# Patient Record
Sex: FEMALE | Race: WHITE | Employment: OTHER | URBAN - METROPOLITAN AREA
[De-identification: names, ages, dates, MRNs, and addresses within clinical notes are randomized per-mention and may not be internally consistent; named-entity substitution may affect disease eponyms.]

---

## 2021-02-22 PROBLEM — D68.9 COAGULOPATHY (HCC): Status: ACTIVE | Noted: 2021-02-22

## 2021-02-22 PROBLEM — D64.9 ABSOLUTE ANEMIA: Status: ACTIVE | Noted: 2021-02-22

## 2021-11-28 PROBLEM — I48.91 ATRIAL FIBRILLATION WITH RVR (HCC): Status: ACTIVE | Noted: 2021-11-28

## 2021-12-03 PROBLEM — E44.0 MODERATE PROTEIN-CALORIE MALNUTRITION (HCC): Chronic | Status: ACTIVE | Noted: 2021-12-03

## 2022-05-03 PROBLEM — I50.23 ACUTE ON CHRONIC SYSTOLIC (CONGESTIVE) HEART FAILURE (HCC): Status: ACTIVE | Noted: 2022-05-03

## 2023-08-18 PROCEDURE — 93295 DEV INTERROG REMOTE 1/2/MLT: CPT | Performed by: INTERNAL MEDICINE

## 2023-08-18 PROCEDURE — G2066 INTER DEVC REMOTE 30D: HCPCS | Performed by: INTERNAL MEDICINE

## 2023-08-18 PROCEDURE — 93296 REM INTERROG EVL PM/IDS: CPT | Performed by: INTERNAL MEDICINE

## 2023-08-18 PROCEDURE — 93297 REM INTERROG DEV EVAL ICPMS: CPT | Performed by: INTERNAL MEDICINE

## 2023-08-30 ENCOUNTER — HOSPITAL ENCOUNTER (EMERGENCY)
Age: 78
Discharge: HOME | End: 2023-08-30
Attending: EMERGENCY MEDICINE
Payer: COMMERCIAL

## 2023-08-30 ENCOUNTER — APPOINTMENT (OUTPATIENT)
Dept: ULTRASOUND IMAGING | Age: 78
End: 2023-08-30
Payer: COMMERCIAL

## 2023-08-30 VITALS
WEIGHT: 96 LBS | DIASTOLIC BLOOD PRESSURE: 63 MMHG | HEART RATE: 91 BPM | OXYGEN SATURATION: 96 % | SYSTOLIC BLOOD PRESSURE: 130 MMHG | TEMPERATURE: 98.2 F | BODY MASS INDEX: 18.14 KG/M2 | RESPIRATION RATE: 18 BRPM

## 2023-08-30 DIAGNOSIS — I80.8 SUPERFICIAL THROMBOPHLEBITIS OF RIGHT UPPER EXTREMITY: Primary | ICD-10-CM

## 2023-08-30 LAB
INR PPP: 4.1
PROTHROMBIN TIME: 44.2 SEC (ref 9.3–12.4)

## 2023-08-30 PROCEDURE — 99284 EMERGENCY DEPT VISIT MOD MDM: CPT

## 2023-08-30 PROCEDURE — 93971 EXTREMITY STUDY: CPT

## 2023-08-30 PROCEDURE — 85610 PROTHROMBIN TIME: CPT

## 2023-08-30 RX ORDER — CEPHALEXIN 250 MG/1
500 CAPSULE ORAL 3 TIMES DAILY
Qty: 30 CAPSULE | Refills: 0 | Status: SHIPPED | OUTPATIENT
Start: 2023-08-30 | End: 2023-09-04

## 2023-08-30 ASSESSMENT — PAIN - FUNCTIONAL ASSESSMENT: PAIN_FUNCTIONAL_ASSESSMENT: 0-10

## 2023-08-30 ASSESSMENT — LIFESTYLE VARIABLES
HOW OFTEN DO YOU HAVE A DRINK CONTAINING ALCOHOL: NEVER
HOW MANY STANDARD DRINKS CONTAINING ALCOHOL DO YOU HAVE ON A TYPICAL DAY: PATIENT DOES NOT DRINK

## 2023-08-30 ASSESSMENT — PAIN SCALES - GENERAL: PAINLEVEL_OUTOF10: 10

## 2023-08-30 NOTE — ED NOTES
Department of Emergency Medicine  FIRST PROVIDER TRIAGE NOTE             Independent MLP           8/30/23  1:03 PM EDT    Date of Encounter: 8/30/23   MRN: [id number]      HPI: Rosalba Lees is a 77 y.o. female who presents to the ED for Arm Pain (Right arm elbow to hand, pt thinks arm is swollen, on coumadin.)         ROS: Negative for cp or sob. PE: Gen Appearance/Constitutional: alert  HEENT: NC/NT. PERRLA,  Airway patent. Initial Plan of Care: All treatment areas with department are currently occupied. Plan to order/Initiate the following while awaiting opening in ED: none.   Initiate Treatment-Testing, Proceed toTreatment Area When Bed Available for ED Attending/MLP to Continue Care    Electronically signed by Carlos Paniagua PA-C   DD: 8/30/23      Carlos Paniagua PA-C  08/30/23 [id number]

## 2023-08-30 NOTE — ED PROVIDER NOTES
plan.       --------------------------------- IMPRESSION AND DISPOSITION ---------------------------------    IMPRESSION  1. Superficial thrombophlebitis of right upper extremity        DISPOSITION  Disposition: Discharge to home  Patient condition is stable        NOTE: This report was transcribed using voice recognition software.  Every effort was made to ensure accuracy; however, inadvertent computerized transcription errors may be present        Jerome Valentine MD  08/30/23 5635

## 2023-09-01 ENCOUNTER — HOSPITAL ENCOUNTER (EMERGENCY)
Age: 78
Discharge: ANOTHER HOSPITAL | End: 2023-09-01
Attending: EMERGENCY MEDICINE
Payer: COMMERCIAL

## 2023-09-01 ENCOUNTER — HOSPITAL ENCOUNTER (INPATIENT)
Age: 78
LOS: 2 days | Discharge: HOME | DRG: 868 | End: 2023-09-03
Attending: INTERNAL MEDICINE | Admitting: INTERNAL MEDICINE
Payer: MEDICARE

## 2023-09-01 ENCOUNTER — APPOINTMENT (OUTPATIENT)
Dept: GENERAL RADIOLOGY | Age: 78
End: 2023-09-01
Payer: COMMERCIAL

## 2023-09-01 VITALS
TEMPERATURE: 97.9 F | RESPIRATION RATE: 18 BRPM | OXYGEN SATURATION: 96 % | SYSTOLIC BLOOD PRESSURE: 110 MMHG | HEART RATE: 80 BPM | DIASTOLIC BLOOD PRESSURE: 60 MMHG

## 2023-09-01 DIAGNOSIS — L03.113 CELLULITIS OF RIGHT UPPER EXTREMITY: Primary | ICD-10-CM

## 2023-09-01 PROBLEM — L03.90 CELLULITIS: Status: ACTIVE | Noted: 2023-09-01

## 2023-09-01 LAB
ANION GAP SERPL CALCULATED.3IONS-SCNC: 11 MMOL/L (ref 7–16)
BASOPHILS # BLD: 0.03 K/UL (ref 0–0.2)
BASOPHILS NFR BLD: 1 % (ref 0–2)
BUN SERPL-MCNC: 18 MG/DL (ref 6–23)
CALCIUM SERPL-MCNC: 9.4 MG/DL (ref 8.6–10.2)
CHLORIDE SERPL-SCNC: 98 MMOL/L (ref 98–107)
CO2 SERPL-SCNC: 29 MMOL/L (ref 22–29)
CREAT SERPL-MCNC: 1.1 MG/DL (ref 0.5–1)
EOSINOPHIL # BLD: 0.21 K/UL (ref 0.05–0.5)
EOSINOPHILS RELATIVE PERCENT: 4 % (ref 0–6)
ERYTHROCYTE [DISTWIDTH] IN BLOOD BY AUTOMATED COUNT: 17.7 % (ref 11.5–15)
GFR SERPL CREATININE-BSD FRML MDRD: 49 ML/MIN/1.73M2
GLUCOSE SERPL-MCNC: 98 MG/DL (ref 74–99)
HCT VFR BLD AUTO: 42.6 % (ref 34–48)
HGB BLD-MCNC: 12.6 G/DL (ref 11.5–15.5)
IMM GRANULOCYTES # BLD AUTO: <0.03 K/UL (ref 0–0.58)
IMM GRANULOCYTES NFR BLD: 0 % (ref 0–5)
INR PPP: 2.5
LYMPHOCYTES NFR BLD: 0.42 K/UL (ref 1.5–4)
LYMPHOCYTES RELATIVE PERCENT: 8 % (ref 20–42)
MCH RBC QN AUTO: 25.6 PG (ref 26–35)
MCHC RBC AUTO-ENTMCNC: 29.6 G/DL (ref 32–34.5)
MCV RBC AUTO: 86.6 FL (ref 80–99.9)
MICROORGANISM SPEC CULT: NORMAL
MICROORGANISM SPEC CULT: NORMAL
MONOCYTES NFR BLD: 0.62 K/UL (ref 0.1–0.95)
MONOCYTES NFR BLD: 11 % (ref 2–12)
NEUTROPHILS NFR BLD: 77 % (ref 43–80)
NEUTS SEG NFR BLD: 4.25 K/UL (ref 1.8–7.3)
PLATELET # BLD AUTO: 160 K/UL (ref 130–450)
PMV BLD AUTO: 12 FL (ref 7–12)
POTASSIUM SERPL-SCNC: 4.8 MMOL/L (ref 3.5–5)
PROTHROMBIN TIME: 27.8 SEC (ref 9.3–12.4)
RBC # BLD AUTO: 4.92 M/UL (ref 3.5–5.5)
SERVICE CMNT-IMP: NORMAL
SERVICE CMNT-IMP: NORMAL
SODIUM SERPL-SCNC: 138 MMOL/L (ref 132–146)
SPECIMEN DESCRIPTION: NORMAL
SPECIMEN DESCRIPTION: NORMAL
WBC OTHER # BLD: 5.6 K/UL (ref 4.5–11.5)

## 2023-09-01 PROCEDURE — 96375 TX/PRO/DX INJ NEW DRUG ADDON: CPT

## 2023-09-01 PROCEDURE — 1200000000 HC SEMI PRIVATE

## 2023-09-01 PROCEDURE — 87040 BLOOD CULTURE FOR BACTERIA: CPT

## 2023-09-01 PROCEDURE — 2580000003 HC RX 258

## 2023-09-01 PROCEDURE — 6360000002 HC RX W HCPCS: Performed by: STUDENT IN AN ORGANIZED HEALTH CARE EDUCATION/TRAINING PROGRAM

## 2023-09-01 PROCEDURE — 96374 THER/PROPH/DIAG INJ IV PUSH: CPT

## 2023-09-01 PROCEDURE — 80048 BASIC METABOLIC PNL TOTAL CA: CPT

## 2023-09-01 PROCEDURE — 85610 PROTHROMBIN TIME: CPT

## 2023-09-01 PROCEDURE — 73130 X-RAY EXAM OF HAND: CPT

## 2023-09-01 PROCEDURE — 85025 COMPLETE CBC W/AUTO DIFF WBC: CPT

## 2023-09-01 PROCEDURE — 99285 EMERGENCY DEPT VISIT HI MDM: CPT

## 2023-09-01 PROCEDURE — 2580000003 HC RX 258: Performed by: STUDENT IN AN ORGANIZED HEALTH CARE EDUCATION/TRAINING PROGRAM

## 2023-09-01 PROCEDURE — 6360000002 HC RX W HCPCS: Performed by: INTERNAL MEDICINE

## 2023-09-01 PROCEDURE — 6370000000 HC RX 637 (ALT 250 FOR IP): Performed by: INTERNAL MEDICINE

## 2023-09-01 PROCEDURE — 2580000003 HC RX 258: Performed by: INTERNAL MEDICINE

## 2023-09-01 PROCEDURE — 6360000002 HC RX W HCPCS

## 2023-09-01 PROCEDURE — 73090 X-RAY EXAM OF FOREARM: CPT

## 2023-09-01 RX ORDER — ACETAMINOPHEN 325 MG/1
650 TABLET ORAL EVERY 6 HOURS PRN
Status: DISCONTINUED | OUTPATIENT
Start: 2023-09-01 | End: 2023-09-03 | Stop reason: HOSPADM

## 2023-09-01 RX ORDER — SENNOSIDES A AND B 8.6 MG/1
1 TABLET, FILM COATED ORAL DAILY PRN
Status: DISCONTINUED | OUTPATIENT
Start: 2023-09-01 | End: 2023-09-03 | Stop reason: HOSPADM

## 2023-09-01 RX ORDER — ACETAMINOPHEN 650 MG/1
650 SUPPOSITORY RECTAL EVERY 6 HOURS PRN
Status: CANCELLED | OUTPATIENT
Start: 2023-09-01

## 2023-09-01 RX ORDER — ACETAMINOPHEN 650 MG/1
650 SUPPOSITORY RECTAL EVERY 6 HOURS PRN
Status: DISCONTINUED | OUTPATIENT
Start: 2023-09-01 | End: 2023-09-03 | Stop reason: HOSPADM

## 2023-09-01 RX ORDER — POTASSIUM CHLORIDE 7.45 MG/ML
10 INJECTION INTRAVENOUS PRN
Status: DISCONTINUED | OUTPATIENT
Start: 2023-09-01 | End: 2023-09-01

## 2023-09-01 RX ORDER — FENTANYL CITRATE 50 UG/ML
25 INJECTION, SOLUTION INTRAMUSCULAR; INTRAVENOUS ONCE
Status: COMPLETED | OUTPATIENT
Start: 2023-09-01 | End: 2023-09-01

## 2023-09-01 RX ORDER — POTASSIUM CHLORIDE 20 MEQ/1
40 TABLET, EXTENDED RELEASE ORAL PRN
Status: DISCONTINUED | OUTPATIENT
Start: 2023-09-01 | End: 2023-09-01

## 2023-09-01 RX ORDER — CHOLECALCIFEROL (VITAMIN D3) 50 MCG
2000 TABLET ORAL DAILY
Status: DISCONTINUED | OUTPATIENT
Start: 2023-09-01 | End: 2023-09-03 | Stop reason: HOSPADM

## 2023-09-01 RX ORDER — DIGOXIN 125 MCG
62.5 TABLET ORAL
Status: DISCONTINUED | OUTPATIENT
Start: 2023-09-01 | End: 2023-09-03 | Stop reason: HOSPADM

## 2023-09-01 RX ORDER — ARFORMOTEROL TARTRATE 15 UG/2ML
15 SOLUTION RESPIRATORY (INHALATION)
Status: DISCONTINUED | OUTPATIENT
Start: 2023-09-01 | End: 2023-09-03 | Stop reason: HOSPADM

## 2023-09-01 RX ORDER — BUDESONIDE AND FORMOTEROL FUMARATE DIHYDRATE 160; 4.5 UG/1; UG/1
2 AEROSOL RESPIRATORY (INHALATION) 2 TIMES DAILY
Status: CANCELLED | OUTPATIENT
Start: 2023-09-01

## 2023-09-01 RX ORDER — CALCITRIOL 0.25 UG/1
0.25 CAPSULE, LIQUID FILLED ORAL DAILY
Status: CANCELLED | OUTPATIENT
Start: 2023-09-01

## 2023-09-01 RX ORDER — VITAMIN C
1 TAB ORAL DAILY
Status: DISCONTINUED | OUTPATIENT
Start: 2023-09-01 | End: 2023-09-03 | Stop reason: HOSPADM

## 2023-09-01 RX ORDER — VITAMIN B COMPLEX
1 CAPSULE ORAL DAILY
Status: CANCELLED | OUTPATIENT
Start: 2023-09-01

## 2023-09-01 RX ORDER — BUDESONIDE 0.5 MG/2ML
0.5 INHALANT ORAL
Status: DISCONTINUED | OUTPATIENT
Start: 2023-09-01 | End: 2023-09-03 | Stop reason: HOSPADM

## 2023-09-01 RX ORDER — MULTIVIT-MIN/IRON/FOLIC ACID/K 18-600-40
2000 CAPSULE ORAL DAILY
Status: CANCELLED | OUTPATIENT
Start: 2023-09-01

## 2023-09-01 RX ORDER — ONDANSETRON 4 MG/1
4 TABLET, ORALLY DISINTEGRATING ORAL EVERY 8 HOURS PRN
Status: DISCONTINUED | OUTPATIENT
Start: 2023-09-01 | End: 2023-09-03 | Stop reason: HOSPADM

## 2023-09-01 RX ORDER — METOPROLOL SUCCINATE 25 MG/1
25 TABLET, EXTENDED RELEASE ORAL 2 TIMES DAILY WITH MEALS
Status: DISCONTINUED | OUTPATIENT
Start: 2023-09-01 | End: 2023-09-02

## 2023-09-01 RX ORDER — LEVOTHYROXINE SODIUM 112 UG/1
112 TABLET ORAL DAILY
Status: CANCELLED | OUTPATIENT
Start: 2023-09-01

## 2023-09-01 RX ORDER — SODIUM CHLORIDE 0.9 % (FLUSH) 0.9 %
10 SYRINGE (ML) INJECTION EVERY 12 HOURS SCHEDULED
Status: DISCONTINUED | OUTPATIENT
Start: 2023-09-01 | End: 2023-09-03 | Stop reason: HOSPADM

## 2023-09-01 RX ORDER — TORSEMIDE 20 MG/1
20 TABLET ORAL DAILY
Status: DISCONTINUED | OUTPATIENT
Start: 2023-09-01 | End: 2023-09-02

## 2023-09-01 RX ORDER — METOPROLOL SUCCINATE 25 MG/1
25 TABLET, EXTENDED RELEASE ORAL 2 TIMES DAILY WITH MEALS
Status: CANCELLED | OUTPATIENT
Start: 2023-09-01

## 2023-09-01 RX ORDER — METOLAZONE 2.5 MG/1
2.5 TABLET ORAL DAILY
Status: DISCONTINUED | OUTPATIENT
Start: 2023-09-01 | End: 2023-09-02

## 2023-09-01 RX ORDER — DIGOXIN 0.06 MG/1
62.5 TABLET ORAL SEE ADMIN INSTRUCTIONS
Status: CANCELLED | OUTPATIENT
Start: 2023-09-01

## 2023-09-01 RX ORDER — SENNOSIDES A AND B 8.6 MG/1
1 TABLET, FILM COATED ORAL DAILY PRN
Status: CANCELLED | OUTPATIENT
Start: 2023-09-01

## 2023-09-01 RX ORDER — ALLOPURINOL 100 MG/1
100 TABLET ORAL DAILY
Status: DISCONTINUED | OUTPATIENT
Start: 2023-09-01 | End: 2023-09-03 | Stop reason: HOSPADM

## 2023-09-01 RX ORDER — BUDESONIDE AND FORMOTEROL FUMARATE DIHYDRATE 160; 4.5 UG/1; UG/1
2 AEROSOL RESPIRATORY (INHALATION) 2 TIMES DAILY
Status: DISCONTINUED | OUTPATIENT
Start: 2023-09-01 | End: 2023-09-01 | Stop reason: CLARIF

## 2023-09-01 RX ORDER — FOLIC ACID 1 MG/1
1 TABLET ORAL DAILY
Status: DISCONTINUED | OUTPATIENT
Start: 2023-09-01 | End: 2023-09-03 | Stop reason: HOSPADM

## 2023-09-01 RX ORDER — MAGNESIUM SULFATE IN WATER 40 MG/ML
2000 INJECTION, SOLUTION INTRAVENOUS PRN
Status: CANCELLED | OUTPATIENT
Start: 2023-09-01

## 2023-09-01 RX ORDER — SODIUM CHLORIDE 9 MG/ML
INJECTION, SOLUTION INTRAVENOUS PRN
Status: DISCONTINUED | OUTPATIENT
Start: 2023-09-01 | End: 2023-09-03 | Stop reason: HOSPADM

## 2023-09-01 RX ORDER — SODIUM CHLORIDE 0.9 % (FLUSH) 0.9 %
10 SYRINGE (ML) INJECTION PRN
Status: DISCONTINUED | OUTPATIENT
Start: 2023-09-01 | End: 2023-09-03 | Stop reason: HOSPADM

## 2023-09-01 RX ORDER — LEVOTHYROXINE SODIUM 112 UG/1
112 TABLET ORAL DAILY
Status: DISCONTINUED | OUTPATIENT
Start: 2023-09-02 | End: 2023-09-03 | Stop reason: HOSPADM

## 2023-09-01 RX ORDER — FERROUS SULFATE 325(65) MG
325 TABLET ORAL 2 TIMES DAILY
Status: CANCELLED | OUTPATIENT
Start: 2023-09-01

## 2023-09-01 RX ORDER — MAGNESIUM SULFATE IN WATER 40 MG/ML
2000 INJECTION, SOLUTION INTRAVENOUS PRN
Status: DISCONTINUED | OUTPATIENT
Start: 2023-09-01 | End: 2023-09-01

## 2023-09-01 RX ORDER — ALLOPURINOL 100 MG/1
100 TABLET ORAL DAILY
Status: CANCELLED | OUTPATIENT
Start: 2023-09-01

## 2023-09-01 RX ORDER — ONDANSETRON 2 MG/ML
4 INJECTION INTRAMUSCULAR; INTRAVENOUS EVERY 6 HOURS PRN
Status: CANCELLED | OUTPATIENT
Start: 2023-09-01

## 2023-09-01 RX ORDER — ONDANSETRON 2 MG/ML
4 INJECTION INTRAMUSCULAR; INTRAVENOUS EVERY 6 HOURS PRN
Status: DISCONTINUED | OUTPATIENT
Start: 2023-09-01 | End: 2023-09-03 | Stop reason: HOSPADM

## 2023-09-01 RX ORDER — FOLIC ACID 1 MG/1
1 TABLET ORAL DAILY
Status: CANCELLED | OUTPATIENT
Start: 2023-09-01

## 2023-09-01 RX ORDER — SODIUM CHLORIDE 0.9 % (FLUSH) 0.9 %
10 SYRINGE (ML) INJECTION PRN
Status: CANCELLED | OUTPATIENT
Start: 2023-09-01

## 2023-09-01 RX ORDER — FERROUS SULFATE 325(65) MG
325 TABLET ORAL 2 TIMES DAILY
Status: DISCONTINUED | OUTPATIENT
Start: 2023-09-01 | End: 2023-09-03 | Stop reason: HOSPADM

## 2023-09-01 RX ORDER — SODIUM CHLORIDE 0.9 % (FLUSH) 0.9 %
10 SYRINGE (ML) INJECTION EVERY 12 HOURS SCHEDULED
Status: CANCELLED | OUTPATIENT
Start: 2023-09-01

## 2023-09-01 RX ORDER — CALCITRIOL 0.25 UG/1
0.25 CAPSULE, LIQUID FILLED ORAL DAILY
Status: DISCONTINUED | OUTPATIENT
Start: 2023-09-01 | End: 2023-09-03 | Stop reason: HOSPADM

## 2023-09-01 RX ORDER — POTASSIUM CHLORIDE 20 MEQ/1
40 TABLET, EXTENDED RELEASE ORAL PRN
Status: CANCELLED | OUTPATIENT
Start: 2023-09-01

## 2023-09-01 RX ORDER — WARFARIN SODIUM 2.5 MG/1
2.5 TABLET ORAL
Status: COMPLETED | OUTPATIENT
Start: 2023-09-01 | End: 2023-09-01

## 2023-09-01 RX ORDER — ACETAMINOPHEN 325 MG/1
650 TABLET ORAL EVERY 6 HOURS PRN
Status: CANCELLED | OUTPATIENT
Start: 2023-09-01

## 2023-09-01 RX ORDER — POTASSIUM CHLORIDE 7.45 MG/ML
10 INJECTION INTRAVENOUS PRN
Status: CANCELLED | OUTPATIENT
Start: 2023-09-01

## 2023-09-01 RX ORDER — METOLAZONE 2.5 MG/1
2.5 TABLET ORAL DAILY
Status: CANCELLED | OUTPATIENT
Start: 2023-09-01

## 2023-09-01 RX ORDER — ONDANSETRON 4 MG/1
4 TABLET, ORALLY DISINTEGRATING ORAL EVERY 8 HOURS PRN
Status: CANCELLED | OUTPATIENT
Start: 2023-09-01

## 2023-09-01 RX ORDER — SODIUM CHLORIDE 9 MG/ML
INJECTION, SOLUTION INTRAVENOUS PRN
Status: CANCELLED | OUTPATIENT
Start: 2023-09-01

## 2023-09-01 RX ORDER — TORSEMIDE 20 MG/1
20 TABLET ORAL DAILY
Status: CANCELLED | OUTPATIENT
Start: 2023-09-01

## 2023-09-01 RX ORDER — POTASSIUM CHLORIDE 750 MG/1
10 TABLET, EXTENDED RELEASE ORAL DAILY
Status: CANCELLED | OUTPATIENT
Start: 2023-09-01

## 2023-09-01 RX ORDER — IPRATROPIUM BROMIDE AND ALBUTEROL SULFATE 2.5; .5 MG/3ML; MG/3ML
1 SOLUTION RESPIRATORY (INHALATION)
Status: DISCONTINUED | OUTPATIENT
Start: 2023-09-01 | End: 2023-09-03 | Stop reason: HOSPADM

## 2023-09-01 RX ORDER — POTASSIUM CHLORIDE 750 MG/1
10 TABLET, EXTENDED RELEASE ORAL DAILY
Status: DISCONTINUED | OUTPATIENT
Start: 2023-09-01 | End: 2023-09-03 | Stop reason: HOSPADM

## 2023-09-01 RX ORDER — LANOLIN ALCOHOL/MO/W.PET/CERES
3 CREAM (GRAM) TOPICAL NIGHTLY PRN
Status: CANCELLED | OUTPATIENT
Start: 2023-09-01

## 2023-09-01 RX ORDER — LANOLIN ALCOHOL/MO/W.PET/CERES
3 CREAM (GRAM) TOPICAL NIGHTLY PRN
Status: DISCONTINUED | OUTPATIENT
Start: 2023-09-01 | End: 2023-09-03 | Stop reason: HOSPADM

## 2023-09-01 RX ADMIN — POTASSIUM CHLORIDE 10 MEQ: 750 TABLET, EXTENDED RELEASE ORAL at 21:34

## 2023-09-01 RX ADMIN — CALCITRIOL 0.25 MCG: 0.25 CAPSULE ORAL at 21:34

## 2023-09-01 RX ADMIN — Medication 1 TABLET: at 21:34

## 2023-09-01 RX ADMIN — SODIUM CHLORIDE, PRESERVATIVE FREE 10 ML: 5 INJECTION INTRAVENOUS at 22:30

## 2023-09-01 RX ADMIN — WATER 1000 MG: 1 INJECTION INTRAMUSCULAR; INTRAVENOUS; SUBCUTANEOUS at 11:48

## 2023-09-01 RX ADMIN — DIGOXIN 62.5 MCG: 0.12 TABLET ORAL at 22:25

## 2023-09-01 RX ADMIN — Medication 2000 UNITS: at 21:36

## 2023-09-01 RX ADMIN — METOPROLOL SUCCINATE 25 MG: 25 TABLET, EXTENDED RELEASE ORAL at 21:35

## 2023-09-01 RX ADMIN — ONDANSETRON 4 MG: 2 INJECTION INTRAMUSCULAR; INTRAVENOUS at 22:35

## 2023-09-01 RX ADMIN — ALLOPURINOL 100 MG: 100 TABLET ORAL at 21:35

## 2023-09-01 RX ADMIN — TORSEMIDE 20 MG: 20 TABLET ORAL at 21:35

## 2023-09-01 RX ADMIN — WATER 2000 MG: 1 INJECTION INTRAMUSCULAR; INTRAVENOUS; SUBCUTANEOUS at 22:26

## 2023-09-01 RX ADMIN — WARFARIN SODIUM 2.5 MG: 2.5 TABLET ORAL at 21:35

## 2023-09-01 RX ADMIN — FERROUS SULFATE TAB 325 MG (65 MG ELEMENTAL FE) 325 MG: 325 (65 FE) TAB at 21:34

## 2023-09-01 RX ADMIN — FENTANYL CITRATE 25 MCG: 50 INJECTION, SOLUTION INTRAMUSCULAR; INTRAVENOUS at 11:47

## 2023-09-01 ASSESSMENT — PAIN DESCRIPTION - DESCRIPTORS
DESCRIPTORS: PATIENT UNABLE TO DESCRIBE
DESCRIPTORS: THROBBING;DISCOMFORT
DESCRIPTORS: ACHING;THROBBING;DISCOMFORT

## 2023-09-01 ASSESSMENT — PAIN SCALES - GENERAL
PAINLEVEL_OUTOF10: 10
PAINLEVEL_OUTOF10: 10
PAINLEVEL_OUTOF10: 8

## 2023-09-01 ASSESSMENT — PAIN DESCRIPTION - LOCATION
LOCATION: HAND
LOCATION: HAND
LOCATION: ARM

## 2023-09-01 ASSESSMENT — PAIN DESCRIPTION - PAIN TYPE: TYPE: ACUTE PAIN

## 2023-09-01 ASSESSMENT — PAIN DESCRIPTION - ORIENTATION
ORIENTATION: RIGHT

## 2023-09-01 ASSESSMENT — PAIN - FUNCTIONAL ASSESSMENT: PAIN_FUNCTIONAL_ASSESSMENT: 0-10

## 2023-09-01 ASSESSMENT — PAIN DESCRIPTION - FREQUENCY: FREQUENCY: CONTINUOUS

## 2023-09-01 ASSESSMENT — LIFESTYLE VARIABLES: HOW OFTEN DO YOU HAVE A DRINK CONTAINING ALCOHOL: NEVER

## 2023-09-01 ASSESSMENT — PAIN DESCRIPTION - ONSET: ONSET: ON-GOING

## 2023-09-01 NOTE — ED PROVIDER NOTES
[address] [address], [facility] ENCOUNTER        Pt Name: James Omalley  MRN: [id number]  [address] [facility] [place] 1945  Date of evaluation: 9/1/2023  Provider: Luz Marina Churchill DO  PCP: Seth Guardado MD  Note Started: 11:29 AM EDT 9/1/23    CHIEF COMPLAINT       Chief Complaint   Patient presents with    Swelling     Swelling to right hand. Seen at the emergency room on 8/30 and prescribed cephalexin. Diagnosed with thrombophlebitis. Not getting better. HISTORY OF PRESENT ILLNESS: 1 or more Elements   History From: Patient    Limitations to history : None    James Omalley is a 77 y.o. female who presents to the emergency department with chief complaint of hand swelling. Patient states that she had routine lab work drawn on 8/29/2023 in her right forearm and states that on 8/30/2023 she woke up and was having erythema around the IV site and some tenderness on the right lateral forearm. She states that she was evaluated at [facility] emergency department at that time and was diagnosed with thrombophlebitis and was prescribed Keflex for infection and inflammation. Patient states that since that time she has not taken the medication as directed but has not had significant improvement of her symptoms. She states that symptoms have actually worsened and she is now having pain and swelling in the right hand as well as the right forearm. Patient does have mild streaking progressing from this area towards her right axillary region as well. Nothing seems to make the symptoms any better and they are worsened with palpation or movement of the hand. Patient denies any fever, chills, nausea, vomiting, chest pain, shortness of breath, abdominal pain, hematuria or dysuria, constipation or diarrhea. Nursing Notes were all reviewed and agreed with or any disagreements were addressed in the HPI.     REVIEW OF SYSTEMS :      Positives and

## 2023-09-02 PROBLEM — I25.10 CAD (CORONARY ARTERY DISEASE): Status: ACTIVE | Noted: 2023-09-02

## 2023-09-02 PROBLEM — L03.90 CELLULITIS: Status: RESOLVED | Noted: 2023-09-01 | Resolved: 2023-09-02

## 2023-09-02 LAB
ALBUMIN SERPL-MCNC: 3.7 G/DL (ref 3.5–5.2)
ALP SERPL-CCNC: 76 U/L (ref 35–104)
ALT SERPL-CCNC: 13 U/L (ref 0–32)
ANION GAP SERPL CALCULATED.3IONS-SCNC: 11 MMOL/L (ref 7–16)
AST SERPL-CCNC: 37 U/L (ref 0–31)
BASOPHILS # BLD: 0.02 K/UL (ref 0–0.2)
BASOPHILS NFR BLD: 0 % (ref 0–2)
BILIRUB SERPL-MCNC: 0.6 MG/DL (ref 0–1.2)
BUN SERPL-MCNC: 18 MG/DL (ref 6–23)
CALCIUM SERPL-MCNC: 9.2 MG/DL (ref 8.6–10.2)
CHLORIDE SERPL-SCNC: 100 MMOL/L (ref 98–107)
CO2 SERPL-SCNC: 28 MMOL/L (ref 22–29)
CREAT SERPL-MCNC: 1.1 MG/DL (ref 0.5–1)
EOSINOPHIL # BLD: 0.4 K/UL (ref 0.05–0.5)
EOSINOPHILS RELATIVE PERCENT: 7 % (ref 0–6)
ERYTHROCYTE [DISTWIDTH] IN BLOOD BY AUTOMATED COUNT: 16.9 % (ref 11.5–15)
GFR SERPL CREATININE-BSD FRML MDRD: 49 ML/MIN/1.73M2
GLUCOSE SERPL-MCNC: 106 MG/DL (ref 74–99)
HCT VFR BLD AUTO: 38.8 % (ref 34–48)
HGB BLD-MCNC: 11.4 G/DL (ref 11.5–15.5)
IMM GRANULOCYTES # BLD AUTO: <0.03 K/UL (ref 0–0.58)
IMM GRANULOCYTES NFR BLD: 0 % (ref 0–5)
INR PPP: 2.7
LYMPHOCYTES NFR BLD: 0.51 K/UL (ref 1.5–4)
LYMPHOCYTES RELATIVE PERCENT: 9 % (ref 20–42)
MCH RBC QN AUTO: 26 PG (ref 26–35)
MCHC RBC AUTO-ENTMCNC: 29.4 G/DL (ref 32–34.5)
MCV RBC AUTO: 88.6 FL (ref 80–99.9)
MONOCYTES NFR BLD: 0.75 K/UL (ref 0.1–0.95)
MONOCYTES NFR BLD: 13 % (ref 2–12)
NEUTROPHILS NFR BLD: 72 % (ref 43–80)
NEUTS SEG NFR BLD: 4.27 K/UL (ref 1.8–7.3)
PLATELET # BLD AUTO: 165 K/UL (ref 130–450)
PMV BLD AUTO: 12.5 FL (ref 7–12)
POTASSIUM SERPL-SCNC: 4.4 MMOL/L (ref 3.5–5)
PROT SERPL-MCNC: 7.1 G/DL (ref 6.4–8.3)
PROTHROMBIN TIME: 30.1 SEC (ref 9.3–12.4)
RBC # BLD AUTO: 4.38 M/UL (ref 3.5–5.5)
RBC # BLD: ABNORMAL 10*6/UL
SODIUM SERPL-SCNC: 139 MMOL/L (ref 132–146)
WBC OTHER # BLD: 6 K/UL (ref 4.5–11.5)

## 2023-09-02 PROCEDURE — 2580000003 HC RX 258: Performed by: STUDENT IN AN ORGANIZED HEALTH CARE EDUCATION/TRAINING PROGRAM

## 2023-09-02 PROCEDURE — 85610 PROTHROMBIN TIME: CPT

## 2023-09-02 PROCEDURE — 36415 COLL VENOUS BLD VENIPUNCTURE: CPT

## 2023-09-02 PROCEDURE — 1200000000 HC SEMI PRIVATE

## 2023-09-02 PROCEDURE — 2580000003 HC RX 258: Performed by: INTERNAL MEDICINE

## 2023-09-02 PROCEDURE — 85025 COMPLETE CBC W/AUTO DIFF WBC: CPT

## 2023-09-02 PROCEDURE — 6370000000 HC RX 637 (ALT 250 FOR IP): Performed by: INTERNAL MEDICINE

## 2023-09-02 PROCEDURE — 80053 COMPREHEN METABOLIC PANEL: CPT

## 2023-09-02 PROCEDURE — 6360000002 HC RX W HCPCS: Performed by: STUDENT IN AN ORGANIZED HEALTH CARE EDUCATION/TRAINING PROGRAM

## 2023-09-02 RX ORDER — TORSEMIDE 20 MG/1
20 TABLET ORAL DAILY
Status: DISCONTINUED | OUTPATIENT
Start: 2023-09-03 | End: 2023-09-02

## 2023-09-02 RX ORDER — METOPROLOL SUCCINATE 25 MG/1
25 TABLET, EXTENDED RELEASE ORAL 2 TIMES DAILY WITH MEALS
Status: DISCONTINUED | OUTPATIENT
Start: 2023-09-02 | End: 2023-09-02

## 2023-09-02 RX ORDER — METOPROLOL SUCCINATE 25 MG/1
25 TABLET, EXTENDED RELEASE ORAL 2 TIMES DAILY
Status: DISCONTINUED | OUTPATIENT
Start: 2023-09-02 | End: 2023-09-03 | Stop reason: HOSPADM

## 2023-09-02 RX ORDER — WARFARIN SODIUM 2 MG/1
2 TABLET ORAL
Status: COMPLETED | OUTPATIENT
Start: 2023-09-02 | End: 2023-09-02

## 2023-09-02 RX ORDER — TORSEMIDE 20 MG/1
20 TABLET ORAL DAILY
Status: DISCONTINUED | OUTPATIENT
Start: 2023-09-03 | End: 2023-09-03 | Stop reason: HOSPADM

## 2023-09-02 RX ADMIN — METOPROLOL SUCCINATE 25 MG: 25 TABLET, EXTENDED RELEASE ORAL at 17:05

## 2023-09-02 RX ADMIN — WARFARIN SODIUM 2 MG: 2 TABLET ORAL at 17:05

## 2023-09-02 RX ADMIN — ALLOPURINOL 100 MG: 100 TABLET ORAL at 11:55

## 2023-09-02 RX ADMIN — WATER 2000 MG: 1 INJECTION INTRAMUSCULAR; INTRAVENOUS; SUBCUTANEOUS at 08:54

## 2023-09-02 RX ADMIN — WATER 2000 MG: 1 INJECTION INTRAMUSCULAR; INTRAVENOUS; SUBCUTANEOUS at 18:39

## 2023-09-02 RX ADMIN — FERROUS SULFATE TAB 325 MG (65 MG ELEMENTAL FE) 325 MG: 325 (65 FE) TAB at 08:53

## 2023-09-02 RX ADMIN — ACETAMINOPHEN 650 MG: 325 TABLET ORAL at 17:06

## 2023-09-02 RX ADMIN — FOLIC ACID 1 MG: 1 TABLET ORAL at 11:55

## 2023-09-02 RX ADMIN — LEVOTHYROXINE SODIUM 112 MCG: 0.11 TABLET ORAL at 06:22

## 2023-09-02 RX ADMIN — CALCITRIOL 0.25 MCG: 0.25 CAPSULE ORAL at 11:55

## 2023-09-02 RX ADMIN — Medication 1 TABLET: at 13:11

## 2023-09-02 RX ADMIN — POTASSIUM CHLORIDE 10 MEQ: 750 TABLET, EXTENDED RELEASE ORAL at 13:11

## 2023-09-02 RX ADMIN — SODIUM CHLORIDE, PRESERVATIVE FREE 10 ML: 5 INJECTION INTRAVENOUS at 08:54

## 2023-09-02 RX ADMIN — SODIUM CHLORIDE, PRESERVATIVE FREE 10 ML: 5 INJECTION INTRAVENOUS at 20:42

## 2023-09-02 RX ADMIN — FERROUS SULFATE TAB 325 MG (65 MG ELEMENTAL FE) 325 MG: 325 (65 FE) TAB at 20:42

## 2023-09-02 RX ADMIN — Medication 2000 UNITS: at 13:11

## 2023-09-02 ASSESSMENT — PAIN SCALES - GENERAL
PAINLEVEL_OUTOF10: 0
PAINLEVEL_OUTOF10: 5

## 2023-09-02 ASSESSMENT — PAIN DESCRIPTION - DESCRIPTORS: DESCRIPTORS: ACHING;DISCOMFORT

## 2023-09-02 ASSESSMENT — PAIN DESCRIPTION - ORIENTATION: ORIENTATION: RIGHT

## 2023-09-02 ASSESSMENT — PAIN DESCRIPTION - LOCATION: LOCATION: ARM

## 2023-09-02 NOTE — CONSULTS
Lab Results   Component Value Date/Time    TSH 0.453 11/28/2021 12:04 PM       Lab Results   Component Value Date/Time    COLORU Yellow 05/03/2022 03:23 PM    PHUR 6.0 05/03/2022 03:23 PM    WBCUA 0-1 05/03/2022 03:23 PM    WBCUA 1-3 04/05/2012 10:15 AM    RBCUA NONE 05/03/2022 03:23 PM    RBCUA NONE 07/18/2013 12:20 PM    BACTERIA RARE 05/03/2022 03:23 PM    CLARITYU Clear 05/03/2022 03:23 PM    SPECGRAV <=1.005 05/03/2022 03:23 PM    LEUKOCYTESUR Negative 05/03/2022 03:23 PM    UROBILINOGEN 0.2 05/03/2022 03:23 PM    BILIRUBINUR Negative 05/03/2022 03:23 PM    BILIRUBINUR NEGATIVE 04/05/2012 10:15 AM    BLOODU Negative 05/03/2022 03:23 PM    GLUCOSEU Negative 05/03/2022 03:23 PM    GLUCOSEU NEGATIVE 04/05/2012 10:15 AM       No results found for: Elaine Ashlee, X4XUEFUB, PHART, THGBART, QKB3URE, PO2ART, UXW7HRS  Radiology:  No orders to display       Microbiology:  Pending  No results for input(s): BC in the last 72 hours. No results for input(s): ORG in the last 72 hours. No results for input(s): Gwenetta Punter in the last 72 hours. No results for input(s): STREPNEUMAGU in the last 72 hours. No results for input(s): LP1UAG in the last 72 hours. No results for input(s): ASO in the last 72 hours. No results for input(s): CULTRESP in the last 72 hours. Assessment:  Right forearm/hand cellulitis/septic thrombophlebitis after IV Stick:     Plan:    Cont IV cefazolin 2gr q 8  Will follow clinical course and adjust antibiotics,  Monitor labs  Will follow with you    Thank you for having us see this patient in consultation. I will be discussing this case with the treating physicians.       Electronically signed by Angi Graf MD on 9/2/2023 at 11:01 AM

## 2023-09-02 NOTE — H&P
Internal Medicine History & Physical     Name: Delta Helms  : 1945  Chief Complaint: hand pain  Primary Care Physician: Teresa Patterson MD  Admission date: 2023  Date of service: 2023   [facility]     History of Present Illness  Dorothy Peguero is a 77y.o. year old female. The patient presents with right arm pain and edema that has been going on for 5-6 days after and IV stick to check her PT/INR on . She was given ABX in the [facility] ([facility] ED and she only took 1 day of them (Keflex). Her sx got worse so she came back to the hospital. These symptoms are moderate in severity. Symptoms are made better by the current ABX. Symptoms are made worse by nothing. Associated symptoms include nothing else. She never had a fever. There are no family or friends at bedside. The history is provided by the patient. She is felt to be a good historian. ED course:   Initial blood work and imaging studies performed. Admission recommended by ED physician. I discussed the case with the ED provider.  Meds in the ED consisted of the following:     Medications   cefTRIAXone (ROCEPHIN) 1,000 mg in sterile water 10 mL IV syringe (1,000 mg IntraVENous Given 23 1148)   fentaNYL (SUBLIMAZE) injection 25 mcg (25 mcg IntraVENous Given 23 1147)        Past Medical History:   Diagnosis Date    Acute exacerbation of CHF (congestive heart failure) ([address]) 2015    alpha one neg PiM >34uM    Acute renal failure ([address]) 10/27/2011    Aneurysm ([address])     thoracic aortic    CAD (coronary artery disease)     CRF (chronic renal failure)     Epistaxis 2014    Gastrointestinal bleeding, lower 2012    H/O anemia of chronic disorder 2012    Hypertension     Thyroid disease        Past Surgical History:   Procedure Laterality Date    AORTIC VALVULOPLASTY      CARDIAC DEFIBRILLATOR PLACEMENT Left 2022    [employer] CRT-D  (Dr. Fortino Owusu)    [facility]

## 2023-09-03 VITALS
OXYGEN SATURATION: 92 % | HEART RATE: 75 BPM | SYSTOLIC BLOOD PRESSURE: 109 MMHG | HEIGHT: 61 IN | WEIGHT: 108 LBS | DIASTOLIC BLOOD PRESSURE: 54 MMHG | RESPIRATION RATE: 16 BRPM | BODY MASS INDEX: 20.39 KG/M2 | TEMPERATURE: 98.2 F

## 2023-09-03 LAB
ALBUMIN SERPL-MCNC: 3.3 G/DL (ref 3.5–5.2)
ALP SERPL-CCNC: 62 U/L (ref 35–104)
ALT SERPL-CCNC: <5 U/L (ref 0–32)
ANION GAP SERPL CALCULATED.3IONS-SCNC: 10 MMOL/L (ref 7–16)
ANION GAP SERPL CALCULATED.3IONS-SCNC: 8 MMOL/L (ref 7–16)
AST SERPL-CCNC: 19 U/L (ref 0–31)
BASOPHILS # BLD: 0.03 K/UL (ref 0–0.2)
BASOPHILS NFR BLD: 1 % (ref 0–2)
BILIRUB SERPL-MCNC: 0.5 MG/DL (ref 0–1.2)
BUN SERPL-MCNC: 22 MG/DL (ref 6–23)
BUN SERPL-MCNC: 24 MG/DL (ref 6–23)
CALCIUM SERPL-MCNC: 8.9 MG/DL (ref 8.6–10.2)
CALCIUM SERPL-MCNC: 9.3 MG/DL (ref 8.6–10.2)
CHLORIDE SERPL-SCNC: 98 MMOL/L (ref 98–107)
CHLORIDE SERPL-SCNC: 98 MMOL/L (ref 98–107)
CO2 SERPL-SCNC: 27 MMOL/L (ref 22–29)
CO2 SERPL-SCNC: 28 MMOL/L (ref 22–29)
CREAT SERPL-MCNC: 1.2 MG/DL (ref 0.5–1)
CREAT SERPL-MCNC: 1.5 MG/DL (ref 0.5–1)
EOSINOPHIL # BLD: 0.5 K/UL (ref 0.05–0.5)
EOSINOPHILS RELATIVE PERCENT: 12 % (ref 0–6)
ERYTHROCYTE [DISTWIDTH] IN BLOOD BY AUTOMATED COUNT: 16.6 % (ref 11.5–15)
GFR SERPL CREATININE-BSD FRML MDRD: 36 ML/MIN/1.73M2
GFR SERPL CREATININE-BSD FRML MDRD: 45 ML/MIN/1.73M2
GLUCOSE SERPL-MCNC: 133 MG/DL (ref 74–99)
GLUCOSE SERPL-MCNC: 96 MG/DL (ref 74–99)
HCT VFR BLD AUTO: 34.3 % (ref 34–48)
HGB BLD-MCNC: 10.1 G/DL (ref 11.5–15.5)
IMM GRANULOCYTES # BLD AUTO: <0.03 K/UL (ref 0–0.58)
IMM GRANULOCYTES NFR BLD: 0 % (ref 0–5)
INR PPP: 3.2
LYMPHOCYTES NFR BLD: 0.5 K/UL (ref 1.5–4)
LYMPHOCYTES RELATIVE PERCENT: 12 % (ref 20–42)
MCH RBC QN AUTO: 25.8 PG (ref 26–35)
MCHC RBC AUTO-ENTMCNC: 29.4 G/DL (ref 32–34.5)
MCV RBC AUTO: 87.5 FL (ref 80–99.9)
MONOCYTES NFR BLD: 0.64 K/UL (ref 0.1–0.95)
MONOCYTES NFR BLD: 15 % (ref 2–12)
NEUTROPHILS NFR BLD: 61 % (ref 43–80)
NEUTS SEG NFR BLD: 2.59 K/UL (ref 1.8–7.3)
PLATELET, FLUORESCENCE: 119 K/UL (ref 130–450)
PMV BLD AUTO: 11.6 FL (ref 7–12)
POTASSIUM SERPL-SCNC: 3.8 MMOL/L (ref 3.5–5)
POTASSIUM SERPL-SCNC: 4.4 MMOL/L (ref 3.5–5)
PROT SERPL-MCNC: 6.1 G/DL (ref 6.4–8.3)
PROTHROMBIN TIME: 36.4 SEC (ref 9.3–12.4)
RBC # BLD AUTO: 3.92 M/UL (ref 3.5–5.5)
RBC # BLD: ABNORMAL 10*6/UL
SODIUM SERPL-SCNC: 134 MMOL/L (ref 132–146)
SODIUM SERPL-SCNC: 135 MMOL/L (ref 132–146)
WBC OTHER # BLD: 4.3 K/UL (ref 4.5–11.5)

## 2023-09-03 PROCEDURE — 6360000002 HC RX W HCPCS: Performed by: STUDENT IN AN ORGANIZED HEALTH CARE EDUCATION/TRAINING PROGRAM

## 2023-09-03 PROCEDURE — 80053 COMPREHEN METABOLIC PANEL: CPT

## 2023-09-03 PROCEDURE — 85025 COMPLETE CBC W/AUTO DIFF WBC: CPT

## 2023-09-03 PROCEDURE — 2580000003 HC RX 258: Performed by: INTERNAL MEDICINE

## 2023-09-03 PROCEDURE — 2580000003 HC RX 258: Performed by: STUDENT IN AN ORGANIZED HEALTH CARE EDUCATION/TRAINING PROGRAM

## 2023-09-03 PROCEDURE — 6370000000 HC RX 637 (ALT 250 FOR IP): Performed by: INTERNAL MEDICINE

## 2023-09-03 PROCEDURE — 85610 PROTHROMBIN TIME: CPT

## 2023-09-03 PROCEDURE — 36415 COLL VENOUS BLD VENIPUNCTURE: CPT

## 2023-09-03 PROCEDURE — 80048 BASIC METABOLIC PNL TOTAL CA: CPT

## 2023-09-03 RX ORDER — SODIUM CHLORIDE 9 MG/ML
INJECTION, SOLUTION INTRAVENOUS CONTINUOUS
Status: ACTIVE | OUTPATIENT
Start: 2023-09-03 | End: 2023-09-03

## 2023-09-03 RX ORDER — CEPHALEXIN 500 MG/1
500 CAPSULE ORAL 3 TIMES DAILY
Qty: 9 CAPSULE | Refills: 0 | Status: SHIPPED | OUTPATIENT
Start: 2023-09-03 | End: 2023-09-06

## 2023-09-03 RX ADMIN — ACETAMINOPHEN 650 MG: 325 TABLET ORAL at 08:21

## 2023-09-03 RX ADMIN — LEVOTHYROXINE SODIUM 112 MCG: 0.11 TABLET ORAL at 06:06

## 2023-09-03 RX ADMIN — ALLOPURINOL 100 MG: 100 TABLET ORAL at 09:53

## 2023-09-03 RX ADMIN — WATER 2000 MG: 1 INJECTION INTRAMUSCULAR; INTRAVENOUS; SUBCUTANEOUS at 14:24

## 2023-09-03 RX ADMIN — SODIUM CHLORIDE: 9 INJECTION, SOLUTION INTRAVENOUS at 09:52

## 2023-09-03 RX ADMIN — WATER 2000 MG: 1 INJECTION INTRAMUSCULAR; INTRAVENOUS; SUBCUTANEOUS at 02:44

## 2023-09-03 RX ADMIN — METOPROLOL SUCCINATE 25 MG: 25 TABLET, EXTENDED RELEASE ORAL at 08:15

## 2023-09-03 RX ADMIN — FOLIC ACID 1 MG: 1 TABLET ORAL at 08:15

## 2023-09-03 RX ADMIN — POTASSIUM CHLORIDE 10 MEQ: 750 TABLET, EXTENDED RELEASE ORAL at 09:53

## 2023-09-03 RX ADMIN — SODIUM CHLORIDE, PRESERVATIVE FREE 10 ML: 5 INJECTION INTRAVENOUS at 08:16

## 2023-09-03 RX ADMIN — Medication 1 TABLET: at 09:53

## 2023-09-03 RX ADMIN — CALCITRIOL 0.25 MCG: 0.25 CAPSULE ORAL at 09:53

## 2023-09-03 RX ADMIN — Medication 2000 UNITS: at 08:16

## 2023-09-03 RX ADMIN — SODIUM CHLORIDE, PRESERVATIVE FREE 10 ML: 5 INJECTION INTRAVENOUS at 02:45

## 2023-09-03 RX ADMIN — FERROUS SULFATE TAB 325 MG (65 MG ELEMENTAL FE) 325 MG: 325 (65 FE) TAB at 08:15

## 2023-09-03 RX ADMIN — METOPROLOL SUCCINATE 25 MG: 25 TABLET, EXTENDED RELEASE ORAL at 16:38

## 2023-09-03 ASSESSMENT — PAIN DESCRIPTION - DESCRIPTORS: DESCRIPTORS: ACHING;DULL

## 2023-09-03 ASSESSMENT — PAIN SCALES - GENERAL: PAINLEVEL_OUTOF10: 5

## 2023-09-03 ASSESSMENT — PAIN DESCRIPTION - LOCATION: LOCATION: ARM;WRIST

## 2023-09-03 ASSESSMENT — PAIN DESCRIPTION - ORIENTATION: ORIENTATION: RIGHT

## 2023-09-05 NOTE — DISCHARGE SUMMARY
Internal Medicine Discharge Summary    NAME: Jessie Arce :  1945  MRN:  [id number] PCP:Teofilo Sy MD    ADMITTED: 2023   DISCHARGED: 9/3/2023  6:08 PM    ADMITTING PHYSICIAN: Brain Lang    PCP: Colby Modi MD    CONSULTANT(S):   IP CONSULT TO PHARMACY  IP CONSULT TO INFECTIOUS DISEASES  IP CONSULT TO IV TEAM  IP CONSULT TO IV TEAM     ADMITTING DIAGNOSIS:   Cellulitis [L03.90]  Cellulitis of right hand [L03.113]     Please see H&P for further details    DISCHARGE DIAGNOSES:   Active Hospital Problems    Diagnosis     Cellulitis of right hand [L03.113]      Priority: High    CAD (coronary artery disease) [I25.10]     Thoracic ascending aortic aneurysm (HCC) [I71.21]     Moderate protein-calorie malnutrition (HCC) [E44.0]     Pulmonary edema [J81.1]     HFrEF (heart failure with reduced ejection fraction) (HCC) [I50.20]     Bilateral carotid artery stenosis [I65.23]     Atrial fibrillation ([address]) [I48.91]     Valvular heart disease [I38]     Hypothyroid [E03.9]     Hypertension [I10]        BRIEF HISTORY OF PRESENT ILLNESS: Jessie Arce is a 77 y.o. female patient of Colby Modi MD who  has a past medical history of Acute exacerbation of CHF (congestive heart failure) ([address]), Acute renal failure ([address]), Aneurysm ([address]), CAD (coronary artery disease), CRF (chronic renal failure), Epistaxis, Gastrointestinal bleeding, lower, H/O anemia of chronic disorder, Hypertension, and Thyroid disease. who originally had no chief complaint listed for this encounter. at presentation on 2023, and was found to have Cellulitis [L03.90]  Cellulitis of right hand [L03.113] after workup. Please see H&P for further details. HOSPITAL COURSE:   The patient presented to the hospital with the chief complaint of No chief complaint on file.   . The patient was admitted to the hospital.     Right hand cellulitis:  Failed OP Keflex  ABX per ID  Xrays noted     Afib:  Coumadin per pharmacy  Follow

## 2023-09-06 LAB
MICROORGANISM SPEC CULT: NORMAL
MICROORGANISM SPEC CULT: NORMAL
SERVICE CMNT-IMP: NORMAL
SERVICE CMNT-IMP: NORMAL
SPECIMEN DESCRIPTION: NORMAL
SPECIMEN DESCRIPTION: NORMAL

## 2023-09-21 ENCOUNTER — HOSPITAL ENCOUNTER (OUTPATIENT)
Age: 78
Discharge: HOME | End: 2023-09-21
Payer: MEDICARE

## 2023-09-21 LAB
INR PPP: 3.4
PROTHROMBIN TIME: 37.2 SEC (ref 9.3–12.4)
URATE SERPL-MCNC: 5.7 MG/DL (ref 2.4–5.7)

## 2023-09-21 PROCEDURE — 85610 PROTHROMBIN TIME: CPT

## 2023-09-21 PROCEDURE — 84550 ASSAY OF BLOOD/URIC ACID: CPT

## 2023-09-21 PROCEDURE — 36415 COLL VENOUS BLD VENIPUNCTURE: CPT

## 2023-10-04 ENCOUNTER — HOSPITAL ENCOUNTER (OUTPATIENT)
Age: 78
Discharge: HOME | End: 2023-10-06
Payer: MEDICARE

## 2023-10-04 ENCOUNTER — HOSPITAL ENCOUNTER (OUTPATIENT)
Dept: GENERAL RADIOLOGY | Age: 78
Discharge: HOME | End: 2023-10-06
Payer: MEDICARE

## 2023-10-04 ENCOUNTER — HOSPITAL ENCOUNTER (OUTPATIENT)
Age: 78
Discharge: HOME | End: 2023-10-04
Payer: MEDICARE

## 2023-10-04 DIAGNOSIS — M25.531 PAIN IN RIGHT WRIST: ICD-10-CM

## 2023-10-04 LAB
CRP SERPL HS-MCNC: 18 MG/L (ref 0–5)
ERYTHROCYTE [SEDIMENTATION RATE] IN BLOOD BY WESTERGREN METHOD: 18 MM/HR (ref 0–20)

## 2023-10-04 PROCEDURE — 85652 RBC SED RATE AUTOMATED: CPT

## 2023-10-04 PROCEDURE — 36415 COLL VENOUS BLD VENIPUNCTURE: CPT

## 2023-10-04 PROCEDURE — 86140 C-REACTIVE PROTEIN: CPT

## 2023-10-04 PROCEDURE — 73110 X-RAY EXAM OF WRIST: CPT

## 2023-10-12 ENCOUNTER — HOSPITAL ENCOUNTER (OUTPATIENT)
Dept: NUCLEAR MEDICINE | Age: 78
Discharge: HOME | End: 2023-10-12
Payer: MEDICARE

## 2023-10-12 DIAGNOSIS — M25.9 DISORDER OF JOINT: ICD-10-CM

## 2023-10-12 PROCEDURE — 78315 BONE IMAGING 3 PHASE: CPT

## 2023-10-12 RX ORDER — TC 99M MEDRONATE 20 MG/10ML
25 INJECTION, POWDER, LYOPHILIZED, FOR SOLUTION INTRAVENOUS
Status: DISCONTINUED | OUTPATIENT
Start: 2023-10-12 | End: 2023-10-13 | Stop reason: HOSPADM

## 2023-11-15 ENCOUNTER — HOSPITAL ENCOUNTER (OUTPATIENT)
Age: 78
Discharge: HOME | End: 2023-11-15
Payer: MEDICARE

## 2023-11-15 LAB
INR PPP: 3.5
PROTHROMBIN TIME: 38.9 SEC (ref 9.3–12.4)

## 2023-11-15 PROCEDURE — 36415 COLL VENOUS BLD VENIPUNCTURE: CPT

## 2023-11-15 PROCEDURE — 85610 PROTHROMBIN TIME: CPT

## 2023-11-20 DIAGNOSIS — I71.21 ANEURYSM OF ASCENDING AORTA WITHOUT RUPTURE (HCC): Primary | ICD-10-CM

## 2023-11-21 ENCOUNTER — HOSPITAL ENCOUNTER (OUTPATIENT)
Age: 78
Discharge: HOME | End: 2023-11-21
Payer: MEDICARE

## 2023-11-21 LAB
INR PPP: 3.3
PROTHROMBIN TIME: 36.8 SEC (ref 9.3–12.4)

## 2023-11-21 PROCEDURE — 85610 PROTHROMBIN TIME: CPT

## 2023-11-21 PROCEDURE — 36415 COLL VENOUS BLD VENIPUNCTURE: CPT

## 2023-12-06 ENCOUNTER — HOSPITAL ENCOUNTER (INPATIENT)
Age: 78
LOS: 2 days | Discharge: HOME | End: 2023-12-08
Attending: INTERNAL MEDICINE | Admitting: INTERNAL MEDICINE
Payer: MEDICARE

## 2023-12-06 ENCOUNTER — HOSPITAL ENCOUNTER (OUTPATIENT)
Age: 78
Discharge: HOME | End: 2023-12-06
Payer: MEDICARE

## 2023-12-06 ENCOUNTER — HOSPITAL ENCOUNTER (EMERGENCY)
Age: 78
Discharge: ANOTHER HOSPITAL | End: 2023-12-06
Attending: EMERGENCY MEDICINE
Payer: MEDICARE

## 2023-12-06 ENCOUNTER — APPOINTMENT (OUTPATIENT)
Dept: GENERAL RADIOLOGY | Age: 78
End: 2023-12-06
Payer: MEDICARE

## 2023-12-06 VITALS
DIASTOLIC BLOOD PRESSURE: 52 MMHG | HEART RATE: 75 BPM | BODY MASS INDEX: 19.83 KG/M2 | OXYGEN SATURATION: 96 % | WEIGHT: 105 LBS | RESPIRATION RATE: 16 BRPM | SYSTOLIC BLOOD PRESSURE: 105 MMHG | TEMPERATURE: 98.6 F | HEIGHT: 61 IN

## 2023-12-06 DIAGNOSIS — I50.9 ACUTE ON CHRONIC CONGESTIVE HEART FAILURE, UNSPECIFIED HEART FAILURE TYPE (HCC): Primary | ICD-10-CM

## 2023-12-06 DIAGNOSIS — I48.11 LONGSTANDING PERSISTENT ATRIAL FIBRILLATION (HCC): ICD-10-CM

## 2023-12-06 DIAGNOSIS — I50.23 ACUTE ON CHRONIC SYSTOLIC CHF (CONGESTIVE HEART FAILURE) (HCC): Primary | ICD-10-CM

## 2023-12-06 LAB
ALBUMIN SERPL-MCNC: 4.2 G/DL (ref 3.5–5.2)
ALP SERPL-CCNC: 71 U/L (ref 35–104)
ALT SERPL-CCNC: 8 U/L (ref 0–32)
ANION GAP SERPL CALCULATED.3IONS-SCNC: 11 MMOL/L (ref 7–16)
AST SERPL-CCNC: 27 U/L (ref 0–31)
BASOPHILS # BLD: 0.04 K/UL (ref 0–0.2)
BASOPHILS NFR BLD: 1 % (ref 0–2)
BILIRUB SERPL-MCNC: 1.5 MG/DL (ref 0–1.2)
BILIRUB UR QL STRIP: NEGATIVE
BNP SERPL-MCNC: ABNORMAL PG/ML (ref 0–450)
BUN SERPL-MCNC: 18 MG/DL (ref 6–23)
CALCIUM SERPL-MCNC: 9 MG/DL (ref 8.6–10.2)
CASTS #/AREA URNS LPF: ABNORMAL /LPF
CHLORIDE SERPL-SCNC: 99 MMOL/L (ref 98–107)
CLARITY UR: CLEAR
CO2 SERPL-SCNC: 28 MMOL/L (ref 22–29)
COLOR UR: YELLOW
CREAT SERPL-MCNC: 1.3 MG/DL (ref 0.5–1)
EOSINOPHIL # BLD: 0.44 K/UL (ref 0.05–0.5)
EOSINOPHILS RELATIVE PERCENT: 9 % (ref 0–6)
EPI CELLS #/AREA URNS HPF: ABNORMAL /HPF
ERYTHROCYTE [DISTWIDTH] IN BLOOD BY AUTOMATED COUNT: 17.7 % (ref 11.5–15)
FLUAV RNA RESP QL NAA+PROBE: NOT DETECTED
FLUBV RNA RESP QL NAA+PROBE: NOT DETECTED
GFR SERPL CREATININE-BSD FRML MDRD: 41 ML/MIN/1.73M2
GLUCOSE SERPL-MCNC: 158 MG/DL (ref 74–99)
GLUCOSE UR STRIP-MCNC: NEGATIVE MG/DL
HCT VFR BLD AUTO: 35 % (ref 34–48)
HGB BLD-MCNC: 10.5 G/DL (ref 11.5–15.5)
HGB UR QL STRIP.AUTO: NEGATIVE
IMM GRANULOCYTES # BLD AUTO: <0.03 K/UL (ref 0–0.58)
IMM GRANULOCYTES NFR BLD: 0 % (ref 0–5)
INR PPP: 8.1
INR PPP: 9
KETONES UR STRIP-MCNC: NEGATIVE MG/DL
LACTATE BLDV-SCNC: 1.4 MMOL/L (ref 0.5–2.2)
LEUKOCYTE ESTERASE UR QL STRIP: NEGATIVE
LIPASE SERPL-CCNC: 19 U/L (ref 13–60)
LYMPHOCYTES NFR BLD: 0.45 K/UL (ref 1.5–4)
LYMPHOCYTES RELATIVE PERCENT: 9 % (ref 20–42)
MCH RBC QN AUTO: 26 PG (ref 26–35)
MCHC RBC AUTO-ENTMCNC: 30 G/DL (ref 32–34.5)
MCV RBC AUTO: 86.6 FL (ref 80–99.9)
MONOCYTES NFR BLD: 0.52 K/UL (ref 0.1–0.95)
MONOCYTES NFR BLD: 10 % (ref 2–12)
MUCOUS THREADS URNS QL MICRO: PRESENT
NEUTROPHILS NFR BLD: 71 % (ref 43–80)
NEUTS SEG NFR BLD: 3.58 K/UL (ref 1.8–7.3)
NITRITE UR QL STRIP: NEGATIVE
PARTIAL THROMBOPLASTIN TIME: 49.5 SEC (ref 24.5–35.1)
PH UR STRIP: 5.5 [PH] (ref 5–9)
PLATELET # BLD AUTO: 201 K/UL (ref 130–450)
PMV BLD AUTO: 11.6 FL (ref 7–12)
POTASSIUM SERPL-SCNC: 4.1 MMOL/L (ref 3.5–5)
PROT SERPL-MCNC: 7.3 G/DL (ref 6.4–8.3)
PROT UR STRIP-MCNC: NEGATIVE MG/DL
PROTHROMBIN TIME: 88.4 SEC (ref 9.3–12.4)
PROTHROMBIN TIME: 98.7 SEC (ref 9.3–12.4)
RBC # BLD AUTO: 4.04 M/UL (ref 3.5–5.5)
RBC #/AREA URNS HPF: ABNORMAL /HPF
SARS-COV-2 RNA RESP QL NAA+PROBE: NOT DETECTED
SODIUM SERPL-SCNC: 138 MMOL/L (ref 132–146)
SOURCE: NORMAL
SP GR UR STRIP: 1.01 (ref 1–1.03)
SPECIMEN DESCRIPTION: NORMAL
TROPONIN I SERPL HS-MCNC: 51 NG/L (ref 0–9)
TROPONIN I SERPL HS-MCNC: 52 NG/L (ref 0–9)
UROBILINOGEN UR STRIP-ACNC: 0.2 EU/DL (ref 0–1)
WBC #/AREA URNS HPF: ABNORMAL /HPF
WBC OTHER # BLD: 5 K/UL (ref 4.5–11.5)

## 2023-12-06 PROCEDURE — 84484 ASSAY OF TROPONIN QUANT: CPT

## 2023-12-06 PROCEDURE — 71045 X-RAY EXAM CHEST 1 VIEW: CPT

## 2023-12-06 PROCEDURE — 80162 ASSAY OF DIGOXIN TOTAL: CPT

## 2023-12-06 PROCEDURE — 81001 URINALYSIS AUTO W/SCOPE: CPT

## 2023-12-06 PROCEDURE — 6370000000 HC RX 637 (ALT 250 FOR IP): Performed by: EMERGENCY MEDICINE

## 2023-12-06 PROCEDURE — 85730 THROMBOPLASTIN TIME PARTIAL: CPT

## 2023-12-06 PROCEDURE — 85025 COMPLETE CBC W/AUTO DIFF WBC: CPT

## 2023-12-06 PROCEDURE — 99285 EMERGENCY DEPT VISIT HI MDM: CPT

## 2023-12-06 PROCEDURE — 36415 COLL VENOUS BLD VENIPUNCTURE: CPT

## 2023-12-06 PROCEDURE — 80053 COMPREHEN METABOLIC PANEL: CPT

## 2023-12-06 PROCEDURE — 87636 SARSCOV2 & INF A&B AMP PRB: CPT

## 2023-12-06 PROCEDURE — 83880 ASSAY OF NATRIURETIC PEPTIDE: CPT

## 2023-12-06 PROCEDURE — 83690 ASSAY OF LIPASE: CPT

## 2023-12-06 PROCEDURE — 2060000000 HC ICU INTERMEDIATE R&B

## 2023-12-06 PROCEDURE — 96374 THER/PROPH/DIAG INJ IV PUSH: CPT

## 2023-12-06 PROCEDURE — 85610 PROTHROMBIN TIME: CPT

## 2023-12-06 PROCEDURE — 93005 ELECTROCARDIOGRAM TRACING: CPT | Performed by: NURSE PRACTITIONER

## 2023-12-06 PROCEDURE — 83605 ASSAY OF LACTIC ACID: CPT

## 2023-12-06 PROCEDURE — 2500000003 HC RX 250 WO HCPCS: Performed by: EMERGENCY MEDICINE

## 2023-12-06 RX ORDER — FERROUS SULFATE 325(65) MG
325 TABLET ORAL 2 TIMES DAILY
Status: CANCELLED | OUTPATIENT
Start: 2023-12-06

## 2023-12-06 RX ORDER — SODIUM CHLORIDE 0.9 % (FLUSH) 0.9 %
10 SYRINGE (ML) INJECTION EVERY 12 HOURS SCHEDULED
Status: DISCONTINUED | OUTPATIENT
Start: 2023-12-06 | End: 2023-12-08 | Stop reason: HOSPADM

## 2023-12-06 RX ORDER — LEVOTHYROXINE SODIUM 112 UG/1
112 TABLET ORAL DAILY
Status: CANCELLED | OUTPATIENT
Start: 2023-12-07

## 2023-12-06 RX ORDER — ALLOPURINOL 100 MG/1
100 TABLET ORAL DAILY
Status: DISCONTINUED | OUTPATIENT
Start: 2023-12-07 | End: 2023-12-08 | Stop reason: HOSPADM

## 2023-12-06 RX ORDER — FERROUS SULFATE 325(65) MG
325 TABLET ORAL 2 TIMES DAILY
Status: DISCONTINUED | OUTPATIENT
Start: 2023-12-06 | End: 2023-12-08 | Stop reason: HOSPADM

## 2023-12-06 RX ORDER — ONDANSETRON 2 MG/ML
4 INJECTION INTRAMUSCULAR; INTRAVENOUS EVERY 6 HOURS PRN
Status: DISCONTINUED | OUTPATIENT
Start: 2023-12-06 | End: 2023-12-08 | Stop reason: HOSPADM

## 2023-12-06 RX ORDER — SODIUM CHLORIDE 0.9 % (FLUSH) 0.9 %
10 SYRINGE (ML) INJECTION EVERY 12 HOURS SCHEDULED
Status: CANCELLED | OUTPATIENT
Start: 2023-12-06

## 2023-12-06 RX ORDER — SODIUM CHLORIDE 0.9 % (FLUSH) 0.9 %
10 SYRINGE (ML) INJECTION PRN
Status: DISCONTINUED | OUTPATIENT
Start: 2023-12-06 | End: 2023-12-08 | Stop reason: HOSPADM

## 2023-12-06 RX ORDER — ACETAMINOPHEN 325 MG/1
650 TABLET ORAL EVERY 6 HOURS PRN
Status: CANCELLED | OUTPATIENT
Start: 2023-12-06

## 2023-12-06 RX ORDER — DIGOXIN 0.06 MG/1
62.5 TABLET ORAL SEE ADMIN INSTRUCTIONS
Status: CANCELLED | OUTPATIENT
Start: 2023-12-06

## 2023-12-06 RX ORDER — ACETAMINOPHEN 650 MG/1
650 SUPPOSITORY RECTAL EVERY 6 HOURS PRN
Status: CANCELLED | OUTPATIENT
Start: 2023-12-06

## 2023-12-06 RX ORDER — METOPROLOL SUCCINATE 25 MG/1
25 TABLET, EXTENDED RELEASE ORAL 2 TIMES DAILY WITH MEALS
Status: DISCONTINUED | OUTPATIENT
Start: 2023-12-07 | End: 2023-12-08 | Stop reason: HOSPADM

## 2023-12-06 RX ORDER — POTASSIUM CHLORIDE 750 MG/1
10 TABLET, EXTENDED RELEASE ORAL DAILY
Status: DISCONTINUED | OUTPATIENT
Start: 2023-12-07 | End: 2023-12-08 | Stop reason: HOSPADM

## 2023-12-06 RX ORDER — FOLIC ACID 1 MG/1
1 TABLET ORAL DAILY
Status: DISCONTINUED | OUTPATIENT
Start: 2023-12-07 | End: 2023-12-08 | Stop reason: HOSPADM

## 2023-12-06 RX ORDER — IPRATROPIUM BROMIDE AND ALBUTEROL SULFATE 2.5; .5 MG/3ML; MG/3ML
1 SOLUTION RESPIRATORY (INHALATION) 4 TIMES DAILY
Status: DISCONTINUED | OUTPATIENT
Start: 2023-12-06 | End: 2023-12-08 | Stop reason: HOSPADM

## 2023-12-06 RX ORDER — ACETAMINOPHEN 325 MG/1
650 TABLET ORAL EVERY 6 HOURS PRN
Status: DISCONTINUED | OUTPATIENT
Start: 2023-12-06 | End: 2023-12-08 | Stop reason: HOSPADM

## 2023-12-06 RX ORDER — ONDANSETRON 4 MG/1
4 TABLET, ORALLY DISINTEGRATING ORAL EVERY 8 HOURS PRN
Status: DISCONTINUED | OUTPATIENT
Start: 2023-12-06 | End: 2023-12-08 | Stop reason: HOSPADM

## 2023-12-06 RX ORDER — TORSEMIDE 20 MG/1
20 TABLET ORAL DAILY
Status: CANCELLED | OUTPATIENT
Start: 2023-12-07

## 2023-12-06 RX ORDER — SODIUM CHLORIDE 9 MG/ML
INJECTION, SOLUTION INTRAVENOUS PRN
Status: CANCELLED | OUTPATIENT
Start: 2023-12-06

## 2023-12-06 RX ORDER — ONDANSETRON 4 MG/1
4 TABLET, ORALLY DISINTEGRATING ORAL EVERY 8 HOURS PRN
Status: CANCELLED | OUTPATIENT
Start: 2023-12-06

## 2023-12-06 RX ORDER — TORSEMIDE 20 MG/1
20 TABLET ORAL DAILY
Status: DISCONTINUED | OUTPATIENT
Start: 2023-12-07 | End: 2023-12-08 | Stop reason: HOSPADM

## 2023-12-06 RX ORDER — DIGOXIN 125 MCG
62.5 TABLET ORAL
Status: DISCONTINUED | OUTPATIENT
Start: 2023-12-08 | End: 2023-12-08 | Stop reason: HOSPADM

## 2023-12-06 RX ORDER — SODIUM CHLORIDE 9 MG/ML
INJECTION, SOLUTION INTRAVENOUS PRN
Status: DISCONTINUED | OUTPATIENT
Start: 2023-12-06 | End: 2023-12-08 | Stop reason: HOSPADM

## 2023-12-06 RX ORDER — LEVOTHYROXINE SODIUM 112 UG/1
112 TABLET ORAL DAILY
Status: DISCONTINUED | OUTPATIENT
Start: 2023-12-07 | End: 2023-12-08 | Stop reason: HOSPADM

## 2023-12-06 RX ORDER — ALLOPURINOL 100 MG/1
100 TABLET ORAL DAILY
Status: CANCELLED | OUTPATIENT
Start: 2023-12-07

## 2023-12-06 RX ORDER — FOLIC ACID 1 MG/1
1 TABLET ORAL DAILY
Status: CANCELLED | OUTPATIENT
Start: 2023-12-07

## 2023-12-06 RX ORDER — SENNOSIDES A AND B 8.6 MG/1
1 TABLET, FILM COATED ORAL DAILY PRN
Status: CANCELLED | OUTPATIENT
Start: 2023-12-06

## 2023-12-06 RX ORDER — ACETAMINOPHEN 650 MG/1
650 SUPPOSITORY RECTAL EVERY 6 HOURS PRN
Status: DISCONTINUED | OUTPATIENT
Start: 2023-12-06 | End: 2023-12-08 | Stop reason: HOSPADM

## 2023-12-06 RX ORDER — POTASSIUM CHLORIDE 750 MG/1
10 TABLET, EXTENDED RELEASE ORAL DAILY
Status: CANCELLED | OUTPATIENT
Start: 2023-12-07

## 2023-12-06 RX ORDER — ONDANSETRON 2 MG/ML
4 INJECTION INTRAMUSCULAR; INTRAVENOUS EVERY 6 HOURS PRN
Status: CANCELLED | OUTPATIENT
Start: 2023-12-06

## 2023-12-06 RX ORDER — BUMETANIDE 0.25 MG/ML
0.5 INJECTION INTRAMUSCULAR; INTRAVENOUS ONCE
Status: COMPLETED | OUTPATIENT
Start: 2023-12-06 | End: 2023-12-06

## 2023-12-06 RX ORDER — SODIUM CHLORIDE 0.9 % (FLUSH) 0.9 %
10 SYRINGE (ML) INJECTION PRN
Status: CANCELLED | OUTPATIENT
Start: 2023-12-06

## 2023-12-06 RX ORDER — PHYTONADIONE 5 MG/1
2.5 TABLET ORAL ONCE
Status: COMPLETED | OUTPATIENT
Start: 2023-12-06 | End: 2023-12-06

## 2023-12-06 RX ORDER — SENNOSIDES A AND B 8.6 MG/1
1 TABLET, FILM COATED ORAL DAILY PRN
Status: DISCONTINUED | OUTPATIENT
Start: 2023-12-06 | End: 2023-12-08 | Stop reason: HOSPADM

## 2023-12-06 RX ORDER — METOPROLOL SUCCINATE 25 MG/1
25 TABLET, EXTENDED RELEASE ORAL 2 TIMES DAILY WITH MEALS
Status: CANCELLED | OUTPATIENT
Start: 2023-12-07

## 2023-12-06 RX ADMIN — BUMETANIDE 0.5 MG: 0.25 INJECTION INTRAMUSCULAR; INTRAVENOUS at 20:25

## 2023-12-06 RX ADMIN — PHYTONADIONE 2.5 MG: 5 TABLET ORAL at 21:13

## 2023-12-06 ASSESSMENT — ENCOUNTER SYMPTOMS
BACK PAIN: 0
ABDOMINAL PAIN: 0
SHORTNESS OF BREATH: 0
COUGH: 0

## 2023-12-06 ASSESSMENT — PAIN - FUNCTIONAL ASSESSMENT: PAIN_FUNCTIONAL_ASSESSMENT: NONE - DENIES PAIN

## 2023-12-07 PROBLEM — I50.23 ACUTE ON CHRONIC SYSTOLIC CHF (CONGESTIVE HEART FAILURE) (HCC): Status: ACTIVE | Noted: 2023-12-06

## 2023-12-07 LAB
ALBUMIN SERPL-MCNC: 3.6 G/DL (ref 3.5–5.2)
ALP SERPL-CCNC: 63 U/L (ref 35–104)
ALT SERPL-CCNC: 7 U/L (ref 0–32)
ANION GAP SERPL CALCULATED.3IONS-SCNC: 14 MMOL/L (ref 7–16)
AST SERPL-CCNC: 26 U/L (ref 0–31)
BASOPHILS # BLD: 0.03 K/UL (ref 0–0.2)
BASOPHILS NFR BLD: 1 % (ref 0–2)
BILIRUB SERPL-MCNC: 1.6 MG/DL (ref 0–1.2)
BUN SERPL-MCNC: 17 MG/DL (ref 6–23)
CALCIUM SERPL-MCNC: 8.7 MG/DL (ref 8.6–10.2)
CHLORIDE SERPL-SCNC: 102 MMOL/L (ref 98–107)
CO2 SERPL-SCNC: 25 MMOL/L (ref 22–29)
CREAT SERPL-MCNC: 1.2 MG/DL (ref 0.5–1)
DIGOXIN SERPL-MCNC: 1 NG/ML (ref 0.8–2)
DIGOXIN SERPL-MCNC: 1.1 NG/ML (ref 0.8–2)
EKG ATRIAL RATE: 468 BPM
EKG Q-T INTERVAL: 426 MS
EKG QRS DURATION: 142 MS
EKG QTC CALCULATION (BAZETT): 475 MS
EKG R AXIS: 146 DEGREES
EKG T AXIS: -29 DEGREES
EKG VENTRICULAR RATE: 75 BPM
EOSINOPHIL # BLD: 0.43 K/UL (ref 0.05–0.5)
EOSINOPHILS RELATIVE PERCENT: 9 % (ref 0–6)
ERYTHROCYTE [DISTWIDTH] IN BLOOD BY AUTOMATED COUNT: 17.8 % (ref 11.5–15)
GFR SERPL CREATININE-BSD FRML MDRD: 46 ML/MIN/1.73M2
GLUCOSE SERPL-MCNC: 86 MG/DL (ref 74–99)
HCT VFR BLD AUTO: 33.8 % (ref 34–48)
HGB BLD-MCNC: 10.3 G/DL (ref 11.5–15.5)
IMM GRANULOCYTES # BLD AUTO: <0.03 K/UL (ref 0–0.58)
IMM GRANULOCYTES NFR BLD: 0 % (ref 0–5)
INR PPP: 5.9
LYMPHOCYTES NFR BLD: 0.5 K/UL (ref 1.5–4)
LYMPHOCYTES RELATIVE PERCENT: 10 % (ref 20–42)
MAGNESIUM SERPL-MCNC: 2 MG/DL (ref 1.6–2.6)
MCH RBC QN AUTO: 26.4 PG (ref 26–35)
MCHC RBC AUTO-ENTMCNC: 30.5 G/DL (ref 32–34.5)
MCV RBC AUTO: 86.7 FL (ref 80–99.9)
MONOCYTES NFR BLD: 0.57 K/UL (ref 0.1–0.95)
MONOCYTES NFR BLD: 11 % (ref 2–12)
NEUTROPHILS NFR BLD: 69 % (ref 43–80)
NEUTS SEG NFR BLD: 3.45 K/UL (ref 1.8–7.3)
PLATELET CONFIRMATION: NORMAL
PLATELET, FLUORESCENCE: 157 K/UL (ref 130–450)
PMV BLD AUTO: 12.1 FL (ref 7–12)
POTASSIUM SERPL-SCNC: 3.2 MMOL/L (ref 3.5–5)
PROT SERPL-MCNC: 6.5 G/DL (ref 6.4–8.3)
PROTHROMBIN TIME: 66 SEC (ref 9.3–12.4)
RBC # BLD AUTO: 3.9 M/UL (ref 3.5–5.5)
RBC # BLD: ABNORMAL 10*6/UL
SODIUM SERPL-SCNC: 141 MMOL/L (ref 132–146)
WBC # BLD: ABNORMAL 10*3/UL
WBC OTHER # BLD: 5 K/UL (ref 4.5–11.5)

## 2023-12-07 PROCEDURE — 2580000003 HC RX 258: Performed by: NURSE PRACTITIONER

## 2023-12-07 PROCEDURE — 2500000003 HC RX 250 WO HCPCS: Performed by: INTERNAL MEDICINE

## 2023-12-07 PROCEDURE — 80162 ASSAY OF DIGOXIN TOTAL: CPT

## 2023-12-07 PROCEDURE — 85610 PROTHROMBIN TIME: CPT

## 2023-12-07 PROCEDURE — 94640 AIRWAY INHALATION TREATMENT: CPT

## 2023-12-07 PROCEDURE — 2060000000 HC ICU INTERMEDIATE R&B

## 2023-12-07 PROCEDURE — 85025 COMPLETE CBC W/AUTO DIFF WBC: CPT

## 2023-12-07 PROCEDURE — 6370000000 HC RX 637 (ALT 250 FOR IP): Performed by: NURSE PRACTITIONER

## 2023-12-07 PROCEDURE — 93010 ELECTROCARDIOGRAM REPORT: CPT | Performed by: INTERNAL MEDICINE

## 2023-12-07 PROCEDURE — 80053 COMPREHEN METABOLIC PANEL: CPT

## 2023-12-07 PROCEDURE — 83735 ASSAY OF MAGNESIUM: CPT

## 2023-12-07 RX ORDER — POTASSIUM CHLORIDE 20 MEQ/1
40 TABLET, EXTENDED RELEASE ORAL PRN
Status: DISCONTINUED | OUTPATIENT
Start: 2023-12-07 | End: 2023-12-07

## 2023-12-07 RX ORDER — POTASSIUM CHLORIDE 7.45 MG/ML
10 INJECTION INTRAVENOUS PRN
Status: DISCONTINUED | OUTPATIENT
Start: 2023-12-07 | End: 2023-12-07

## 2023-12-07 RX ORDER — BUMETANIDE 0.25 MG/ML
1 INJECTION INTRAMUSCULAR; INTRAVENOUS 2 TIMES DAILY
Status: DISCONTINUED | OUTPATIENT
Start: 2023-12-07 | End: 2023-12-08 | Stop reason: HOSPADM

## 2023-12-07 RX ORDER — LANOLIN ALCOHOL/MO/W.PET/CERES
3 CREAM (GRAM) TOPICAL NIGHTLY PRN
Status: DISCONTINUED | OUTPATIENT
Start: 2023-12-07 | End: 2023-12-08 | Stop reason: HOSPADM

## 2023-12-07 RX ADMIN — BUMETANIDE 1 MG: 0.25 INJECTION INTRAMUSCULAR; INTRAVENOUS at 20:20

## 2023-12-07 RX ADMIN — FOLIC ACID 1 MG: 1 TABLET ORAL at 08:14

## 2023-12-07 RX ADMIN — METOPROLOL SUCCINATE 25 MG: 25 TABLET, EXTENDED RELEASE ORAL at 16:02

## 2023-12-07 RX ADMIN — IPRATROPIUM BROMIDE AND ALBUTEROL SULFATE 1 DOSE: 2.5; .5 SOLUTION RESPIRATORY (INHALATION) at 16:13

## 2023-12-07 RX ADMIN — IPRATROPIUM BROMIDE AND ALBUTEROL SULFATE 1 DOSE: 2.5; .5 SOLUTION RESPIRATORY (INHALATION) at 21:22

## 2023-12-07 RX ADMIN — IPRATROPIUM BROMIDE AND ALBUTEROL SULFATE 1 DOSE: 2.5; .5 SOLUTION RESPIRATORY (INHALATION) at 12:17

## 2023-12-07 RX ADMIN — Medication 10 ML: at 08:17

## 2023-12-07 RX ADMIN — POTASSIUM CHLORIDE 10 MEQ: 750 TABLET, EXTENDED RELEASE ORAL at 08:14

## 2023-12-07 RX ADMIN — IPRATROPIUM BROMIDE AND ALBUTEROL SULFATE 1 DOSE: 2.5; .5 SOLUTION RESPIRATORY (INHALATION) at 07:46

## 2023-12-07 RX ADMIN — Medication 10 ML: at 20:23

## 2023-12-07 RX ADMIN — LEVOTHYROXINE SODIUM 112 MCG: 0.11 TABLET ORAL at 05:44

## 2023-12-07 RX ADMIN — BUMETANIDE 1 MG: 0.25 INJECTION INTRAMUSCULAR; INTRAVENOUS at 08:15

## 2023-12-07 RX ADMIN — Medication 10 ML: at 00:44

## 2023-12-07 RX ADMIN — ALLOPURINOL 100 MG: 100 TABLET ORAL at 08:14

## 2023-12-07 NOTE — ED NOTES
Called [facility] and gave report to Michelle Guardado, she is aware of ETA.   Pt and family aware of room and ETA     Will Light RN  12/06/23 6483

## 2023-12-07 NOTE — ED PROVIDER NOTES
This is a 59-year-old female with a past medical history of CHF and atrial fibrillation who presents to the ED for evaluation of shortness of breath. Patient states that increasingly over the past 2 weeks or so she is becoming short of breath. Patient states she has been having to sleep with pillows at night and is also been having leg swelling bilaterally. Patient remarks that she is also becoming increasingly weak and fatigued and cannot walk more than several steps that feeling short of breath. Patient denies any black or bloody stools. Patient has no fevers or chills or chest pain. Patient has no other reported mitigating or exacerbating factors    The history is provided by the patient. Review of Systems   Constitutional:  Positive for fatigue. Negative for fever. HENT:  Negative for congestion. Eyes:  Negative for visual disturbance. Respiratory:  Negative for cough and shortness of breath. Cardiovascular:  Positive for leg swelling. Negative for chest pain. Gastrointestinal:  Negative for abdominal pain. Endocrine: Negative for polyuria. Genitourinary:  Negative for dysuria. Musculoskeletal:  Negative for back pain. Skin:  Negative for rash. Neurological:  Negative for headaches. Hematological:  Bruises/bleeds easily. Psychiatric/Behavioral:  Negative for confusion. Physical Exam  Vitals and nursing note reviewed. Constitutional:       General: She is not in acute distress. Appearance: She is well-developed. HENT:      Head: Normocephalic and atraumatic. Mouth/Throat:      Mouth: Mucous membranes are moist.   Eyes:      Extraocular Movements: Extraocular movements intact. Pupils: Pupils are equal, round, and reactive to light. Neck:      Vascular: No JVD. Cardiovascular:      Rate and Rhythm: Normal rate. Pulmonary:      Effort: Pulmonary effort is normal.   Abdominal:      General: There is no distension.       Palpations: Abdomen is soft.      Tenderness: There is no abdominal tenderness. There is no guarding or rebound. Hernia: No hernia is present. Musculoskeletal:         General: Swelling present. Cervical back: Normal range of motion and neck supple. Skin:     General: Skin is warm and dry. Neurological:      Mental Status: She is alert and oriented to person, place, and time. Cranial Nerves: No cranial nerve deficit. Psychiatric:         Mood and Affect: Mood normal.         Behavior: Behavior normal.          Procedures     MDM  Number of Diagnoses or Management Options  Acute on chronic congestive heart failure, unspecified heart failure type [facility])  Diagnosis management comments: This is a 67-year-old female with a past medical history of CHF and atrial fibrillation who presented to the ED for several weeks of worsening shortness of breath leg swelling and fatigue. Patient appeared fluid overloaded had edema and findings of dyspnea with exertion and sleep. Patient did not require extra oxygen however given that I believe she has decompensated her CHF and she was supratheraputic on her INR, she was admitted to Dr. Rosetta Katz service. She was given Vitamin K and a one time dose of Bumex                           --------------------------------------------- PAST HISTORY ---------------------------------------------  Past Medical History:  has a past medical history of Acute exacerbation of CHF (congestive heart failure) ([address]), Acute renal failure ([address]), Aneurysm ([address]), CAD (coronary artery disease), CRF (chronic renal failure), Epistaxis, Gastrointestinal bleeding, lower, H/O anemia of chronic disorder, Hypertension, and Thyroid disease. Past Surgical History:  has a past surgical history that includes Cardiac surgery; Aortic valvuloplasty; Mitral valvuloplasty; Tricuspid valvuloplasty; Colonoscopy;  Upper gastrointestinal endoscopy; Cardiac defibrillator placement (Left, 05/09/2022); hernia repair (Right,

## 2023-12-07 NOTE — CARE COORDINATION
Internal Medicine On-call Care Coordination Note    I was called by the ED physician because they recommended admission for this patient and we cover their PCP. The history as I understand it after discussion with the ED physician is as follows:    Presents with worsening leg swelling and shortness of breath  Hx CHF  INR 9- given vitamin K    I placed admission orders. Including:    Pharmacy to dose warfarin  Cardiology consult  Cardiac diet    Dr. Cindy Cee, or our coverage will see the patient tomorrow for H&P.     Electronically signed by SOCORRO Westfall CNP on 12/6/2023 at 10:52 PM

## 2023-12-07 NOTE — ACP (ADVANCE CARE PLANNING)
Advance Care Planning   Healthcare Decision Maker:    Primary Decision Maker: Lor Jovel - [facility] - [phone]    Click here to complete Healthcare Decision Makers including selection of the Healthcare Decision Maker Relationship (ie \"Primary\").

## 2023-12-07 NOTE — ED NOTES
[facility] Access provided PAS ETA [id number]     Whitney , [address] [place] [address]  12/06/23 2125

## 2023-12-07 NOTE — CARE COORDINATION
Social Work:    Reviewed chart notes. Mrs. Ko Yoder admitted from home due to leg swelling/SOV. She is presently on bumex BID, coumadin is hold until INR is therapeutic. Cardiology & CHF nurse were consulted. Social service met with Mrs. Ko Yoder and her , advised them about social service role and discussed discharge planning. Mrs. Ko Yoder is a patient of Dr. Chivo Arroyo and uses [facility] in [place]. She resides independently in her 2- story home and has a cane & nebulizer. Mrs. Ko Yoder has never used [facility] or SNF. She has no present discharge concerns and does not feel she will need home care at this time.     Electronically signed by LISSETH Mancera on 12/7/2023 at 2:03 PM

## 2023-12-07 NOTE — ED NOTES
[facility] lab reports INR critical 9.0, this reported to Dr Judith Lopez and RN Xiang Crew.      Sury Burrows RN  12/06/23 2040

## 2023-12-07 NOTE — DISCHARGE INSTRUCTIONS
HEART FAILURE  / CONGESTIVE HEART FAILURE  DISCHARGE INSTRUCTIONS:  GUIDELINES TO FOLLOW AT HOME    Self- Managed Care:     MEDICATIONS:  Take your medication as directed. If you are experiencing any side effects, inform your doctor, Do not stop taking any of your medications without letting your doctor know. Check with your doctor before taking any over-the-counter medications / herbal / or dietary supplements. They may interfere with your other medications. Do not take ibuprofen (Advil or Motrin) and naproxen (Aleve) without talking to your doctor first. They could make your heart failure worse. WEIGHT MONITORING:   Weigh yourself everyday (with the same scale) around the same time of the day and write it down. (you can chart them on a calendar or keep track of them on paper. Notify your doctor of a weight gain of 3 pounds or more in 1 day   OR a total of 5 pounds or more in 1 week    Take your weight record to your doctor visits  Also, the same goes if you loose more than 3# in one day, let your heart doctor know. DIET:   Cardiac heart healthy diet- Low saturated / low trans fat, no added salt, caffeine restricted, Low sodium diet-   No more than 2,000mg (2 grams) of salt / sodium per day (which equals to a little less than  a teaspoon of salt)  If your doctor wants you on a fluid restriction. ..it is usually recommended a fluid limit of 2,000cc -  Fluid restriction- 2,000 ml (milliliters) = 64 ounces = you can have 8 glasses of fluid per day (each glass 8 ounces)    Follow a low salt diet - avoid using salt at the table, avoid / limit use of canned soups, processed / packaged foods, salted snacks, olives and pickles. Do not use a salt substitute without checking with your doctor, they may contain a high amount of potassioum. (Mrs. Veronica Flores is safe to use).     Limit the use of alcohol       CALL YOUR DOCTOR THE FIRST DAY YOU NOTICE ANY OF THESE   SYMPTOMS:  You have a doctor whether you need another dose. My Goal for Self-management of Heart Failure Includes 5 steps :    1. Notice a change in symptoms ( weight gain, short of breath, leg swelling, decreased activity level, bloating. ...)    2. Evaluate the change: (use the Heart Failure Zones )     3. Decide to take action: decide what your options are, such as: (call your doctor for an extra visit, take a prescribed medication, such as your water pill if your doctor has given you directions to do so, [facility])    4. Come up with a strategy:  (now you call the doctor for advice / appointment. This is where you take action!!! Do not wait, catch the symptom early and treat it before it worsens. 5. Evaluate the response: The next day, check your Heart Failure Zones: are you in the GREEN ZONE (safe zone)? Worsening symptoms of YELLOW ZONE? Or have you moved to the RED ZONE and need to call 911 or go to the Emergency Room for evaluation? Call your doctor's office to update them on your symptoms of heart failure.

## 2023-12-07 NOTE — ED NOTES
[facility] with access line called advise room assignment at [place] bed 308D with report line of [id number], intermediate bed & was accepted by  Dr Gerda Heimlich. JOSE Napoles notified.       Oscar Saleh  12/06/23 2120

## 2023-12-07 NOTE — PROGRESS NOTES
Pharmacy Consultation Note  (Warfarin Dosing and Monitoring)    Initial consult date: 12/6/2023  Consulting Provider: SOCORRO Cheng-MELISSA    Olivai Craven is a 77 y.o. female for whom pharmacy has been asked to manage warfarin therapy. Weight:   Wt Readings from Last 1 Encounters:   12/07/23 48.1 kg (106 lb)       TSH:    Lab Results   Component Value Date/Time    TSH 0.453 11/28/2021 12:04 PM       Hepatic Function Panel:                          Lab Results   Component Value Date/Time    ALKPHOS 63 12/07/2023 04:41 AM    ALT 7 12/07/2023 04:41 AM    AST 26 12/07/2023 04:41 AM    PROT 6.5 12/07/2023 04:41 AM    BILITOT 1.6 12/07/2023 04:41 AM    BILIDIR <0.2 09/25/2016 10:07 AM    IBILI 0.7 09/25/2016 10:07 AM    LABALBU 3.6 12/07/2023 04:41 AM    LABALBU 4.4 04/05/2012 10:15 AM       Current warfarin drug-drug interactions include: No significant interactions    Recent Labs     12/06/23  1800 12/07/23  0441   HGB 10.5* 10.3*     --      Date Warfarin Dose INR Heparin or LMWH Comment   12/7 HOLD 5.9 --                                  Assessment:  Patient is a 77 y.o. female on warfarin for Atrial Fibrillation. Patient's home warfarin dosing regimen is documented as warfarin 2.5 mg on Sunday and warfarin 2 mg Monday through Saturday. Goal INR 2 - 3  INR 5.9 today; received vitamin K 2.5 mg PO on 12/6    Plan:   Will hold warfarin tonight  Daily PT/INR until the INR is stable within the therapeutic range  Pharmacist will follow and monitor/adjust dosing as necessary    Angel Patiño PharmD, BCCCP  12/7/2023  10:48 AM

## 2023-12-07 NOTE — PROGRESS NOTES
Dr Rebecca Ortega notified of consult via his office.  Brayan ramírez any difficulty speaking. She is able to speak in full and clear sentences. Patient is A&O x4. Previous GI history:   Patient has an extensive GI history as noted below.       OBJECTIVE:     PAST MEDICAL/SURGICAL HISTORY  Past Medical History:   Diagnosis Date    Anxiety     Arthritis     Back pain     Bronchitis     Caffeine use     8 coffee / day    Carpal tunnel syndrome     Cataracts, bilateral     Constipation     Depression     Diarrhea     GERD (gastroesophageal reflux disease)     H/O gastric ulcer     Hyperlipidemia     Hypertension     Mumps     MVP (mitral valve prolapse)     Dr. Erickson Merrill in May 2019    Recurrent UTI     Dr. Walker Julián    Sinusitis     Tracheostomy in place [facility])     Ulcerative esophagitis     Wellness examination     Dr. Vega Gtz seen in Jan 2020     Past Surgical History:   Procedure Laterality Date    APPENDECTOMY      CARPAL TUNNEL RELEASE      CHOLECYSTECTOMY, LAPAROSCOPIC N/A 05/22/2020    XI LAPAROSCOPIC ROBOTIC CHOLECYSTECTOMY, PYLOROPLASTY performed by Larisa Farrell MD at [facility] ERCP  05/21/2020    with stent insertion, balloon dilation, sphinctereotomy    ERCP  05/21/2020    ** CASE IN OR WITY GI STAFF** ERCP STENT INSERTION performed by Matt Alston MD at [facility]    ERCP  05/21/2020    ** CASE IN OR WITH GI STAFF**ERCP SPHINCTER/PAPILLOTOMY performed by Matt Alston MD at [facility]    ERCP  05/21/2020    ** CASE IN OR WITH GI STAFF**ERCP DILATION BALLOON performed by Matt Alston MD at [facility]    ERCP N/A 2/8/2021    ERCP STENT REMOVAL performed by Jared Reid MD at [facility]    ERCP  2/8/2021    ERCP STONE REMOVAL performed by Jared Reid MD at [address]    GASTRIC FUNDOPLICATION N/A 78/65/0803    XI LAPAROSCOPIC ROBOTIC HIATAL HERNIA REPAIR, CHERYL FUNDOPLICATION performed by Larisa Farrell MD at [address] N/A 03/27/2020    EGD PEG TUBE PLACEMENT performed by Tricia Sood MD at [address] [place] [address] 434 N/A 2/8/2021    **CASE IN O.R. W/ GI STAFF - EGD WITH REMOVAL PEG TUBE performed by Alexys Preciado MD at [facility]    HAND SURGERY      [facility] CATH POWER PICC TRIPLE  03/04/2020         HYSTERECTOMY      Abdominal    JOINT REPLACEMENT Right     right hip    OVARY REMOVAL      RHINOPLASTY      TONSILLECTOMY      TOTAL HIP ARTHROPLASTY      TOTAL NEPHRECTOMY      atrophic from infections, age 13    TRACHEOSTOMY N/A 03/27/2020    **ADD ON **WANTS 10:00AM **TRACHEOTOMY performed by Tricia Sood MD at [facility] 04/02/2020    TRACHEOTOMY EXCHANGE performed by Tricia Sood MD at [facility] N/A 03/17/2020    BEDSIDE EGD ESOPHAGOGASTRODUODENOSCOPY (ICU) performed by Tricia Sood MD at [address] [place] [address] N/A 05/18/2020    EGD BIOPSY performed by Danni Chong MD at [address] [place] [address]  05/18/2020    EGD DILATION BALLOON performed by Danni Chong MD at [address] [place] [address] N/A 07/06/2020    ** CASE IN O.R. WITH GI STAFF** EGD ESOPHAGOGASTRODUODENOSCOPY performed by Yudith Reyes MD at [address] [place] [address] N/A 11/09/2020    EGD DIAGNOSTIC ONLY performed by Sis Elizalde MD at [address] [place] [address]  02/08/2021    - EGD WITH REMOVAL PEG TUBE,ERCP STENT REMOVAL,ERCP STONE REMOVAL       ALLERGIES:  Allergies   Allergen Reactions    Prednisone Anxiety    Compazine [Prochlorperazine Maleate]     Penicillin V Potassium     Penicillins Other (See Comments)     Shock- received meropenem and ceftriaxone wo issues 2020       HOME MEDICATIONS:  Prior to Admission medications    Medication Sig Start Date End Date Taking?  Authorizing Provider   bisacodyl (DULCOLAX) 10 MG suppository Place 10 mg rectally daily as needed for Constipation    Historical Provider, MD   Sodium Phosphates (FLEET) 7-19 GM/118ML Place 1 enema rectally As needed after no BM for 5 days    Historical Provider, MD   clotrimazole-betamethasone (LOTRISONE) 1-0.05 % cream Apply topically 2 times daily Apply topically 2 times daily. Historical Provider, MD   magnesium hydroxide (MILK OF MAGNESIA) 400 MG/5ML suspension Take by mouth daily as needed for Constipation    Historical Provider, MD   polyethylene glycol (GLYCOLAX) 17 g packet Take 17 g by mouth every other day    Historical Provider, MD   metoclopramide (REGLAN) 5 MG tablet Take 5 mg by mouth 3 times daily (before meals) For nausea    Historical Provider, MD   budesonide-formoterol (SYMBICORT) 160-4.5 MCG/ACT AERO Inhale 2 puffs into the lungs 2 times daily    Historical Provider, MD   gabapentin (NEURONTIN) 300 MG capsule Take 1 capsule by mouth 3 times daily for 30 days. 2/10/21 3/12/21  Howard Dangelo MD   QUEtiapine (SEROQUEL) 25 MG tablet Take 1 tablet by mouth 2 times daily 12/16/20   MARGOTH Cheng NP   ferrous sulfate (FE TABS 325) 325 (65 Fe) MG EC tablet Take 1 tablet by mouth daily (with breakfast) 12/16/20   MARGOTH Cheng NP   furosemide (LASIX) 20 MG tablet Take 1 tablet by mouth daily 12/16/20   MARGOTH Cheng NP   gabapentin (NEURONTIN) 300 MG capsule Take 1 capsule by mouth 3 times daily for 30 days. 12/16/20 9/10/21  MARGOTH Cheng NP   clonazePAM (KLONOPIN) 0.5 MG tablet Take 0.5 tablets by mouth 2 times daily for 60 days.  Taking 1/2 tablet BID 7/7/20 9/10/21  Umberto Marshall MD   pantoprazole (PROTONIX) 40 MG tablet Take 1 tablet by mouth 2 times daily 7/7/20   Umberto Marshall MD   amitriptyline (ELAVIL) 25 MG tablet Take 1 tablet by mouth nightly 7/14/20   Umberto Marshall MD   busPIRone (BUSPAR) 10 MG tablet Take 2 tablets by mouth 3 times daily 7/14/20   Umberto Marshall MD   metoprolol succinate (TOPROL XL) 25 MG extended release tablet Take 1 tablet by mouth daily 5/30/20   Elías Cardozo MD   docusate (COLACE) 50 MG/5ML liquid 10 mLs by Per G Tube route 2 times daily 4/6/20   Curly Marc MD   sucralfate (CARAFATE) 1 GM tablet Take 1 tablet by mouth 4 times daily 4/6/20   Curly Marc MD   traZODone (DESYREL) 100 MG tablet Take 1 tablet by mouth nightly 4/6/20   Curly Marc MD RA VITAMIN D-3 50 MCG (2000 UT) CAPS take 1 capsule by mouth once daily 2/14/20   Historical Provider, MD   Oyster Shell 500 MG TABS Take 500 mg by mouth 2 times daily     Historical Provider, MD   calcium carbonate (TUMS) 500 MG chewable tablet Take 1 tablet by mouth 3 times daily as needed for Heartburn    Historical Provider, MD   simvastatin (ZOCOR) 40 MG tablet Take 40 mg by mouth nightly. Historical Provider, MD       CURRENT MEDICATIONS:  Scheduled Meds:   meropenem  1,000 mg IntraVENous Q12H    sodium chloride flush  5-40 mL IntraVENous 2 times per day    enoxaparin  30 mg SubCUTAneous Daily    busPIRone  20 mg Oral TID    amitriptyline  25 mg Oral Nightly    budesonide-formoterol  2 puff Inhalation BID    metoclopramide  5 mg Oral TID AC    polyethylene glycol  17 g Oral Every Other Day    QUEtiapine  25 mg Oral BID    atorvastatin  40 mg Oral Daily    sucralfate  1 g Oral 4x Daily    traZODone  100 mg Oral Nightly    tamsulosin  0.4 mg Oral Daily     Continuous Infusions:   sodium chloride      sodium chloride 150 mL/hr at 11/24/21 0819     PRN Meds:sodium chloride flush, sodium chloride, acetaminophen **OR** acetaminophen, LORazepam, calcium carbonate, magnesium hydroxide, morphine    SOCIAL HISTORY:     Tobacco:   reports that she has quit smoking. Her smoking use included cigarettes. She has a 50.00 pack-year smoking history. She has never used smokeless tobacco.  Alcohol:   reports no history of alcohol use. Illicit drugs:  reports no history of drug use.     FAMILY HISTORY: Family History   Problem Relation Age of Onset   [facility] Mother         uterine    Heart Attack Mother     Heart Attack Father     Other Maternal Grandfather         foot gangrene    Other Paternal Grandmother         bleeding ulcers    Other Paternal Grandfather         lung cancer       REVIEW OF SYSTEMS:    Constitutional: No fever, no chills, no lethargy, no weakness. HEENT:  No headache, otalgia, itchy eyes, nasal discharge or sore throat. Cardiac:  No chest pain, dyspnea, orthopnea or PND. Chest:   No cough, phlegm or wheezing. Abdomen:  No abdominal pain, nausea or vomiting. Neuro:  No focal weakness, abnormal movements or seizure like activity. Skin:   No rashes, no itching. :   No hematuria, no pyuria, no dysuria, no flank pain. Extremities:  No swelling or joint pains. ROS was otherwise negative except as mentioned in the [address] PHYSICAL EXAM:    /65   Pulse 71   Temp 98.7 °F (37.1 °C)   Resp 18   Ht 5' 2\" (1.575 m)   Wt 170 lb (77.1 kg)   SpO2 93%   BMI 31.09 kg/m²     GENERAL:  Tearful and anxious   HEAD:  Normocephalic, Atraumatic  EENT:  EOMI, Sclera not icteric, Oropharynx moist   NECK: Supple, Trachea midline  LUNGS: CTA Bilaterally  HEART: RRR, No murmur  ABDOMEN:  Soft, lower abdominal pain that is sharp in nature with guarding and rebound tenderness, Nondistended, BS WNL  EXT:  No clubbing. No cyanosis. No edema. SKIN:  No rashes. No jaundice. No stigmata of liver disease.     MUSC/SKEL:  Adequate muscle bulk for patient's age, No significant synovitis, No deformities  NEURO:  A&O x Three, CN II- XII grossly intact      LABS AND IMAGING:     CBC  Recent Labs     11/23/21 2043 11/24/21  0631   WBC 8.9 7.9   HGB 13.4 12.3   HCT 42.2 38.1   MCV 87.6 88.6   MCH 27.8 28.6   MCHC 31.8 32.3    155       IMMATURE PLTs  Lab Results   Component Value Date    PLTFLUORE 131 12/14/2020    PLTFLUORE 130 02/27/2020       BMP  Recent Labs     11/23/21 2043 obstruction. Suggestion of mild wall thickening and mucosal hyperenhancement at the rectum. Pelvis: Assessment is degraded by streak artifact from right hip arthroplasty hardware. The urinary bladder contains excreting contrast but is otherwise grossly unremarkable. Hysterectomy. Neither ovary is definitively identified. Peritoneum/Retroperitoneum: No free fluid or free air. Mildly prominent lymph nodes in the maria luisa hepatis are unchanged from prior. Aortoiliac atherosclerosis and minimal ectasia but no aneurysm. Bones/Soft Tissues: Osseous demineralization and degenerative changes with levoconvex rotatory scoliosis. Partially imaged right total hip arthroplasty. No acute abnormality in the superficial soft tissues. Moderate hiatal hernia with a fluid-filled patulous distal esophagus which is similar to prior comparison exams. Distended stomach with a normal caliber duodenum. Query possible gastroparesis or functional outlet obstruction. Possible findings of proctitis. Correlate clinically. Hepatomegaly with steatosis, unchanged. Findings in the dependent lung bases which are suspicious for sequelae of aspiration pneumonia in the appropriate clinical setting. XR CHEST PORTABLE    Result Date: 11/23/2021  EXAMINATION: ONE XRAY VIEW OF THE CHEST 11/23/2021 11:33 pm COMPARISON: 09/10/2021 HISTORY: ORDERING SYSTEM PROVIDED HISTORY: eval for pmn FINDINGS: The cardiomediastinal silhouette is normal in size and configuration. Coarse interstitial lung markings along with right basilar airspace opacities. No sizable pleural effusion or pneumothorax. No acute osseous findings. Upper abdomen is grossly unremarkable. Chronic lung changes with right basilar airspace opacity concerning for underlying pneumonia in the appropriate clinical setting. Correlate with aspiration risk.      CTA HEAD NECK W CONTRAST    Result Date: 11/23/2021  EXAMINATION: CTA OF THE HEAD AND NECK WITH CONTRAST 11/23/2021 8:25 pm: TECHNIQUE: CTA of the head and neck was performed with the administration of intravenous contrast. Multiplanar reformatted images are provided for review. MIP images are provided for review. Stenosis of the internal carotid arteries measured using NASCET criteria. Dose modulation, iterative reconstruction, and/or weight based adjustment of the mA/kV was utilized to reduce the radiation dose to as low as reasonably achievable. COMPARISON: None. HISTORY: ORDERING SYSTEM PROVIDED HISTORY: eval for stroke TECHNOLOGIST PROVIDED HISTORY: eval for stroke Decision Support Exception - unselect if not a suspected or confirmed emergency medical condition->Emergency Medical Condition (MA) FINDINGS: CTA NECK: AORTIC ARCH/ARCH VESSELS: No dissection or arterial injury. No significant stenosis of the brachiocephalic or subclavian arteries. CAROTID ARTERIES: Atherosclerotic disease results in 25% stenosis of the proximal right cervical internal carotid artery. VERTEBRAL ARTERIES: No dissection, arterial injury, or significant stenosis. SOFT TISSUES: Emphysematous changes of the lungs are present. There is a dilated, air and fluid-filled esophagus. BONES: Degenerative changes of the spine are noted. CTA HEAD: ANTERIOR CIRCULATION: No significant stenosis of the intracranial internal carotid, anterior cerebral, or middle cerebral arteries. No aneurysm. POSTERIOR CIRCULATION: No significant stenosis of the vertebral, basilar, or posterior cerebral arteries. No aneurysm. OTHER: No dural venous sinus thrombosis on this non-dedicated study. BRAIN: See separately dictated noncontrast head CT report. No large vessel occlusion visualized within the head or neck. Incidentally noted pulmonary emphysema and dilated, air and fluid-filled esophagus. IMPRESSION:     1. Possible gastric outlet obstruction given history of gastric ulcers. CT abdomen shows mild distension of the stomach with gas and fluid.  There is also mild distension of proximal transverse colon with no focal obstruction. Considering ongoing abdominal pain and recent history of nausea and dark red vomiting, we will proceed with EGD today. 2. Nausea and vomiting   3. Diarrhea   4. Constipation   5. GERD   6. Ulcerative esophagitis   7. Anxiety/depression   8. HLD  9. HTN  10. MVP    Old records, labs and imaging reviewed. PLAN   - Plan for EGD today   - Keep NPO   - Complete plan of care to follow EGD     This plan was formulated in collaboration with Dr. Josiah Holman. Thank you for allowing us to participate in the care of your patient. Electronically signed by: Ramon Mcfarlane MD on 11/24/2021 at 11:23 AM     Please note that this note was generated using a voice recognition dictation software. Although every effort was made to ensure the accuracy of this automated transcription, some errors in transcription may have occurred.

## 2023-12-08 ENCOUNTER — APPOINTMENT (OUTPATIENT)
Age: 78
End: 2023-12-08
Attending: INTERNAL MEDICINE
Payer: MEDICARE

## 2023-12-08 VITALS
SYSTOLIC BLOOD PRESSURE: 102 MMHG | HEART RATE: 106 BPM | BODY MASS INDEX: 19.45 KG/M2 | HEIGHT: 61 IN | RESPIRATION RATE: 18 BRPM | OXYGEN SATURATION: 92 % | TEMPERATURE: 99.2 F | WEIGHT: 103 LBS | DIASTOLIC BLOOD PRESSURE: 50 MMHG

## 2023-12-08 LAB
ALBUMIN SERPL-MCNC: 3.6 G/DL (ref 3.5–5.2)
ALP SERPL-CCNC: 61 U/L (ref 35–104)
ALT SERPL-CCNC: 7 U/L (ref 0–32)
ANION GAP SERPL CALCULATED.3IONS-SCNC: 11 MMOL/L (ref 7–16)
AST SERPL-CCNC: 20 U/L (ref 0–31)
BASOPHILS # BLD: 0.03 K/UL (ref 0–0.2)
BASOPHILS NFR BLD: 1 % (ref 0–2)
BILIRUB SERPL-MCNC: 2 MG/DL (ref 0–1.2)
BUN SERPL-MCNC: 17 MG/DL (ref 6–23)
CALCIUM SERPL-MCNC: 8.7 MG/DL (ref 8.6–10.2)
CHLORIDE SERPL-SCNC: 98 MMOL/L (ref 98–107)
CO2 SERPL-SCNC: 27 MMOL/L (ref 22–29)
CREAT SERPL-MCNC: 1.2 MG/DL (ref 0.5–1)
EOSINOPHIL # BLD: 0.49 K/UL (ref 0.05–0.5)
EOSINOPHILS RELATIVE PERCENT: 9 % (ref 0–6)
ERYTHROCYTE [DISTWIDTH] IN BLOOD BY AUTOMATED COUNT: 17.4 % (ref 11.5–15)
GFR SERPL CREATININE-BSD FRML MDRD: 48 ML/MIN/1.73M2
GLUCOSE SERPL-MCNC: 88 MG/DL (ref 74–99)
HCT VFR BLD AUTO: 30.5 % (ref 34–48)
HGB BLD-MCNC: 9.1 G/DL (ref 11.5–15.5)
IMM GRANULOCYTES # BLD AUTO: <0.03 K/UL (ref 0–0.58)
IMM GRANULOCYTES NFR BLD: 0 % (ref 0–5)
INR PPP: 2.4
LYMPHOCYTES NFR BLD: 0.62 K/UL (ref 1.5–4)
LYMPHOCYTES RELATIVE PERCENT: 12 % (ref 20–42)
MAGNESIUM SERPL-MCNC: 2 MG/DL (ref 1.6–2.6)
MCH RBC QN AUTO: 26 PG (ref 26–35)
MCHC RBC AUTO-ENTMCNC: 29.8 G/DL (ref 32–34.5)
MCV RBC AUTO: 87.1 FL (ref 80–99.9)
MONOCYTES NFR BLD: 0.62 K/UL (ref 0.1–0.95)
MONOCYTES NFR BLD: 12 % (ref 2–12)
NEUTROPHILS NFR BLD: 66 % (ref 43–80)
NEUTS SEG NFR BLD: 3.41 K/UL (ref 1.8–7.3)
PLATELET # BLD AUTO: 161 K/UL (ref 130–450)
PMV BLD AUTO: 11.7 FL (ref 7–12)
POTASSIUM SERPL-SCNC: 3.2 MMOL/L (ref 3.5–5)
PROT SERPL-MCNC: 6.4 G/DL (ref 6.4–8.3)
PROTHROMBIN TIME: 26.3 SEC (ref 9.3–12.4)
RBC # BLD AUTO: 3.5 M/UL (ref 3.5–5.5)
SODIUM SERPL-SCNC: 136 MMOL/L (ref 132–146)
WBC OTHER # BLD: 5.2 K/UL (ref 4.5–11.5)

## 2023-12-08 PROCEDURE — 2580000003 HC RX 258: Performed by: NURSE PRACTITIONER

## 2023-12-08 PROCEDURE — 2500000003 HC RX 250 WO HCPCS: Performed by: INTERNAL MEDICINE

## 2023-12-08 PROCEDURE — 6370000000 HC RX 637 (ALT 250 FOR IP): Performed by: INTERNAL MEDICINE

## 2023-12-08 PROCEDURE — 6370000000 HC RX 637 (ALT 250 FOR IP): Performed by: NURSE PRACTITIONER

## 2023-12-08 PROCEDURE — 6360000002 HC RX W HCPCS: Performed by: INTERNAL MEDICINE

## 2023-12-08 PROCEDURE — 83735 ASSAY OF MAGNESIUM: CPT

## 2023-12-08 PROCEDURE — 80053 COMPREHEN METABOLIC PANEL: CPT

## 2023-12-08 PROCEDURE — 93306 TTE W/DOPPLER COMPLETE: CPT

## 2023-12-08 PROCEDURE — 85610 PROTHROMBIN TIME: CPT

## 2023-12-08 PROCEDURE — 85025 COMPLETE CBC W/AUTO DIFF WBC: CPT

## 2023-12-08 PROCEDURE — 94640 AIRWAY INHALATION TREATMENT: CPT

## 2023-12-08 RX ORDER — WARFARIN SODIUM 2 MG/1
2 TABLET ORAL DAILY
Status: DISCONTINUED | OUTPATIENT
Start: 2023-12-08 | End: 2023-12-08 | Stop reason: HOSPADM

## 2023-12-08 RX ORDER — POTASSIUM CHLORIDE 20 MEQ/1
40 TABLET, EXTENDED RELEASE ORAL ONCE
Status: COMPLETED | OUTPATIENT
Start: 2023-12-08 | End: 2023-12-08

## 2023-12-08 RX ORDER — ENOXAPARIN SODIUM 100 MG/ML
1 INJECTION SUBCUTANEOUS 2 TIMES DAILY
Status: DISCONTINUED | OUTPATIENT
Start: 2023-12-08 | End: 2023-12-08 | Stop reason: HOSPADM

## 2023-12-08 RX ADMIN — IPRATROPIUM BROMIDE AND ALBUTEROL SULFATE 1 DOSE: 2.5; .5 SOLUTION RESPIRATORY (INHALATION) at 08:56

## 2023-12-08 RX ADMIN — LEVOTHYROXINE SODIUM 112 MCG: 0.11 TABLET ORAL at 05:37

## 2023-12-08 RX ADMIN — WARFARIN SODIUM 2 MG: 2 TABLET ORAL at 17:13

## 2023-12-08 RX ADMIN — IPRATROPIUM BROMIDE AND ALBUTEROL SULFATE 1 DOSE: 2.5; .5 SOLUTION RESPIRATORY (INHALATION) at 15:42

## 2023-12-08 RX ADMIN — ALLOPURINOL 100 MG: 100 TABLET ORAL at 08:06

## 2023-12-08 RX ADMIN — DIGOXIN 62.5 MCG: 0.12 TABLET ORAL at 08:07

## 2023-12-08 RX ADMIN — METOPROLOL SUCCINATE 25 MG: 25 TABLET, EXTENDED RELEASE ORAL at 17:13

## 2023-12-08 RX ADMIN — POTASSIUM CHLORIDE 10 MEQ: 750 TABLET, EXTENDED RELEASE ORAL at 08:06

## 2023-12-08 RX ADMIN — ENOXAPARIN SODIUM 50 MG: 100 INJECTION SUBCUTANEOUS at 09:52

## 2023-12-08 RX ADMIN — Medication 10 ML: at 08:08

## 2023-12-08 RX ADMIN — FOLIC ACID 1 MG: 1 TABLET ORAL at 08:06

## 2023-12-08 RX ADMIN — IPRATROPIUM BROMIDE AND ALBUTEROL SULFATE 1 DOSE: 2.5; .5 SOLUTION RESPIRATORY (INHALATION) at 12:15

## 2023-12-08 RX ADMIN — BUMETANIDE 1 MG: 0.25 INJECTION INTRAMUSCULAR; INTRAVENOUS at 08:08

## 2023-12-08 RX ADMIN — POTASSIUM CHLORIDE 40 MEQ: 1500 TABLET, EXTENDED RELEASE ORAL at 11:47

## 2023-12-08 NOTE — DISCHARGE SUMMARY
Recent Labs     12/07/23  0441 12/08/23  0336   MG 2.0 2.0       No results for input(s): \"CKTOTAL\", \"CKMB\", \"TROPONINI\" in the last 72 hours. Recent Labs     12/06/23  1800   LACTA 1.4       IMAGING:  XR CHEST PORTABLE    Result Date: 12/6/2023  EXAMINATION: ONE XRAY VIEW OF THE CHEST 12/6/2023 7:48 pm COMPARISON: None. HISTORY: ORDERING SYSTEM PROVIDED HISTORY: SOB TECHNOLOGIST PROVIDED HISTORY: Reason for exam:->SOB FINDINGS: The lungs are without acute focal process. There is no effusion or pneumothorax. Cardiomegaly. Sternotomy wires and left-sided transvenous pacer device. The osseous structures are without acute process. No acute process. Cardiomegaly. DISPOSITION:  The patient's condition is fair.    The patient is being discharged to home    DISCHARGE MEDICATIONS:      Medication List        CONTINUE taking these medications      albuterol-ipratropium  MCG/ACT Aers inhaler  Commonly known as: COMBIVENT RESPIMAT  Inhale 1 puff into the lungs 4 times daily     allopurinol 100 MG tablet  Commonly known as: ZYLOPRIM     b complex vitamins capsule     calcitRIOL 0.25 MCG capsule  Commonly known as: ROCALTROL     Digoxin 62.5 MCG Tabs  Take 62.5 mcg by mouth See Admin Instructions Take the medication: Monday, Wednesday and Friday     ferrous sulfate 325 (65 Fe) MG tablet  Commonly known as: IRON 667     folic acid 1 MG tablet  Commonly known as: FOLVITE     levothyroxine 112 MCG tablet  Commonly known as: SYNTHROID     metoprolol succinate 25 MG extended release tablet  Commonly known as: TOPROL XL  Take 1 tablet by mouth 2 times daily (with meals)     potassium chloride 10 MEQ extended release tablet  Commonly known as: KLOR-CON M  Take 1 tablet by mouth daily     torsemide 20 MG tablet  Commonly known as: DEMADEX  Take 1 tablet by mouth daily     vitamin B-12 500 MCG tablet  Commonly known as: CYANOCOBALAMIN     vitamin D 50 MCG (2000 UT) Caps capsule     warfarin 2.5 MG

## 2023-12-08 NOTE — PROGRESS NOTES
Internal Medicine Progress Note    Patient's name: Sumit Zamora  : 1945  Chief complaints (on day of admission): No chief complaint on file. Admission date: 2023  Date of service: 2023   Room: [facility]  Primary care physician: Samantha Barrett MD  Reason for visit: Follow-up for CHF    Subjective  Lelia Abdi was seen and examined at the bedside. She was sitting comfortably in bed, in no acute distress. She states that she is feeling better and asking when she will be discharged. She is on room air. She denies fever, chills, cough and shortness of breath. She was asking if she was going to get an echocardiogram today. She has no other questions or concerns at this time. Review of Systems  There are no new complaints of chest pain, shortness of breath, abdominal pain, nausea, vomiting, diarrhea, constipation.     Hospital Medications  Current Facility-Administered Medications   Medication Dose Route Frequency Provider Last Rate Last Admin    bumetanide (BUMEX) injection 1 mg  1 mg IntraVENous BID Nicolás Lam DO   1 mg at 23    melatonin tablet 3 mg  3 mg Oral Nightly PRN Nicolás Lam DO        warfarin placeholder: dosing by pharmacy   Other RX Placeholder SOCORRO Orta CNP        ipratropium 0.5 mg-albuterol 2.5 mg (DUONEB) nebulizer solution 1 Dose  1 Dose Inhalation 4x Daily SOCORRO Orta CNP   1 Dose at 23    allopurinol (ZYLOPRIM) tablet 100 mg  100 mg Oral Daily SOCORRO Orta CNP   100 mg at 23 1024    digoxin (LANOXIN) tablet 62.5 mcg  62.5 mcg Oral Q MWF Isaura Guerin APRN - CNP        [Held by provider] ferrous sulfate (IRON 325) tablet 325 mg  325 mg Oral BID Isaura Guerin APRN - CNP        folic acid (FOLVITE) tablet 1 mg  1 mg Oral Daily SOCORRO Orta CNP   1 mg at 23    levothyroxine (SYNTHROID) tablet 112 mcg  112 mcg Oral Daily SOCORRO Orta

## 2023-12-08 NOTE — PROGRESS NOTES
Discharge instructions, medications and labs reviewed with patient and her  and all questions answered. IV site and telemetry monitor removed.

## 2023-12-08 NOTE — CONSULTS
CARDIOLOGY CONSULTATION    Patient Name:  Chula Fishman    :  1945    Reason for Consultation:   History of aortic-mitral valve replacement; hypoprothrombinemia    History of Present Illness:   Chula Fishman returns to [address], following history of . Markedly elevated prothrombin time of 9 units. Upon further questioning she apparently has been on antibiotics which she discontinued on her own but denied any ingestion of nonsteroidals etc.  He also has chronic shortness of breath with any form of exertion secondary to her longstanding history of underlying valvular heart disease as well as right heart failure. She also has an ICD device and follows with Dr. Trevor Benavides. .  She remains in chronic atrial fibrillation. .  She is now admitted for reduction of INR with medical intervention and will reassess left ventricular right ventricular and valvular function as it is now been >1-year since her last echocardiogram.  Past Medical History:   has a past medical history of A-fib ([address]), Acute exacerbation of CHF (congestive heart failure) ([address]), Acute renal failure ([address]), Aneurysm ([address]), CAD (coronary artery disease), CRF (chronic renal failure), Epistaxis, Gastrointestinal bleeding, lower, H/O anemia of chronic disorder, Hypertension, and Thyroid disease. Surgical History:   has a past surgical history that includes Cardiac surgery; Aortic valvuloplasty; Mitral valvuloplasty; Tricuspid valvuloplasty; Colonoscopy; Upper gastrointestinal endoscopy; Cardiac defibrillator placement (Left, 2022); hernia repair (Right, 2023); and Hernia mesh removal.  Aortic and mitral valve replacement  [facility], tricuspid valvuloplasty-[facility] Rukhsana-Lebron annuloplasty ring-Kaleb salguero MD    Social History:   reports that she quit smoking about 23 years ago. Her smoking use included cigarettes. She has a 12.50 pack-year smoking history.  She has never used smokeless  and Dany Vaughn MD for allowing me to consult in the care of this patient.     Janusz Laurent DO DO, [address] [place] [address], Erica Albrecht

## 2023-12-08 NOTE — PROGRESS NOTES
Pharmacy Consultation Note  (Warfarin Dosing and Monitoring)    Initial consult date: 12/6/2023  Consulting Provider: AYDEN Gallo    Kati Donis is a 77 y.o. female for whom pharmacy has been asked to manage warfarin therapy. Weight:   Wt Readings from Last 1 Encounters:   12/08/23 46.7 kg (103 lb)       TSH:    Lab Results   Component Value Date/Time    TSH 0.453 11/28/2021 12:04 PM       Hepatic Function Panel:                          Lab Results   Component Value Date/Time    ALKPHOS 61 12/08/2023 03:36 AM    ALT 7 12/08/2023 03:36 AM    AST 20 12/08/2023 03:36 AM    PROT 6.4 12/08/2023 03:36 AM    BILITOT 2.0 12/08/2023 03:36 AM    BILIDIR <0.2 09/25/2016 10:07 AM    IBILI 0.7 09/25/2016 10:07 AM    LABALBU 3.6 12/08/2023 03:36 AM    LABALBU 4.4 04/05/2012 10:15 AM       Current warfarin drug-drug interactions include: No significant interactions    Recent Labs     12/06/23  1800 12/07/23  0441 12/08/23  0336   HGB 10.5* 10.3* 9.1*     --  161       Date Warfarin Dose INR Heparin or LMWH Comment   12/7 HOLD 5.9 --    12/8 2 mg 2.4 Enoxaparin                           Assessment:  Patient is a 77 y.o. female on warfarin for Atrial Fibrillation. Patient's home warfarin dosing regimen is documented as warfarin 2.5 mg on Sunday and warfarin 2 mg Monday through Saturday. Goal INR 2 - 3  INR 2.4 today; received vitamin K 2.5 mg PO on 12/6  On enoxaparin for bridge anticoagulation    Plan:   Will give warfarin 2 mg tonight  Daily PT/INR until the INR is stable within the therapeutic range  Pharmacist will follow and monitor/adjust dosing as necessary    Karey Sue PharmD, BCCCP  12/8/2023  1:01 PM

## 2023-12-09 LAB
ECHO AO ASC DIAM: 3.3 CM
ECHO AO ASCENDING AORTA INDEX: 2.32 CM/M2
ECHO AV AREA PEAK VELOCITY: 1.4 CM2
ECHO AV AREA VTI: 1.6 CM2
ECHO AV AREA/BSA PEAK VELOCITY: 1 CM2/M2
ECHO AV AREA/BSA VTI: 1.1 CM2/M2
ECHO AV MEAN GRADIENT: 23 MMHG
ECHO AV MEAN VELOCITY: 2.3 M/S
ECHO AV PEAK GRADIENT: 40 MMHG
ECHO AV PEAK VELOCITY: 3.2 M/S
ECHO AV VELOCITY RATIO: 0.47
ECHO AV VTI: 53.5 CM
ECHO BSA: 1.43 M2
ECHO EST RA PRESSURE: 15 MMHG
ECHO LA DIAMETER INDEX: 3.8 CM/M2
ECHO LA DIAMETER: 5.4 CM
ECHO LV EDV A4C: 94 ML
ECHO LV EDV INDEX A4C: 66 ML/M2
ECHO LV EF PHYSICIAN: 33 %
ECHO LV EJECTION FRACTION A4C: 34 %
ECHO LV ESV A4C: 62 ML
ECHO LV ESV INDEX A4C: 44 ML/M2
ECHO LV FRACTIONAL SHORTENING: 16 % (ref 28–44)
ECHO LV INTERNAL DIMENSION DIASTOLE INDEX: 4.01 CM/M2
ECHO LV INTERNAL DIMENSION DIASTOLIC: 5.7 CM (ref 3.9–5.3)
ECHO LV INTERNAL DIMENSION SYSTOLIC INDEX: 3.38 CM/M2
ECHO LV INTERNAL DIMENSION SYSTOLIC: 4.8 CM
ECHO LV IVSD: 0.9 CM (ref 0.6–0.9)
ECHO LV IVSS: 0.8 CM
ECHO LV MASS 2D: 241.3 G (ref 67–162)
ECHO LV MASS INDEX 2D: 170 G/M2 (ref 43–95)
ECHO LV POSTERIOR WALL DIASTOLIC: 1.2 CM (ref 0.6–0.9)
ECHO LV POSTERIOR WALL SYSTOLIC: 1.4 CM
ECHO LV RELATIVE WALL THICKNESS RATIO: 0.42
ECHO LVOT AREA: 3.1 CM2
ECHO LVOT AV VTI INDEX: 0.55
ECHO LVOT DIAM: 2 CM
ECHO LVOT MEAN GRADIENT: 5 MMHG
ECHO LVOT PEAK GRADIENT: 9 MMHG
ECHO LVOT PEAK VELOCITY: 1.5 M/S
ECHO LVOT STROKE VOLUME INDEX: 64.8 ML/M2
ECHO LVOT SV: 92 ML
ECHO LVOT VTI: 29.3 CM
ECHO MV AREA PHT: 3.9 CM2
ECHO MV AREA VTI: 3.1 CM2
ECHO MV LVOT VTI INDEX: 1.01
ECHO MV MAX VELOCITY: 2.1 M/S
ECHO MV MEAN GRADIENT: 5 MMHG
ECHO MV MEAN VELOCITY: 0.9 M/S
ECHO MV PEAK GRADIENT: 17 MMHG
ECHO MV PRESSURE HALF TIME (PHT): 55.8 MS
ECHO MV VTI: 29.7 CM
ECHO PULMONARY ARTERY END DIASTOLIC PRESSURE: 14 MMHG
ECHO PV MAX VELOCITY: 1.4 M/S
ECHO PV MEAN GRADIENT: 3 MMHG
ECHO PV MEAN VELOCITY: 0.7 M/S
ECHO PV PEAK GRADIENT: 8 MMHG
ECHO PV REGURGITANT MAX VELOCITY: 1.9 M/S
ECHO PV VTI: 20.2 CM
ECHO RIGHT VENTRICULAR SYSTOLIC PRESSURE (RVSP): 72 MMHG
ECHO TV REGURGITANT MAX VELOCITY: 3.78 M/S
ECHO TV REGURGITANT PEAK GRADIENT: 57 MMHG

## 2023-12-11 ENCOUNTER — HOSPITAL ENCOUNTER (OUTPATIENT)
Age: 78
Discharge: HOME | End: 2023-12-11
Payer: MEDICARE

## 2023-12-11 DIAGNOSIS — I48.11 LONGSTANDING PERSISTENT ATRIAL FIBRILLATION (HCC): ICD-10-CM

## 2023-12-11 LAB
INR PPP: 2
PROTHROMBIN TIME: 22.4 SEC (ref 9.3–12.4)

## 2023-12-11 PROCEDURE — 36415 COLL VENOUS BLD VENIPUNCTURE: CPT

## 2023-12-11 PROCEDURE — 85610 PROTHROMBIN TIME: CPT

## 2023-12-15 PROBLEM — I50.9 ACUTE ON CHRONIC CONGESTIVE HEART FAILURE, UNSPECIFIED HEART FAILURE TYPE (HCC): Status: ACTIVE | Noted: 2023-12-15

## 2023-12-20 PROBLEM — Z95.2 HISTORY OF MITRAL VALVE REPLACEMENT WITH MECHANICAL VALVE: Status: ACTIVE | Noted: 2023-12-20

## 2023-12-20 PROBLEM — Z95.2 HISTORY OF MECHANICAL AORTIC VALVE REPLACEMENT: Status: ACTIVE | Noted: 2023-12-20

## 2023-12-20 PROBLEM — N18.30 CHRONIC RENAL INSUFFICIENCY, STAGE III (MODERATE) (HCC): Status: ACTIVE | Noted: 2023-12-20

## 2023-12-20 PROBLEM — R79.1 SUPRATHERAPEUTIC INR: Status: ACTIVE | Noted: 2023-12-20

## 2023-12-22 ENCOUNTER — HOSPITAL ENCOUNTER (OUTPATIENT)
Dept: CT IMAGING | Age: 78
Discharge: HOME | End: 2023-12-24
Payer: MEDICARE

## 2023-12-22 DIAGNOSIS — I71.21 ANEURYSM OF ASCENDING AORTA WITHOUT RUPTURE (HCC): ICD-10-CM

## 2023-12-22 PROCEDURE — 71250 CT THORAX DX C-: CPT

## 2023-12-26 NOTE — PROGRESS NOTES
CLINICAL PHARMACY NOTE: MEDS TO BEDS    Total # of Prescriptions Filled: 4   The following medications were delivered to the patient:  Warfarin 1  Bumex 1  Aldactone 25  Jardiance 10    Additional Documentation:

## 2023-12-27 ENCOUNTER — HOSPITAL ENCOUNTER (OUTPATIENT)
Age: 78
Discharge: HOME | End: 2023-12-27
Payer: MEDICARE

## 2023-12-27 LAB
FERRITIN SERPL-MCNC: 74 NG/ML
INR PPP: 4.3
IRON SATN MFR SERPL: 10 % (ref 15–50)
IRON SERPL-MCNC: 36 UG/DL (ref 37–145)
PROTHROMBIN TIME: 47.5 SEC (ref 9.3–12.4)
TIBC SERPL-MCNC: 349 UG/DL (ref 250–450)

## 2023-12-27 PROCEDURE — 85610 PROTHROMBIN TIME: CPT

## 2023-12-27 PROCEDURE — 36415 COLL VENOUS BLD VENIPUNCTURE: CPT

## 2024-01-02 ENCOUNTER — SCHEDULED TELEPHONE ENCOUNTER (OUTPATIENT)
Dept: CARDIOTHORACIC SURGERY | Age: 79
End: 2024-01-02
Payer: MEDICARE

## 2024-01-02 DIAGNOSIS — I71.21 ANEURYSM OF ASCENDING AORTA WITHOUT RUPTURE (HCC): Primary | ICD-10-CM

## 2024-01-02 PROCEDURE — 99441 PR PHYS/QHP TELEPHONE EVALUATION 5-10 MIN: CPT | Performed by: THORACIC SURGERY (CARDIOTHORACIC VASCULAR SURGERY)

## 2024-01-02 NOTE — PROGRESS NOTES
CC: Thoracic ascending aneurysm      HPI:  Josefina Garcia is a 78 y.o. female whom is evaluated via telephone due to the covid 19 pandemic for initial surveillance of an ascending aortic aneurysm. She is s/p sternotomy for AVR/MVR in 1992 and redo sternotomy in 2011 for ?tricuspid repair. Recent CT of the chest done reveals an incidental finding of a 12 mm Focal outpouching anterior ascending thoracic aorta may be a small saccular aneurysm. She denies any SOB, CP, back pain, or any major complaints. She takes antihypertensive medication, and is a former smoker (quit 2000).      Review of Systems:   Constitutional: Negative for fever and chills.   Respiratory: Negative for cough, chest tightness and shortness of breath.    Cardiovascular: Negative for chest pain, palpitations and leg swelling.   Musculoskeletal: Negative for back pain.   Neurological: Negative for dizziness, syncope and light-headedness.       Objective:   Physical Exam   Constitutional: oriented to person, place, and time. No distress.     Remaining PE deferred due to telephone encounter.    Assessment:      Stable, small saccular aneurysm of the ascending aorta likely related to previous cardiac surgery many years ago has not changed in follow up                Plan:     F/u prn   Importance of BP control reinforced        Educated on the importance of strict blood pressure control and tobacco free life-style to prevent progression            Josefina Garcia is a 78 y.o. female evaluated via telephone on 1/2/2024.      Consent:  She and/or health care decision maker is aware that that she may receive a bill for this telephone service, depending on her insurance coverage, and has provided verbal consent to proceed: Yes      Documentation:  I communicated with the patient and/or health care decision maker about above.   Details of this discussion including any medical advice provided: as above      I affirm this is a Patient Initiated

## 2024-01-03 ENCOUNTER — HOSPITAL ENCOUNTER (OUTPATIENT)
Age: 79
Discharge: HOME | End: 2024-01-03
Payer: MEDICARE

## 2024-01-03 LAB
INR PPP: 4.6
PROTHROMBIN TIME: 50.6 SEC (ref 9.3–12.4)

## 2024-01-03 PROCEDURE — 36415 COLL VENOUS BLD VENIPUNCTURE: CPT

## 2024-01-03 PROCEDURE — 85610 PROTHROMBIN TIME: CPT

## 2024-01-05 ENCOUNTER — HOSPITAL ENCOUNTER (OUTPATIENT)
Age: 79
Discharge: HOME | End: 2024-01-05
Payer: MEDICARE

## 2024-01-05 LAB
INR PPP: 3.2
PROTHROMBIN TIME: 35.6 SEC (ref 9.3–12.4)

## 2024-01-05 PROCEDURE — 85610 PROTHROMBIN TIME: CPT

## 2024-01-05 PROCEDURE — 36415 COLL VENOUS BLD VENIPUNCTURE: CPT

## 2024-01-10 LAB
ALBUMIN SERPL-MCNC: 4 G/DL (ref 3.5–5.2)
ALP SERPL-CCNC: 77 U/L (ref 35–104)
ALT SERPL-CCNC: 9 U/L (ref 0–32)
ANION GAP SERPL CALCULATED.3IONS-SCNC: 13 MMOL/L (ref 7–16)
AST SERPL-CCNC: 30 U/L (ref 0–31)
BILIRUB SERPL-MCNC: 1.5 MG/DL (ref 0–1.2)
BUN SERPL-MCNC: 17 MG/DL (ref 6–23)
CALCIUM SERPL-MCNC: 9.9 MG/DL (ref 8.6–10.2)
CHLORIDE SERPL-SCNC: 97 MMOL/L (ref 98–107)
CO2 SERPL-SCNC: 28 MMOL/L (ref 22–29)
CREAT SERPL-MCNC: 1.2 MG/DL (ref 0.5–1)
FERRITIN SERPL-MCNC: 55 NG/ML
GFR SERPL CREATININE-BSD FRML MDRD: 45 ML/MIN/1.73M2
GLUCOSE SERPL-MCNC: 89 MG/DL (ref 74–99)
IRON SATN MFR SERPL: 8 % (ref 15–50)
IRON SERPL-MCNC: 28 UG/DL (ref 37–145)
POTASSIUM SERPL-SCNC: 4 MMOL/L (ref 3.5–5)
PROT SERPL-MCNC: 7.3 G/DL (ref 6.4–8.3)
SODIUM SERPL-SCNC: 138 MMOL/L (ref 132–146)
TIBC SERPL-MCNC: 348 UG/DL (ref 250–450)

## 2024-01-11 ENCOUNTER — HOSPITAL ENCOUNTER (OUTPATIENT)
Age: 79
Discharge: HOME | End: 2024-01-11
Payer: MEDICARE

## 2024-01-11 LAB
INR PPP: 1.9
PROTHROMBIN TIME: 20.8 SEC (ref 9.3–12.4)

## 2024-01-11 PROCEDURE — 36415 COLL VENOUS BLD VENIPUNCTURE: CPT

## 2024-01-11 PROCEDURE — 85610 PROTHROMBIN TIME: CPT

## 2024-01-23 ENCOUNTER — HOSPITAL ENCOUNTER (OUTPATIENT)
Age: 79
Discharge: HOME | End: 2024-01-23
Payer: MEDICARE

## 2024-01-23 LAB
INR PPP: 2.2
PROTHROMBIN TIME: 23.9 SEC (ref 9.3–12.4)

## 2024-01-23 PROCEDURE — 85610 PROTHROMBIN TIME: CPT

## 2024-01-23 PROCEDURE — 36415 COLL VENOUS BLD VENIPUNCTURE: CPT

## 2024-02-06 ENCOUNTER — HOSPITAL ENCOUNTER (OUTPATIENT)
Age: 79
Discharge: HOME | End: 2024-02-06
Payer: MEDICARE

## 2024-02-06 LAB
INR PPP: 1.9
PROTHROMBIN TIME: 21.2 SEC (ref 9.3–12.4)

## 2024-02-06 PROCEDURE — 36415 COLL VENOUS BLD VENIPUNCTURE: CPT

## 2024-02-06 PROCEDURE — 85610 PROTHROMBIN TIME: CPT

## 2024-03-20 ENCOUNTER — HOSPITAL ENCOUNTER (OUTPATIENT)
Age: 79
Discharge: HOME | End: 2024-03-20
Payer: MEDICARE

## 2024-03-20 LAB
25(OH)D3 SERPL-MCNC: 83.2 NG/ML (ref 30–100)
ALBUMIN SERPL-MCNC: 4.1 G/DL (ref 3.5–5.2)
ALP SERPL-CCNC: 85 U/L (ref 35–104)
ALT SERPL-CCNC: 8 U/L (ref 0–32)
ANION GAP SERPL CALCULATED.3IONS-SCNC: 12 MMOL/L (ref 7–16)
AST SERPL-CCNC: 22 U/L (ref 0–31)
BASOPHILS # BLD: 0.03 K/UL (ref 0–0.2)
BASOPHILS NFR BLD: 1 % (ref 0–2)
BILIRUB SERPL-MCNC: 1.2 MG/DL (ref 0–1.2)
BILIRUB UR QL STRIP: NEGATIVE
BUN SERPL-MCNC: 22 MG/DL (ref 6–23)
CALCIUM SERPL-MCNC: 9.6 MG/DL (ref 8.6–10.2)
CHLORIDE SERPL-SCNC: 99 MMOL/L (ref 98–107)
CHOLEST SERPL-MCNC: 120 MG/DL
CLARITY UR: CLEAR
CO2 SERPL-SCNC: 28 MMOL/L (ref 22–29)
COLOR UR: YELLOW
CREAT SERPL-MCNC: 1.3 MG/DL (ref 0.5–1)
CREAT UR-MCNC: 45.8 MG/DL (ref 29–226)
CRP SERPL HS-MCNC: 2.1 MG/L (ref 0–3)
EOSINOPHIL # BLD: 0.4 K/UL (ref 0.05–0.5)
EOSINOPHILS RELATIVE PERCENT: 8 % (ref 0–6)
EPI CELLS #/AREA URNS HPF: ABNORMAL /HPF
ERYTHROCYTE [DISTWIDTH] IN BLOOD BY AUTOMATED COUNT: 19.4 % (ref 11.5–15)
GFR SERPL CREATININE-BSD FRML MDRD: 41 ML/MIN/1.73M2
GLUCOSE SERPL-MCNC: 86 MG/DL (ref 74–99)
GLUCOSE UR STRIP-MCNC: 500 MG/DL
HBA1C MFR BLD: 5.6 % (ref 4–5.6)
HCT VFR BLD AUTO: 30.8 % (ref 34–48)
HDLC SERPL-MCNC: 45 MG/DL
HGB BLD-MCNC: 9.5 G/DL (ref 11.5–15.5)
HGB UR QL STRIP.AUTO: ABNORMAL
IMM GRANULOCYTES # BLD AUTO: <0.03 K/UL (ref 0–0.58)
IMM GRANULOCYTES NFR BLD: 0 % (ref 0–5)
KETONES UR STRIP-MCNC: NEGATIVE MG/DL
LDLC SERPL CALC-MCNC: 63 MG/DL
LEUKOCYTE ESTERASE UR QL STRIP: NEGATIVE
LYMPHOCYTES NFR BLD: 0.48 K/UL (ref 1.5–4)
LYMPHOCYTES RELATIVE PERCENT: 10 % (ref 20–42)
MAGNESIUM SERPL-MCNC: 2.2 MG/DL (ref 1.6–2.6)
MCH RBC QN AUTO: 25.4 PG (ref 26–35)
MCHC RBC AUTO-ENTMCNC: 30.8 G/DL (ref 32–34.5)
MCV RBC AUTO: 82.4 FL (ref 80–99.9)
MICROALBUMIN UR-MCNC: <12 MG/L (ref 0–19)
MICROALBUMIN/CREAT UR-RTO: NORMAL MCG/MG CREAT (ref 0–30)
MONOCYTES NFR BLD: 0.41 K/UL (ref 0.1–0.95)
MONOCYTES NFR BLD: 8 % (ref 2–12)
NEUTROPHILS NFR BLD: 73 % (ref 43–80)
NEUTS SEG NFR BLD: 3.54 K/UL (ref 1.8–7.3)
NITRITE UR QL STRIP: NEGATIVE
PH UR STRIP: 6 [PH] (ref 5–9)
PHOSPHATE SERPL-MCNC: 3.4 MG/DL (ref 2.5–4.5)
PLATELET # BLD AUTO: 185 K/UL (ref 130–450)
PMV BLD AUTO: 11.1 FL (ref 7–12)
POTASSIUM SERPL-SCNC: 4 MMOL/L (ref 3.5–5)
PROT SERPL-MCNC: 7.6 G/DL (ref 6.4–8.3)
PROT UR STRIP-MCNC: NEGATIVE MG/DL
PTH-INTACT SERPL-MCNC: 37.3 PG/ML (ref 15–65)
RBC # BLD AUTO: 3.74 M/UL (ref 3.5–5.5)
RBC #/AREA URNS HPF: ABNORMAL /HPF
SODIUM SERPL-SCNC: 139 MMOL/L (ref 132–146)
SP GR UR STRIP: <1.005 (ref 1–1.03)
TRIGL SERPL-MCNC: 58 MG/DL
URATE SERPL-MCNC: 5.4 MG/DL (ref 2.4–5.7)
UROBILINOGEN UR STRIP-ACNC: 0.2 EU/DL (ref 0–1)
VIT B12 SERPL-MCNC: 798 PG/ML (ref 211–946)
VLDLC SERPL CALC-MCNC: 12 MG/DL
WBC #/AREA URNS HPF: ABNORMAL /HPF
WBC OTHER # BLD: 4.9 K/UL (ref 4.5–11.5)

## 2024-03-20 PROCEDURE — 85025 COMPLETE CBC W/AUTO DIFF WBC: CPT

## 2024-03-20 PROCEDURE — 83036 HEMOGLOBIN GLYCOSYLATED A1C: CPT

## 2024-03-20 PROCEDURE — 82570 ASSAY OF URINE CREATININE: CPT

## 2024-03-20 PROCEDURE — 82607 VITAMIN B-12: CPT

## 2024-03-20 PROCEDURE — 86141 C-REACTIVE PROTEIN HS: CPT

## 2024-03-20 PROCEDURE — 81001 URINALYSIS AUTO W/SCOPE: CPT

## 2024-03-20 PROCEDURE — 82306 VITAMIN D 25 HYDROXY: CPT

## 2024-03-20 PROCEDURE — 36415 COLL VENOUS BLD VENIPUNCTURE: CPT

## 2024-03-20 PROCEDURE — 84550 ASSAY OF BLOOD/URIC ACID: CPT

## 2024-03-20 PROCEDURE — 80061 LIPID PANEL: CPT

## 2024-03-20 PROCEDURE — 84100 ASSAY OF PHOSPHORUS: CPT

## 2024-03-20 PROCEDURE — 80053 COMPREHEN METABOLIC PANEL: CPT

## 2024-03-20 PROCEDURE — 83970 ASSAY OF PARATHORMONE: CPT

## 2024-03-20 PROCEDURE — 82043 UR ALBUMIN QUANTITATIVE: CPT

## 2024-03-20 PROCEDURE — 83735 ASSAY OF MAGNESIUM: CPT

## 2024-04-03 ENCOUNTER — NURSE ONLY (OUTPATIENT)
Dept: NON INVASIVE DIAGNOSTICS | Age: 79
End: 2024-04-03
Payer: MEDICARE

## 2024-04-03 DIAGNOSIS — I42.0 NONISCHEMIC DILATED CARDIOMYOPATHY (HCC): Primary | ICD-10-CM

## 2024-04-03 DIAGNOSIS — I48.20 CHRONIC ATRIAL FIBRILLATION (HCC): ICD-10-CM

## 2024-04-03 DIAGNOSIS — Z95.810 PRESENCE OF AUTOMATIC CARDIOVERTER/DEFIBRILLATOR (AICD): ICD-10-CM

## 2024-04-03 PROCEDURE — 93284 PRGRMG EVAL IMPLANTABLE DFB: CPT | Performed by: INTERNAL MEDICINE

## 2024-04-15 ENCOUNTER — HOSPITAL ENCOUNTER (OUTPATIENT)
Age: 79
Discharge: HOME | End: 2024-04-15
Payer: MEDICARE

## 2024-04-15 LAB
ANION GAP SERPL CALCULATED.3IONS-SCNC: 10 MMOL/L (ref 7–16)
BUN SERPL-MCNC: 20 MG/DL (ref 6–23)
CALCIUM SERPL-MCNC: 9.4 MG/DL (ref 8.6–10.2)
CHLORIDE SERPL-SCNC: 97 MMOL/L (ref 98–107)
CO2 SERPL-SCNC: 34 MMOL/L (ref 22–29)
CREAT SERPL-MCNC: 1.4 MG/DL (ref 0.5–1)
GFR SERPL CREATININE-BSD FRML MDRD: 39 ML/MIN/1.73M2
GLUCOSE SERPL-MCNC: 77 MG/DL (ref 74–99)
POTASSIUM SERPL-SCNC: 4.4 MMOL/L (ref 3.5–5)
SODIUM SERPL-SCNC: 141 MMOL/L (ref 132–146)

## 2024-04-15 PROCEDURE — 36415 COLL VENOUS BLD VENIPUNCTURE: CPT

## 2024-04-15 PROCEDURE — 80048 BASIC METABOLIC PNL TOTAL CA: CPT

## 2024-04-19 ENCOUNTER — PREP FOR PROCEDURE (OUTPATIENT)
Dept: GASTROENTEROLOGY | Age: 79
End: 2024-04-19

## 2024-04-19 RX ORDER — POTASSIUM CHLORIDE 1.5 G/1.58G
20 POWDER, FOR SOLUTION ORAL DAILY
COMMUNITY

## 2024-04-19 RX ORDER — SODIUM CHLORIDE 9 MG/ML
INJECTION, SOLUTION INTRAVENOUS CONTINUOUS
Status: CANCELLED | OUTPATIENT
Start: 2024-04-19

## 2024-04-19 RX ORDER — SODIUM CHLORIDE 0.9 % (FLUSH) 0.9 %
5-40 SYRINGE (ML) INJECTION PRN
Status: CANCELLED | OUTPATIENT
Start: 2024-04-19

## 2024-04-19 RX ORDER — SODIUM CHLORIDE 0.9 % (FLUSH) 0.9 %
5-40 SYRINGE (ML) INJECTION EVERY 12 HOURS SCHEDULED
Status: CANCELLED | OUTPATIENT
Start: 2024-04-19

## 2024-04-19 RX ORDER — SODIUM CHLORIDE 9 MG/ML
INJECTION, SOLUTION INTRAVENOUS PRN
Status: CANCELLED | OUTPATIENT
Start: 2024-04-19

## 2024-04-19 NOTE — PROGRESS NOTES
[facility] PRE-ADMISSION TESTING INSTRUCTIONS    The Preadmission Testing patient is instructed accordingly using the following criteria (check applicable):    ARRIVAL INSTRUCTIONS:  [x] Parking the day of Surgery is located in the Main Entrance lot.  Upon entering the door, make an immediate right to the surgery reception desk    [x] Bring photo ID and insurance card    [] Bring in a copy of Living will or Durable Power of  papers.    [x] Please be sure to arrange for a responsible adult to provide transportation to and from the hospital    [x] Please arrange for a responsible adult to be with you for the 24 hour period post procedure due to having anesthesia    [x] If you awake am of surgery not feeling well or have temperature >100 please call [phone]    GENERAL INSTRUCTIONS:    [x] No solid foods after midnight, may have up to 8oz of water until 4 hours prior to surgery. No gum, no candy, no mints. NPO time:       [x] You may brush your teeth, do not swallow any toothpaste    [x] Take medications as instructed     [x] Stop herbal supplements and vitamins 5 days prior to procedure    [x] Follow preop dosing of blood thinners per physician instructions    [] Take 1/2 dose of evening insulin, but no insulin after midnight    [] No oral diabetic medications after midnight    [] If diabetic and have low blood sugar or feel symptomatic, take 1-2oz apple juice only    [] Bring inhalers day of surgery    [x] Bring urine specimen day of surgery    [x] Shower or bath with soap, lather and rinse well, AM of Surgery, no lotion, powders or creams to surgical site    [x] Follow bowel prep as instructed per surgeon    [x] No tobacco products within 24 hours of surgery     [x] No alcohol or illegal drug use, marijuana included, within 24 hours of surgery.    [x] Jewelry, body piercing's, eyeglasses, contact lenses and dentures are not permitted into surgery (bring cases)      [x] Please

## 2024-04-19 NOTE — H&P
Patient: Josefina Garcia  MR#: [id number]  : 1945  Acct:[id number]  Age/Sex: 79 / F  ADM Date: 24  Loc: SPS.514            Provider Location:   cc: Dr. Teofilo Dutton~        Intake  Vital Signs      24  08:35  Height   5 ft 1 in  Weight   98 lb 2 oz  BMI   18.5  BP   111/60  Mean Arterial Pressure   77  Blood Pressure Location   Left Upper Arm  Position   Sitting  Pulse Rate   92  Pulse Source   Monitor    Intake  Visit Reasons: Positive Cologuard  Current Pain: No  Fall Within Last 3 Months: No  Work/School/Activity Note Needed: No  Allergies    Iodinated Contrast Media [Iodinated Contrast Media - Oral and] Allergy (Verified 24 08:37)  Anaphylaxis      Safety Concerns: Feels Safe At This Time    [facility]  Medical History (Updated 24 @ 16:33 by Wolf Jenkins MD)    Hypertension  Cirrhosis  Rheumatic heart disease  Status post aortic and mitral mechanical valve, tricuspid repair, severe pulmonary hypertension, combined systolic and diastolic heart failure, atrial fibrillation and chronic anticoagulant therapy, biventricular pacer ICD May 2022  Presence of combination internal cardiac defibrillator (ICD) and pacemaker (~2022)  Done at [facility]      Surgical History (Updated 24 @ 16:24 by Wolf Jenkins MD)    History of femoral hernia repair  Incarcerated right femoral hernia repair 2023  H/O tricuspid valve repair  Mitral valve replaced  Aortic valve replaced  History of colonoscopy  2018,  and 2009: All normal      Social History (Updated 24 @ 16:25 by Wolf Jenkins MD)  Marital Status Details:     Number of Children:  3   Have You Traveled Out of the [place] in the Last Month:  No   Smoking Status:  Former Smoker   Smoking Start Date:  1961   Smoking Cessation Date:  93   Alcohol Intake:  Never         HPI  History of Present Illness:   79 years of age.  Known to this office.  Last seen 2023.  He

## 2024-04-19 NOTE — H&P (VIEW-ONLY)
Patient: Josefina Garcia  MR#: [id number]  : 1945  Acct:[id number]  Age/Sex: 79 / F  ADM Date: 24  Loc: SPS.514            Provider Location:   cc: Dr. Teofilo Dutton~        Intake  Vital Signs      24  08:35  Height   5 ft 1 in  Weight   98 lb 2 oz  BMI   18.5  BP   111/60  Mean Arterial Pressure   77  Blood Pressure Location   Left Upper Arm  Position   Sitting  Pulse Rate   92  Pulse Source   Monitor    Intake  Visit Reasons: Positive Cologuard  Current Pain: No  Fall Within Last 3 Months: No  Work/School/Activity Note Needed: No  Allergies    Iodinated Contrast Media [Iodinated Contrast Media - Oral and] Allergy (Verified 24 08:37)  Anaphylaxis      Safety Concerns: Feels Safe At This Time    [facility]  Medical History (Updated 24 @ 16:33 by Wolf Jenkins MD)    Hypertension  Cirrhosis  Rheumatic heart disease  Status post aortic and mitral mechanical valve, tricuspid repair, severe pulmonary hypertension, combined systolic and diastolic heart failure, atrial fibrillation and chronic anticoagulant therapy, biventricular pacer ICD May 2022  Presence of combination internal cardiac defibrillator (ICD) and pacemaker (~2022)  Done at [facility]      Surgical History (Updated 24 @ 16:24 by Wolf Jenkins MD)    History of femoral hernia repair  Incarcerated right femoral hernia repair 2023  H/O tricuspid valve repair  Mitral valve replaced  Aortic valve replaced  History of colonoscopy  2018,  and 2009: All normal      Social History (Updated 24 @ 16:25 by Wolf Jenkins MD)  Marital Status Details:     Number of Children:  3   Have You Traveled Out of the [place] in the Last Month:  No   Smoking Status:  Former Smoker   Smoking Start Date:  1961   Smoking Cessation Date:  93   Alcohol Intake:  Never         HPI  History of Present Illness:   79 years of age.  Known to this office.  Last seen 2023.  He  heart tones.  A benign abdomen without ascites and no edema.  A rectal exam was not done.  H&P for Procedures  H&P for Procedure: No    **Medication Reconciliation  Allergies    Iodinated Contrast Media [Iodinated Contrast Media - Oral and] Allergy (Verified 02/07/24 08:37)  Anaphylaxis        Assessment and Plan  Assessment and Plan  (1) Positive colorectal cancer screening using Cologuard test:         Code(s):  R19.5 - Other fecal abnormalities        Status: None        Plan:   She is anemic but normochromic normocytic with a slightly prolonged RDW.  She is chronically anticoagulated.  She has been anemic before and is anemic again.  Mildly so.  No localizing symptoms.  She was Hemoccult negative in the hospital.  Now she has a positive Cologuard.  3 previous colonoscopies the last as recently as 2018 was negative and there was no plan then for future screening.  Now her face with a positive Cologuard in the absence of symptoms.  My suspicion is that this is a significant findings is exceedingly small.  She has severe underlying heart disease.  Chronically in failure.  Neck veins are nondistended.  Severe pulmonary hypertension.  She needs cardiac clearance and has an appointment in the not-too-distant future with her primary cardiologist.  I have asked her to address that with him.  Again my suspicions of underlying colonic neoplasia are vanishingly small.  The question is is it worth the risk of sedation and a bowel prep at this situation.  I guess we could consider CT colonoscopy.  Would still require imaging and a trip to [place].    Gastrointestinal ROS: negative     Plan: She will discuss this with Dr. Fajardo and I will be more than happy to approach it with him as well.  No decisions until she is reevaluated by her cardiologist with possible colonoscopy in mind.  (2) Anemia:         Code(s):  D64.9 - Anemia, unspecified        Status: None        Qualifiers:          Anemia type: unspecified type

## 2024-04-22 ENCOUNTER — ANESTHESIA EVENT (OUTPATIENT)
Dept: ENDOSCOPY | Age: 79
End: 2024-04-22
Payer: MEDICARE

## 2024-04-22 ASSESSMENT — ENCOUNTER SYMPTOMS: SHORTNESS OF BREATH: 1

## 2024-04-22 NOTE — PROGRESS NOTES
Pt with Hx CAD,a fib follows yearly with Dr Fajardo last seen 2/29/24. Cardiac clearance received from Dr Fajardo office, attached to chart. Last echo 12/2023 EF 30-35%. Pt denies any recent cardiac complaints/symptoms.

## 2024-04-23 ENCOUNTER — ANESTHESIA (OUTPATIENT)
Dept: ENDOSCOPY | Age: 79
End: 2024-04-23
Payer: MEDICARE

## 2024-04-23 ENCOUNTER — HOSPITAL ENCOUNTER (OUTPATIENT)
Age: 79
Setting detail: OUTPATIENT SURGERY
Discharge: HOME | End: 2024-04-23
Attending: INTERNAL MEDICINE | Admitting: INTERNAL MEDICINE
Payer: MEDICARE

## 2024-04-23 VITALS
DIASTOLIC BLOOD PRESSURE: 55 MMHG | HEART RATE: 85 BPM | WEIGHT: 100 LBS | RESPIRATION RATE: 20 BRPM | BODY MASS INDEX: 18.88 KG/M2 | SYSTOLIC BLOOD PRESSURE: 90 MMHG | TEMPERATURE: 97.8 F | HEIGHT: 61 IN | OXYGEN SATURATION: 97 %

## 2024-04-23 DIAGNOSIS — R19.5 POSITIVE COLORECTAL CANCER SCREENING USING COLOGUARD TEST: ICD-10-CM

## 2024-04-23 LAB
INR PPP: 2.1
PARTIAL THROMBOPLASTIN TIME: 34.4 SEC (ref 24.5–35.1)
PROTHROMBIN TIME: 23 SEC (ref 9.3–12.4)

## 2024-04-23 PROCEDURE — 6360000002 HC RX W HCPCS: Performed by: REGISTERED NURSE

## 2024-04-23 PROCEDURE — 3609017100 HC EGD: Performed by: INTERNAL MEDICINE

## 2024-04-23 PROCEDURE — 3700000001 HC ADD 15 MINUTES (ANESTHESIA): Performed by: INTERNAL MEDICINE

## 2024-04-23 PROCEDURE — 2580000003 HC RX 258: Performed by: INTERNAL MEDICINE

## 2024-04-23 PROCEDURE — 3609009900 HC COLONOSCOPY W/CONTROL BLEEDING ANY METHOD: Performed by: INTERNAL MEDICINE

## 2024-04-23 PROCEDURE — C1889 IMPLANT/INSERT DEVICE, NOC: HCPCS | Performed by: INTERNAL MEDICINE

## 2024-04-23 PROCEDURE — 85610 PROTHROMBIN TIME: CPT

## 2024-04-23 PROCEDURE — 85730 THROMBOPLASTIN TIME PARTIAL: CPT

## 2024-04-23 PROCEDURE — 3700000000 HC ANESTHESIA ATTENDED CARE: Performed by: INTERNAL MEDICINE

## 2024-04-23 PROCEDURE — 88305 TISSUE EXAM BY PATHOLOGIST: CPT

## 2024-04-23 PROCEDURE — 2709999900 HC NON-CHARGEABLE SUPPLY: Performed by: INTERNAL MEDICINE

## 2024-04-23 PROCEDURE — 7100000010 HC PHASE II RECOVERY - FIRST 15 MIN: Performed by: INTERNAL MEDICINE

## 2024-04-23 PROCEDURE — 7100000011 HC PHASE II RECOVERY - ADDTL 15 MIN: Performed by: INTERNAL MEDICINE

## 2024-04-23 DEVICE — WORKING LENGTH 235CM, WORKING CHANNEL 2.8MM
Type: IMPLANTABLE DEVICE | Site: OTHER | Status: FUNCTIONAL
Brand: RESOLUTION 360 CLIP

## 2024-04-23 RX ORDER — SODIUM CHLORIDE 9 MG/ML
INJECTION, SOLUTION INTRAVENOUS CONTINUOUS
Status: DISCONTINUED | OUTPATIENT
Start: 2024-04-23 | End: 2024-04-23 | Stop reason: HOSPADM

## 2024-04-23 RX ORDER — SODIUM CHLORIDE 0.9 % (FLUSH) 0.9 %
5-40 SYRINGE (ML) INJECTION PRN
Status: DISCONTINUED | OUTPATIENT
Start: 2024-04-23 | End: 2024-04-23 | Stop reason: HOSPADM

## 2024-04-23 RX ORDER — SODIUM CHLORIDE 9 MG/ML
INJECTION, SOLUTION INTRAVENOUS PRN
Status: CANCELLED | OUTPATIENT
Start: 2024-04-23

## 2024-04-23 RX ORDER — SODIUM CHLORIDE 9 MG/ML
INJECTION, SOLUTION INTRAVENOUS PRN
Status: DISCONTINUED | OUTPATIENT
Start: 2024-04-23 | End: 2024-04-23 | Stop reason: HOSPADM

## 2024-04-23 RX ORDER — SODIUM CHLORIDE 0.9 % (FLUSH) 0.9 %
5-40 SYRINGE (ML) INJECTION EVERY 12 HOURS SCHEDULED
Status: DISCONTINUED | OUTPATIENT
Start: 2024-04-23 | End: 2024-04-23 | Stop reason: HOSPADM

## 2024-04-23 RX ORDER — PROPOFOL 10 MG/ML
INJECTION, EMULSION INTRAVENOUS PRN
Status: DISCONTINUED | OUTPATIENT
Start: 2024-04-23 | End: 2024-04-23 | Stop reason: SDUPTHER

## 2024-04-23 RX ORDER — SODIUM CHLORIDE 0.9 % (FLUSH) 0.9 %
5-40 SYRINGE (ML) INJECTION PRN
Status: CANCELLED | OUTPATIENT
Start: 2024-04-23

## 2024-04-23 RX ORDER — NALOXONE HYDROCHLORIDE 0.4 MG/ML
INJECTION, SOLUTION INTRAMUSCULAR; INTRAVENOUS; SUBCUTANEOUS PRN
Status: CANCELLED | OUTPATIENT
Start: 2024-04-23

## 2024-04-23 RX ORDER — ONDANSETRON 2 MG/ML
4 INJECTION INTRAMUSCULAR; INTRAVENOUS
Status: CANCELLED | OUTPATIENT
Start: 2024-04-23 | End: 2024-04-24

## 2024-04-23 RX ORDER — FENTANYL CITRATE 50 UG/ML
25 INJECTION, SOLUTION INTRAMUSCULAR; INTRAVENOUS EVERY 5 MIN PRN
Status: CANCELLED | OUTPATIENT
Start: 2024-04-23

## 2024-04-23 RX ORDER — SODIUM CHLORIDE 0.9 % (FLUSH) 0.9 %
5-40 SYRINGE (ML) INJECTION EVERY 12 HOURS SCHEDULED
Status: CANCELLED | OUTPATIENT
Start: 2024-04-23

## 2024-04-23 RX ADMIN — PHENYLEPHRINE HYDROCHLORIDE 100 MCG: 10 INJECTION INTRAVENOUS at 12:59

## 2024-04-23 RX ADMIN — PROPOFOL 220 MG: 10 INJECTION, EMULSION INTRAVENOUS at 12:49

## 2024-04-23 RX ADMIN — PHENYLEPHRINE HYDROCHLORIDE 100 MCG: 10 INJECTION INTRAVENOUS at 12:56

## 2024-04-23 RX ADMIN — PHENYLEPHRINE HYDROCHLORIDE 100 MCG: 10 INJECTION INTRAVENOUS at 13:12

## 2024-04-23 RX ADMIN — PHENYLEPHRINE HYDROCHLORIDE 100 MCG: 10 INJECTION INTRAVENOUS at 12:54

## 2024-04-23 RX ADMIN — PHENYLEPHRINE HYDROCHLORIDE 100 MCG: 10 INJECTION INTRAVENOUS at 13:01

## 2024-04-23 RX ADMIN — PHENYLEPHRINE HYDROCHLORIDE 100 MCG: 10 INJECTION INTRAVENOUS at 13:08

## 2024-04-23 RX ADMIN — SODIUM CHLORIDE: 9 INJECTION, SOLUTION INTRAVENOUS at 12:41

## 2024-04-23 RX ADMIN — PHENYLEPHRINE HYDROCHLORIDE 100 MCG: 10 INJECTION INTRAVENOUS at 13:04

## 2024-04-23 ASSESSMENT — PAIN - FUNCTIONAL ASSESSMENT
PAIN_FUNCTIONAL_ASSESSMENT: 0-10
PAIN_FUNCTIONAL_ASSESSMENT: 0-10

## 2024-04-23 NOTE — BRIEF OP NOTE
Brief Postoperative Note      Patient: Josefina Garcia  YOB: 1945  MRN: [id number]    Date of Procedure: 4/23/2024    Pre-Op Diagnosis Codes:     * Positive colorectal cancer screening using Cologuard test [R19.5]    Post-Op Diagnosis: 4 mm polyp at hepatic flexure                                    Mild gastritis       Procedure(s):  COLONOSCOPY CONTROL HEMORRHAGE  COLONOSCOPY POLYPECTOMY COLD SNARE BIOPSY  ESOPHAGOGASTRODUODENOSCOPY    Surgeon(s):  Wolf Jenkins MD    Assistant:  * No surgical staff found *    Anesthesia: * No anesthesia type entered *    Estimated Blood Loss (mL): Minimal    Complications: None    Specimens:   ID Type Source Tests Collected by Time Destination   A : HEPATIC FLEXURE POLYPECTOMY Tissue Colon SURGICAL PATHOLOGY Wolf Jenkins MD 4/23/2024 1300        Implants:  Implant Name Type Inv. Item Serial No.  Lot No. LRB No. Used Action   CLIP INT [id number] WRK CHN DIA2.8MM OPN 11MM BRAID CATH ROT BX/10 - [id number]  CLIP INT [id number] WRK CHN DIA2.8MM OPN 11MM BRAID CATH ROT BX/10  [employer]- [id number] N/A 1 Implanted         Drains: * No LDAs found *    Findings:  Infection Present At Time Of Surgery (PATOS) (choose all levels that have infection present):  No infection present  Other Findings see above    Electronically signed by Wolf Jenkins MD on 4/23/2024 at 1:14 PM

## 2024-04-23 NOTE — ANESTHESIA PRE PROCEDURE
Department of Anesthesiology  Preprocedure Note       Name:  Josefina Garcia   Age:  79 y.o.  :  1945                                          MRN:  [id number]         Date:  2024      Surgeon: CHARLES Garcia    Procedure: RIGHT HERNIA FEMORAL REPAIR, POSSIBLE MESH (Right)      Medications prior to admission:   Prior to Admission medications    Medication Sig Start Date End Date Taking? Authorizing Provider   potassium chloride (KLOR-CON) 20 MEQ packet Take 20 mEq by mouth daily   Yes Jr Johnson MD   empagliflozin (JARDIANCE) 10 MG tablet Take 1 tablet by mouth daily  Patient taking differently: Take 1 tablet by mouth daily Used for heart failure 23   Rick Fajardo DO   warfarin (COUMADIN) 2 MG tablet 2 mg daily except 1 mg  and  only  Patient taking differently: 2 mg daily except 1.5mg Thursday and  only   Last dose 24   Rick Fajardo DO   metoprolol succinate (TOPROL XL) 25 MG extended release tablet Take 1 tablet by mouth daily 23   Rick Fajardo DO   bumetanide (BUMEX) 1 MG tablet Take 1 tablet by mouth daily 23   Rick Fajardo DO   spironolactone (ALDACTONE) 25 MG tablet Take 0.5 tablets by mouth daily 23   Rick Fajardo DO   levothyroxine (SYNTHROID) 112 MCG tablet Take 1 tablet by mouth Daily    Jr Johnson MD   digoxin 62.5 MCG TABS Take 62.5 mcg by mouth See Admin Instructions Take the medication: Monday, Wednesday and 21  Lyla Mccann MD   folic acid (FOLVITE) 1 MG tablet Take 1 tablet by mouth daily    Jr Johnson MD   vitamin B-12 (CYANOCOBALAMIN) 500 MCG tablet Take 1 tablet by mouth daily    Jr Johnson MD   b complex vitamins capsule Take 1 capsule by mouth daily    Jr Johnson MD   albuterol-ipratropium (COMBIVENT RESPIMAT)  MCG/ACT AERS inhaler Inhale 1 puff into the lungs 4 times daily 8/3/15   Jordan Aguero MD

## 2024-04-23 NOTE — INTERVAL H&P NOTE
Update History & Physical    The patient's History and Physical of April 19, 2024 was reviewed with the patient and I examined the patient. There was no change. The surgical site was confirmed by the patient and me.     Plan: The risks, benefits, expected outcome, and alternative to the recommended procedure have been discussed with the patient. Patient understands and wants to proceed with the procedure.     Electronically signed by Wolf Jenkins MD on 4/23/2024 at 12:19 PM

## 2024-04-23 NOTE — DISCHARGE INSTRUCTIONS
Follow all instructions carefully:      Restrictions: Do not drive or operate machinery for eighteen hours after sedation.    Diet:  regular diet       Medications: [unfilled]      Follow up Care:   Follow-up : not required, resume warfarin today    If problems with abdominal pain, nausea, vomiting, bleeding or any other concerns call Dr. Jenkins at [phone] or Answering Service [phone], If unable to reach me call [facility].    Wolf Jenkins MD M.D.

## 2024-04-23 NOTE — ANESTHESIA POSTPROCEDURE EVALUATION
Department of Anesthesiology  Postprocedure Note    Patient: Josefina Garcia  MRN: [id number]  YOB: 1945  Date of evaluation: 4/23/2024    Procedure Summary       Date: 04/23/24 Room / Location: [place] [address] / [facility]    Anesthesia Start: 1241 Anesthesia Stop: 1314    Procedures:       COLONOSCOPY CONTROL HEMORRHAGE WITH CLIPPING      COLONOSCOPY POLYPECTOMY COLD SNARE BIOPSY      ESOPHAGOGASTRODUODENOSCOPY Diagnosis:       Positive colorectal cancer screening using Cologuard test      (Positive colorectal cancer screening using Cologuard test [R19.5])    Surgeons: Wolf Jenkins MD Responsible Provider: Jimy Heredia MD    Anesthesia Type: MAC ASA Status: 4            Anesthesia Type: No value filed.    Idalia Phase I: Idalia Score: 10    Idalia Phase II:      Anesthesia Post Evaluation    Patient location during evaluation: PACU  Patient participation: complete - patient participated  Level of consciousness: awake and alert  Pain score: 2  Airway patency: patent  Nausea & Vomiting: no nausea and no vomiting  Cardiovascular status: blood pressure returned to baseline  Respiratory status: acceptable  Hydration status: euvolemic  Pain management: adequate    No notable events documented.

## 2024-04-24 NOTE — PROCEDURES
[place] [address]                             PROCEDURE NOTE      PATIENT NAME: RULA GUZMAN          : 1945  MED REC NO: [id number]                        ROOM: [facility] ROOM  ACCOUNT NO: [id number]                       ADMIT DATE: 2024  PROVIDER: Wolf Jenkins MD      DATE OF PROCEDURE:  2024    SURGEON:  Wolf Jenkins MD    PROCEDURE:  Colonoscopy post polypectomy/esophagogastroduodenoscopy.    PREOPERATIVE DIAGNOSES:  Anemia, Hemoccult-positive stool, positive Cologuard.    POSTOPERATIVE DIAGNOSES:    1. Well-prepped colon.  2. Single isolated 4 mm polyp at the hepatic flexure, cold snare removal, Resolution clip applied because of anticoagulation.  3. Normal esophagus.  4. Mild gastritis without mucosal breaks or angiodysplasia.  5. Normal proximal duodenum.    INDICATIONS:  She is 79 years of age.  She has a complicated history of valvular heart disease with 2 mechanical valves and chronic anticoagulant therapy.  She also has a history of underlying cirrhosis, likely related to her valvular heart disease.  That is stable.  She presents with a history of some fluctuation in hemoglobins, iron studies, which are not diagnostic, but seemed consistent with iron deficiency and a positive Cologuard.  Her last colonoscopy of which there have been 3 over time was in 2018 and devoid of any neoplastic polyps.  She does not have a history of gross GI bleeding.  Cardiac clearance was obtained from a cardiologist.    Preop is monitored anesthesia care.    DESCRIPTION OF PROCEDURE:  She was placed left lateral and sedated.  Digital exam was normal.  The Olympus PCF-H190L video colonoscope was introduced into the rectum which was normal and colon was well prepared.  The instrument was passed proximally.  Intubation to the level of the appendiceal orifice was achieved without difficulty.  On entry,

## 2024-04-25 LAB — SURGICAL PATHOLOGY REPORT: NORMAL

## 2024-08-18 PROCEDURE — 93297 REM INTERROG DEV EVAL ICPMS: CPT | Performed by: INTERNAL MEDICINE

## 2024-08-18 PROCEDURE — 93296 REM INTERROG EVL PM/IDS: CPT | Performed by: INTERNAL MEDICINE

## 2024-08-18 PROCEDURE — 93295 DEV INTERROG REMOTE 1/2/MLT: CPT | Performed by: INTERNAL MEDICINE

## 2024-10-16 ENCOUNTER — HOSPITAL ENCOUNTER (OUTPATIENT)
Age: 79
Discharge: HOME | End: 2024-10-16
Payer: MEDICARE

## 2024-10-16 LAB
ALBUMIN SERPL-MCNC: 4.4 G/DL (ref 3.5–5.2)
ALP SERPL-CCNC: 108 U/L (ref 35–104)
ALT SERPL-CCNC: <5 U/L (ref 0–32)
ANION GAP SERPL CALCULATED.3IONS-SCNC: 12 MMOL/L (ref 7–16)
AST SERPL-CCNC: 29 U/L (ref 0–31)
BASOPHILS # BLD: 0.05 K/UL (ref 0–0.2)
BASOPHILS NFR BLD: 2 % (ref 0–2)
BILIRUB SERPL-MCNC: 1.5 MG/DL (ref 0–1.2)
BILIRUB UR QL STRIP: NEGATIVE
BUN SERPL-MCNC: 16 MG/DL (ref 6–23)
CALCIUM SERPL-MCNC: 9.5 MG/DL (ref 8.6–10.2)
CHLORIDE SERPL-SCNC: 98 MMOL/L (ref 98–107)
CLARITY UR: CLEAR
CO2 SERPL-SCNC: 29 MMOL/L (ref 22–29)
COLOR UR: YELLOW
CREAT SERPL-MCNC: 1.4 MG/DL (ref 0.5–1)
EOSINOPHIL # BLD: 0.38 K/UL (ref 0.05–0.5)
EOSINOPHILS RELATIVE PERCENT: 12 % (ref 0–6)
EPI CELLS #/AREA URNS HPF: ABNORMAL /HPF
ERYTHROCYTE [DISTWIDTH] IN BLOOD BY AUTOMATED COUNT: 16.1 % (ref 11.5–15)
FERRITIN SERPL-MCNC: 94 NG/ML
GFR, ESTIMATED: 38 ML/MIN/1.73M2
GLUCOSE SERPL-MCNC: 86 MG/DL (ref 74–99)
GLUCOSE UR STRIP-MCNC: 500 MG/DL
HCT VFR BLD AUTO: 39.6 % (ref 34–48)
HGB BLD-MCNC: 11.7 G/DL (ref 11.5–15.5)
HGB UR QL STRIP.AUTO: ABNORMAL
IMM GRANULOCYTES # BLD AUTO: <0.03 K/UL (ref 0–0.58)
IMM GRANULOCYTES NFR BLD: 0 % (ref 0–5)
IRON SATN MFR SERPL: 10 % (ref 15–50)
IRON SERPL-MCNC: 33 UG/DL (ref 37–145)
KETONES UR STRIP-MCNC: NEGATIVE MG/DL
LEUKOCYTE ESTERASE UR QL STRIP: NEGATIVE
LYMPHOCYTES NFR BLD: 0.42 K/UL (ref 1.5–4)
LYMPHOCYTES RELATIVE PERCENT: 13 % (ref 20–42)
MAGNESIUM SERPL-MCNC: 2.3 MG/DL (ref 1.6–2.6)
MCH RBC QN AUTO: 26.4 PG (ref 26–35)
MCHC RBC AUTO-ENTMCNC: 29.5 G/DL (ref 32–34.5)
MCV RBC AUTO: 89.4 FL (ref 80–99.9)
MONOCYTES NFR BLD: 0.43 K/UL (ref 0.1–0.95)
MONOCYTES NFR BLD: 14 % (ref 2–12)
NEUTROPHILS NFR BLD: 59 % (ref 43–80)
NEUTS SEG NFR BLD: 1.86 K/UL (ref 1.8–7.3)
NITRITE UR QL STRIP: NEGATIVE
PH UR STRIP: 6 [PH] (ref 5–9)
PHOSPHATE SERPL-MCNC: 2.4 MG/DL (ref 2.5–4.5)
PLATELET # BLD AUTO: 175 K/UL (ref 130–450)
PMV BLD AUTO: 11.4 FL (ref 7–12)
POTASSIUM SERPL-SCNC: 4.2 MMOL/L (ref 3.5–5)
PROT SERPL-MCNC: 7.6 G/DL (ref 6.4–8.3)
PROT UR STRIP-MCNC: NEGATIVE MG/DL
PTH-INTACT SERPL-MCNC: 59.2 PG/ML (ref 15–65)
RBC # BLD AUTO: 4.43 M/UL (ref 3.5–5.5)
RBC #/AREA URNS HPF: ABNORMAL /HPF
SODIUM SERPL-SCNC: 139 MMOL/L (ref 132–146)
SP GR UR STRIP: 1.01 (ref 1–1.03)
TIBC SERPL-MCNC: 315 UG/DL (ref 250–450)
UROBILINOGEN UR STRIP-ACNC: 0.2 EU/DL (ref 0–1)
WBC #/AREA URNS HPF: ABNORMAL /HPF
WBC OTHER # BLD: 3.2 K/UL (ref 4.5–11.5)

## 2024-10-16 PROCEDURE — 85025 COMPLETE CBC W/AUTO DIFF WBC: CPT

## 2024-10-16 PROCEDURE — 83540 ASSAY OF IRON: CPT

## 2024-10-16 PROCEDURE — 81001 URINALYSIS AUTO W/SCOPE: CPT

## 2024-10-16 PROCEDURE — 83550 IRON BINDING TEST: CPT

## 2024-10-16 PROCEDURE — 87077 CULTURE AEROBIC IDENTIFY: CPT

## 2024-10-16 PROCEDURE — 36415 COLL VENOUS BLD VENIPUNCTURE: CPT

## 2024-10-16 PROCEDURE — 84100 ASSAY OF PHOSPHORUS: CPT

## 2024-10-16 PROCEDURE — 87086 URINE CULTURE/COLONY COUNT: CPT

## 2024-10-16 PROCEDURE — 82728 ASSAY OF FERRITIN: CPT

## 2024-10-16 PROCEDURE — 83970 ASSAY OF PARATHORMONE: CPT

## 2024-10-16 PROCEDURE — 83735 ASSAY OF MAGNESIUM: CPT

## 2024-10-16 PROCEDURE — 80053 COMPREHEN METABOLIC PANEL: CPT

## 2024-10-18 LAB
MICROORGANISM SPEC CULT: ABNORMAL
SPECIMEN DESCRIPTION: ABNORMAL

## 2024-11-18 ENCOUNTER — NURSE ONLY (OUTPATIENT)
Dept: NON INVASIVE DIAGNOSTICS | Age: 79
End: 2024-11-18

## 2024-11-18 ENCOUNTER — OFFICE VISIT (OUTPATIENT)
Dept: NON INVASIVE DIAGNOSTICS | Age: 79
End: 2024-11-18

## 2024-11-18 VITALS
HEART RATE: 81 BPM | DIASTOLIC BLOOD PRESSURE: 52 MMHG | WEIGHT: 99.6 LBS | HEIGHT: 61 IN | SYSTOLIC BLOOD PRESSURE: 98 MMHG | RESPIRATION RATE: 18 BRPM | BODY MASS INDEX: 18.81 KG/M2

## 2024-11-18 DIAGNOSIS — I42.0 NONISCHEMIC DILATED CARDIOMYOPATHY (HCC): Primary | ICD-10-CM

## 2024-11-18 DIAGNOSIS — I42.0 NONISCHEMIC DILATED CARDIOMYOPATHY (HCC): ICD-10-CM

## 2024-11-18 DIAGNOSIS — Z95.810 PRESENCE OF AUTOMATIC CARDIOVERTER/DEFIBRILLATOR (AICD): ICD-10-CM

## 2024-11-18 DIAGNOSIS — I48.20 CHRONIC ATRIAL FIBRILLATION (HCC): Primary | ICD-10-CM

## 2024-11-18 RX ORDER — POTASSIUM CHLORIDE 750 MG/1
10 CAPSULE, EXTENDED RELEASE ORAL DAILY
COMMUNITY
Start: 2024-10-29

## 2024-11-18 RX ORDER — DIGOXIN 125 MCG
125 TABLET ORAL DAILY
COMMUNITY
Start: 2024-08-29

## 2024-11-18 RX ORDER — LEVOTHYROXINE SODIUM 125 UG/1
125 TABLET ORAL DAILY
COMMUNITY
Start: 2024-11-09

## 2024-11-18 NOTE — PROGRESS NOTES
Cardiac Electrophysiology   Outpatient Progress Note    Josefina Garcia  1945  Date of Service: 11/18/2024  Referring Provider/PCP: Teofilo Dutton Jr., MD    Chief Complaint   Patient presents with    Follow-up         Patient Active Problem List    Diagnosis Date Noted    Acute on chronic right-sided congestive heart failure ([facility]) 05/03/2022     Priority: Medium    Supratherapeutic INR 12/20/2023    History of mitral valve replacement with mechanical valve 12/20/2023    History of mechanical aortic valve replacement 12/20/2023    Chronic renal insufficiency, stage III (moderate) ([facility]) 12/20/2023    Acute on chronic congestive heart failure, unspecified heart failure type ([facility]) 12/15/2023    Acute on chronic systolic CHF (congestive heart failure) ([facility]) 12/06/2023    CAD (coronary artery disease) 09/02/2023    Cellulitis of right hand 09/01/2023    Thoracic ascending aortic aneurysm ([facility]) 02/01/2022    Moderate protein-calorie malnutrition ([facility]) 12/03/2021    Atrial fibrillation with RVR ([facility]) 11/28/2021    HFrEF (heart failure with reduced ejection fraction) ([facility]) 11/28/2021    Pulmonary edema 11/28/2021    Bilateral carotid artery stenosis 05/11/2021    Abrasion 02/22/2021    Coagulopathy ([facility]) 02/22/2021    Absolute anemia 02/22/2021    Red blood cell antibody positive 06/25/2019     Overview Note:     \"The patient demonstrates red blood cell antibody (Antibody ID: Anti-Chelsie) in pre-transfusion testing.   Antigen-negative red blood cells are required for transfusion.  Slight/minimal delay in finding compatible red blood cell units for transfusion.        Herpes zoster 09/25/2016    Acute on chronic systolic and diastolic heart failure, NYHA class 3 ([facility]) 09/24/2016    CHF exacerbation ([facility]) 07/23/2015    Atrial fibrillation ([facility]) 07/23/2015    Hypertension 10/27/2011    Hypothyroid 10/27/2011    Valvular heart disease 10/27/2011     PMH    1.  Hypertension   2.  Valvular heart disease--related to

## 2024-11-27 ENCOUNTER — HOSPITAL ENCOUNTER (OUTPATIENT)
Dept: ULTRASOUND IMAGING | Age: 79
Discharge: HOME | End: 2024-11-29
Attending: SURGERY
Payer: MEDICARE

## 2024-11-27 DIAGNOSIS — K40.20 BILATERAL DIRECT INGUINAL HERNIA: ICD-10-CM

## 2024-11-27 PROCEDURE — 76856 US EXAM PELVIC COMPLETE: CPT

## 2024-12-06 LAB
ALBUMIN SERPL-MCNC: 4.3 G/DL (ref 3.5–5.2)
ALP SERPL-CCNC: 83 U/L (ref 35–104)
ALT SERPL-CCNC: 10 U/L (ref 0–32)
ANION GAP SERPL CALCULATED.3IONS-SCNC: 9 MMOL/L (ref 7–16)
AST SERPL-CCNC: 23 U/L (ref 0–31)
BASOPHILS # BLD: 0.04 K/UL (ref 0–0.2)
BASOPHILS NFR BLD: 1 % (ref 0–2)
BILIRUB SERPL-MCNC: 0.9 MG/DL (ref 0–1.2)
BUN SERPL-MCNC: 20 MG/DL (ref 6–23)
CALCIUM SERPL-MCNC: 9.6 MG/DL (ref 8.6–10.2)
CHLORIDE SERPL-SCNC: 99 MMOL/L (ref 98–107)
CO2 SERPL-SCNC: 31 MMOL/L (ref 22–29)
CREAT SERPL-MCNC: 1.3 MG/DL (ref 0.5–1)
EOSINOPHIL # BLD: 0.56 K/UL (ref 0.05–0.5)
EOSINOPHILS RELATIVE PERCENT: 13 % (ref 0–6)
ERYTHROCYTE [DISTWIDTH] IN BLOOD BY AUTOMATED COUNT: 16.5 % (ref 11.5–15)
GFR, ESTIMATED: 43 ML/MIN/1.73M2
GLUCOSE SERPL-MCNC: 92 MG/DL (ref 74–99)
HCT VFR BLD AUTO: 37.3 % (ref 34–48)
HGB BLD-MCNC: 10.8 G/DL (ref 11.5–15.5)
LYMPHOCYTES NFR BLD: 0.49 K/UL (ref 1.5–4)
LYMPHOCYTES RELATIVE PERCENT: 11 % (ref 20–42)
MCH RBC QN AUTO: 26.5 PG (ref 26–35)
MCHC RBC AUTO-ENTMCNC: 29 G/DL (ref 32–34.5)
MCV RBC AUTO: 91.6 FL (ref 80–99.9)
MONOCYTES NFR BLD: 0.37 K/UL (ref 0.1–0.95)
MONOCYTES NFR BLD: 9 % (ref 2–12)
NEUTROPHILS NFR BLD: 66 % (ref 43–80)
NEUTS SEG NFR BLD: 2.84 K/UL (ref 1.8–7.3)
PLATELET # BLD AUTO: 173 K/UL (ref 130–450)
PMV BLD AUTO: 13 FL (ref 7–12)
POTASSIUM SERPL-SCNC: 5 MMOL/L (ref 3.5–5)
PROT SERPL-MCNC: 8 G/DL (ref 6.4–8.3)
RBC # BLD AUTO: 4.07 M/UL (ref 3.5–5.5)
RBC # BLD: ABNORMAL 10*6/UL
SODIUM SERPL-SCNC: 139 MMOL/L (ref 132–146)
TSH SERPL DL<=0.05 MIU/L-ACNC: 3.69 UIU/ML (ref 0.27–4.2)
WBC # BLD: ABNORMAL 10*3/UL
WBC OTHER # BLD: 4.3 K/UL (ref 4.5–11.5)

## 2024-12-18 ENCOUNTER — TELEPHONE (OUTPATIENT)
Dept: CARDIOLOGY CLINIC | Age: 79
End: 2024-12-18

## 2024-12-18 ENCOUNTER — HOSPITAL ENCOUNTER (OUTPATIENT)
Age: 79
Discharge: HOME | End: 2024-12-18
Payer: MEDICARE

## 2024-12-18 LAB
ALBUMIN SERPL-MCNC: 4.2 G/DL (ref 3.5–5.2)
ALP SERPL-CCNC: 86 U/L (ref 35–104)
ALT SERPL-CCNC: 6 U/L (ref 0–32)
ANION GAP SERPL CALCULATED.3IONS-SCNC: 11 MMOL/L (ref 7–16)
AST SERPL-CCNC: 19 U/L (ref 0–31)
BILIRUB SERPL-MCNC: 1.5 MG/DL (ref 0–1.2)
BUN SERPL-MCNC: 27 MG/DL (ref 6–23)
CALCIUM SERPL-MCNC: 9.5 MG/DL (ref 8.6–10.2)
CHLORIDE SERPL-SCNC: 99 MMOL/L (ref 98–107)
CO2 SERPL-SCNC: 30 MMOL/L (ref 22–29)
CREAT SERPL-MCNC: 1.7 MG/DL (ref 0.5–1)
GFR, ESTIMATED: 30 ML/MIN/1.73M2
GLUCOSE SERPL-MCNC: 86 MG/DL (ref 74–99)
POTASSIUM SERPL-SCNC: 4.2 MMOL/L (ref 3.5–5)
PROT SERPL-MCNC: 7.8 G/DL (ref 6.4–8.3)
SODIUM SERPL-SCNC: 140 MMOL/L (ref 132–146)

## 2024-12-18 PROCEDURE — 36415 COLL VENOUS BLD VENIPUNCTURE: CPT

## 2024-12-18 PROCEDURE — 80053 COMPREHEN METABOLIC PANEL: CPT

## 2024-12-18 NOTE — TELEPHONE ENCOUNTER
Patients  and her called in and stated that she still has swelling and is now having shortness of breath while doing things. Patient's  is wondering if it's from the changes made to her device at the last OV. Please advise.

## 2024-12-26 ENCOUNTER — HOSPITAL ENCOUNTER (EMERGENCY)
Age: 79
Discharge: ANOTHER HOSPITAL | End: 2024-12-27
Attending: EMERGENCY MEDICINE
Payer: MEDICARE

## 2024-12-26 ENCOUNTER — APPOINTMENT (OUTPATIENT)
Dept: GENERAL RADIOLOGY | Age: 79
End: 2024-12-26
Payer: MEDICARE

## 2024-12-26 DIAGNOSIS — Z79.01 CHRONIC ANTICOAGULATION: ICD-10-CM

## 2024-12-26 DIAGNOSIS — R60.0 PERIPHERAL EDEMA: ICD-10-CM

## 2024-12-26 DIAGNOSIS — R79.1 ELEVATED INR: ICD-10-CM

## 2024-12-26 DIAGNOSIS — I50.9 ACUTE ON CHRONIC CONGESTIVE HEART FAILURE, UNSPECIFIED HEART FAILURE TYPE (HCC): Primary | ICD-10-CM

## 2024-12-26 DIAGNOSIS — R06.02 SHORTNESS OF BREATH ON EXERTION: ICD-10-CM

## 2024-12-26 LAB
ALBUMIN SERPL-MCNC: 4.2 G/DL (ref 3.5–5.2)
ALP SERPL-CCNC: 77 U/L (ref 35–104)
ALT SERPL-CCNC: 6 U/L (ref 0–32)
ANION GAP SERPL CALCULATED.3IONS-SCNC: 11 MMOL/L (ref 7–16)
AST SERPL-CCNC: 21 U/L (ref 0–31)
BASOPHILS # BLD: 0.05 K/UL (ref 0–0.2)
BASOPHILS NFR BLD: 1 % (ref 0–2)
BILIRUB SERPL-MCNC: 1.6 MG/DL (ref 0–1.2)
BNP SERPL-MCNC: ABNORMAL PG/ML (ref 0–450)
BUN SERPL-MCNC: 21 MG/DL (ref 6–23)
CALCIUM SERPL-MCNC: 9.9 MG/DL (ref 8.6–10.2)
CHLORIDE SERPL-SCNC: 97 MMOL/L (ref 98–107)
CO2 SERPL-SCNC: 28 MMOL/L (ref 22–29)
CREAT SERPL-MCNC: 1.7 MG/DL (ref 0.5–1)
EOSINOPHIL # BLD: 0.11 K/UL (ref 0.05–0.5)
EOSINOPHILS RELATIVE PERCENT: 2 % (ref 0–6)
ERYTHROCYTE [DISTWIDTH] IN BLOOD BY AUTOMATED COUNT: 17.3 % (ref 11.5–15)
GFR, ESTIMATED: 31 ML/MIN/1.73M2
GLUCOSE SERPL-MCNC: 103 MG/DL (ref 74–99)
HCT VFR BLD AUTO: 37.7 % (ref 34–48)
HGB BLD-MCNC: 11.6 G/DL (ref 11.5–15.5)
IMM GRANULOCYTES # BLD AUTO: <0.03 K/UL (ref 0–0.58)
IMM GRANULOCYTES NFR BLD: 0 % (ref 0–5)
INR PPP: 4.1
LYMPHOCYTES NFR BLD: 0.48 K/UL (ref 1.5–4)
LYMPHOCYTES RELATIVE PERCENT: 9 % (ref 20–42)
MCH RBC QN AUTO: 25.9 PG (ref 26–35)
MCHC RBC AUTO-ENTMCNC: 30.8 G/DL (ref 32–34.5)
MCV RBC AUTO: 84.2 FL (ref 80–99.9)
MONOCYTES NFR BLD: 0.65 K/UL (ref 0.1–0.95)
MONOCYTES NFR BLD: 12 % (ref 2–12)
NEUTROPHILS NFR BLD: 76 % (ref 43–80)
NEUTS SEG NFR BLD: 4.18 K/UL (ref 1.8–7.3)
PLATELET # BLD AUTO: 206 K/UL (ref 130–450)
PMV BLD AUTO: 11.2 FL (ref 7–12)
POTASSIUM SERPL-SCNC: 4.7 MMOL/L (ref 3.5–5)
PROT SERPL-MCNC: 7.5 G/DL (ref 6.4–8.3)
PROTHROMBIN TIME: 45.8 SEC (ref 9.3–12.4)
RBC # BLD AUTO: 4.48 M/UL (ref 3.5–5.5)
SODIUM SERPL-SCNC: 136 MMOL/L (ref 132–146)
TROPONIN I SERPL HS-MCNC: 51 NG/L (ref 0–9)
TROPONIN I SERPL HS-MCNC: 53 NG/L (ref 0–9)
WBC OTHER # BLD: 5.5 K/UL (ref 4.5–11.5)

## 2024-12-26 PROCEDURE — 85025 COMPLETE CBC W/AUTO DIFF WBC: CPT

## 2024-12-26 PROCEDURE — 83880 ASSAY OF NATRIURETIC PEPTIDE: CPT

## 2024-12-26 PROCEDURE — 96374 THER/PROPH/DIAG INJ IV PUSH: CPT

## 2024-12-26 PROCEDURE — 80162 ASSAY OF DIGOXIN TOTAL: CPT

## 2024-12-26 PROCEDURE — 71046 X-RAY EXAM CHEST 2 VIEWS: CPT

## 2024-12-26 PROCEDURE — 93005 ELECTROCARDIOGRAM TRACING: CPT | Performed by: PHYSICIAN ASSISTANT

## 2024-12-26 PROCEDURE — 6360000002 HC RX W HCPCS: Performed by: EMERGENCY MEDICINE

## 2024-12-26 PROCEDURE — 85610 PROTHROMBIN TIME: CPT

## 2024-12-26 PROCEDURE — 84484 ASSAY OF TROPONIN QUANT: CPT

## 2024-12-26 PROCEDURE — 80053 COMPREHEN METABOLIC PANEL: CPT

## 2024-12-26 PROCEDURE — 99285 EMERGENCY DEPT VISIT HI MDM: CPT

## 2024-12-26 RX ORDER — POTASSIUM CHLORIDE 750 MG/1
10 CAPSULE, EXTENDED RELEASE ORAL DAILY
Status: CANCELLED | OUTPATIENT
Start: 2024-12-27

## 2024-12-26 RX ORDER — ONDANSETRON 4 MG/1
4 TABLET, ORALLY DISINTEGRATING ORAL EVERY 8 HOURS PRN
Status: CANCELLED | OUTPATIENT
Start: 2024-12-26

## 2024-12-26 RX ORDER — SODIUM CHLORIDE 0.9 % (FLUSH) 0.9 %
10 SYRINGE (ML) INJECTION PRN
Status: CANCELLED | OUTPATIENT
Start: 2024-12-26

## 2024-12-26 RX ORDER — BUMETANIDE 0.25 MG/ML
1 INJECTION, SOLUTION INTRAMUSCULAR; INTRAVENOUS ONCE
Status: DISCONTINUED | OUTPATIENT
Start: 2024-12-26 | End: 2024-12-27 | Stop reason: HOSPADM

## 2024-12-26 RX ORDER — ONDANSETRON 2 MG/ML
4 INJECTION INTRAMUSCULAR; INTRAVENOUS EVERY 6 HOURS PRN
Status: CANCELLED | OUTPATIENT
Start: 2024-12-26

## 2024-12-26 RX ORDER — SODIUM CHLORIDE 0.9 % (FLUSH) 0.9 %
10 SYRINGE (ML) INJECTION EVERY 12 HOURS SCHEDULED
Status: CANCELLED | OUTPATIENT
Start: 2024-12-26

## 2024-12-26 RX ORDER — FOLIC ACID 1 MG/1
1 TABLET ORAL DAILY
Status: CANCELLED | OUTPATIENT
Start: 2024-12-27

## 2024-12-26 RX ORDER — ACETAMINOPHEN 650 MG/1
650 SUPPOSITORY RECTAL EVERY 6 HOURS PRN
Status: CANCELLED | OUTPATIENT
Start: 2024-12-26

## 2024-12-26 RX ORDER — SENNOSIDES A AND B 8.6 MG/1
1 TABLET, FILM COATED ORAL DAILY PRN
Status: CANCELLED | OUTPATIENT
Start: 2024-12-26

## 2024-12-26 RX ORDER — DIGOXIN 125 MCG
125 TABLET ORAL DAILY
Status: CANCELLED | OUTPATIENT
Start: 2024-12-27

## 2024-12-26 RX ORDER — SODIUM CHLORIDE 9 MG/ML
INJECTION, SOLUTION INTRAVENOUS PRN
Status: CANCELLED | OUTPATIENT
Start: 2024-12-26

## 2024-12-26 RX ORDER — FERROUS SULFATE 325(65) MG
325 TABLET ORAL 2 TIMES DAILY
Status: CANCELLED | OUTPATIENT
Start: 2024-12-26

## 2024-12-26 RX ORDER — ACETAMINOPHEN 325 MG/1
650 TABLET ORAL EVERY 6 HOURS PRN
Status: CANCELLED | OUTPATIENT
Start: 2024-12-26

## 2024-12-26 RX ORDER — LEVOTHYROXINE SODIUM 125 UG/1
125 TABLET ORAL DAILY
Status: CANCELLED | OUTPATIENT
Start: 2024-12-27

## 2024-12-26 RX ORDER — SPIRONOLACTONE 25 MG/1
12.5 TABLET ORAL DAILY
Status: CANCELLED | OUTPATIENT
Start: 2024-12-27

## 2024-12-26 RX ORDER — ALLOPURINOL 100 MG/1
100 TABLET ORAL DAILY
Status: CANCELLED | OUTPATIENT
Start: 2024-12-27

## 2024-12-26 RX ORDER — IPRATROPIUM BROMIDE AND ALBUTEROL SULFATE 2.5; .5 MG/3ML; MG/3ML
1 SOLUTION RESPIRATORY (INHALATION)
Status: CANCELLED | OUTPATIENT
Start: 2024-12-26

## 2024-12-26 RX ORDER — BUMETANIDE 1 MG/1
1 TABLET ORAL DAILY
Status: CANCELLED | OUTPATIENT
Start: 2024-12-27

## 2024-12-26 RX ORDER — METOPROLOL SUCCINATE 25 MG/1
25 TABLET, EXTENDED RELEASE ORAL DAILY
Status: CANCELLED | OUTPATIENT
Start: 2024-12-27

## 2024-12-26 RX ORDER — BUMETANIDE 0.25 MG/ML
1 INJECTION, SOLUTION INTRAMUSCULAR; INTRAVENOUS ONCE
Status: COMPLETED | OUTPATIENT
Start: 2024-12-26 | End: 2024-12-26

## 2024-12-26 RX ADMIN — BUMETANIDE 1 MG: 0.25 INJECTION INTRAMUSCULAR; INTRAVENOUS at 20:58

## 2024-12-26 ASSESSMENT — PAIN - FUNCTIONAL ASSESSMENT
PAIN_FUNCTIONAL_ASSESSMENT: NONE - DENIES PAIN
PAIN_FUNCTIONAL_ASSESSMENT: 0-10
PAIN_FUNCTIONAL_ASSESSMENT: NONE - DENIES PAIN

## 2024-12-26 ASSESSMENT — PAIN SCALES - GENERAL
PAINLEVEL_OUTOF10: 0
PAINLEVEL_OUTOF10: 0

## 2024-12-26 NOTE — ED NOTES
Department of Emergency Medicine  FIRST PROVIDER TRIAGE NOTE             Independent MLP           12/26/24  3:26 PM EST    Date of Encounter: 12/26/24   MRN: [id number]      HPI: Josefina Garcia is a 79 y.o. female who presents to the ED for Leg Swelling (Having swelling in abdomen and bilateral legs x 3days)   Swelling in her abdomen and legs for weeks but worse the past 2 days. No CP. Does report exertional dyspnea. Has had this in the past. Reports she typically weighs 98lbs, today scale reads 104.6lbs. did not take her diuretic today \"because it's not working anyways.\" Reports her pacemaker was adjusted about a month ago and symptoms worsened since then.     ROS: Negative for vomiting or diarrhea.    PE: Gen Appearance/Constitutional: alert  Musculoskeletal: moves all extremities x 4    Initial Plan of Care: All treatment areas with department are currently occupied.      Plan to order/Initiate the following while awaiting opening in ED: Triage evaluation  .     Provider-Patient relationship only established for Provider In Triage (PIT).  Full assessment, HPI and examination not performed, therefore, it is not yet possible to state whether or not an emergency medical condition exists     Initial Plan of Care: Initiate Treatment-Testing, Proceed toTreatment Area When Bed Available for ED Attending/MLP to Continue Care  Secondary to high volume, low staffing, and/or boarding- patient to await bed availability.     This ends my PIT-Patient relationship.  Care of patient relinquished after triage         Electronically signed by THOM Tomlinson   DD: 12/26/24       Iva Zavaleta PA  12/26/24 1528       Iva Zavaleta PA  12/26/24 1529       Iva Zavaleta PA  12/26/24 1531

## 2024-12-27 ENCOUNTER — HOSPITAL ENCOUNTER (INPATIENT)
Age: 79
LOS: 5 days | Discharge: HOME | DRG: 291 | End: 2025-01-01
Attending: INTERNAL MEDICINE | Admitting: INTERNAL MEDICINE
Payer: MEDICARE

## 2024-12-27 VITALS
RESPIRATION RATE: 16 BRPM | OXYGEN SATURATION: 99 % | BODY MASS INDEX: 19.76 KG/M2 | TEMPERATURE: 97.8 F | WEIGHT: 104.6 LBS | HEART RATE: 87 BPM | SYSTOLIC BLOOD PRESSURE: 113 MMHG | DIASTOLIC BLOOD PRESSURE: 57 MMHG

## 2024-12-27 DIAGNOSIS — I50.20 SYSTOLIC CONGESTIVE HEART FAILURE, UNSPECIFIED HF CHRONICITY (HCC): Primary | ICD-10-CM

## 2024-12-27 PROBLEM — I50.23 ACUTE ON CHRONIC CLINICAL SYSTOLIC HEART FAILURE (HCC): Status: ACTIVE | Noted: 2024-12-27

## 2024-12-27 LAB
ALBUMIN SERPL-MCNC: 3.9 G/DL (ref 3.5–5.2)
ALP SERPL-CCNC: 75 U/L (ref 35–104)
ALT SERPL-CCNC: 9 U/L (ref 0–32)
ANION GAP SERPL CALCULATED.3IONS-SCNC: 13 MMOL/L (ref 7–16)
AST SERPL-CCNC: 24 U/L (ref 0–31)
BASOPHILS # BLD: 0.06 K/UL (ref 0–0.2)
BASOPHILS NFR BLD: 1 % (ref 0–2)
BILIRUB SERPL-MCNC: 1.7 MG/DL (ref 0–1.2)
BUN SERPL-MCNC: 21 MG/DL (ref 6–23)
CALCIUM SERPL-MCNC: 9.1 MG/DL (ref 8.6–10.2)
CHLORIDE SERPL-SCNC: 100 MMOL/L (ref 98–107)
CO2 SERPL-SCNC: 26 MMOL/L (ref 22–29)
CREAT SERPL-MCNC: 1.5 MG/DL (ref 0.5–1)
DIGOXIN SERPL-MCNC: 0.7 NG/ML (ref 0.8–2)
EOSINOPHIL # BLD: 0.24 K/UL (ref 0.05–0.5)
EOSINOPHILS RELATIVE PERCENT: 5 % (ref 0–6)
ERYTHROCYTE [DISTWIDTH] IN BLOOD BY AUTOMATED COUNT: 17.2 % (ref 11.5–15)
GFR, ESTIMATED: 36 ML/MIN/1.73M2
GLUCOSE SERPL-MCNC: 78 MG/DL (ref 74–99)
HCT VFR BLD AUTO: 38.6 % (ref 34–48)
HGB BLD-MCNC: 11.3 G/DL (ref 11.5–15.5)
IMM GRANULOCYTES # BLD AUTO: <0.03 K/UL (ref 0–0.58)
IMM GRANULOCYTES NFR BLD: 0 % (ref 0–5)
INR PPP: 4.4
LYMPHOCYTES NFR BLD: 0.43 K/UL (ref 1.5–4)
LYMPHOCYTES RELATIVE PERCENT: 8 % (ref 20–42)
MCH RBC QN AUTO: 25.8 PG (ref 26–35)
MCHC RBC AUTO-ENTMCNC: 29.3 G/DL (ref 32–34.5)
MCV RBC AUTO: 88.1 FL (ref 80–99.9)
MONOCYTES NFR BLD: 0.63 K/UL (ref 0.1–0.95)
MONOCYTES NFR BLD: 12 % (ref 2–12)
NEUTROPHILS NFR BLD: 74 % (ref 43–80)
NEUTS SEG NFR BLD: 3.9 K/UL (ref 1.8–7.3)
PLATELET # BLD AUTO: 198 K/UL (ref 130–450)
PMV BLD AUTO: 11.4 FL (ref 7–12)
POTASSIUM SERPL-SCNC: 4.2 MMOL/L (ref 3.5–5)
PROT SERPL-MCNC: 7.4 G/DL (ref 6.4–8.3)
PROTHROMBIN TIME: 48.6 SEC (ref 9.3–12.4)
RBC # BLD AUTO: 4.38 M/UL (ref 3.5–5.5)
RBC # BLD: ABNORMAL 10*6/UL
SODIUM SERPL-SCNC: 139 MMOL/L (ref 132–146)
WBC OTHER # BLD: 5.3 K/UL (ref 4.5–11.5)

## 2024-12-27 PROCEDURE — 6360000002 HC RX W HCPCS: Performed by: INTERNAL MEDICINE

## 2024-12-27 PROCEDURE — 6370000000 HC RX 637 (ALT 250 FOR IP): Performed by: NURSE PRACTITIONER

## 2024-12-27 PROCEDURE — 85610 PROTHROMBIN TIME: CPT

## 2024-12-27 PROCEDURE — 94640 AIRWAY INHALATION TREATMENT: CPT

## 2024-12-27 PROCEDURE — 2700000000 HC OXYGEN THERAPY PER DAY

## 2024-12-27 PROCEDURE — 85025 COMPLETE CBC W/AUTO DIFF WBC: CPT

## 2024-12-27 PROCEDURE — 2060000000 HC ICU INTERMEDIATE R&B

## 2024-12-27 PROCEDURE — 97165 OT EVAL LOW COMPLEX 30 MIN: CPT

## 2024-12-27 PROCEDURE — 80053 COMPREHEN METABOLIC PANEL: CPT

## 2024-12-27 PROCEDURE — 6370000000 HC RX 637 (ALT 250 FOR IP): Performed by: INTERNAL MEDICINE

## 2024-12-27 PROCEDURE — 97161 PT EVAL LOW COMPLEX 20 MIN: CPT

## 2024-12-27 PROCEDURE — 2500000003 HC RX 250 WO HCPCS: Performed by: NURSE PRACTITIONER

## 2024-12-27 RX ORDER — METOPROLOL SUCCINATE 25 MG/1
25 TABLET, EXTENDED RELEASE ORAL DAILY
Status: DISCONTINUED | OUTPATIENT
Start: 2024-12-27 | End: 2025-01-01 | Stop reason: HOSPADM

## 2024-12-27 RX ORDER — ACETAMINOPHEN 325 MG/1
650 TABLET ORAL EVERY 6 HOURS PRN
Status: DISCONTINUED | OUTPATIENT
Start: 2024-12-27 | End: 2025-01-01 | Stop reason: HOSPADM

## 2024-12-27 RX ORDER — DIGOXIN 125 MCG
62.5 TABLET ORAL DAILY
Status: DISCONTINUED | OUTPATIENT
Start: 2024-12-28 | End: 2024-12-30

## 2024-12-27 RX ORDER — BUMETANIDE 1 MG/1
1 TABLET ORAL DAILY
Status: DISCONTINUED | OUTPATIENT
Start: 2024-12-27 | End: 2024-12-30

## 2024-12-27 RX ORDER — ACETAMINOPHEN 650 MG/1
650 SUPPOSITORY RECTAL EVERY 6 HOURS PRN
Status: DISCONTINUED | OUTPATIENT
Start: 2024-12-27 | End: 2025-01-01 | Stop reason: HOSPADM

## 2024-12-27 RX ORDER — FERROUS SULFATE 325(65) MG
325 TABLET ORAL 2 TIMES DAILY WITH MEALS
Status: DISCONTINUED | OUTPATIENT
Start: 2024-12-27 | End: 2025-01-01 | Stop reason: HOSPADM

## 2024-12-27 RX ORDER — ONDANSETRON 4 MG/1
4 TABLET, ORALLY DISINTEGRATING ORAL EVERY 8 HOURS PRN
Status: DISCONTINUED | OUTPATIENT
Start: 2024-12-27 | End: 2024-12-27

## 2024-12-27 RX ORDER — SENNOSIDES A AND B 8.6 MG/1
1 TABLET, FILM COATED ORAL DAILY PRN
Status: DISCONTINUED | OUTPATIENT
Start: 2024-12-27 | End: 2025-01-01 | Stop reason: HOSPADM

## 2024-12-27 RX ORDER — FOLIC ACID 1 MG/1
1 TABLET ORAL DAILY
Status: DISCONTINUED | OUTPATIENT
Start: 2024-12-27 | End: 2025-01-01 | Stop reason: HOSPADM

## 2024-12-27 RX ORDER — SODIUM CHLORIDE 0.9 % (FLUSH) 0.9 %
10 SYRINGE (ML) INJECTION PRN
Status: DISCONTINUED | OUTPATIENT
Start: 2024-12-27 | End: 2025-01-01 | Stop reason: HOSPADM

## 2024-12-27 RX ORDER — PROCHLORPERAZINE EDISYLATE 5 MG/ML
10 INJECTION INTRAMUSCULAR; INTRAVENOUS EVERY 6 HOURS PRN
Status: DISCONTINUED | OUTPATIENT
Start: 2024-12-27 | End: 2025-01-01 | Stop reason: HOSPADM

## 2024-12-27 RX ORDER — ALLOPURINOL 100 MG/1
100 TABLET ORAL DAILY
Status: DISCONTINUED | OUTPATIENT
Start: 2024-12-27 | End: 2025-01-01 | Stop reason: HOSPADM

## 2024-12-27 RX ORDER — ONDANSETRON 2 MG/ML
4 INJECTION INTRAMUSCULAR; INTRAVENOUS EVERY 6 HOURS PRN
Status: DISCONTINUED | OUTPATIENT
Start: 2024-12-27 | End: 2024-12-27

## 2024-12-27 RX ORDER — DIGOXIN 125 MCG
125 TABLET ORAL DAILY
Status: DISCONTINUED | OUTPATIENT
Start: 2024-12-27 | End: 2024-12-27

## 2024-12-27 RX ORDER — IPRATROPIUM BROMIDE AND ALBUTEROL SULFATE 2.5; .5 MG/3ML; MG/3ML
1 SOLUTION RESPIRATORY (INHALATION)
Status: DISCONTINUED | OUTPATIENT
Start: 2024-12-27 | End: 2025-01-01 | Stop reason: HOSPADM

## 2024-12-27 RX ORDER — SODIUM CHLORIDE 9 MG/ML
INJECTION, SOLUTION INTRAVENOUS PRN
Status: DISCONTINUED | OUTPATIENT
Start: 2024-12-27 | End: 2025-01-01 | Stop reason: HOSPADM

## 2024-12-27 RX ORDER — ACETAZOLAMIDE 500 MG/5ML
250 INJECTION, POWDER, LYOPHILIZED, FOR SOLUTION INTRAVENOUS ONCE
Status: COMPLETED | OUTPATIENT
Start: 2024-12-27 | End: 2024-12-27

## 2024-12-27 RX ORDER — POTASSIUM CHLORIDE 750 MG/1
10 TABLET, EXTENDED RELEASE ORAL DAILY
Status: DISCONTINUED | OUTPATIENT
Start: 2024-12-27 | End: 2025-01-01 | Stop reason: HOSPADM

## 2024-12-27 RX ORDER — SPIRONOLACTONE 25 MG/1
12.5 TABLET ORAL DAILY
Status: DISCONTINUED | OUTPATIENT
Start: 2024-12-27 | End: 2025-01-01

## 2024-12-27 RX ORDER — SODIUM CHLORIDE 0.9 % (FLUSH) 0.9 %
10 SYRINGE (ML) INJECTION EVERY 12 HOURS SCHEDULED
Status: DISCONTINUED | OUTPATIENT
Start: 2024-12-27 | End: 2025-01-01 | Stop reason: HOSPADM

## 2024-12-27 RX ORDER — PROCHLORPERAZINE MALEATE 10 MG
10 TABLET ORAL EVERY 8 HOURS PRN
Status: DISCONTINUED | OUTPATIENT
Start: 2024-12-27 | End: 2025-01-01 | Stop reason: HOSPADM

## 2024-12-27 RX ADMIN — FERROUS SULFATE TAB 325 MG (65 MG ELEMENTAL FE) 325 MG: 325 (65 FE) TAB at 15:42

## 2024-12-27 RX ADMIN — IPRATROPIUM BROMIDE AND ALBUTEROL SULFATE 1 DOSE: .5; 2.5 SOLUTION RESPIRATORY (INHALATION) at 12:36

## 2024-12-27 RX ADMIN — IPRATROPIUM BROMIDE AND ALBUTEROL SULFATE 1 DOSE: .5; 2.5 SOLUTION RESPIRATORY (INHALATION) at 08:22

## 2024-12-27 RX ADMIN — SODIUM CHLORIDE, PRESERVATIVE FREE 10 ML: 5 INJECTION INTRAVENOUS at 11:34

## 2024-12-27 RX ADMIN — DIGOXIN 125 MCG: 0.12 TABLET ORAL at 08:52

## 2024-12-27 RX ADMIN — ALLOPURINOL 100 MG: 100 TABLET ORAL at 08:53

## 2024-12-27 RX ADMIN — FERROUS SULFATE TAB 325 MG (65 MG ELEMENTAL FE) 325 MG: 325 (65 FE) TAB at 08:52

## 2024-12-27 RX ADMIN — SACUBITRIL AND VALSARTAN 0.5 TABLET: 24; 26 TABLET, FILM COATED ORAL at 19:46

## 2024-12-27 RX ADMIN — POTASSIUM CHLORIDE 10 MEQ: 750 TABLET, EXTENDED RELEASE ORAL at 08:53

## 2024-12-27 RX ADMIN — FOLIC ACID 1 MG: 1 TABLET ORAL at 08:52

## 2024-12-27 RX ADMIN — IPRATROPIUM BROMIDE AND ALBUTEROL SULFATE 1 DOSE: .5; 2.5 SOLUTION RESPIRATORY (INHALATION) at 21:08

## 2024-12-27 RX ADMIN — Medication 3 MG: at 19:46

## 2024-12-27 RX ADMIN — IPRATROPIUM BROMIDE AND ALBUTEROL SULFATE 1 DOSE: .5; 2.5 SOLUTION RESPIRATORY (INHALATION) at 15:48

## 2024-12-27 RX ADMIN — LEVOTHYROXINE SODIUM 125 MCG: 25 TABLET ORAL at 05:18

## 2024-12-27 RX ADMIN — ACETAZOLAMIDE SODIUM 250 MG: 500 INJECTION, POWDER, LYOPHILIZED, FOR SOLUTION INTRAVENOUS at 15:42

## 2024-12-27 ASSESSMENT — PAIN SCALES - GENERAL: PAINLEVEL_OUTOF10: 0

## 2024-12-27 ASSESSMENT — PAIN - FUNCTIONAL ASSESSMENT: PAIN_FUNCTIONAL_ASSESSMENT: 0-10

## 2024-12-27 NOTE — H&P
Internal Medicine History & Physical     Name: Josefina Garcia  : 1945  Chief Complaint: No chief complaint on file.  Primary Care Physician: Teofilo Dutton Jr., MD  Admission date: 2024  Date of service: 2024     History of Present Illness  Josefina is a 79 y.o. year old female.  Patient states she has had several days of increasing shortness of breath with any activity.  States that her legs are more swollen than usual.  States that she is unable to walk a flight of steps without getting very short of breath.  She denies fevers or chills.  She is taking medications as prescribed at home.  She denies fevers, chills, headache, dysuria, hematuria, chest pain, palpitations.  States she has no orthopnea, PND.  Admits to dyspnea with exertion.  Patient states nothing has been making her feel better at home.  She states she is taking medications as prescribed.    Patient follows with Dr. Fajardo is her cardiologist.  States that she does not feel as swollen as when she came in after receiving a diuretic.  Nothing else was helping her at home.  Nothing was making her feel better.  She continued to worsen and therefore came in for reevaluation in the hospital.      ED course:   Initial blood work and imaging studies performed. Admission recommended by ED physician. Case was discussed with ED provider. Meds in ED consisted of the following:  Medications   ipratropium 0.5 mg-albuterol 2.5 mg (DUONEB) nebulizer solution 1 Dose (1 Dose Inhalation Given 24 1236)   allopurinol (ZYLOPRIM) tablet 100 mg (100 mg Oral Given 24 0853)   bumetanide (BUMEX) tablet 1 mg (0 mg Oral Held 24 0855)   digoxin (LANOXIN) tablet 125 mcg (125 mcg Oral Given 24 0852)   empagliflozin (JARDIANCE) tablet 10 mg (0 mg Oral Held 24 0855)   ferrous sulfate (IRON 325) tablet 325 mg (325 mg Oral Given 24 0852)   folic acid (FOLVITE) tablet 1 mg (1 mg Oral Given 24 0852)   levothyroxine

## 2024-12-27 NOTE — ED PROVIDER NOTES
[facility] EMERGENCY DEPARTMENT  EMERGENCY DEPARTMENT ENCOUNTER        Pt Name: Josefina Garcia  MRN: [id number]  Birthdate 1945  Date of evaluation: 12/26/2024  Provider: Arturo Mixon DO  PCP: Teofilo Dutton Jr., MD  Note Started: 8:53 PM EST 12/26/24    CHIEF COMPLAINT       Chief Complaint   Patient presents with    Leg Swelling     Having swelling in abdomen and bilateral legs x 3days       HISTORY OF PRESENT ILLNESS: 1 or more Elements   History From: Patient, EMR     Limitations to history : None    Josefina Garcia is a 79 y.o. female who presents with leg swelling shortness of breath.  History of congestive heart failure, primary cardiologist Dr. Fajardo, states worsening leg swelling for the last week or so particularly bad in the last few days, dyspnea on exertion cannot walk more than few steps for she has stopped catch her breath.  She is on warfarin therapy secondary to mechanical valves.  Denies fevers chills chest pain or productive cough she states good compliance with her diuretics.    Nursing Notes were all reviewed and agreed with or any disagreements were addressed in the HPI.      REVIEW OF EXTERNAL NOTE :       Electrophysiology note from 11/18/2024, was seen in follow-up noted to have history of mechanical valve.  History of A-fib with pacemaker placed    Nephrology note from 10/25/2024 was seen by Dr. Burdick    REVIEW OF SYSTEMS :           Positives and Pertinent negatives as per HPI.     SURGICAL HISTORY     Past Surgical History:   Procedure Laterality Date    AORTIC VALVULOPLASTY      1992    CARDIAC DEFIBRILLATOR PLACEMENT Left 05/09/2022    [employer] CRT-D  (Dr. Vega)    CARDIAC SURGERY      COLONOSCOPY      COLONOSCOPY  4/23/2024    COLONOSCOPY CONTROL HEMORRHAGE WITH CLIPPING performed by Wolf Jenkins MD at [facility]    HERNIA MESH REMOVAL      HERNIA REPAIR Right 01/05/2023    RIGHT FEMORAL HERNIA  REPAIR WITH MESH

## 2024-12-27 NOTE — CONSULTS
CARDIOLOGY CONSULTATION    Patient Name:  Josefina Garcia    :  1945    Reason for Consultation:   History of aortic-mitral valve replacement; hypoprothrombinemia; recurrent peripheral edema and elevated INR.    History of Present Illness:   Josefina Garcia returns to [facility], following history of increasing edema in lower extremities.  He also has chronic shortness of breath with any form of exertion secondary to her longstanding history of underlying complex valvular heart disease as well as right heart failure.  She also has an ICD device and follows with Dr. Antonia Vega..  She remains in chronic atrial fibrillation..  She was just discharged following an episode in which her INR was exceedingly high after having received antibiotics.  Additionally her proBNP is exceedingly elevated at 30,000 pg/mL and importantly her INR is once again increased with a change in her dosage of warfarin.  Past Medical History:   has a past medical history of A-fib (HCC), Acute exacerbation of CHF (congestive heart failure) (HCC), Acute renal failure (HCC), Aneurysm (HCC), Aneurysm of ascending aorta without rupture (HCC), CAD (coronary artery disease), CRF (chronic renal failure), Epistaxis, Gastrointestinal bleeding, lower, H/O anemia of chronic disorder, Hypertension, and Thyroid disease.    Surgical History:   has a past surgical history that includes Cardiac surgery; Aortic valvuloplasty; Mitral valvuloplasty; Tricuspid valvuloplasty; Colonoscopy; Upper gastrointestinal endoscopy; Cardiac defibrillator placement (Left, 2022); hernia repair (Right, 2023); Hernia mesh removal; Colonoscopy (2024); and Upper gastrointestinal endoscopy (2024).  Aortic and mitral valve replacement  [facility], tricuspid valvuloplasty-[facility] #[facility] Rukhsana-Lebron annuloplasty ubaldo-Kaleb salguero MD    Social History:   reports that she quit smoking about 24 years ago. Her smoking use

## 2024-12-28 ENCOUNTER — APPOINTMENT (OUTPATIENT)
Age: 79
DRG: 291 | End: 2024-12-28
Attending: INTERNAL MEDICINE
Payer: MEDICARE

## 2024-12-28 LAB
ALBUMIN SERPL-MCNC: 3.4 G/DL (ref 3.5–5.2)
ALP SERPL-CCNC: 67 U/L (ref 35–104)
ALT SERPL-CCNC: 7 U/L (ref 0–32)
ANION GAP SERPL CALCULATED.3IONS-SCNC: 10 MMOL/L (ref 7–16)
AST SERPL-CCNC: 16 U/L (ref 0–31)
BASOPHILS # BLD: 0.05 K/UL (ref 0–0.2)
BASOPHILS NFR BLD: 1 % (ref 0–2)
BILIRUB SERPL-MCNC: 1.2 MG/DL (ref 0–1.2)
BUN SERPL-MCNC: 24 MG/DL (ref 6–23)
CALCIUM SERPL-MCNC: 8.9 MG/DL (ref 8.6–10.2)
CHLORIDE SERPL-SCNC: 101 MMOL/L (ref 98–107)
CO2 SERPL-SCNC: 28 MMOL/L (ref 22–29)
CREAT SERPL-MCNC: 1.7 MG/DL (ref 0.5–1)
ECHO AO ASC DIAM: 3.2 CM
ECHO AO ASCENDING AORTA INDEX: 2.24 CM/M2
ECHO AO SINUS VALSALVA DIAM: 2.9 CM
ECHO AO SINUS VALSALVA INDEX: 2.03 CM/M2
ECHO AV ACCELERATION TIME: 114.19 MS
ECHO AV AREA PEAK VELOCITY: 2.4 CM2
ECHO AV AREA VTI: 2.1 CM2
ECHO AV AREA/BSA PEAK VELOCITY: 1.7 CM2/M2
ECHO AV AREA/BSA VTI: 1.5 CM2/M2
ECHO AV MEAN GRADIENT: 27 MMHG
ECHO AV MEAN VELOCITY: 2.5 M/S
ECHO AV PEAK GRADIENT: 39 MMHG
ECHO AV PEAK VELOCITY: 3.1 M/S
ECHO AV VELOCITY RATIO: 0.74
ECHO AV VTI: 60.3 CM
ECHO BSA: 1.42 M2
ECHO EST RA PRESSURE: 15 MMHG
ECHO LA DIAMETER INDEX: 3.36 CM/M2
ECHO LA DIAMETER: 4.8 CM
ECHO LA VOL A-L A2C: 109 ML (ref 22–52)
ECHO LA VOL A-L A4C: 94 ML (ref 22–52)
ECHO LA VOL MOD A2C: 97 ML (ref 22–52)
ECHO LA VOL MOD A4C: 89 ML (ref 22–52)
ECHO LA VOLUME AREA LENGTH: 107 ML
ECHO LA VOLUME INDEX A-L A2C: 76 ML/M2 (ref 16–34)
ECHO LA VOLUME INDEX A-L A4C: 66 ML/M2 (ref 16–34)
ECHO LA VOLUME INDEX AREA LENGTH: 75 ML/M2 (ref 16–34)
ECHO LA VOLUME INDEX MOD A2C: 68 ML/M2 (ref 16–34)
ECHO LA VOLUME INDEX MOD A4C: 62 ML/M2 (ref 16–34)
ECHO LV EDV A2C: 69 ML
ECHO LV EDV A4C: 89 ML
ECHO LV EDV BP: 85 ML (ref 56–104)
ECHO LV EDV INDEX A4C: 62 ML/M2
ECHO LV EDV INDEX BP: 59 ML/M2
ECHO LV EDV NDEX A2C: 48 ML/M2
ECHO LV EF PHYSICIAN: 28 %
ECHO LV EJECTION FRACTION A2C: 31 %
ECHO LV EJECTION FRACTION A4C: 30 %
ECHO LV EJECTION FRACTION BIPLANE: 29 % (ref 55–100)
ECHO LV ESV A2C: 48 ML
ECHO LV ESV A4C: 62 ML
ECHO LV ESV BP: 60 ML (ref 19–49)
ECHO LV ESV INDEX A2C: 34 ML/M2
ECHO LV ESV INDEX A4C: 43 ML/M2
ECHO LV ESV INDEX BP: 42 ML/M2
ECHO LV FRACTIONAL SHORTENING: 11 % (ref 28–44)
ECHO LV INTERNAL DIMENSION DIASTOLE INDEX: 3.15 CM/M2
ECHO LV INTERNAL DIMENSION DIASTOLIC: 4.5 CM (ref 3.9–5.3)
ECHO LV INTERNAL DIMENSION SYSTOLIC INDEX: 2.8 CM/M2
ECHO LV INTERNAL DIMENSION SYSTOLIC: 4 CM
ECHO LV IVSD: 1.1 CM (ref 0.6–0.9)
ECHO LV IVSS: 1.2 CM
ECHO LV MASS 2D: 175 G (ref 67–162)
ECHO LV MASS INDEX 2D: 122.4 G/M2 (ref 43–95)
ECHO LV POSTERIOR WALL DIASTOLIC: 1.1 CM (ref 0.6–0.9)
ECHO LV POSTERIOR WALL SYSTOLIC: 1.2 CM
ECHO LV RELATIVE WALL THICKNESS RATIO: 0.49
ECHO LVOT AREA: 3.1 CM2
ECHO LVOT AV VTI INDEX: 0.66
ECHO LVOT DIAM: 2 CM
ECHO LVOT MEAN GRADIENT: 11 MMHG
ECHO LVOT PEAK GRADIENT: 21 MMHG
ECHO LVOT PEAK VELOCITY: 2.3 M/S
ECHO LVOT STROKE VOLUME INDEX: 87.6 ML/M2
ECHO LVOT SV: 125.3 ML
ECHO LVOT VTI: 39.9 CM
ECHO MV AREA PHT: 4.5 CM2
ECHO MV AREA VTI: 4.8 CM2
ECHO MV E DECELERATION TIME (DT): 138.5 MS
ECHO MV LVOT VTI INDEX: 0.65
ECHO MV MAX VELOCITY: 1.9 M/S
ECHO MV MEAN GRADIENT: 4 MMHG
ECHO MV MEAN VELOCITY: 0.8 M/S
ECHO MV PEAK GRADIENT: 14 MMHG
ECHO MV PRESSURE HALF TIME (PHT): 48.6 MS
ECHO MV VTI: 26 CM
ECHO PULMONARY ARTERY END DIASTOLIC PRESSURE: 6 MMHG
ECHO PV MAX VELOCITY: 1 M/S
ECHO PV MEAN GRADIENT: 2 MMHG
ECHO PV MEAN VELOCITY: 0.6 M/S
ECHO PV PEAK GRADIENT: 4 MMHG
ECHO PV REGURGITANT MAX VELOCITY: 1.2 M/S
ECHO PV VTI: 17.1 CM
ECHO RIGHT VENTRICULAR SYSTOLIC PRESSURE (RVSP): 64 MMHG
ECHO RV INTERNAL DIMENSION: 2.9 CM
ECHO RV TAPSE: 1.3 CM (ref 1.7–?)
ECHO TV REGURGITANT MAX VELOCITY: 3.49 M/S
ECHO TV REGURGITANT PEAK GRADIENT: 49 MMHG
EKG ATRIAL RATE: 86 BPM
EKG Q-T INTERVAL: 466 MS
EKG QRS DURATION: 168 MS
EKG QTC CALCULATION (BAZETT): 527 MS
EKG R AXIS: 151 DEGREES
EKG T AXIS: -29 DEGREES
EKG VENTRICULAR RATE: 77 BPM
EOSINOPHIL # BLD: 0.3 K/UL (ref 0.05–0.5)
EOSINOPHILS RELATIVE PERCENT: 6 % (ref 0–6)
ERYTHROCYTE [DISTWIDTH] IN BLOOD BY AUTOMATED COUNT: 17.5 % (ref 11.5–15)
GFR, ESTIMATED: 31 ML/MIN/1.73M2
GLUCOSE SERPL-MCNC: 104 MG/DL (ref 74–99)
HCT VFR BLD AUTO: 33 % (ref 34–48)
HGB BLD-MCNC: 10.2 G/DL (ref 11.5–15.5)
IMM GRANULOCYTES # BLD AUTO: <0.03 K/UL (ref 0–0.58)
IMM GRANULOCYTES NFR BLD: 0 % (ref 0–5)
INR PPP: 5.2
INR PPP: 6.5
LYMPHOCYTES NFR BLD: 0.59 K/UL (ref 1.5–4)
LYMPHOCYTES RELATIVE PERCENT: 12 % (ref 20–42)
MCH RBC QN AUTO: 26.4 PG (ref 26–35)
MCHC RBC AUTO-ENTMCNC: 30.9 G/DL (ref 32–34.5)
MCV RBC AUTO: 85.5 FL (ref 80–99.9)
MONOCYTES NFR BLD: 0.59 K/UL (ref 0.1–0.95)
MONOCYTES NFR BLD: 12 % (ref 2–12)
NEUTROPHILS NFR BLD: 69 % (ref 43–80)
NEUTS SEG NFR BLD: 3.45 K/UL (ref 1.8–7.3)
PARTIAL THROMBOPLASTIN TIME: 41.3 SEC (ref 24.5–35.1)
PLATELET # BLD AUTO: 176 K/UL (ref 130–450)
PMV BLD AUTO: 11.1 FL (ref 7–12)
POTASSIUM SERPL-SCNC: 3.6 MMOL/L (ref 3.5–5)
PROT SERPL-MCNC: 6.5 G/DL (ref 6.4–8.3)
PROTHROMBIN TIME: 55.2 SEC (ref 9.3–12.4)
PROTHROMBIN TIME: 71.1 SEC (ref 9.3–12.4)
RBC # BLD AUTO: 3.86 M/UL (ref 3.5–5.5)
RBC # BLD: ABNORMAL 10*6/UL
SODIUM SERPL-SCNC: 139 MMOL/L (ref 132–146)
WBC OTHER # BLD: 5 K/UL (ref 4.5–11.5)

## 2024-12-28 PROCEDURE — 6370000000 HC RX 637 (ALT 250 FOR IP): Performed by: INTERNAL MEDICINE

## 2024-12-28 PROCEDURE — 85730 THROMBOPLASTIN TIME PARTIAL: CPT

## 2024-12-28 PROCEDURE — 85610 PROTHROMBIN TIME: CPT

## 2024-12-28 PROCEDURE — 2500000003 HC RX 250 WO HCPCS: Performed by: NURSE PRACTITIONER

## 2024-12-28 PROCEDURE — 94640 AIRWAY INHALATION TREATMENT: CPT

## 2024-12-28 PROCEDURE — 6370000000 HC RX 637 (ALT 250 FOR IP): Performed by: NURSE PRACTITIONER

## 2024-12-28 PROCEDURE — 93010 ELECTROCARDIOGRAM REPORT: CPT | Performed by: INTERNAL MEDICINE

## 2024-12-28 PROCEDURE — 93306 TTE W/DOPPLER COMPLETE: CPT

## 2024-12-28 PROCEDURE — 2580000003 HC RX 258: Performed by: INTERNAL MEDICINE

## 2024-12-28 PROCEDURE — 85025 COMPLETE CBC W/AUTO DIFF WBC: CPT

## 2024-12-28 PROCEDURE — 2060000000 HC ICU INTERMEDIATE R&B

## 2024-12-28 PROCEDURE — 80053 COMPREHEN METABOLIC PANEL: CPT

## 2024-12-28 RX ORDER — 0.9 % SODIUM CHLORIDE 0.9 %
500 INTRAVENOUS SOLUTION INTRAVENOUS ONCE
Status: COMPLETED | OUTPATIENT
Start: 2024-12-28 | End: 2024-12-28

## 2024-12-28 RX ADMIN — METOPROLOL SUCCINATE 25 MG: 25 TABLET, FILM COATED, EXTENDED RELEASE ORAL at 08:09

## 2024-12-28 RX ADMIN — LEVOTHYROXINE SODIUM 125 MCG: 25 TABLET ORAL at 06:25

## 2024-12-28 RX ADMIN — SODIUM CHLORIDE, PRESERVATIVE FREE 10 ML: 5 INJECTION INTRAVENOUS at 08:12

## 2024-12-28 RX ADMIN — BUMETANIDE 1 MG: 1 TABLET ORAL at 08:10

## 2024-12-28 RX ADMIN — IPRATROPIUM BROMIDE AND ALBUTEROL SULFATE 1 DOSE: .5; 2.5 SOLUTION RESPIRATORY (INHALATION) at 13:01

## 2024-12-28 RX ADMIN — DIGOXIN 62.5 MCG: 0.12 TABLET ORAL at 08:09

## 2024-12-28 RX ADMIN — EMPAGLIFLOZIN 10 MG: 10 TABLET, FILM COATED ORAL at 08:10

## 2024-12-28 RX ADMIN — SODIUM CHLORIDE 500 ML: 9 INJECTION, SOLUTION INTRAVENOUS at 17:25

## 2024-12-28 RX ADMIN — FERROUS SULFATE TAB 325 MG (65 MG ELEMENTAL FE) 325 MG: 325 (65 FE) TAB at 17:35

## 2024-12-28 RX ADMIN — ALLOPURINOL 100 MG: 100 TABLET ORAL at 08:11

## 2024-12-28 RX ADMIN — IPRATROPIUM BROMIDE AND ALBUTEROL SULFATE 1 DOSE: .5; 2.5 SOLUTION RESPIRATORY (INHALATION) at 09:35

## 2024-12-28 RX ADMIN — IPRATROPIUM BROMIDE AND ALBUTEROL SULFATE 1 DOSE: .5; 2.5 SOLUTION RESPIRATORY (INHALATION) at 16:18

## 2024-12-28 RX ADMIN — IPRATROPIUM BROMIDE AND ALBUTEROL SULFATE 1 DOSE: .5; 2.5 SOLUTION RESPIRATORY (INHALATION) at 20:20

## 2024-12-28 RX ADMIN — POTASSIUM CHLORIDE 10 MEQ: 750 TABLET, EXTENDED RELEASE ORAL at 08:09

## 2024-12-28 RX ADMIN — SPIRONOLACTONE 12.5 MG: 25 TABLET ORAL at 08:10

## 2024-12-28 RX ADMIN — FOLIC ACID 1 MG: 1 TABLET ORAL at 08:10

## 2024-12-28 RX ADMIN — FERROUS SULFATE TAB 325 MG (65 MG ELEMENTAL FE) 325 MG: 325 (65 FE) TAB at 08:11

## 2024-12-28 ASSESSMENT — PAIN SCALES - GENERAL
PAINLEVEL_OUTOF10: 0
PAINLEVEL_OUTOF10: 0

## 2024-12-29 LAB
ALBUMIN SERPL-MCNC: 3.7 G/DL (ref 3.5–5.2)
ALP SERPL-CCNC: 70 U/L (ref 35–104)
ALT SERPL-CCNC: 7 U/L (ref 0–32)
ANION GAP SERPL CALCULATED.3IONS-SCNC: 10 MMOL/L (ref 7–16)
AST SERPL-CCNC: 20 U/L (ref 0–31)
BASOPHILS # BLD: 0.04 K/UL (ref 0–0.2)
BASOPHILS NFR BLD: 1 % (ref 0–2)
BILIRUB SERPL-MCNC: 1.6 MG/DL (ref 0–1.2)
BUN SERPL-MCNC: 24 MG/DL (ref 6–23)
CALCIUM SERPL-MCNC: 9 MG/DL (ref 8.6–10.2)
CHLORIDE SERPL-SCNC: 96 MMOL/L (ref 98–107)
CO2 SERPL-SCNC: 25 MMOL/L (ref 22–29)
CREAT SERPL-MCNC: 1.6 MG/DL (ref 0.5–1)
EOSINOPHIL # BLD: 0.44 K/UL (ref 0.05–0.5)
EOSINOPHILS RELATIVE PERCENT: 9 % (ref 0–6)
ERYTHROCYTE [DISTWIDTH] IN BLOOD BY AUTOMATED COUNT: 17.4 % (ref 11.5–15)
GFR, ESTIMATED: 33 ML/MIN/1.73M2
GLUCOSE SERPL-MCNC: 87 MG/DL (ref 74–99)
HCT VFR BLD AUTO: 35.3 % (ref 34–48)
HGB BLD-MCNC: 10.5 G/DL (ref 11.5–15.5)
IMM GRANULOCYTES # BLD AUTO: <0.03 K/UL (ref 0–0.58)
IMM GRANULOCYTES NFR BLD: 0 % (ref 0–5)
INR PPP: 4.3
LYMPHOCYTES NFR BLD: 0.45 K/UL (ref 1.5–4)
LYMPHOCYTES RELATIVE PERCENT: 9 % (ref 20–42)
MCH RBC QN AUTO: 25.7 PG (ref 26–35)
MCHC RBC AUTO-ENTMCNC: 29.7 G/DL (ref 32–34.5)
MCV RBC AUTO: 86.5 FL (ref 80–99.9)
MONOCYTES NFR BLD: 0.53 K/UL (ref 0.1–0.95)
MONOCYTES NFR BLD: 10 % (ref 2–12)
NEUTROPHILS NFR BLD: 72 % (ref 43–80)
NEUTS SEG NFR BLD: 3.69 K/UL (ref 1.8–7.3)
PLATELET # BLD AUTO: 177 K/UL (ref 130–450)
PMV BLD AUTO: 11.3 FL (ref 7–12)
POTASSIUM SERPL-SCNC: 4.3 MMOL/L (ref 3.5–5)
PROT SERPL-MCNC: 7 G/DL (ref 6.4–8.3)
PROTHROMBIN TIME: 45.4 SEC (ref 9.3–12.4)
RBC # BLD AUTO: 4.08 M/UL (ref 3.5–5.5)
RBC # BLD: ABNORMAL 10*6/UL
SODIUM SERPL-SCNC: 131 MMOL/L (ref 132–146)
WBC OTHER # BLD: 5.2 K/UL (ref 4.5–11.5)

## 2024-12-29 PROCEDURE — 2500000003 HC RX 250 WO HCPCS: Performed by: NURSE PRACTITIONER

## 2024-12-29 PROCEDURE — 94640 AIRWAY INHALATION TREATMENT: CPT

## 2024-12-29 PROCEDURE — 6370000000 HC RX 637 (ALT 250 FOR IP): Performed by: NURSE PRACTITIONER

## 2024-12-29 PROCEDURE — 85610 PROTHROMBIN TIME: CPT

## 2024-12-29 PROCEDURE — 80053 COMPREHEN METABOLIC PANEL: CPT

## 2024-12-29 PROCEDURE — 6370000000 HC RX 637 (ALT 250 FOR IP): Performed by: INTERNAL MEDICINE

## 2024-12-29 PROCEDURE — 2060000000 HC ICU INTERMEDIATE R&B

## 2024-12-29 PROCEDURE — 85025 COMPLETE CBC W/AUTO DIFF WBC: CPT

## 2024-12-29 RX ADMIN — DIGOXIN 62.5 MCG: 0.12 TABLET ORAL at 08:06

## 2024-12-29 RX ADMIN — FERROUS SULFATE TAB 325 MG (65 MG ELEMENTAL FE) 325 MG: 325 (65 FE) TAB at 08:06

## 2024-12-29 RX ADMIN — METOPROLOL SUCCINATE 25 MG: 25 TABLET, FILM COATED, EXTENDED RELEASE ORAL at 08:07

## 2024-12-29 RX ADMIN — LEVOTHYROXINE SODIUM 125 MCG: 25 TABLET ORAL at 06:55

## 2024-12-29 RX ADMIN — Medication 3 MG: at 19:58

## 2024-12-29 RX ADMIN — IPRATROPIUM BROMIDE AND ALBUTEROL SULFATE 1 DOSE: .5; 2.5 SOLUTION RESPIRATORY (INHALATION) at 09:29

## 2024-12-29 RX ADMIN — FOLIC ACID 1 MG: 1 TABLET ORAL at 08:06

## 2024-12-29 RX ADMIN — EMPAGLIFLOZIN 10 MG: 10 TABLET, FILM COATED ORAL at 08:06

## 2024-12-29 RX ADMIN — ALLOPURINOL 100 MG: 100 TABLET ORAL at 08:06

## 2024-12-29 RX ADMIN — FERROUS SULFATE TAB 325 MG (65 MG ELEMENTAL FE) 325 MG: 325 (65 FE) TAB at 16:28

## 2024-12-29 RX ADMIN — IPRATROPIUM BROMIDE AND ALBUTEROL SULFATE 1 DOSE: .5; 2.5 SOLUTION RESPIRATORY (INHALATION) at 20:42

## 2024-12-29 RX ADMIN — POTASSIUM CHLORIDE 10 MEQ: 750 TABLET, EXTENDED RELEASE ORAL at 08:06

## 2024-12-29 RX ADMIN — SODIUM CHLORIDE, PRESERVATIVE FREE 10 ML: 5 INJECTION INTRAVENOUS at 08:08

## 2024-12-30 ENCOUNTER — APPOINTMENT (OUTPATIENT)
Dept: GENERAL RADIOLOGY | Age: 79
DRG: 291 | End: 2024-12-30
Attending: INTERNAL MEDICINE
Payer: MEDICARE

## 2024-12-30 LAB
ALBUMIN SERPL-MCNC: 3.8 G/DL (ref 3.5–5.2)
ALP SERPL-CCNC: 74 U/L (ref 35–104)
ALT SERPL-CCNC: 9 U/L (ref 0–32)
ANION GAP SERPL CALCULATED.3IONS-SCNC: 13 MMOL/L (ref 7–16)
AST SERPL-CCNC: 23 U/L (ref 0–31)
ATYPICAL LYMPHOCYTE ABSOLUTE COUNT: 0.12 K/UL (ref 0–0.46)
ATYPICAL LYMPHOCYTES: 3 % (ref 0–4)
BASOPHILS # BLD: 0 K/UL (ref 0–0.2)
BASOPHILS NFR BLD: 0 % (ref 0–2)
BILIRUB SERPL-MCNC: 1.9 MG/DL (ref 0–1.2)
BNP SERPL-MCNC: ABNORMAL PG/ML (ref 0–450)
BUN SERPL-MCNC: 22 MG/DL (ref 6–23)
CALCIUM SERPL-MCNC: 9.5 MG/DL (ref 8.6–10.2)
CHLORIDE SERPL-SCNC: 98 MMOL/L (ref 98–107)
CO2 SERPL-SCNC: 22 MMOL/L (ref 22–29)
CREAT SERPL-MCNC: 1.5 MG/DL (ref 0.5–1)
EOSINOPHIL # BLD: 0.4 K/UL (ref 0.05–0.5)
EOSINOPHILS RELATIVE PERCENT: 9 % (ref 0–6)
ERYTHROCYTE [DISTWIDTH] IN BLOOD BY AUTOMATED COUNT: 17.6 % (ref 11.5–15)
GFR, ESTIMATED: 36 ML/MIN/1.73M2
GLUCOSE SERPL-MCNC: 102 MG/DL (ref 74–99)
HCT VFR BLD AUTO: 36.8 % (ref 34–48)
HGB BLD-MCNC: 10.9 G/DL (ref 11.5–15.5)
INR PPP: 2.9
LYMPHOCYTES NFR BLD: 0.16 K/UL (ref 1.5–4)
LYMPHOCYTES RELATIVE PERCENT: 4 % (ref 20–42)
MCH RBC QN AUTO: 25.5 PG (ref 26–35)
MCHC RBC AUTO-ENTMCNC: 29.6 G/DL (ref 32–34.5)
MCV RBC AUTO: 86.2 FL (ref 80–99.9)
MONOCYTES NFR BLD: 0.24 K/UL (ref 0.1–0.95)
MONOCYTES NFR BLD: 5 % (ref 2–12)
NEUTROPHILS NFR BLD: 80 % (ref 43–80)
NEUTS SEG NFR BLD: 3.68 K/UL (ref 1.8–7.3)
NUCLEATED RED BLOOD CELLS: 1 PER 100 WBC
PLATELET # BLD AUTO: 181 K/UL (ref 130–450)
PMV BLD AUTO: 11.5 FL (ref 7–12)
POTASSIUM SERPL-SCNC: 4.1 MMOL/L (ref 3.5–5)
PROT SERPL-MCNC: 7.3 G/DL (ref 6.4–8.3)
PROTHROMBIN TIME: 30.3 SEC (ref 9.3–12.4)
RBC # BLD AUTO: 4.27 M/UL (ref 3.5–5.5)
RBC # BLD: ABNORMAL 10*6/UL
SODIUM SERPL-SCNC: 133 MMOL/L (ref 132–146)
WBC OTHER # BLD: 4.6 K/UL (ref 4.5–11.5)

## 2024-12-30 PROCEDURE — 6370000000 HC RX 637 (ALT 250 FOR IP): Performed by: NURSE PRACTITIONER

## 2024-12-30 PROCEDURE — 85025 COMPLETE CBC W/AUTO DIFF WBC: CPT

## 2024-12-30 PROCEDURE — 83880 ASSAY OF NATRIURETIC PEPTIDE: CPT

## 2024-12-30 PROCEDURE — 2500000003 HC RX 250 WO HCPCS: Performed by: NURSE PRACTITIONER

## 2024-12-30 PROCEDURE — 80053 COMPREHEN METABOLIC PANEL: CPT

## 2024-12-30 PROCEDURE — 2060000000 HC ICU INTERMEDIATE R&B

## 2024-12-30 PROCEDURE — 85610 PROTHROMBIN TIME: CPT

## 2024-12-30 PROCEDURE — 6370000000 HC RX 637 (ALT 250 FOR IP): Performed by: HOSPITALIST

## 2024-12-30 PROCEDURE — 94640 AIRWAY INHALATION TREATMENT: CPT

## 2024-12-30 PROCEDURE — 6370000000 HC RX 637 (ALT 250 FOR IP): Performed by: INTERNAL MEDICINE

## 2024-12-30 PROCEDURE — 71045 X-RAY EXAM CHEST 1 VIEW: CPT

## 2024-12-30 PROCEDURE — 36415 COLL VENOUS BLD VENIPUNCTURE: CPT

## 2024-12-30 PROCEDURE — 82306 VITAMIN D 25 HYDROXY: CPT

## 2024-12-30 RX ORDER — WARFARIN SODIUM 2 MG/1
2 TABLET ORAL
Status: COMPLETED | OUTPATIENT
Start: 2024-12-30 | End: 2024-12-30

## 2024-12-30 RX ORDER — DIGOXIN 125 MCG
62.5 TABLET ORAL
Status: DISCONTINUED | OUTPATIENT
Start: 2025-01-01 | End: 2025-01-01 | Stop reason: HOSPADM

## 2024-12-30 RX ORDER — BUMETANIDE 1 MG/1
2 TABLET ORAL DAILY
Status: DISCONTINUED | OUTPATIENT
Start: 2024-12-31 | End: 2025-01-01 | Stop reason: HOSPADM

## 2024-12-30 RX ADMIN — IPRATROPIUM BROMIDE AND ALBUTEROL SULFATE 1 DOSE: .5; 2.5 SOLUTION RESPIRATORY (INHALATION) at 20:00

## 2024-12-30 RX ADMIN — IPRATROPIUM BROMIDE AND ALBUTEROL SULFATE 1 DOSE: .5; 2.5 SOLUTION RESPIRATORY (INHALATION) at 08:50

## 2024-12-30 RX ADMIN — SODIUM CHLORIDE, PRESERVATIVE FREE 10 ML: 5 INJECTION INTRAVENOUS at 17:53

## 2024-12-30 RX ADMIN — IPRATROPIUM BROMIDE AND ALBUTEROL SULFATE 1 DOSE: .5; 2.5 SOLUTION RESPIRATORY (INHALATION) at 15:57

## 2024-12-30 RX ADMIN — ALLOPURINOL 100 MG: 100 TABLET ORAL at 08:33

## 2024-12-30 RX ADMIN — FERROUS SULFATE TAB 325 MG (65 MG ELEMENTAL FE) 325 MG: 325 (65 FE) TAB at 17:53

## 2024-12-30 RX ADMIN — Medication 3 MG: at 19:54

## 2024-12-30 RX ADMIN — METOPROLOL SUCCINATE 25 MG: 25 TABLET, FILM COATED, EXTENDED RELEASE ORAL at 10:06

## 2024-12-30 RX ADMIN — FERROUS SULFATE TAB 325 MG (65 MG ELEMENTAL FE) 325 MG: 325 (65 FE) TAB at 08:33

## 2024-12-30 RX ADMIN — IPRATROPIUM BROMIDE AND ALBUTEROL SULFATE 1 DOSE: .5; 2.5 SOLUTION RESPIRATORY (INHALATION) at 12:43

## 2024-12-30 RX ADMIN — DIGOXIN 62.5 MCG: 0.12 TABLET ORAL at 08:40

## 2024-12-30 RX ADMIN — SACUBITRIL AND VALSARTAN 0.5 TABLET: 24; 26 TABLET, FILM COATED ORAL at 17:53

## 2024-12-30 RX ADMIN — LEVOTHYROXINE SODIUM 125 MCG: 25 TABLET ORAL at 05:38

## 2024-12-30 RX ADMIN — BUMETANIDE 1 MG: 1 TABLET ORAL at 08:33

## 2024-12-30 RX ADMIN — POTASSIUM CHLORIDE 10 MEQ: 750 TABLET, EXTENDED RELEASE ORAL at 08:33

## 2024-12-30 RX ADMIN — FOLIC ACID 1 MG: 1 TABLET ORAL at 08:33

## 2024-12-30 RX ADMIN — SODIUM CHLORIDE, PRESERVATIVE FREE 10 ML: 5 INJECTION INTRAVENOUS at 19:54

## 2024-12-30 RX ADMIN — WARFARIN SODIUM 2 MG: 2 TABLET ORAL at 17:53

## 2024-12-30 RX ADMIN — EMPAGLIFLOZIN 10 MG: 10 TABLET, FILM COATED ORAL at 10:06

## 2024-12-30 NOTE — PLAN OF CARE
Patient's chart updated to reflect:      .    - HF care plan, HF education points and HF discharge instructions.  -Orders: 2 gram sodium diet, daily weights, I/O.  -PCP and cardiology follow up appointments to be scheduled within 7 days of hospital discharge.  -CHF education session will be provided to the patient prior to hospital discharge.    Angelica Singer RN   Heart Failure Navigator

## 2024-12-30 NOTE — PLAN OF CARE
Problem: Chronic Conditions and Co-morbidities  Goal: Patient's chronic conditions and co-morbidity symptoms are monitored and maintained or improved  12/30/2024 1108 by Lefty Campbell RN  Outcome: Progressing  12/30/2024 1108 by Lefty Campbell RN  Outcome: Progressing  12/29/2024 2131 by Cindy Gonsales RN  Outcome: Progressing     Problem: Discharge Planning  Goal: Discharge to home or other facility with appropriate resources  12/30/2024 1108 by Lefty Campbell RN  Outcome: Progressing  12/30/2024 1108 by Lefty Campbell RN  Outcome: Progressing  12/29/2024 2131 by Cindy Gonsales RN  Outcome: Progressing     Problem: Safety - Adult  Goal: Free from fall injury  12/30/2024 1108 by Lefty Campbell RN  Outcome: Progressing  12/30/2024 1108 by Lefty Campbell RN  Outcome: Progressing  12/29/2024 2131 by Cindy Gonsales RN  Outcome: Progressing  Flowsheets (Taken 12/29/2024 0800 by Neeta Vazquez, RN)  Free From Fall Injury: Instruct family/caregiver on patient safety

## 2024-12-30 NOTE — PLAN OF CARE
Problem: Chronic Conditions and Co-morbidities  Goal: Patient's chronic conditions and co-morbidity symptoms are monitored and maintained or improved  12/30/2024 1108 by Lefty Campbell RN  Outcome: Progressing  12/29/2024 2131 by Cindy Gonsales RN  Outcome: Progressing     Problem: Discharge Planning  Goal: Discharge to home or other facility with appropriate resources  12/30/2024 1108 by Lefty Campbell RN  Outcome: Progressing  12/29/2024 2131 by Cindy Gonsales RN  Outcome: Progressing     Problem: Safety - Adult  Goal: Free from fall injury  12/30/2024 1108 by Lefty Campbell RN  Outcome: Progressing  12/29/2024 2131 by Cindy Gonsales RN  Outcome: Progressing  Flowsheets (Taken 12/29/2024 0800 by Neeta Vazquez, RN)  Free From Fall Injury: Instruct family/caregiver on patient safety

## 2024-12-30 NOTE — CARE COORDINATION
Social work / Discharge planning:         Patient is independent from home with .    AM PAC 22/24.     On room air.   CHF nurse following.    No discharge needs identified at this time.   Electronically signed by LISSETH Schwartz on 12/30/2024 at 10:27 AM

## 2024-12-30 NOTE — ACP (ADVANCE CARE PLANNING)
Advance Care Planning   Healthcare Decision Maker:    Primary Decision Maker: GarciaGagandeep - [facility] - [phone]    Click here to complete Healthcare Decision Makers including selection of the Healthcare Decision Maker Relationship (ie \"Primary\").

## 2024-12-31 LAB
25(OH)D3 SERPL-MCNC: 72.2 NG/ML (ref 30–100)
ALBUMIN SERPL-MCNC: 3.5 G/DL (ref 3.5–5.2)
ALP SERPL-CCNC: 67 U/L (ref 35–104)
ALT SERPL-CCNC: 8 U/L (ref 0–32)
ANION GAP SERPL CALCULATED.3IONS-SCNC: 12 MMOL/L (ref 7–16)
AST SERPL-CCNC: 19 U/L (ref 0–31)
BASOPHILS # BLD: 0.04 K/UL (ref 0–0.2)
BASOPHILS NFR BLD: 1 % (ref 0–2)
BILIRUB SERPL-MCNC: 1.4 MG/DL (ref 0–1.2)
BUN SERPL-MCNC: 24 MG/DL (ref 6–23)
CALCIUM SERPL-MCNC: 9 MG/DL (ref 8.6–10.2)
CHLORIDE SERPL-SCNC: 100 MMOL/L (ref 98–107)
CO2 SERPL-SCNC: 25 MMOL/L (ref 22–29)
CREAT SERPL-MCNC: 1.4 MG/DL (ref 0.5–1)
EOSINOPHIL # BLD: 0.37 K/UL (ref 0.05–0.5)
EOSINOPHILS RELATIVE PERCENT: 10 % (ref 0–6)
ERYTHROCYTE [DISTWIDTH] IN BLOOD BY AUTOMATED COUNT: 17.4 % (ref 11.5–15)
GFR, ESTIMATED: 37 ML/MIN/1.73M2
GLUCOSE SERPL-MCNC: 82 MG/DL (ref 74–99)
HCT VFR BLD AUTO: 34.4 % (ref 34–48)
HGB BLD-MCNC: 10.2 G/DL (ref 11.5–15.5)
IMM GRANULOCYTES # BLD AUTO: <0.03 K/UL (ref 0–0.58)
IMM GRANULOCYTES NFR BLD: 0 % (ref 0–5)
INR PPP: 2.5
LYMPHOCYTES NFR BLD: 0.4 K/UL (ref 1.5–4)
LYMPHOCYTES RELATIVE PERCENT: 11 % (ref 20–42)
MCH RBC QN AUTO: 25.6 PG (ref 26–35)
MCHC RBC AUTO-ENTMCNC: 29.7 G/DL (ref 32–34.5)
MCV RBC AUTO: 86.2 FL (ref 80–99.9)
MONOCYTES NFR BLD: 0.48 K/UL (ref 0.1–0.95)
MONOCYTES NFR BLD: 13 % (ref 2–12)
NEUTROPHILS NFR BLD: 64 % (ref 43–80)
NEUTS SEG NFR BLD: 2.31 K/UL (ref 1.8–7.3)
PLATELET # BLD AUTO: 155 K/UL (ref 130–450)
PMV BLD AUTO: 10.2 FL (ref 7–12)
POTASSIUM SERPL-SCNC: 3.6 MMOL/L (ref 3.5–5)
PROT SERPL-MCNC: 6.5 G/DL (ref 6.4–8.3)
PROTHROMBIN TIME: 27.4 SEC (ref 9.3–12.4)
RBC # BLD AUTO: 3.99 M/UL (ref 3.5–5.5)
RBC # BLD: ABNORMAL 10*6/UL
SODIUM SERPL-SCNC: 137 MMOL/L (ref 132–146)
TSH SERPL DL<=0.05 MIU/L-ACNC: 0.31 UIU/ML (ref 0.27–4.2)
WBC OTHER # BLD: 3.6 K/UL (ref 4.5–11.5)

## 2024-12-31 PROCEDURE — 94640 AIRWAY INHALATION TREATMENT: CPT

## 2024-12-31 PROCEDURE — 85610 PROTHROMBIN TIME: CPT

## 2024-12-31 PROCEDURE — 2500000003 HC RX 250 WO HCPCS: Performed by: NURSE PRACTITIONER

## 2024-12-31 PROCEDURE — 84443 ASSAY THYROID STIM HORMONE: CPT

## 2024-12-31 PROCEDURE — 80053 COMPREHEN METABOLIC PANEL: CPT

## 2024-12-31 PROCEDURE — 36415 COLL VENOUS BLD VENIPUNCTURE: CPT

## 2024-12-31 PROCEDURE — 6370000000 HC RX 637 (ALT 250 FOR IP): Performed by: HOSPITALIST

## 2024-12-31 PROCEDURE — 6370000000 HC RX 637 (ALT 250 FOR IP): Performed by: INTERNAL MEDICINE

## 2024-12-31 PROCEDURE — 85025 COMPLETE CBC W/AUTO DIFF WBC: CPT

## 2024-12-31 PROCEDURE — 6370000000 HC RX 637 (ALT 250 FOR IP): Performed by: NURSE PRACTITIONER

## 2024-12-31 PROCEDURE — 2060000000 HC ICU INTERMEDIATE R&B

## 2024-12-31 RX ORDER — PROCHLORPERAZINE MALEATE 10 MG
10 TABLET ORAL EVERY 8 HOURS PRN
Qty: 120 TABLET | Refills: 3 | Status: SHIPPED | OUTPATIENT
Start: 2024-12-31

## 2024-12-31 RX ORDER — DIGOXIN 0.06 MG/1
62.5 TABLET ORAL
Qty: 30 TABLET | Refills: 3 | Status: SHIPPED | OUTPATIENT
Start: 2025-01-01

## 2024-12-31 RX ORDER — BUMETANIDE 2 MG/1
2 TABLET ORAL DAILY
Qty: 30 TABLET | Refills: 3 | Status: SHIPPED | OUTPATIENT
Start: 2025-01-01

## 2024-12-31 RX ORDER — WARFARIN SODIUM 1 MG/1
1 TABLET ORAL
Status: COMPLETED | OUTPATIENT
Start: 2024-12-31 | End: 2024-12-31

## 2024-12-31 RX ORDER — METOLAZONE 2.5 MG/1
2.5 TABLET ORAL
Status: DISCONTINUED | OUTPATIENT
Start: 2025-01-01 | End: 2025-01-01 | Stop reason: HOSPADM

## 2024-12-31 RX ORDER — WARFARIN SODIUM 1 MG/1
1 TABLET ORAL DAILY
Qty: 30 TABLET | Refills: 3 | Status: SHIPPED | OUTPATIENT
Start: 2024-12-31

## 2024-12-31 RX ADMIN — IPRATROPIUM BROMIDE AND ALBUTEROL SULFATE 1 DOSE: .5; 2.5 SOLUTION RESPIRATORY (INHALATION) at 08:46

## 2024-12-31 RX ADMIN — WARFARIN SODIUM 1 MG: 1 TABLET ORAL at 18:24

## 2024-12-31 RX ADMIN — FERROUS SULFATE TAB 325 MG (65 MG ELEMENTAL FE) 325 MG: 325 (65 FE) TAB at 08:33

## 2024-12-31 RX ADMIN — ALLOPURINOL 100 MG: 100 TABLET ORAL at 08:33

## 2024-12-31 RX ADMIN — METOPROLOL SUCCINATE 25 MG: 25 TABLET, FILM COATED, EXTENDED RELEASE ORAL at 08:33

## 2024-12-31 RX ADMIN — SODIUM CHLORIDE, PRESERVATIVE FREE 10 ML: 5 INJECTION INTRAVENOUS at 08:34

## 2024-12-31 RX ADMIN — EMPAGLIFLOZIN 10 MG: 10 TABLET, FILM COATED ORAL at 08:33

## 2024-12-31 RX ADMIN — POTASSIUM CHLORIDE 10 MEQ: 750 TABLET, EXTENDED RELEASE ORAL at 08:33

## 2024-12-31 RX ADMIN — IPRATROPIUM BROMIDE AND ALBUTEROL SULFATE 1 DOSE: .5; 2.5 SOLUTION RESPIRATORY (INHALATION) at 20:02

## 2024-12-31 RX ADMIN — Medication 3 MG: at 19:33

## 2024-12-31 RX ADMIN — FOLIC ACID 1 MG: 1 TABLET ORAL at 08:33

## 2024-12-31 RX ADMIN — LEVOTHYROXINE SODIUM 125 MCG: 25 TABLET ORAL at 05:27

## 2024-12-31 RX ADMIN — SACUBITRIL AND VALSARTAN 0.5 TABLET: 24; 26 TABLET, FILM COATED ORAL at 19:32

## 2024-12-31 RX ADMIN — BUMETANIDE 2 MG: 1 TABLET ORAL at 08:33

## 2024-12-31 RX ADMIN — SODIUM CHLORIDE, PRESERVATIVE FREE 10 ML: 5 INJECTION INTRAVENOUS at 19:33

## 2024-12-31 RX ADMIN — FERROUS SULFATE TAB 325 MG (65 MG ELEMENTAL FE) 325 MG: 325 (65 FE) TAB at 16:55

## 2024-12-31 RX ADMIN — IPRATROPIUM BROMIDE AND ALBUTEROL SULFATE 1 DOSE: .5; 2.5 SOLUTION RESPIRATORY (INHALATION) at 12:04

## 2024-12-31 NOTE — CONSULTS
[facility]   Inpatient CHF Nurse Navigator Consult      Cardiologist: Dr Tana Garcia is a 79 y.o. (1945) female with a history of HFrEF, most recent EF:  Lab Results   Component Value Date    LVEF 32 05/10/2022       Patient was awake and alert, laying in bed during the consultation and is agreeable to heart failure education. She was engaged and asked appropriate questions throughout the education session. She is feeling better today. She weighs herself daily and follow a low salt diet most of the time.    Barriers identified during consult contributing to HF Hospitalization:  [] Limited medication adherence   [] Poor health literacy, education regarding HF medications provided   [] Pill box provided to patient  [] Difficulty affording medications  [] Difficulty obtaining/ managing medications  [] Prescription assistance information given     [] Not weighing themselves daily  [x] Weight log provided for easy monitoring  [] Scale provided     [] Not following low sodium diet  [] Food insecurity   [x] 2 gram sodium diet education provided   [] Low sodium recipes provided  [] Sodium free seasoning provided   [] Low sodium meal delivery options given to patient  [] Dietician consulted     [] Lack of transportation to appointments     [] Depression, given chronic illness  [] Primary team notified     [] Goals of care need addressed  [] Palliative care consulted     [] CHF CHW consulted, to assist with     Chart Reviewed:  Diet: ADULT DIET; Regular; Low Fat/Low Chol/High Fiber/DAVIDSON   Daily Weights: Patient Vitals for the past 96 hrs (Last 3 readings):   Weight   12/31/24 0527 47.6 kg (105 lb)     I/O:   Intake/Output Summary (Last 24 hours) at 12/31/2024 1235  Last data filed at 12/31/2024 0527  Gross per 24 hour   Intake 300 ml   Output 350 ml   Net -50 ml       [] Nursing staff/manager notified of inaccurate tompkins weights or I/O        Discharge Plan:  Above

## 2024-12-31 NOTE — PLAN OF CARE
Problem: Chronic Conditions and Co-morbidities  Goal: Patient's chronic conditions and co-morbidity symptoms are monitored and maintained or improved  12/31/2024 1004 by Lefty Campbell RN  Outcome: Progressing  12/30/2024 2321 by Nimisha Montes, RN  Outcome: Progressing     Problem: Discharge Planning  Goal: Discharge to home or other facility with appropriate resources  12/31/2024 1004 by Lefty Campbell, RN  Outcome: Progressing  12/30/2024 2321 by Nimisha Montes, RN  Outcome: Progressing     Problem: Safety - Adult  Goal: Free from fall injury  12/31/2024 1004 by Lefty Campbell, RN  Outcome: Progressing  12/30/2024 2321 by Nimisha Montes, RN  Outcome: Progressing

## 2024-12-31 NOTE — PLAN OF CARE
Problem: Chronic Conditions and Co-morbidities  Goal: Patient's chronic conditions and co-morbidity symptoms are monitored and maintained or improved  12/30/2024 2321 by Nimisha Montes RN  Outcome: Progressing  12/30/2024 1108 by Lefty Campbell RN  Outcome: Progressing  12/30/2024 1108 by Lefty Campbell RN  Outcome: Progressing     Problem: Discharge Planning  Goal: Discharge to home or other facility with appropriate resources  12/30/2024 2321 by Nimisha Montes RN  Outcome: Progressing  12/30/2024 1108 by Lefty Campbell RN  Outcome: Progressing  12/30/2024 1108 by Lefty Campbell, RN  Outcome: Progressing     Problem: Safety - Adult  Goal: Free from fall injury  12/30/2024 2321 by Nimisha Montes RN  Outcome: Progressing  12/30/2024 1108 by Lefty Campbell, RN  Outcome: Progressing  12/30/2024 1108 by Lefty Campbell, RN  Outcome: Progressing

## 2024-12-31 NOTE — DISCHARGE SUMMARY
Will continue Monday, Wednesday, Friday.  Discussed with cardiology, they agree with plan for discharge.  Follow-up as an up as an outpatient.  Continue Tubigrip's at home as ordered.    Acute on chronic systolic heart failure  Gentle diuresis per cardiology given hypotensive tendencies  Monitor blood pressure and heart rate  Metoprolol dosing decreased, continue Jardiance, give low-dose Entresto at night  Echo noted  Permanent A-fib secondary to valvular heart disease  Digoxin dose decreased to 62.5  Continue to monitor heart rate  Supratherapeutic INR  Patient chronically on Coumadin for A-fib  Pharmacy consulted, will keep INR between 2-3  Coumadin held 12/27, 12/28, 12/29  Hypertension  Blood pressure on the low side, continue to monitor  May need to adjust medications further  Valvular heart disease  Cardiology making recommendation for outpatient follow-up in [place]  Information given  Hypothyroidism  Continue levothyroxine  CKD stage III  Baseline creatinine is 1.3-1.5  Currently stable  Continue to monitor        BRIEF PHYSICAL EXAMINATION AND LABORATORIES ON DAY OF DISCHARGE:  VITALS:  BP (!) 99/44   Pulse 74   Temp 98.4 °F (36.9 °C) (Oral)   Resp 16   Ht 1.549 m (5' 1\")   Wt 47.6 kg (105 lb)   SpO2 96%   BMI 19.84 kg/m²   General: AAO to person/place/time/purpose, NAD, no labored breathing,   Eyes: conjunctivae/corneas clear, sclera non icteric  Skin: Legs with tubigrip below the knee with a darkened color, no weeping  Lungs: CTAB in upper lobes, fine crackles noted bilaterally lower lobes, no retractions/use of accessory muscles, no vocal fremitus, no rhonchi, no rales.  Room air  Heart: Irregular rate, irregular rhythm, + murmur  Abdomen: soft, NT, bowel sounds normal  Extremities: atraumatic, 1+  pitting edema jaime lower extremities with chronic discoloration noted. DP/ PT pulses 2+ and equal  Neurologic: cranial nerves 2-12 grossly intact, no slurred speech    LABS::  Recent Labs

## 2025-01-01 ENCOUNTER — APPOINTMENT (OUTPATIENT)
Dept: GENERAL RADIOLOGY | Age: 80
End: 2025-01-01
Payer: MEDICARE

## 2025-01-01 ENCOUNTER — HOSPITAL ENCOUNTER (INPATIENT)
Age: 80
LOS: 5 days | Discharge: HOME | DRG: 308 | End: 2025-06-16
Attending: INTERNAL MEDICINE | Admitting: INTERNAL MEDICINE
Payer: MEDICARE

## 2025-01-01 ENCOUNTER — APPOINTMENT (OUTPATIENT)
Dept: ULTRASOUND IMAGING | Age: 80
DRG: 308 | End: 2025-01-01
Attending: INTERNAL MEDICINE
Payer: MEDICARE

## 2025-01-01 ENCOUNTER — APPOINTMENT (OUTPATIENT)
Dept: GENERAL RADIOLOGY | Age: 80
DRG: 865 | End: 2025-01-01
Attending: INTERNAL MEDICINE
Payer: MEDICARE

## 2025-01-01 ENCOUNTER — HOSPITAL ENCOUNTER (EMERGENCY)
Age: 80
Discharge: ANOTHER HOSPITAL | End: 2025-06-22
Attending: STUDENT IN AN ORGANIZED HEALTH CARE EDUCATION/TRAINING PROGRAM
Payer: MEDICARE

## 2025-01-01 ENCOUNTER — HOSPITAL ENCOUNTER (INPATIENT)
Age: 80
LOS: 1 days | DRG: 865 | End: 2025-06-23
Attending: INTERNAL MEDICINE | Admitting: INTERNAL MEDICINE
Payer: MEDICARE

## 2025-01-01 ENCOUNTER — APPOINTMENT (OUTPATIENT)
Dept: GENERAL RADIOLOGY | Age: 80
DRG: 308 | End: 2025-01-01
Attending: INTERNAL MEDICINE
Payer: MEDICARE

## 2025-01-01 VITALS
OXYGEN SATURATION: 98 % | TEMPERATURE: 98.4 F | RESPIRATION RATE: 16 BRPM | BODY MASS INDEX: 19.83 KG/M2 | WEIGHT: 105 LBS | SYSTOLIC BLOOD PRESSURE: 92 MMHG | HEIGHT: 61 IN | DIASTOLIC BLOOD PRESSURE: 52 MMHG | HEART RATE: 87 BPM

## 2025-01-01 VITALS
TEMPERATURE: 97.5 F | BODY MASS INDEX: 18.5 KG/M2 | OXYGEN SATURATION: 90 % | HEART RATE: 105 BPM | WEIGHT: 98 LBS | RESPIRATION RATE: 18 BRPM | SYSTOLIC BLOOD PRESSURE: 97 MMHG | HEIGHT: 61 IN | DIASTOLIC BLOOD PRESSURE: 60 MMHG

## 2025-01-01 VITALS
RESPIRATION RATE: 22 BRPM | OXYGEN SATURATION: 95 % | DIASTOLIC BLOOD PRESSURE: 56 MMHG | HEART RATE: 101 BPM | TEMPERATURE: 98 F | SYSTOLIC BLOOD PRESSURE: 107 MMHG

## 2025-01-01 VITALS
DIASTOLIC BLOOD PRESSURE: 50 MMHG | OXYGEN SATURATION: 98 % | WEIGHT: 89.73 LBS | SYSTOLIC BLOOD PRESSURE: 100 MMHG | BODY MASS INDEX: 16.94 KG/M2 | TEMPERATURE: 100.2 F | RESPIRATION RATE: 25 BRPM | HEIGHT: 61 IN

## 2025-01-01 DIAGNOSIS — I50.9 ACUTE ON CHRONIC CONGESTIVE HEART FAILURE, UNSPECIFIED HEART FAILURE TYPE (HCC): Primary | ICD-10-CM

## 2025-01-01 DIAGNOSIS — J18.9 PNEUMONIA OF BOTH LOWER LOBES DUE TO INFECTIOUS ORGANISM: ICD-10-CM

## 2025-01-01 DIAGNOSIS — J90 BILATERAL PLEURAL EFFUSION: ICD-10-CM

## 2025-01-01 LAB
ALBUMIN SERPL-MCNC: 3.4 G/DL (ref 3.5–5.2)
ALBUMIN SERPL-MCNC: 3.6 G/DL (ref 3.5–5.2)
ALBUMIN SERPL-MCNC: 3.6 G/DL (ref 3.5–5.2)
ALBUMIN SERPL-MCNC: 3.7 G/DL (ref 3.5–5.2)
ALBUMIN SERPL-MCNC: 3.7 G/DL (ref 3.5–5.2)
ALBUMIN SERPL-MCNC: 3.9 G/DL (ref 3.5–5.2)
ALBUMIN SERPL-MCNC: 4.5 G/DL (ref 3.5–5.2)
ALP SERPL-CCNC: 64 U/L (ref 35–104)
ALP SERPL-CCNC: 67 U/L (ref 35–104)
ALP SERPL-CCNC: 67 U/L (ref 35–104)
ALP SERPL-CCNC: 70 U/L (ref 35–104)
ALP SERPL-CCNC: 70 U/L (ref 35–104)
ALP SERPL-CCNC: 75 U/L (ref 35–104)
ALP SERPL-CCNC: 76 U/L (ref 35–104)
ALP SERPL-CCNC: 78 U/L (ref 35–104)
ALP SERPL-CCNC: 92 U/L (ref 35–104)
ALT SERPL-CCNC: 10 U/L (ref 0–32)
ALT SERPL-CCNC: 11 U/L (ref 0–32)
ALT SERPL-CCNC: 17 U/L (ref 0–32)
ALT SERPL-CCNC: 20 U/L (ref 0–32)
ALT SERPL-CCNC: 20 U/L (ref 0–35)
ANION GAP SERPL CALCULATED.3IONS-SCNC: 13 MMOL/L (ref 7–16)
ANION GAP SERPL CALCULATED.3IONS-SCNC: 14 MMOL/L (ref 7–16)
ANION GAP SERPL CALCULATED.3IONS-SCNC: 14 MMOL/L (ref 7–16)
ANION GAP SERPL CALCULATED.3IONS-SCNC: 15 MMOL/L (ref 7–16)
ANION GAP SERPL CALCULATED.3IONS-SCNC: 16 MMOL/L (ref 7–16)
ANION GAP SERPL CALCULATED.3IONS-SCNC: 17 MMOL/L (ref 7–16)
ANION GAP SERPL CALCULATED.3IONS-SCNC: 17 MMOL/L (ref 7–16)
AST SERPL-CCNC: 19 U/L (ref 0–31)
AST SERPL-CCNC: 25 U/L (ref 0–31)
AST SERPL-CCNC: 28 U/L (ref 0–31)
AST SERPL-CCNC: 30 U/L (ref 0–31)
AST SERPL-CCNC: 31 U/L (ref 0–35)
AST SERPL-CCNC: 34 U/L (ref 0–31)
AST SERPL-CCNC: 46 U/L (ref 0–31)
AST SERPL-CCNC: 47 U/L (ref 0–31)
AST SERPL-CCNC: 53 U/L (ref 0–31)
B PARAP IS1001 DNA NPH QL NAA+NON-PROBE: NOT DETECTED
B PARAP IS1001 DNA NPH QL NAA+NON-PROBE: NOT DETECTED
B PERT DNA SPEC QL NAA+PROBE: NOT DETECTED
B PERT DNA SPEC QL NAA+PROBE: NOT DETECTED
B.E.: 11.3 MMOL/L (ref -3–3)
BASOPHILS # BLD: 0 K/UL (ref 0–0.2)
BASOPHILS # BLD: 0.01 K/UL (ref 0–0.2)
BASOPHILS # BLD: 0.01 K/UL (ref 0–0.2)
BASOPHILS # BLD: 0.02 K/UL (ref 0–0.2)
BASOPHILS # BLD: 0.04 K/UL (ref 0–0.2)
BASOPHILS NFR BLD: 0 % (ref 0–2)
BASOPHILS NFR BLD: 1 % (ref 0–2)
BILIRUB SERPL-MCNC: 0.7 MG/DL (ref 0–1.2)
BILIRUB SERPL-MCNC: 0.8 MG/DL (ref 0–1.2)
BILIRUB SERPL-MCNC: 0.8 MG/DL (ref 0–1.2)
BILIRUB SERPL-MCNC: 1 MG/DL (ref 0–1.2)
BILIRUB SERPL-MCNC: 1.1 MG/DL (ref 0–1.2)
BILIRUB SERPL-MCNC: 1.1 MG/DL (ref 0–1.2)
BILIRUB SERPL-MCNC: 1.3 MG/DL (ref 0–1.2)
BILIRUB SERPL-MCNC: 1.7 MG/DL (ref 0–1.2)
BILIRUB SERPL-MCNC: 2.7 MG/DL (ref 0–1.2)
BILIRUB UR QL STRIP: NEGATIVE
BNP SERPL-MCNC: ABNORMAL PG/ML (ref 0–450)
BUN SERPL-MCNC: 16 MG/DL (ref 6–23)
BUN SERPL-MCNC: 35 MG/DL (ref 6–23)
BUN SERPL-MCNC: 39 MG/DL (ref 6–23)
BUN SERPL-MCNC: 41 MG/DL (ref 6–23)
BUN SERPL-MCNC: 58 MG/DL (ref 6–23)
BUN SERPL-MCNC: 59 MG/DL (ref 6–23)
BUN SERPL-MCNC: 62 MG/DL (ref 6–23)
BUN SERPL-MCNC: 69 MG/DL (ref 6–23)
BUN SERPL-MCNC: 71 MG/DL (ref 8–23)
BUN SERPL-MCNC: 73 MG/DL (ref 6–23)
BUN SERPL-MCNC: 75 MG/DL (ref 6–23)
BUN SERPL-MCNC: 78 MG/DL (ref 6–23)
C PNEUM DNA NPH QL NAA+NON-PROBE: NOT DETECTED
C PNEUM DNA NPH QL NAA+NON-PROBE: NOT DETECTED
CA-I BLD-SCNC: 1.15 MMOL/L (ref 1.15–1.33)
CALCIUM SERPL-MCNC: 10.3 MG/DL (ref 8.8–10.2)
CALCIUM SERPL-MCNC: 8.4 MG/DL (ref 8.6–10.2)
CALCIUM SERPL-MCNC: 8.4 MG/DL (ref 8.6–10.2)
CALCIUM SERPL-MCNC: 8.5 MG/DL (ref 8.6–10.2)
CALCIUM SERPL-MCNC: 8.6 MG/DL (ref 8.6–10.2)
CALCIUM SERPL-MCNC: 8.7 MG/DL (ref 8.6–10.2)
CALCIUM SERPL-MCNC: 8.7 MG/DL (ref 8.6–10.2)
CALCIUM SERPL-MCNC: 8.8 MG/DL (ref 8.6–10.2)
CALCIUM SERPL-MCNC: 9.1 MG/DL (ref 8.6–10.2)
CALCIUM SERPL-MCNC: 9.3 MG/DL (ref 8.6–10.2)
CALCIUM SERPL-MCNC: 9.6 MG/DL (ref 8.6–10.2)
CALCIUM SERPL-MCNC: 9.9 MG/DL (ref 8.6–10.2)
CHLORIDE SERPL-SCNC: 101 MMOL/L (ref 98–107)
CHLORIDE SERPL-SCNC: 84 MMOL/L (ref 98–107)
CHLORIDE SERPL-SCNC: 85 MMOL/L (ref 98–107)
CHLORIDE SERPL-SCNC: 86 MMOL/L (ref 98–107)
CHLORIDE SERPL-SCNC: 86 MMOL/L (ref 98–107)
CHLORIDE SERPL-SCNC: 88 MMOL/L (ref 98–107)
CHLORIDE SERPL-SCNC: 89 MMOL/L (ref 98–107)
CHLORIDE SERPL-SCNC: 89 MMOL/L (ref 98–107)
CHLORIDE SERPL-SCNC: 90 MMOL/L (ref 98–107)
CHLORIDE SERPL-SCNC: 91 MMOL/L (ref 98–107)
CLARITY UR: CLEAR
CO2 SERPL-SCNC: 24 MMOL/L (ref 22–29)
CO2 SERPL-SCNC: 28 MMOL/L (ref 22–29)
CO2 SERPL-SCNC: 29 MMOL/L (ref 22–29)
CO2 SERPL-SCNC: 30 MMOL/L (ref 22–29)
CO2 SERPL-SCNC: 31 MMOL/L (ref 22–29)
CO2 SERPL-SCNC: 35 MMOL/L (ref 22–29)
CO2 SERPL-SCNC: 38 MMOL/L (ref 22–29)
CO2 SERPL-SCNC: 38 MMOL/L (ref 22–29)
CO2 SERPL-SCNC: 39 MMOL/L (ref 22–29)
COHB: 0.7 % (ref 0–1.5)
COLOR UR: YELLOW
COMMENT: ABNORMAL
CORTIS SERPL-MCNC: 25.3 UG/DL (ref 2.7–18.4)
CREAT SERPL-MCNC: 1.4 MG/DL (ref 0.5–1)
CREAT SERPL-MCNC: 1.5 MG/DL (ref 0.5–1)
CREAT SERPL-MCNC: 1.5 MG/DL (ref 0.5–1)
CREAT SERPL-MCNC: 1.6 MG/DL (ref 0.5–1)
CREAT SERPL-MCNC: 1.6 MG/DL (ref 0.5–1)
CREAT SERPL-MCNC: 1.7 MG/DL (ref 0.5–1)
CREAT SERPL-MCNC: 1.7 MG/DL (ref 0.5–1)
CREAT SERPL-MCNC: 1.8 MG/DL (ref 0.5–1)
CREAT SERPL-MCNC: 1.9 MG/DL (ref 0.5–1)
CREAT SERPL-MCNC: 2.1 MG/DL (ref 0.5–1)
CREAT UR-MCNC: 22 MG/DL (ref 29–226)
CRITICAL ACTION: NORMAL
CRITICAL NOTIFICATION DATE/TIME: NORMAL
CRITICAL NOTIFICATION: NORMAL
CRITICAL VALUE READ BACK: YES
CRITICAL: ABNORMAL
CRP SERPL HS-MCNC: 220 MG/L (ref 0–5)
DATE ANALYZED: ABNORMAL
DATE OF COLLECTION: ABNORMAL
EKG ATRIAL RATE: 88 BPM
EKG Q-T INTERVAL: 458 MS
EKG QRS DURATION: 188 MS
EKG QTC CALCULATION (BAZETT): 587 MS
EKG R AXIS: 210 DEGREES
EKG T AXIS: 9 DEGREES
EKG VENTRICULAR RATE: 99 BPM
EOSINOPHIL # BLD: 0 K/UL (ref 0.05–0.5)
EOSINOPHIL # BLD: 0.01 K/UL (ref 0.05–0.5)
EOSINOPHIL # BLD: 0.03 K/UL (ref 0.05–0.5)
EOSINOPHIL # BLD: 0.08 K/UL (ref 0.05–0.5)
EOSINOPHIL # BLD: 0.47 K/UL (ref 0.05–0.5)
EOSINOPHILS RELATIVE PERCENT: 0 % (ref 0–6)
EOSINOPHILS RELATIVE PERCENT: 1 % (ref 0–6)
EOSINOPHILS RELATIVE PERCENT: 13 % (ref 0–6)
ERYTHROCYTE [DISTWIDTH] IN BLOOD BY AUTOMATED COUNT: 15.9 % (ref 11.5–15)
ERYTHROCYTE [DISTWIDTH] IN BLOOD BY AUTOMATED COUNT: 16 % (ref 11.5–15)
ERYTHROCYTE [DISTWIDTH] IN BLOOD BY AUTOMATED COUNT: 16.1 % (ref 11.5–15)
ERYTHROCYTE [DISTWIDTH] IN BLOOD BY AUTOMATED COUNT: 16.9 % (ref 11.5–15)
ERYTHROCYTE [DISTWIDTH] IN BLOOD BY AUTOMATED COUNT: 17.3 % (ref 11.5–15)
ERYTHROCYTE [DISTWIDTH] IN BLOOD BY AUTOMATED COUNT: 17.3 % (ref 11.5–15)
ERYTHROCYTE [DISTWIDTH] IN BLOOD BY AUTOMATED COUNT: 17.5 % (ref 11.5–15)
ERYTHROCYTE [SEDIMENTATION RATE] IN BLOOD BY WESTERGREN METHOD: 10 MM/HR (ref 0–20)
FLUAV RNA NPH QL NAA+NON-PROBE: NOT DETECTED
FLUAV RNA NPH QL NAA+NON-PROBE: NOT DETECTED
FLUAV RNA RESP QL NAA+PROBE: NOT DETECTED
FLUBV RNA NPH QL NAA+NON-PROBE: NOT DETECTED
FLUBV RNA NPH QL NAA+NON-PROBE: NOT DETECTED
FLUBV RNA RESP QL NAA+PROBE: NOT DETECTED
GFR, ESTIMATED: 24 ML/MIN/1.73M2
GFR, ESTIMATED: 26 ML/MIN/1.73M2
GFR, ESTIMATED: 28 ML/MIN/1.73M2
GFR, ESTIMATED: 28 ML/MIN/1.73M2
GFR, ESTIMATED: 29 ML/MIN/1.73M2
GFR, ESTIMATED: 30 ML/MIN/1.73M2
GFR, ESTIMATED: 30 ML/MIN/1.73M2
GFR, ESTIMATED: 31 ML/MIN/1.73M2
GFR, ESTIMATED: 32 ML/MIN/1.73M2
GFR, ESTIMATED: 35 ML/MIN/1.73M2
GFR, ESTIMATED: 36 ML/MIN/1.73M2
GFR, ESTIMATED: 37 ML/MIN/1.73M2
GLUCOSE BLD-MCNC: 57 MG/DL (ref 74–99)
GLUCOSE BLD-MCNC: 74 MG/DL (ref 74–99)
GLUCOSE SERPL-MCNC: 103 MG/DL (ref 74–99)
GLUCOSE SERPL-MCNC: 106 MG/DL (ref 74–99)
GLUCOSE SERPL-MCNC: 110 MG/DL (ref 74–99)
GLUCOSE SERPL-MCNC: 116 MG/DL (ref 74–99)
GLUCOSE SERPL-MCNC: 125 MG/DL (ref 74–99)
GLUCOSE SERPL-MCNC: 138 MG/DL (ref 74–99)
GLUCOSE SERPL-MCNC: 139 MG/DL (ref 74–99)
GLUCOSE SERPL-MCNC: 140 MG/DL (ref 74–99)
GLUCOSE SERPL-MCNC: 144 MG/DL (ref 74–99)
GLUCOSE SERPL-MCNC: 60 MG/DL (ref 74–99)
GLUCOSE SERPL-MCNC: 75 MG/DL (ref 74–99)
GLUCOSE SERPL-MCNC: 89 MG/DL (ref 74–99)
GLUCOSE UR STRIP-MCNC: NEGATIVE MG/DL
HADV DNA NPH QL NAA+NON-PROBE: NOT DETECTED
HADV DNA NPH QL NAA+NON-PROBE: NOT DETECTED
HCO3: 35.9 MMOL/L (ref 22–26)
HCOV 229E RNA NPH QL NAA+NON-PROBE: NOT DETECTED
HCOV 229E RNA NPH QL NAA+NON-PROBE: NOT DETECTED
HCOV HKU1 RNA NPH QL NAA+NON-PROBE: NOT DETECTED
HCOV HKU1 RNA NPH QL NAA+NON-PROBE: NOT DETECTED
HCOV NL63 RNA NPH QL NAA+NON-PROBE: NOT DETECTED
HCOV NL63 RNA NPH QL NAA+NON-PROBE: NOT DETECTED
HCOV OC43 RNA NPH QL NAA+NON-PROBE: NOT DETECTED
HCOV OC43 RNA NPH QL NAA+NON-PROBE: NOT DETECTED
HCT VFR BLD AUTO: 28.2 % (ref 34–48)
HCT VFR BLD AUTO: 29 % (ref 34–48)
HCT VFR BLD AUTO: 29.9 % (ref 34–48)
HCT VFR BLD AUTO: 30.9 % (ref 34–48)
HCT VFR BLD AUTO: 32.7 % (ref 34–48)
HCT VFR BLD AUTO: 33.7 % (ref 34–48)
HCT VFR BLD AUTO: 34 % (ref 34–48)
HCT VFR BLD AUTO: 34.5 % (ref 34–48)
HCT VFR BLD AUTO: 35.4 % (ref 34–48)
HGB BLD-MCNC: 10.2 G/DL (ref 11.5–15.5)
HGB BLD-MCNC: 10.4 G/DL (ref 11.5–15.5)
HGB BLD-MCNC: 10.5 G/DL (ref 11.5–15.5)
HGB BLD-MCNC: 10.8 G/DL (ref 11.5–15.5)
HGB BLD-MCNC: 8.7 G/DL (ref 11.5–15.5)
HGB BLD-MCNC: 8.9 G/DL (ref 11.5–15.5)
HGB BLD-MCNC: 9.1 G/DL (ref 11.5–15.5)
HGB BLD-MCNC: 9.5 G/DL (ref 11.5–15.5)
HGB BLD-MCNC: 9.8 G/DL (ref 11.5–15.5)
HGB UR QL STRIP.AUTO: NEGATIVE
HHB: 11.8 % (ref 0–5)
HMPV RNA NPH QL NAA+NON-PROBE: NOT DETECTED
HMPV RNA NPH QL NAA+NON-PROBE: NOT DETECTED
HPIV1 RNA NPH QL NAA+NON-PROBE: NOT DETECTED
HPIV1 RNA NPH QL NAA+NON-PROBE: NOT DETECTED
HPIV2 RNA NPH QL NAA+NON-PROBE: NOT DETECTED
HPIV2 RNA NPH QL NAA+NON-PROBE: NOT DETECTED
HPIV3 RNA NPH QL NAA+NON-PROBE: DETECTED
HPIV3 RNA NPH QL NAA+NON-PROBE: DETECTED
HPIV4 RNA NPH QL NAA+NON-PROBE: NOT DETECTED
HPIV4 RNA NPH QL NAA+NON-PROBE: NOT DETECTED
IMM GRANULOCYTES # BLD AUTO: 0.05 K/UL (ref 0–0.58)
IMM GRANULOCYTES # BLD AUTO: 0.11 K/UL (ref 0–0.58)
IMM GRANULOCYTES # BLD AUTO: <0.03 K/UL (ref 0–0.58)
IMM GRANULOCYTES NFR BLD: 0 % (ref 0–5)
IMM GRANULOCYTES NFR BLD: 1 % (ref 0–5)
IMM GRANULOCYTES NFR BLD: 1 % (ref 0–5)
INR PPP: 2.4
INR PPP: 2.7
INR PPP: 3.4
INR PPP: 3.5
INR PPP: 3.7
INR PPP: 3.8
INR PPP: 3.9
INR PPP: 4.4
INR PPP: 4.9
INR PPP: 6
KETONES UR STRIP-MCNC: NEGATIVE MG/DL
L PNEUMO1 AG UR QL IA.RAPID: NEGATIVE
LAB: ABNORMAL
LACTATE BLDV-SCNC: 2.7 MMOL/L (ref 0.5–2.2)
LACTATE BLDV-SCNC: 4.5 MMOL/L (ref 0.5–2.2)
LEUKOCYTE ESTERASE UR QL STRIP: NEGATIVE
LYMPHOCYTES NFR BLD: 0 K/UL (ref 1.5–4)
LYMPHOCYTES NFR BLD: 0 K/UL (ref 1.5–4)
LYMPHOCYTES NFR BLD: 0.02 K/UL (ref 1.5–4)
LYMPHOCYTES NFR BLD: 0.04 K/UL (ref 1.5–4)
LYMPHOCYTES NFR BLD: 0.15 K/UL (ref 1.5–4)
LYMPHOCYTES NFR BLD: 0.26 K/UL (ref 1.5–4)
LYMPHOCYTES NFR BLD: 0.26 K/UL (ref 1.5–4)
LYMPHOCYTES NFR BLD: 0.3 K/UL (ref 1.5–4)
LYMPHOCYTES NFR BLD: 0.48 K/UL (ref 1.5–4)
LYMPHOCYTES RELATIVE PERCENT: 0 % (ref 20–42)
LYMPHOCYTES RELATIVE PERCENT: 0 % (ref 20–42)
LYMPHOCYTES RELATIVE PERCENT: 1 % (ref 20–42)
LYMPHOCYTES RELATIVE PERCENT: 13 % (ref 20–42)
LYMPHOCYTES RELATIVE PERCENT: 3 % (ref 20–42)
LYMPHOCYTES RELATIVE PERCENT: 6 % (ref 20–42)
LYMPHOCYTES RELATIVE PERCENT: 7 % (ref 20–42)
Lab: 1437
M PNEUMO DNA NPH QL NAA+NON-PROBE: NOT DETECTED
M PNEUMO DNA NPH QL NAA+NON-PROBE: NOT DETECTED
MAGNESIUM SERPL-MCNC: 1.5 MG/DL (ref 1.6–2.6)
MAGNESIUM SERPL-MCNC: 1.7 MG/DL (ref 1.6–2.6)
MAGNESIUM SERPL-MCNC: 1.9 MG/DL (ref 1.6–2.4)
MAGNESIUM SERPL-MCNC: 1.9 MG/DL (ref 1.6–2.6)
MAGNESIUM SERPL-MCNC: 2 MG/DL (ref 1.6–2.6)
MAGNESIUM SERPL-MCNC: 2.2 MG/DL (ref 1.6–2.6)
MCH RBC QN AUTO: 25.7 PG (ref 26–35)
MCH RBC QN AUTO: 26.8 PG (ref 26–35)
MCH RBC QN AUTO: 26.9 PG (ref 26–35)
MCH RBC QN AUTO: 27 PG (ref 26–35)
MCH RBC QN AUTO: 27.1 PG (ref 26–35)
MCH RBC QN AUTO: 27.2 PG (ref 26–35)
MCH RBC QN AUTO: 27.3 PG (ref 26–35)
MCH RBC QN AUTO: 27.4 PG (ref 26–35)
MCH RBC QN AUTO: 27.4 PG (ref 26–35)
MCHC RBC AUTO-ENTMCNC: 29.7 G/DL (ref 32–34.5)
MCHC RBC AUTO-ENTMCNC: 30 G/DL (ref 32–34.5)
MCHC RBC AUTO-ENTMCNC: 30.4 G/DL (ref 32–34.5)
MCHC RBC AUTO-ENTMCNC: 30.7 G/DL (ref 32–34.5)
MCHC RBC AUTO-ENTMCNC: 30.9 G/DL (ref 32–34.5)
MCHC RBC AUTO-ENTMCNC: 31.3 G/DL (ref 32–34.5)
MCHC RBC AUTO-ENTMCNC: 31.6 G/DL (ref 32–34.5)
MCV RBC AUTO: 86.5 FL (ref 80–99.9)
MCV RBC AUTO: 86.8 FL (ref 80–99.9)
MCV RBC AUTO: 86.8 FL (ref 80–99.9)
MCV RBC AUTO: 88.7 FL (ref 80–99.9)
MCV RBC AUTO: 88.8 FL (ref 80–99.9)
MCV RBC AUTO: 89.2 FL (ref 80–99.9)
MCV RBC AUTO: 89.5 FL (ref 80–99.9)
MCV RBC AUTO: 89.8 FL (ref 80–99.9)
MCV RBC AUTO: 90.1 FL (ref 80–99.9)
METAMYELOCYTES ABSOLUTE COUNT: 0.04 K/UL (ref 0–0.12)
METAMYELOCYTES ABSOLUTE COUNT: 0.08 K/UL (ref 0–0.12)
METAMYELOCYTES ABSOLUTE COUNT: 1.02 K/UL (ref 0–0.12)
METAMYELOCYTES: 1 % (ref 0–1)
METAMYELOCYTES: 1 % (ref 0–1)
METAMYELOCYTES: 6 % (ref 0–1)
METHB: 0.3 % (ref 0–1.5)
MODE: ABNORMAL
MONOCYTES NFR BLD: 0 % (ref 2–12)
MONOCYTES NFR BLD: 0 % (ref 2–12)
MONOCYTES NFR BLD: 0 K/UL (ref 0.1–0.95)
MONOCYTES NFR BLD: 0 K/UL (ref 0.1–0.95)
MONOCYTES NFR BLD: 0.05 K/UL (ref 0.1–0.95)
MONOCYTES NFR BLD: 0.28 K/UL (ref 0.1–0.95)
MONOCYTES NFR BLD: 0.32 K/UL (ref 0.1–0.95)
MONOCYTES NFR BLD: 0.34 K/UL (ref 0.1–0.95)
MONOCYTES NFR BLD: 0.4 K/UL (ref 0.1–0.95)
MONOCYTES NFR BLD: 0.56 K/UL (ref 0.1–0.95)
MONOCYTES NFR BLD: 0.85 K/UL (ref 0.1–0.95)
MONOCYTES NFR BLD: 16 % (ref 2–12)
MONOCYTES NFR BLD: 2 % (ref 2–12)
MONOCYTES NFR BLD: 2 % (ref 2–12)
MONOCYTES NFR BLD: 4 % (ref 2–12)
MONOCYTES NFR BLD: 5 % (ref 2–12)
MONOCYTES NFR BLD: 7 % (ref 2–12)
MONOCYTES NFR BLD: 7 % (ref 2–12)
MYELOCYTES ABSOLUTE COUNT: 0.17 K/UL
MYELOCYTES: 1 %
NEUTROPHILS NFR BLD: 57 % (ref 43–80)
NEUTROPHILS NFR BLD: 86 % (ref 43–80)
NEUTROPHILS NFR BLD: 87 % (ref 43–80)
NEUTROPHILS NFR BLD: 91 % (ref 43–80)
NEUTROPHILS NFR BLD: 92 % (ref 43–80)
NEUTROPHILS NFR BLD: 95 % (ref 43–80)
NEUTROPHILS NFR BLD: 97 % (ref 43–80)
NEUTROPHILS NFR BLD: 98 % (ref 43–80)
NEUTROPHILS NFR BLD: 98 % (ref 43–80)
NEUTS SEG NFR BLD: 15.47 K/UL (ref 1.8–7.3)
NEUTS SEG NFR BLD: 19.55 K/UL (ref 1.8–7.3)
NEUTS SEG NFR BLD: 2.04 K/UL (ref 1.8–7.3)
NEUTS SEG NFR BLD: 2.73 K/UL (ref 1.8–7.3)
NEUTS SEG NFR BLD: 3.58 K/UL (ref 1.8–7.3)
NEUTS SEG NFR BLD: 3.76 K/UL (ref 1.8–7.3)
NEUTS SEG NFR BLD: 4.23 K/UL (ref 1.8–7.3)
NEUTS SEG NFR BLD: 8.25 K/UL (ref 1.8–7.3)
NEUTS SEG NFR BLD: 9.14 K/UL (ref 1.8–7.3)
NITRITE UR QL STRIP: NEGATIVE
O2 CONTENT: 12.8 ML/DL
O2 DELIVERY DEVICE: ABNORMAL
O2 SATURATION: 88.1 % (ref 92–98.5)
O2HB: 87.2 % (ref 94–97)
OPERATOR ID: 8634
PATIENT TEMP: 37 C
PCO2: 48 MMHG (ref 35–45)
PH BLOOD GAS: 7.49 (ref 7.35–7.45)
PH UR STRIP: 6 [PH] (ref 5–8)
PHOSPHATE SERPL-MCNC: 3.3 MG/DL (ref 2.5–4.5)
PLATELET # BLD AUTO: 130 K/UL (ref 130–450)
PLATELET # BLD AUTO: 156 K/UL (ref 130–450)
PLATELET # BLD AUTO: 157 K/UL (ref 130–450)
PLATELET # BLD AUTO: 159 K/UL (ref 130–450)
PLATELET # BLD AUTO: 176 K/UL (ref 130–450)
PLATELET # BLD AUTO: 179 K/UL (ref 130–450)
PLATELET # BLD AUTO: 191 K/UL (ref 130–450)
PLATELET # BLD AUTO: 202 K/UL (ref 130–450)
PLATELET, FLUORESCENCE: 114 K/UL (ref 130–450)
PMV BLD AUTO: 10.8 FL (ref 7–12)
PMV BLD AUTO: 11.8 FL (ref 7–12)
PMV BLD AUTO: 11.9 FL (ref 7–12)
PMV BLD AUTO: 12 FL (ref 7–12)
PMV BLD AUTO: 12 FL (ref 7–12)
PMV BLD AUTO: 12.1 FL (ref 7–12)
PMV BLD AUTO: 12.1 FL (ref 7–12)
PMV BLD AUTO: 12.5 FL (ref 7–12)
PMV BLD AUTO: 12.9 FL (ref 7–12)
PO2: 51.1 MMHG (ref 75–100)
POC HCO3: 41.7 MMOL/L (ref 22–26)
POC O2 SATURATION: 98.8 % (ref 92–98.5)
POC PCO2: 43.3 MM HG (ref 35–45)
POC PH: 7.59 (ref 7.35–7.45)
POC PO2: 105.7 MM HG (ref 60–80)
POSITIVE BASE EXCESS, ART: 18.1 MMOL/L (ref 0–3)
POTASSIUM SERPL-SCNC: 2.3 MMOL/L (ref 3.5–5.1)
POTASSIUM SERPL-SCNC: 2.7 MMOL/L (ref 3.5–5)
POTASSIUM SERPL-SCNC: 2.7 MMOL/L (ref 3.5–5)
POTASSIUM SERPL-SCNC: 2.8 MMOL/L (ref 3.5–5.1)
POTASSIUM SERPL-SCNC: 2.9 MMOL/L (ref 3.5–5)
POTASSIUM SERPL-SCNC: 3.1 MMOL/L (ref 3.5–5)
POTASSIUM SERPL-SCNC: 3.2 MMOL/L (ref 3.5–5)
POTASSIUM SERPL-SCNC: 3.4 MMOL/L (ref 3.5–5)
POTASSIUM SERPL-SCNC: 3.9 MMOL/L (ref 3.5–5)
POTASSIUM SERPL-SCNC: 4 MMOL/L (ref 3.5–5)
POTASSIUM SERPL-SCNC: 4.2 MMOL/L (ref 3.5–5)
PROCALCITONIN SERPL-MCNC: 0.32 NG/ML (ref 0–0.08)
PROCALCITONIN SERPL-MCNC: 0.68 NG/ML (ref 0–0.08)
PROCALCITONIN SERPL-MCNC: 76.8 NG/ML (ref 0–0.08)
PROT SERPL-MCNC: 6.5 G/DL (ref 6.4–8.3)
PROT SERPL-MCNC: 6.6 G/DL (ref 6.4–8.3)
PROT SERPL-MCNC: 6.9 G/DL (ref 6.4–8.3)
PROT SERPL-MCNC: 7 G/DL (ref 6.4–8.3)
PROT SERPL-MCNC: 7.1 G/DL (ref 6.4–8.3)
PROT SERPL-MCNC: 7.2 G/DL (ref 6.4–8.3)
PROT SERPL-MCNC: 7.2 G/DL (ref 6.4–8.3)
PROT SERPL-MCNC: 7.7 G/DL (ref 6.4–8.3)
PROT SERPL-MCNC: 7.8 G/DL (ref 6.4–8.3)
PROT UR STRIP-MCNC: NEGATIVE MG/DL
PROTHROMBIN TIME: 26.2 SEC (ref 9.3–12.4)
PROTHROMBIN TIME: 29.3 SEC (ref 9.3–12.4)
PROTHROMBIN TIME: 36.2 SEC (ref 9.3–12.4)
PROTHROMBIN TIME: 38.7 SEC (ref 9.3–12.4)
PROTHROMBIN TIME: 39.8 SEC (ref 9.3–12.4)
PROTHROMBIN TIME: 41.6 SEC (ref 9.3–12.4)
PROTHROMBIN TIME: 43.6 SEC (ref 9.3–12.4)
PROTHROMBIN TIME: 47 SEC (ref 9.3–12.4)
PROTHROMBIN TIME: 51.9 SEC (ref 9.3–12.4)
PROTHROMBIN TIME: 63.7 SEC (ref 9.3–12.4)
PTH-INTACT SERPL-MCNC: 144 PG/ML (ref 15–65)
RBC # BLD AUTO: 3.22 M/UL (ref 3.5–5.5)
RBC # BLD AUTO: 3.25 M/UL (ref 3.5–5.5)
RBC # BLD AUTO: 3.34 M/UL (ref 3.5–5.5)
RBC # BLD AUTO: 3.48 M/UL (ref 3.5–5.5)
RBC # BLD AUTO: 3.64 M/UL (ref 3.5–5.5)
RBC # BLD AUTO: 3.8 M/UL (ref 3.5–5.5)
RBC # BLD AUTO: 3.81 M/UL (ref 3.5–5.5)
RBC # BLD AUTO: 3.99 M/UL (ref 3.5–5.5)
RBC # BLD AUTO: 4.08 M/UL (ref 3.5–5.5)
RBC # BLD: ABNORMAL 10*6/UL
RSV RNA NPH QL NAA+NON-PROBE: NOT DETECTED
RSV RNA NPH QL NAA+NON-PROBE: NOT DETECTED
RV+EV RNA NPH QL NAA+NON-PROBE: NOT DETECTED
RV+EV RNA NPH QL NAA+NON-PROBE: NOT DETECTED
S PNEUM AG SPEC QL: NEGATIVE
SAMPLE SITE: ABNORMAL
SARS-COV-2 RNA NPH QL NAA+NON-PROBE: NOT DETECTED
SARS-COV-2 RNA NPH QL NAA+NON-PROBE: NOT DETECTED
SARS-COV-2 RNA RESP QL NAA+PROBE: NOT DETECTED
SODIUM SERPL-SCNC: 133 MMOL/L (ref 132–146)
SODIUM SERPL-SCNC: 133 MMOL/L (ref 132–146)
SODIUM SERPL-SCNC: 134 MMOL/L (ref 132–146)
SODIUM SERPL-SCNC: 135 MMOL/L (ref 132–146)
SODIUM SERPL-SCNC: 135 MMOL/L (ref 132–146)
SODIUM SERPL-SCNC: 136 MMOL/L (ref 132–146)
SODIUM SERPL-SCNC: 137 MMOL/L (ref 132–146)
SODIUM SERPL-SCNC: 137 MMOL/L (ref 132–146)
SODIUM SERPL-SCNC: 138 MMOL/L (ref 132–146)
SODIUM SERPL-SCNC: 139 MMOL/L (ref 136–145)
SODIUM UR-SCNC: 48 MMOL/L
SOURCE, BLOOD GAS: ABNORMAL
SOURCE: NORMAL
SP GR UR STRIP: <1.005 (ref 1–1.03)
SPECIMEN DESCRIPTION: ABNORMAL
SPECIMEN DESCRIPTION: ABNORMAL
SPECIMEN DESCRIPTION: NORMAL
SPECIMEN SOURCE: NORMAL
THB: 10.4 G/DL (ref 11.5–16.5)
TIME ANALYZED: 1440
TROPONIN I SERPL HS-MCNC: 89 NG/L (ref 0–14)
TROPONIN I SERPL HS-MCNC: 89 NG/L (ref 0–14)
UROBILINOGEN UR STRIP-ACNC: 0.2 EU/DL (ref 0–1)
UUN UR-MCNC: 195 MG/DL (ref 800–1666)
WBC # BLD: ABNORMAL 10*3/UL
WBC OTHER # BLD: 17 K/UL (ref 4.5–11.5)
WBC OTHER # BLD: 2.8 K/UL (ref 4.5–11.5)
WBC OTHER # BLD: 20.7 K/UL (ref 4.5–11.5)
WBC OTHER # BLD: 3.6 K/UL (ref 4.5–11.5)
WBC OTHER # BLD: 4.1 K/UL (ref 4.5–11.5)
WBC OTHER # BLD: 4.3 K/UL (ref 4.5–11.5)
WBC OTHER # BLD: 4.4 K/UL (ref 4.5–11.5)
WBC OTHER # BLD: 9 K/UL (ref 4.5–11.5)
WBC OTHER # BLD: 9.3 K/UL (ref 4.5–11.5)

## 2025-01-01 PROCEDURE — 2500000003 HC RX 250 WO HCPCS: Performed by: NURSE PRACTITIONER

## 2025-01-01 PROCEDURE — 94640 AIRWAY INHALATION TREATMENT: CPT

## 2025-01-01 PROCEDURE — 2500000003 HC RX 250 WO HCPCS: Performed by: HOSPITALIST

## 2025-01-01 PROCEDURE — 6370000000 HC RX 637 (ALT 250 FOR IP): Performed by: INTERNAL MEDICINE

## 2025-01-01 PROCEDURE — 83970 ASSAY OF PARATHORMONE: CPT

## 2025-01-01 PROCEDURE — 96361 HYDRATE IV INFUSION ADD-ON: CPT

## 2025-01-01 PROCEDURE — 36415 COLL VENOUS BLD VENIPUNCTURE: CPT

## 2025-01-01 PROCEDURE — 2060000000 HC ICU INTERMEDIATE R&B

## 2025-01-01 PROCEDURE — 84145 PROCALCITONIN (PCT): CPT

## 2025-01-01 PROCEDURE — 83735 ASSAY OF MAGNESIUM: CPT

## 2025-01-01 PROCEDURE — 85610 PROTHROMBIN TIME: CPT

## 2025-01-01 PROCEDURE — 99291 CRITICAL CARE FIRST HOUR: CPT | Performed by: STUDENT IN AN ORGANIZED HEALTH CARE EDUCATION/TRAINING PROGRAM

## 2025-01-01 PROCEDURE — 82805 BLOOD GASES W/O2 SATURATION: CPT

## 2025-01-01 PROCEDURE — 85025 COMPLETE CBC W/AUTO DIFF WBC: CPT

## 2025-01-01 PROCEDURE — 6360000002 HC RX W HCPCS: Performed by: INTERNAL MEDICINE

## 2025-01-01 PROCEDURE — 81003 URINALYSIS AUTO W/O SCOPE: CPT

## 2025-01-01 PROCEDURE — 6370000000 HC RX 637 (ALT 250 FOR IP): Performed by: HOSPITALIST

## 2025-01-01 PROCEDURE — 84484 ASSAY OF TROPONIN QUANT: CPT

## 2025-01-01 PROCEDURE — 6360000002 HC RX W HCPCS: Performed by: HOSPITALIST

## 2025-01-01 PROCEDURE — 6360000002 HC RX W HCPCS: Performed by: STUDENT IN AN ORGANIZED HEALTH CARE EDUCATION/TRAINING PROGRAM

## 2025-01-01 PROCEDURE — 84132 ASSAY OF SERUM POTASSIUM: CPT

## 2025-01-01 PROCEDURE — 87150 DNA/RNA AMPLIFIED PROBE: CPT

## 2025-01-01 PROCEDURE — 80048 BASIC METABOLIC PNL TOTAL CA: CPT

## 2025-01-01 PROCEDURE — 86140 C-REACTIVE PROTEIN: CPT

## 2025-01-01 PROCEDURE — 6360000002 HC RX W HCPCS

## 2025-01-01 PROCEDURE — 84100 ASSAY OF PHOSPHORUS: CPT

## 2025-01-01 PROCEDURE — 94660 CPAP INITIATION&MGMT: CPT

## 2025-01-01 PROCEDURE — 80053 COMPREHEN METABOLIC PANEL: CPT

## 2025-01-01 PROCEDURE — 36620 INSERTION CATHETER ARTERY: CPT

## 2025-01-01 PROCEDURE — 99222 1ST HOSP IP/OBS MODERATE 55: CPT

## 2025-01-01 PROCEDURE — 2700000000 HC OXYGEN THERAPY PER DAY

## 2025-01-01 PROCEDURE — 99285 EMERGENCY DEPT VISIT HI MDM: CPT

## 2025-01-01 PROCEDURE — 87081 CULTURE SCREEN ONLY: CPT

## 2025-01-01 PROCEDURE — 6370000000 HC RX 637 (ALT 250 FOR IP): Performed by: NURSE PRACTITIONER

## 2025-01-01 PROCEDURE — 06HY33Z INSERTION OF INFUSION DEVICE INTO LOWER VEIN, PERCUTANEOUS APPROACH: ICD-10-PCS | Performed by: INTERNAL MEDICINE

## 2025-01-01 PROCEDURE — 83880 ASSAY OF NATRIURETIC PEPTIDE: CPT

## 2025-01-01 PROCEDURE — 6370000000 HC RX 637 (ALT 250 FOR IP): Performed by: STUDENT IN AN ORGANIZED HEALTH CARE EDUCATION/TRAINING PROGRAM

## 2025-01-01 PROCEDURE — 84300 ASSAY OF URINE SODIUM: CPT

## 2025-01-01 PROCEDURE — 2580000003 HC RX 258: Performed by: STUDENT IN AN ORGANIZED HEALTH CARE EDUCATION/TRAINING PROGRAM

## 2025-01-01 PROCEDURE — 71045 X-RAY EXAM CHEST 1 VIEW: CPT

## 2025-01-01 PROCEDURE — 82330 ASSAY OF CALCIUM: CPT

## 2025-01-01 PROCEDURE — 36556 INSERT NON-TUNNEL CV CATH: CPT

## 2025-01-01 PROCEDURE — 87077 CULTURE AEROBIC IDENTIFY: CPT

## 2025-01-01 PROCEDURE — 2580000003 HC RX 258

## 2025-01-01 PROCEDURE — 85652 RBC SED RATE AUTOMATED: CPT

## 2025-01-01 PROCEDURE — 93010 ELECTROCARDIOGRAM REPORT: CPT | Performed by: INTERNAL MEDICINE

## 2025-01-01 PROCEDURE — 5A09357 ASSISTANCE WITH RESPIRATORY VENTILATION, LESS THAN 24 CONSECUTIVE HOURS, CONTINUOUS POSITIVE AIRWAY PRESSURE: ICD-10-PCS

## 2025-01-01 PROCEDURE — C1751 CATH, INF, PER/CENT/MIDLINE: HCPCS

## 2025-01-01 PROCEDURE — 2500000003 HC RX 250 WO HCPCS: Performed by: INTERNAL MEDICINE

## 2025-01-01 PROCEDURE — 82803 BLOOD GASES ANY COMBINATION: CPT

## 2025-01-01 PROCEDURE — 3E033XZ INTRODUCTION OF VASOPRESSOR INTO PERIPHERAL VEIN, PERCUTANEOUS APPROACH: ICD-10-PCS

## 2025-01-01 PROCEDURE — 3E033XZ INTRODUCTION OF VASOPRESSOR INTO PERIPHERAL VEIN, PERCUTANEOUS APPROACH: ICD-10-PCS | Performed by: INTERNAL MEDICINE

## 2025-01-01 PROCEDURE — 82533 TOTAL CORTISOL: CPT

## 2025-01-01 PROCEDURE — 5A09357 ASSISTANCE WITH RESPIRATORY VENTILATION, LESS THAN 24 CONSECUTIVE HOURS, CONTINUOUS POSITIVE AIRWAY PRESSURE: ICD-10-PCS | Performed by: INTERNAL MEDICINE

## 2025-01-01 PROCEDURE — 87449 NOS EACH ORGANISM AG IA: CPT

## 2025-01-01 PROCEDURE — 82570 ASSAY OF URINE CREATININE: CPT

## 2025-01-01 PROCEDURE — 6360000002 HC RX W HCPCS: Performed by: PHYSICIAN ASSISTANT

## 2025-01-01 PROCEDURE — 0202U NFCT DS 22 TRGT SARS-COV-2: CPT

## 2025-01-01 PROCEDURE — 04HY32Z INSERTION OF MONITORING DEVICE INTO LOWER ARTERY, PERCUTANEOUS APPROACH: ICD-10-PCS | Performed by: INTERNAL MEDICINE

## 2025-01-01 PROCEDURE — 87040 BLOOD CULTURE FOR BACTERIA: CPT

## 2025-01-01 PROCEDURE — 93005 ELECTROCARDIOGRAM TRACING: CPT | Performed by: STUDENT IN AN ORGANIZED HEALTH CARE EDUCATION/TRAINING PROGRAM

## 2025-01-01 PROCEDURE — 76770 US EXAM ABDO BACK WALL COMP: CPT

## 2025-01-01 PROCEDURE — 82962 GLUCOSE BLOOD TEST: CPT

## 2025-01-01 PROCEDURE — 2580000003 HC RX 258: Performed by: INTERNAL MEDICINE

## 2025-01-01 PROCEDURE — 6360000002 HC RX W HCPCS: Performed by: NURSE PRACTITIONER

## 2025-01-01 PROCEDURE — 87899 AGENT NOS ASSAY W/OPTIC: CPT

## 2025-01-01 PROCEDURE — 96375 TX/PRO/DX INJ NEW DRUG ADDON: CPT

## 2025-01-01 PROCEDURE — 93005 ELECTROCARDIOGRAM TRACING: CPT | Performed by: INTERNAL MEDICINE

## 2025-01-01 PROCEDURE — 84540 ASSAY OF URINE/UREA-N: CPT

## 2025-01-01 PROCEDURE — 96365 THER/PROPH/DIAG IV INF INIT: CPT

## 2025-01-01 PROCEDURE — 87636 SARSCOV2 & INF A&B AMP PRB: CPT

## 2025-01-01 PROCEDURE — 83605 ASSAY OF LACTIC ACID: CPT

## 2025-01-01 RX ORDER — FERROUS SULFATE 325(65) MG
325 TABLET ORAL 2 TIMES DAILY
Status: DISCONTINUED | OUTPATIENT
Start: 2025-01-01 | End: 2025-01-01 | Stop reason: HOSPADM

## 2025-01-01 RX ORDER — PREDNISONE 10 MG/1
TABLET ORAL
Qty: 30 TABLET | Refills: 0 | Status: SHIPPED | OUTPATIENT
Start: 2025-01-01 | End: 2025-06-24

## 2025-01-01 RX ORDER — METOLAZONE 2.5 MG/1
2.5 TABLET ORAL
Qty: 30 TABLET | Refills: 3 | Status: SHIPPED | OUTPATIENT
Start: 2025-01-03

## 2025-01-01 RX ORDER — PREDNISONE 20 MG/1
20 TABLET ORAL DAILY
Status: DISCONTINUED | OUTPATIENT
Start: 2025-01-01 | End: 2025-01-01

## 2025-01-01 RX ORDER — AMIODARONE HYDROCHLORIDE 200 MG/1
200 TABLET ORAL DAILY
Status: DISCONTINUED | OUTPATIENT
Start: 2025-01-01 | End: 2025-01-01

## 2025-01-01 RX ORDER — WARFARIN SODIUM 2 MG/1
2 TABLET ORAL
Status: COMPLETED | OUTPATIENT
Start: 2025-01-01 | End: 2025-01-01

## 2025-01-01 RX ORDER — SODIUM CHLORIDE, SODIUM LACTATE, POTASSIUM CHLORIDE, AND CALCIUM CHLORIDE .6; .31; .03; .02 G/100ML; G/100ML; G/100ML; G/100ML
500 INJECTION, SOLUTION INTRAVENOUS ONCE
Status: COMPLETED | OUTPATIENT
Start: 2025-01-01 | End: 2025-01-01

## 2025-01-01 RX ORDER — GLUCAGON 1 MG/ML
1 KIT INJECTION PRN
Status: DISCONTINUED | OUTPATIENT
Start: 2025-01-01 | End: 2025-01-01 | Stop reason: HOSPADM

## 2025-01-01 RX ORDER — SODIUM CHLORIDE 9 MG/ML
INJECTION, SOLUTION INTRAVENOUS PRN
Status: DISCONTINUED | OUTPATIENT
Start: 2025-01-01 | End: 2025-01-01 | Stop reason: HOSPADM

## 2025-01-01 RX ORDER — AMIODARONE HYDROCHLORIDE 200 MG/1
100 TABLET ORAL DAILY
Status: DISCONTINUED | OUTPATIENT
Start: 2025-06-24 | End: 2025-01-01 | Stop reason: HOSPADM

## 2025-01-01 RX ORDER — IPRATROPIUM BROMIDE AND ALBUTEROL SULFATE 2.5; .5 MG/3ML; MG/3ML
1 SOLUTION RESPIRATORY (INHALATION) ONCE
Status: COMPLETED | OUTPATIENT
Start: 2025-01-01 | End: 2025-01-01

## 2025-01-01 RX ORDER — LORAZEPAM 2 MG/ML
1 INJECTION INTRAMUSCULAR EVERY 6 HOURS PRN
Status: DISCONTINUED | OUTPATIENT
Start: 2025-01-01 | End: 2025-01-01 | Stop reason: HOSPADM

## 2025-01-01 RX ORDER — DIGOXIN 0.06 MG/1
62.5 TABLET ORAL
Qty: 30 TABLET | Refills: 3 | Status: SHIPPED | OUTPATIENT
Start: 2025-01-01 | End: 2025-06-24

## 2025-01-01 RX ORDER — POTASSIUM CHLORIDE 1500 MG/1
20 TABLET, EXTENDED RELEASE ORAL ONCE
Status: COMPLETED | OUTPATIENT
Start: 2025-01-01 | End: 2025-01-01

## 2025-01-01 RX ORDER — MIDODRINE HYDROCHLORIDE 5 MG/1
5 TABLET ORAL
Status: DISCONTINUED | OUTPATIENT
Start: 2025-01-01 | End: 2025-01-01

## 2025-01-01 RX ORDER — DIGOXIN 125 MCG
62.5 TABLET ORAL
Status: DISCONTINUED | OUTPATIENT
Start: 2025-01-01 | End: 2025-01-01

## 2025-01-01 RX ORDER — METHYLPREDNISOLONE SODIUM SUCCINATE 40 MG/ML
40 INJECTION INTRAMUSCULAR; INTRAVENOUS EVERY 12 HOURS
Status: DISCONTINUED | OUTPATIENT
Start: 2025-01-01 | End: 2025-01-01 | Stop reason: HOSPADM

## 2025-01-01 RX ORDER — DIGOXIN 0.25 MG/ML
62.5 INJECTION INTRAMUSCULAR; INTRAVENOUS ONCE
Status: COMPLETED | OUTPATIENT
Start: 2025-01-01 | End: 2025-01-01

## 2025-01-01 RX ORDER — GUAIFENESIN/DEXTROMETHORPHAN 100-10MG/5
5 SYRUP ORAL EVERY 4 HOURS PRN
Status: DISCONTINUED | OUTPATIENT
Start: 2025-01-01 | End: 2025-01-01 | Stop reason: HOSPADM

## 2025-01-01 RX ORDER — ONDANSETRON 2 MG/ML
4 INJECTION INTRAMUSCULAR; INTRAVENOUS EVERY 6 HOURS PRN
Status: DISCONTINUED | OUTPATIENT
Start: 2025-01-01 | End: 2025-01-01

## 2025-01-01 RX ORDER — DEXTROSE MONOHYDRATE 100 MG/ML
INJECTION, SOLUTION INTRAVENOUS CONTINUOUS PRN
Status: CANCELLED | OUTPATIENT
Start: 2025-01-01

## 2025-01-01 RX ORDER — ALBUTEROL SULFATE 0.83 MG/ML
2.5 SOLUTION RESPIRATORY (INHALATION)
Status: DISCONTINUED | OUTPATIENT
Start: 2025-01-01 | End: 2025-01-01 | Stop reason: SDUPTHER

## 2025-01-01 RX ORDER — FOLIC ACID 1 MG/1
1 TABLET ORAL DAILY
Status: DISCONTINUED | OUTPATIENT
Start: 2025-01-01 | End: 2025-01-01 | Stop reason: HOSPADM

## 2025-01-01 RX ORDER — POTASSIUM CHLORIDE 1500 MG/1
40 TABLET, EXTENDED RELEASE ORAL ONCE
Status: COMPLETED | OUTPATIENT
Start: 2025-01-01 | End: 2025-01-01

## 2025-01-01 RX ORDER — METHYLPREDNISOLONE SODIUM SUCCINATE 40 MG/ML
40 INJECTION INTRAMUSCULAR; INTRAVENOUS EVERY 8 HOURS
Status: DISCONTINUED | OUTPATIENT
Start: 2025-01-01 | End: 2025-01-01

## 2025-01-01 RX ORDER — SENNOSIDES 8.6 MG/1
1 TABLET ORAL DAILY PRN
Status: DISCONTINUED | OUTPATIENT
Start: 2025-01-01 | End: 2025-01-01 | Stop reason: HOSPADM

## 2025-01-01 RX ORDER — MORPHINE SULFATE 4 MG/ML
4 INJECTION, SOLUTION INTRAMUSCULAR; INTRAVENOUS
Refills: 0 | Status: DISCONTINUED | OUTPATIENT
Start: 2025-01-01 | End: 2025-01-01 | Stop reason: HOSPADM

## 2025-01-01 RX ORDER — ALLOPURINOL 100 MG/1
100 TABLET ORAL DAILY
Status: DISCONTINUED | OUTPATIENT
Start: 2025-01-01 | End: 2025-01-01 | Stop reason: HOSPADM

## 2025-01-01 RX ORDER — POTASSIUM CHLORIDE 7.45 MG/ML
10 INJECTION INTRAVENOUS
Status: COMPLETED | OUTPATIENT
Start: 2025-01-01 | End: 2025-01-01

## 2025-01-01 RX ORDER — POTASSIUM CHLORIDE 29.8 MG/ML
INJECTION INTRAVENOUS
Status: COMPLETED
Start: 2025-01-01 | End: 2025-01-01

## 2025-01-01 RX ORDER — GUAIFENESIN 200 MG/10ML
200 LIQUID ORAL EVERY 4 HOURS PRN
Status: DISCONTINUED | OUTPATIENT
Start: 2025-01-01 | End: 2025-01-01 | Stop reason: HOSPADM

## 2025-01-01 RX ORDER — DIGOXIN 125 MCG
62.5 TABLET ORAL
Status: DISCONTINUED | OUTPATIENT
Start: 2025-01-01 | End: 2025-01-01 | Stop reason: HOSPADM

## 2025-01-01 RX ORDER — IPRATROPIUM BROMIDE AND ALBUTEROL SULFATE 2.5; .5 MG/3ML; MG/3ML
1 SOLUTION RESPIRATORY (INHALATION) EVERY 6 HOURS
Status: DISCONTINUED | OUTPATIENT
Start: 2025-01-01 | End: 2025-01-01

## 2025-01-01 RX ORDER — AMIODARONE HYDROCHLORIDE 200 MG/1
200 TABLET ORAL DAILY
Status: DISCONTINUED | OUTPATIENT
Start: 2025-01-01 | End: 2025-01-01 | Stop reason: HOSPADM

## 2025-01-01 RX ORDER — ACETAMINOPHEN 325 MG/1
650 TABLET ORAL EVERY 6 HOURS PRN
Status: DISCONTINUED | OUTPATIENT
Start: 2025-01-01 | End: 2025-01-01 | Stop reason: HOSPADM

## 2025-01-01 RX ORDER — DEXTROSE MONOHYDRATE 100 MG/ML
INJECTION, SOLUTION INTRAVENOUS CONTINUOUS PRN
Status: DISCONTINUED | OUTPATIENT
Start: 2025-01-01 | End: 2025-01-01 | Stop reason: HOSPADM

## 2025-01-01 RX ORDER — PROCHLORPERAZINE EDISYLATE 5 MG/ML
10 INJECTION INTRAMUSCULAR; INTRAVENOUS EVERY 6 HOURS PRN
Status: DISCONTINUED | OUTPATIENT
Start: 2025-01-01 | End: 2025-01-01 | Stop reason: HOSPADM

## 2025-01-01 RX ORDER — ACETAMINOPHEN 650 MG/1
650 SUPPOSITORY RECTAL EVERY 6 HOURS PRN
Status: DISCONTINUED | OUTPATIENT
Start: 2025-01-01 | End: 2025-01-01 | Stop reason: HOSPADM

## 2025-01-01 RX ORDER — MAGNESIUM SULFATE IN WATER 40 MG/ML
2000 INJECTION, SOLUTION INTRAVENOUS PRN
Status: DISCONTINUED | OUTPATIENT
Start: 2025-01-01 | End: 2025-01-01 | Stop reason: HOSPADM

## 2025-01-01 RX ORDER — POTASSIUM CHLORIDE 29.8 MG/ML
20 INJECTION INTRAVENOUS PRN
Status: DISCONTINUED | OUTPATIENT
Start: 2025-01-01 | End: 2025-01-01 | Stop reason: HOSPADM

## 2025-01-01 RX ORDER — MIDODRINE HYDROCHLORIDE 5 MG/1
10 TABLET ORAL
Status: DISCONTINUED | OUTPATIENT
Start: 2025-01-01 | End: 2025-01-01 | Stop reason: HOSPADM

## 2025-01-01 RX ORDER — SODIUM CHLORIDE 0.9 % (FLUSH) 0.9 %
10 SYRINGE (ML) INJECTION PRN
Status: DISCONTINUED | OUTPATIENT
Start: 2025-01-01 | End: 2025-01-01 | Stop reason: HOSPADM

## 2025-01-01 RX ORDER — LEVOTHYROXINE SODIUM 100 UG/1
100 TABLET ORAL DAILY
Status: DISCONTINUED | OUTPATIENT
Start: 2025-01-01 | End: 2025-01-01 | Stop reason: HOSPADM

## 2025-01-01 RX ORDER — WARFARIN SODIUM 2 MG/1
2 TABLET ORAL
Status: DISCONTINUED | OUTPATIENT
Start: 2025-01-01 | End: 2025-01-01 | Stop reason: HOSPADM

## 2025-01-01 RX ORDER — PROCHLORPERAZINE MALEATE 10 MG
10 TABLET ORAL EVERY 8 HOURS PRN
Status: DISCONTINUED | OUTPATIENT
Start: 2025-01-01 | End: 2025-01-01 | Stop reason: HOSPADM

## 2025-01-01 RX ORDER — METOPROLOL SUCCINATE 25 MG/1
25 TABLET, EXTENDED RELEASE ORAL DAILY
Status: DISCONTINUED | OUTPATIENT
Start: 2025-01-01 | End: 2025-01-01 | Stop reason: HOSPADM

## 2025-01-01 RX ORDER — LEVOTHYROXINE SODIUM 100 UG/1
100 TABLET ORAL DAILY
Qty: 30 TABLET | Refills: 3 | Status: SHIPPED | OUTPATIENT
Start: 2025-01-01 | End: 2025-06-24

## 2025-01-01 RX ORDER — MORPHINE SULFATE 2 MG/ML
2 INJECTION, SOLUTION INTRAMUSCULAR; INTRAVENOUS
Refills: 0 | Status: DISCONTINUED | OUTPATIENT
Start: 2025-01-01 | End: 2025-01-01 | Stop reason: HOSPADM

## 2025-01-01 RX ORDER — ONDANSETRON 4 MG/1
4 TABLET, ORALLY DISINTEGRATING ORAL EVERY 8 HOURS PRN
Status: DISCONTINUED | OUTPATIENT
Start: 2025-01-01 | End: 2025-01-01

## 2025-01-01 RX ORDER — GLYCOPYRROLATE 0.2 MG/ML
0.2 INJECTION INTRAMUSCULAR; INTRAVENOUS EVERY 4 HOURS PRN
Status: DISCONTINUED | OUTPATIENT
Start: 2025-01-01 | End: 2025-01-01 | Stop reason: HOSPADM

## 2025-01-01 RX ORDER — AMIODARONE HYDROCHLORIDE 200 MG/1
200 TABLET ORAL DAILY
Qty: 30 TABLET | Refills: 1 | Status: SHIPPED | OUTPATIENT
Start: 2025-01-01 | End: 2025-06-24

## 2025-01-01 RX ORDER — IPRATROPIUM BROMIDE AND ALBUTEROL SULFATE 2.5; .5 MG/3ML; MG/3ML
1 SOLUTION RESPIRATORY (INHALATION) EVERY 4 HOURS PRN
Status: DISCONTINUED | OUTPATIENT
Start: 2025-01-01 | End: 2025-01-01

## 2025-01-01 RX ORDER — BUMETANIDE 0.25 MG/ML
1 INJECTION, SOLUTION INTRAMUSCULAR; INTRAVENOUS ONCE
Status: COMPLETED | OUTPATIENT
Start: 2025-01-01 | End: 2025-01-01

## 2025-01-01 RX ORDER — POTASSIUM CHLORIDE 7.45 MG/ML
10 INJECTION INTRAVENOUS PRN
Status: DISCONTINUED | OUTPATIENT
Start: 2025-01-01 | End: 2025-01-01

## 2025-01-01 RX ORDER — MIDODRINE HYDROCHLORIDE 5 MG/1
5 TABLET ORAL
Status: DISCONTINUED | OUTPATIENT
Start: 2025-01-01 | End: 2025-01-01 | Stop reason: HOSPADM

## 2025-01-01 RX ORDER — WARFARIN SODIUM 1 MG/1
1 TABLET ORAL
Status: COMPLETED | OUTPATIENT
Start: 2025-01-01 | End: 2025-01-01

## 2025-01-01 RX ORDER — DILTIAZEM HYDROCHLORIDE 5 MG/ML
5 INJECTION INTRAVENOUS ONCE
Status: DISCONTINUED | OUTPATIENT
Start: 2025-01-01 | End: 2025-01-01

## 2025-01-01 RX ORDER — POTASSIUM CHLORIDE 7.45 MG/ML
10 INJECTION INTRAVENOUS
Status: DISCONTINUED | OUTPATIENT
Start: 2025-01-01 | End: 2025-01-01 | Stop reason: HOSPADM

## 2025-01-01 RX ORDER — GLUCAGON 1 MG/ML
1 KIT INJECTION PRN
Status: CANCELLED | OUTPATIENT
Start: 2025-01-01

## 2025-01-01 RX ORDER — SODIUM CHLORIDE 0.9 % (FLUSH) 0.9 %
10 SYRINGE (ML) INJECTION EVERY 12 HOURS SCHEDULED
Status: DISCONTINUED | OUTPATIENT
Start: 2025-01-01 | End: 2025-01-01 | Stop reason: HOSPADM

## 2025-01-01 RX ORDER — MAGNESIUM SULFATE IN WATER 40 MG/ML
2000 INJECTION, SOLUTION INTRAVENOUS PRN
Status: DISCONTINUED | OUTPATIENT
Start: 2025-01-01 | End: 2025-01-01

## 2025-01-01 RX ORDER — BUMETANIDE 1 MG/1
2 TABLET ORAL DAILY
Status: DISCONTINUED | OUTPATIENT
Start: 2025-01-01 | End: 2025-01-01 | Stop reason: HOSPADM

## 2025-01-01 RX ORDER — ENOXAPARIN SODIUM 100 MG/ML
30 INJECTION SUBCUTANEOUS DAILY
Status: DISCONTINUED | OUTPATIENT
Start: 2025-01-01 | End: 2025-01-01

## 2025-01-01 RX ORDER — IPRATROPIUM BROMIDE AND ALBUTEROL SULFATE 2.5; .5 MG/3ML; MG/3ML
1 SOLUTION RESPIRATORY (INHALATION)
Status: DISCONTINUED | OUTPATIENT
Start: 2025-01-01 | End: 2025-01-01 | Stop reason: HOSPADM

## 2025-01-01 RX ORDER — METOLAZONE 2.5 MG/1
2.5 TABLET ORAL
Status: DISCONTINUED | OUTPATIENT
Start: 2025-01-01 | End: 2025-01-01 | Stop reason: HOSPADM

## 2025-01-01 RX ORDER — POTASSIUM CHLORIDE 29.8 MG/ML
20 INJECTION INTRAVENOUS PRN
Status: DISCONTINUED | OUTPATIENT
Start: 2025-01-01 | End: 2025-01-01

## 2025-01-01 RX ORDER — POTASSIUM CHLORIDE 7.45 MG/ML
10 INJECTION INTRAVENOUS
Status: DISCONTINUED | OUTPATIENT
Start: 2025-01-01 | End: 2025-01-01

## 2025-01-01 RX ORDER — POTASSIUM CHLORIDE 29.8 MG/ML
20 INJECTION INTRAVENOUS
Status: ACTIVE | OUTPATIENT
Start: 2025-01-01 | End: 2025-01-01

## 2025-01-01 RX ORDER — LANOLIN ALCOHOL/MO/W.PET/CERES
400 CREAM (GRAM) TOPICAL DAILY
Status: DISCONTINUED | OUTPATIENT
Start: 2025-01-01 | End: 2025-01-01 | Stop reason: HOSPADM

## 2025-01-01 RX ADMIN — MIDODRINE HYDROCHLORIDE 5 MG: 5 TABLET ORAL at 16:24

## 2025-01-01 RX ADMIN — LEVOTHYROXINE SODIUM 100 MCG: 100 TABLET ORAL at 07:13

## 2025-01-01 RX ADMIN — ACETAMINOPHEN 650 MG: 325 TABLET ORAL at 04:31

## 2025-01-01 RX ADMIN — MIDODRINE HYDROCHLORIDE 5 MG: 5 TABLET ORAL at 17:23

## 2025-01-01 RX ADMIN — POTASSIUM CHLORIDE 10 MEQ: 10 INJECTION, SOLUTION INTRAVENOUS at 12:00

## 2025-01-01 RX ADMIN — FOLIC ACID 1 MG: 1 TABLET ORAL at 08:38

## 2025-01-01 RX ADMIN — SODIUM CHLORIDE, PRESERVATIVE FREE 10 ML: 5 INJECTION INTRAVENOUS at 08:29

## 2025-01-01 RX ADMIN — FERROUS SULFATE TAB 325 MG (65 MG ELEMENTAL FE) 325 MG: 325 (65 FE) TAB at 08:31

## 2025-01-01 RX ADMIN — EMPAGLIFLOZIN 10 MG: 10 TABLET, FILM COATED ORAL at 08:47

## 2025-01-01 RX ADMIN — GUAIFENESIN 200 MG: 200 SOLUTION ORAL at 18:15

## 2025-01-01 RX ADMIN — BUMETANIDE 1 MG: 0.25 INJECTION INTRAMUSCULAR; INTRAVENOUS at 11:46

## 2025-01-01 RX ADMIN — MIDODRINE HYDROCHLORIDE 5 MG: 5 TABLET ORAL at 16:16

## 2025-01-01 RX ADMIN — FERROUS SULFATE TAB 325 MG (65 MG ELEMENTAL FE) 325 MG: 325 (65 FE) TAB at 08:22

## 2025-01-01 RX ADMIN — METHYLPREDNISOLONE SODIUM SUCCINATE 40 MG: 40 INJECTION INTRAMUSCULAR; INTRAVENOUS at 19:43

## 2025-01-01 RX ADMIN — IPRATROPIUM BROMIDE AND ALBUTEROL SULFATE 1 DOSE: .5; 2.5 SOLUTION RESPIRATORY (INHALATION) at 12:09

## 2025-01-01 RX ADMIN — VASOPRESSIN 0.03 UNITS/MIN: 20 INJECTION INTRAVENOUS at 16:19

## 2025-01-01 RX ADMIN — ALBUTEROL SULFATE 2.5 MG: 2.5 SOLUTION RESPIRATORY (INHALATION) at 12:46

## 2025-01-01 RX ADMIN — ALBUTEROL SULFATE 2.5 MG: 2.5 SOLUTION RESPIRATORY (INHALATION) at 20:19

## 2025-01-01 RX ADMIN — SODIUM CHLORIDE 5 MCG/MIN: 0.9 INJECTION, SOLUTION INTRAVENOUS at 15:16

## 2025-01-01 RX ADMIN — METOLAZONE 2.5 MG: 2.5 TABLET ORAL at 08:47

## 2025-01-01 RX ADMIN — IPRATROPIUM BROMIDE AND ALBUTEROL SULFATE 1 DOSE: 2.5; .5 SOLUTION RESPIRATORY (INHALATION) at 10:17

## 2025-01-01 RX ADMIN — POTASSIUM CHLORIDE 40 MEQ: 1500 TABLET, EXTENDED RELEASE ORAL at 08:39

## 2025-01-01 RX ADMIN — Medication 3 MG: at 21:19

## 2025-01-01 RX ADMIN — IPRATROPIUM BROMIDE AND ALBUTEROL SULFATE 1 DOSE: .5; 2.5 SOLUTION RESPIRATORY (INHALATION) at 11:42

## 2025-01-01 RX ADMIN — IPRATROPIUM BROMIDE AND ALBUTEROL SULFATE 1 DOSE: .5; 2.5 SOLUTION RESPIRATORY (INHALATION) at 19:33

## 2025-01-01 RX ADMIN — IPRATROPIUM BROMIDE AND ALBUTEROL SULFATE 1 DOSE: .5; 2.5 SOLUTION RESPIRATORY (INHALATION) at 04:55

## 2025-01-01 RX ADMIN — METOPROLOL SUCCINATE 25 MG: 25 TABLET, EXTENDED RELEASE ORAL at 08:31

## 2025-01-01 RX ADMIN — FERROUS SULFATE TAB 325 MG (65 MG ELEMENTAL FE) 325 MG: 325 (65 FE) TAB at 09:36

## 2025-01-01 RX ADMIN — PROCHLORPERAZINE EDISYLATE 10 MG: 5 INJECTION INTRAMUSCULAR; INTRAVENOUS at 07:15

## 2025-01-01 RX ADMIN — IPRATROPIUM BROMIDE AND ALBUTEROL SULFATE 1 DOSE: .5; 2.5 SOLUTION RESPIRATORY (INHALATION) at 16:01

## 2025-01-01 RX ADMIN — BUMETANIDE 2 MG: 1 TABLET ORAL at 08:21

## 2025-01-01 RX ADMIN — SODIUM CHLORIDE, PRESERVATIVE FREE 10 ML: 5 INJECTION INTRAVENOUS at 20:22

## 2025-01-01 RX ADMIN — IPRATROPIUM BROMIDE AND ALBUTEROL SULFATE 1 DOSE: .5; 2.5 SOLUTION RESPIRATORY (INHALATION) at 01:09

## 2025-01-01 RX ADMIN — POTASSIUM CHLORIDE 20 MEQ: 1500 TABLET, EXTENDED RELEASE ORAL at 15:13

## 2025-01-01 RX ADMIN — IPRATROPIUM BROMIDE AND ALBUTEROL SULFATE 1 DOSE: 2.5; .5 SOLUTION RESPIRATORY (INHALATION) at 09:56

## 2025-01-01 RX ADMIN — ALBUTEROL SULFATE 2.5 MG: 2.5 SOLUTION RESPIRATORY (INHALATION) at 09:11

## 2025-01-01 RX ADMIN — IPRATROPIUM BROMIDE AND ALBUTEROL SULFATE 1 DOSE: .5; 2.5 SOLUTION RESPIRATORY (INHALATION) at 07:49

## 2025-01-01 RX ADMIN — FOLIC ACID 1 MG: 1 TABLET ORAL at 08:47

## 2025-01-01 RX ADMIN — MIDODRINE HYDROCHLORIDE 5 MG: 5 TABLET ORAL at 11:26

## 2025-01-01 RX ADMIN — FOLIC ACID 1 MG: 1 TABLET ORAL at 08:31

## 2025-01-01 RX ADMIN — MIDODRINE HYDROCHLORIDE 5 MG: 5 TABLET ORAL at 07:01

## 2025-01-01 RX ADMIN — GUAIFENESIN 200 MG: 200 SOLUTION ORAL at 14:26

## 2025-01-01 RX ADMIN — AMIODARONE HYDROCHLORIDE 200 MG: 200 TABLET ORAL at 10:33

## 2025-01-01 RX ADMIN — DIGOXIN 62.5 MCG: 0.25 INJECTION INTRAMUSCULAR; INTRAVENOUS at 18:09

## 2025-01-01 RX ADMIN — POTASSIUM CHLORIDE 10 MEQ: 10 INJECTION, SOLUTION INTRAVENOUS at 11:27

## 2025-01-01 RX ADMIN — PREDNISONE 20 MG: 20 TABLET ORAL at 15:58

## 2025-01-01 RX ADMIN — METHYLPREDNISOLONE SODIUM SUCCINATE 40 MG: 40 INJECTION INTRAMUSCULAR; INTRAVENOUS at 17:24

## 2025-01-01 RX ADMIN — METOPROLOL SUCCINATE 25 MG: 25 TABLET, FILM COATED, EXTENDED RELEASE ORAL at 08:47

## 2025-01-01 RX ADMIN — IPRATROPIUM BROMIDE AND ALBUTEROL SULFATE 1 DOSE: .5; 2.5 SOLUTION RESPIRATORY (INHALATION) at 07:58

## 2025-01-01 RX ADMIN — WARFARIN SODIUM 2 MG: 2 TABLET ORAL at 18:15

## 2025-01-01 RX ADMIN — MIDODRINE HYDROCHLORIDE 5 MG: 5 TABLET ORAL at 08:38

## 2025-01-01 RX ADMIN — ALBUTEROL SULFATE 2.5 MG: 2.5 SOLUTION RESPIRATORY (INHALATION) at 12:25

## 2025-01-01 RX ADMIN — GUAIFENESIN 200 MG: 200 SOLUTION ORAL at 00:32

## 2025-01-01 RX ADMIN — BUMETANIDE 2 MG: 1 TABLET ORAL at 08:31

## 2025-01-01 RX ADMIN — ALBUTEROL SULFATE 2.5 MG: 2.5 SOLUTION RESPIRATORY (INHALATION) at 13:04

## 2025-01-01 RX ADMIN — POTASSIUM CHLORIDE 20 MEQ: 1500 TABLET, EXTENDED RELEASE ORAL at 12:19

## 2025-01-01 RX ADMIN — LEVOTHYROXINE SODIUM 100 MCG: 0.1 TABLET ORAL at 05:35

## 2025-01-01 RX ADMIN — SODIUM CHLORIDE, PRESERVATIVE FREE 10 ML: 5 INJECTION INTRAVENOUS at 08:27

## 2025-01-01 RX ADMIN — SODIUM CHLORIDE, PRESERVATIVE FREE 10 ML: 5 INJECTION INTRAVENOUS at 09:35

## 2025-01-01 RX ADMIN — IPRATROPIUM BROMIDE AND ALBUTEROL SULFATE 1 DOSE: .5; 2.5 SOLUTION RESPIRATORY (INHALATION) at 04:14

## 2025-01-01 RX ADMIN — IPRATROPIUM BROMIDE AND ALBUTEROL SULFATE 1 DOSE: .5; 2.5 SOLUTION RESPIRATORY (INHALATION) at 08:51

## 2025-01-01 RX ADMIN — POTASSIUM CHLORIDE 40 MEQ: 1500 TABLET, EXTENDED RELEASE ORAL at 12:42

## 2025-01-01 RX ADMIN — GUAIFENESIN 200 MG: 200 SOLUTION ORAL at 20:30

## 2025-01-01 RX ADMIN — METOPROLOL SUCCINATE 25 MG: 25 TABLET, EXTENDED RELEASE ORAL at 09:29

## 2025-01-01 RX ADMIN — IPRATROPIUM BROMIDE AND ALBUTEROL SULFATE 1 DOSE: .5; 2.5 SOLUTION RESPIRATORY (INHALATION) at 12:32

## 2025-01-01 RX ADMIN — AMIODARONE HYDROCHLORIDE 1 MG/MIN: 50 INJECTION, SOLUTION INTRAVENOUS at 16:41

## 2025-01-01 RX ADMIN — FOLIC ACID 1 MG: 1 TABLET ORAL at 08:22

## 2025-01-01 RX ADMIN — FERROUS SULFATE TAB 325 MG (65 MG ELEMENTAL FE) 325 MG: 325 (65 FE) TAB at 19:43

## 2025-01-01 RX ADMIN — AMIODARONE HYDROCHLORIDE 200 MG: 200 TABLET ORAL at 08:38

## 2025-01-01 RX ADMIN — GUAIFENESIN 200 MG: 200 SOLUTION ORAL at 08:29

## 2025-01-01 RX ADMIN — POTASSIUM CHLORIDE 10 MEQ: 750 TABLET, EXTENDED RELEASE ORAL at 08:47

## 2025-01-01 RX ADMIN — METOLAZONE 2.5 MG: 2.5 TABLET ORAL at 08:31

## 2025-01-01 RX ADMIN — FOLIC ACID 1 MG: 1 TABLET ORAL at 09:36

## 2025-01-01 RX ADMIN — ALBUTEROL SULFATE 2.5 MG: 2.5 SOLUTION RESPIRATORY (INHALATION) at 15:51

## 2025-01-01 RX ADMIN — POTASSIUM CHLORIDE 10 MEQ: 10 INJECTION, SOLUTION INTRAVENOUS at 14:10

## 2025-01-01 RX ADMIN — SODIUM CHLORIDE, PRESERVATIVE FREE 10 ML: 5 INJECTION INTRAVENOUS at 09:32

## 2025-01-01 RX ADMIN — SODIUM CHLORIDE, PRESERVATIVE FREE 10 ML: 5 INJECTION INTRAVENOUS at 19:52

## 2025-01-01 RX ADMIN — SODIUM CHLORIDE, SODIUM LACTATE, POTASSIUM CHLORIDE, AND CALCIUM CHLORIDE 500 ML: .6; .31; .03; .02 INJECTION, SOLUTION INTRAVENOUS at 16:28

## 2025-01-01 RX ADMIN — HYDROCORTISONE SODIUM SUCCINATE 100 MG: 100 INJECTION INTRAMUSCULAR; INTRAVENOUS at 15:48

## 2025-01-01 RX ADMIN — GUAIFENESIN 200 MG: 200 SOLUTION ORAL at 08:21

## 2025-01-01 RX ADMIN — GUAIFENESIN 200 MG: 200 SOLUTION ORAL at 12:04

## 2025-01-01 RX ADMIN — ALLOPURINOL 100 MG: 100 TABLET ORAL at 08:26

## 2025-01-01 RX ADMIN — LEVOTHYROXINE SODIUM 100 MCG: 100 TABLET ORAL at 05:40

## 2025-01-01 RX ADMIN — AMIODARONE HYDROCHLORIDE 200 MG: 200 TABLET ORAL at 10:05

## 2025-01-01 RX ADMIN — WARFARIN SODIUM 1 MG: 1 TABLET ORAL at 17:23

## 2025-01-01 RX ADMIN — ALBUTEROL SULFATE 2.5 MG: 2.5 SOLUTION RESPIRATORY (INHALATION) at 15:27

## 2025-01-01 RX ADMIN — POTASSIUM CHLORIDE 10 MEQ: 10 INJECTION, SOLUTION INTRAVENOUS at 08:26

## 2025-01-01 RX ADMIN — IPRATROPIUM BROMIDE AND ALBUTEROL SULFATE 1 DOSE: .5; 2.5 SOLUTION RESPIRATORY (INHALATION) at 21:08

## 2025-01-01 RX ADMIN — PREDNISONE 20 MG: 20 TABLET ORAL at 09:35

## 2025-01-01 RX ADMIN — FERROUS SULFATE TAB 325 MG (65 MG ELEMENTAL FE) 325 MG: 325 (65 FE) TAB at 20:28

## 2025-01-01 RX ADMIN — POTASSIUM CHLORIDE 20 MEQ: 29.8 INJECTION INTRAVENOUS at 15:40

## 2025-01-01 RX ADMIN — POTASSIUM CHLORIDE 10 MEQ: 7.46 INJECTION, SOLUTION INTRAVENOUS at 12:48

## 2025-01-01 RX ADMIN — ACETAMINOPHEN 650 MG: 650 SUPPOSITORY RECTAL at 16:03

## 2025-01-01 RX ADMIN — METOPROLOL SUCCINATE 25 MG: 25 TABLET, EXTENDED RELEASE ORAL at 08:21

## 2025-01-01 RX ADMIN — MIDODRINE HYDROCHLORIDE 10 MG: 5 TABLET ORAL at 11:30

## 2025-01-01 RX ADMIN — BUMETANIDE 2 MG: 1 TABLET ORAL at 08:47

## 2025-01-01 RX ADMIN — Medication 3 MG: at 00:32

## 2025-01-01 RX ADMIN — MAGNESIUM OXIDE 400 MG (241.3 MG MAGNESIUM) TABLET 400 MG: TABLET at 12:19

## 2025-01-01 RX ADMIN — IPRATROPIUM BROMIDE AND ALBUTEROL SULFATE 1 DOSE: .5; 2.5 SOLUTION RESPIRATORY (INHALATION) at 09:00

## 2025-01-01 RX ADMIN — POTASSIUM CHLORIDE 40 MEQ: 1500 TABLET, EXTENDED RELEASE ORAL at 11:26

## 2025-01-01 RX ADMIN — ALLOPURINOL 100 MG: 100 TABLET ORAL at 09:36

## 2025-01-01 RX ADMIN — MIDODRINE HYDROCHLORIDE 5 MG: 5 TABLET ORAL at 09:29

## 2025-01-01 RX ADMIN — MIDODRINE HYDROCHLORIDE 5 MG: 5 TABLET ORAL at 18:15

## 2025-01-01 RX ADMIN — METHYLPREDNISOLONE SODIUM SUCCINATE 40 MG: 40 INJECTION INTRAMUSCULAR; INTRAVENOUS at 00:32

## 2025-01-01 RX ADMIN — FERROUS SULFATE TAB 325 MG (65 MG ELEMENTAL FE) 325 MG: 325 (65 FE) TAB at 20:22

## 2025-01-01 RX ADMIN — METOPROLOL SUCCINATE 25 MG: 25 TABLET, EXTENDED RELEASE ORAL at 10:06

## 2025-01-01 RX ADMIN — LEVOTHYROXINE SODIUM 100 MCG: 100 TABLET ORAL at 06:11

## 2025-01-01 RX ADMIN — SODIUM CHLORIDE, PRESERVATIVE FREE 10 ML: 5 INJECTION INTRAVENOUS at 08:21

## 2025-01-01 RX ADMIN — GUAIFENESIN 200 MG: 200 SOLUTION ORAL at 08:10

## 2025-01-01 RX ADMIN — METOPROLOL SUCCINATE 25 MG: 25 TABLET, EXTENDED RELEASE ORAL at 08:39

## 2025-01-01 RX ADMIN — LEVOTHYROXINE SODIUM 100 MCG: 100 TABLET ORAL at 05:37

## 2025-01-01 RX ADMIN — LEVOTHYROXINE SODIUM 125 MCG: 25 TABLET ORAL at 06:01

## 2025-01-01 RX ADMIN — LEVOTHYROXINE SODIUM 100 MCG: 100 TABLET ORAL at 06:01

## 2025-01-01 RX ADMIN — FERROUS SULFATE TAB 325 MG (65 MG ELEMENTAL FE) 325 MG: 325 (65 FE) TAB at 19:52

## 2025-01-01 RX ADMIN — FERROUS SULFATE TAB 325 MG (65 MG ELEMENTAL FE) 325 MG: 325 (65 FE) TAB at 08:47

## 2025-01-01 RX ADMIN — MIDODRINE HYDROCHLORIDE 5 MG: 5 TABLET ORAL at 11:58

## 2025-01-01 RX ADMIN — SODIUM CHLORIDE, PRESERVATIVE FREE 10 ML: 5 INJECTION INTRAVENOUS at 19:45

## 2025-01-01 RX ADMIN — SODIUM CHLORIDE, PRESERVATIVE FREE 10 ML: 5 INJECTION INTRAVENOUS at 08:48

## 2025-01-01 RX ADMIN — ALLOPURINOL 100 MG: 100 TABLET ORAL at 08:29

## 2025-01-01 RX ADMIN — POTASSIUM CHLORIDE 20 MEQ: 29.8 INJECTION, SOLUTION INTRAVENOUS at 15:40

## 2025-01-01 RX ADMIN — MIDODRINE HYDROCHLORIDE 5 MG: 5 TABLET ORAL at 12:36

## 2025-01-01 RX ADMIN — ALLOPURINOL 100 MG: 100 TABLET ORAL at 16:35

## 2025-01-01 RX ADMIN — IPRATROPIUM BROMIDE AND ALBUTEROL SULFATE 1 DOSE: .5; 2.5 SOLUTION RESPIRATORY (INHALATION) at 05:01

## 2025-01-01 RX ADMIN — METOLAZONE 2.5 MG: 2.5 TABLET ORAL at 10:33

## 2025-01-01 RX ADMIN — MIDODRINE HYDROCHLORIDE 5 MG: 5 TABLET ORAL at 08:30

## 2025-01-01 RX ADMIN — MIDODRINE HYDROCHLORIDE 5 MG: 5 TABLET ORAL at 11:52

## 2025-01-01 RX ADMIN — MIDODRINE HYDROCHLORIDE 5 MG: 5 TABLET ORAL at 08:29

## 2025-01-01 RX ADMIN — POTASSIUM CHLORIDE 10 MEQ: 10 INJECTION, SOLUTION INTRAVENOUS at 13:00

## 2025-01-01 RX ADMIN — ALLOPURINOL 100 MG: 100 TABLET ORAL at 08:47

## 2025-01-01 RX ADMIN — ALBUTEROL SULFATE 2.5 MG: 2.5 SOLUTION RESPIRATORY (INHALATION) at 08:22

## 2025-01-01 RX ADMIN — METOPROLOL SUCCINATE 25 MG: 25 TABLET, EXTENDED RELEASE ORAL at 16:35

## 2025-01-01 RX ADMIN — DIGOXIN 62.5 MCG: 0.12 TABLET ORAL at 12:20

## 2025-01-01 RX ADMIN — ALLOPURINOL 100 MG: 100 TABLET ORAL at 09:29

## 2025-01-01 RX ADMIN — MIDODRINE HYDROCHLORIDE 5 MG: 5 TABLET ORAL at 12:41

## 2025-01-01 RX ADMIN — POTASSIUM CHLORIDE 10 MEQ: 10 INJECTION, SOLUTION INTRAVENOUS at 12:14

## 2025-01-01 RX ADMIN — PREDNISONE 20 MG: 20 TABLET ORAL at 08:21

## 2025-01-01 RX ADMIN — FERROUS SULFATE TAB 325 MG (65 MG ELEMENTAL FE) 325 MG: 325 (65 FE) TAB at 08:38

## 2025-01-01 RX ADMIN — SACUBITRIL AND VALSARTAN 0.5 TABLET: 24; 26 TABLET, FILM COATED ORAL at 08:47

## 2025-01-01 RX ADMIN — SODIUM CHLORIDE, PRESERVATIVE FREE 10 ML: 5 INJECTION INTRAVENOUS at 19:43

## 2025-01-01 RX ADMIN — ALBUTEROL SULFATE 2.5 MG: 2.5 SOLUTION RESPIRATORY (INHALATION) at 09:52

## 2025-01-01 RX ADMIN — BUMETANIDE 2 MG: 1 TABLET ORAL at 10:33

## 2025-01-01 RX ADMIN — FERROUS SULFATE TAB 325 MG (65 MG ELEMENTAL FE) 325 MG: 325 (65 FE) TAB at 09:28

## 2025-01-01 RX ADMIN — METHYLPREDNISOLONE SODIUM SUCCINATE 40 MG: 40 INJECTION INTRAMUSCULAR; INTRAVENOUS at 08:30

## 2025-01-01 RX ADMIN — DIGOXIN 62.5 MCG: 0.12 TABLET ORAL at 14:14

## 2025-01-01 RX ADMIN — IPRATROPIUM BROMIDE AND ALBUTEROL SULFATE 1 DOSE: .5; 2.5 SOLUTION RESPIRATORY (INHALATION) at 00:57

## 2025-01-01 RX ADMIN — IPRATROPIUM BROMIDE AND ALBUTEROL SULFATE 1 DOSE: .5; 2.5 SOLUTION RESPIRATORY (INHALATION) at 23:56

## 2025-01-01 RX ADMIN — AMIODARONE HYDROCHLORIDE 200 MG: 200 TABLET ORAL at 21:23

## 2025-01-01 RX ADMIN — MIDODRINE HYDROCHLORIDE 5 MG: 5 TABLET ORAL at 16:35

## 2025-01-01 RX ADMIN — CEFEPIME 2000 MG: 2 INJECTION, POWDER, FOR SOLUTION INTRAVENOUS at 15:45

## 2025-01-01 RX ADMIN — ALBUTEROL SULFATE 2.5 MG: 2.5 SOLUTION RESPIRATORY (INHALATION) at 19:49

## 2025-01-01 RX ADMIN — AMIODARONE HYDROCHLORIDE 200 MG: 200 TABLET ORAL at 16:35

## 2025-01-01 RX ADMIN — ALLOPURINOL 100 MG: 100 TABLET ORAL at 08:38

## 2025-01-01 RX ADMIN — ALLOPURINOL 100 MG: 100 TABLET ORAL at 08:22

## 2025-01-01 RX ADMIN — SODIUM CHLORIDE, PRESERVATIVE FREE 10 ML: 5 INJECTION INTRAVENOUS at 08:39

## 2025-01-01 RX ADMIN — GUAIFENESIN 200 MG: 200 SOLUTION ORAL at 16:48

## 2025-01-01 RX ADMIN — BUMETANIDE 2 MG: 1 TABLET ORAL at 09:28

## 2025-01-01 RX ADMIN — AMIODARONE HYDROCHLORIDE 200 MG: 200 TABLET ORAL at 08:31

## 2025-01-01 RX ADMIN — FERROUS SULFATE TAB 325 MG (65 MG ELEMENTAL FE) 325 MG: 325 (65 FE) TAB at 08:29

## 2025-01-01 RX ADMIN — FOLIC ACID 1 MG: 1 TABLET ORAL at 09:28

## 2025-01-01 RX ADMIN — BUMETANIDE 2 MG: 1 TABLET ORAL at 10:06

## 2025-01-01 RX ADMIN — PREDNISONE 20 MG: 20 TABLET ORAL at 08:29

## 2025-01-01 RX ADMIN — FOLIC ACID 1 MG: 1 TABLET ORAL at 08:29

## 2025-01-01 RX ADMIN — AMIODARONE HYDROCHLORIDE 200 MG: 200 TABLET ORAL at 10:06

## 2025-01-01 RX ADMIN — POTASSIUM CHLORIDE 10 MEQ: 10 INJECTION, SOLUTION INTRAVENOUS at 14:00

## 2025-01-01 RX ADMIN — MIDODRINE HYDROCHLORIDE 5 MG: 5 TABLET ORAL at 08:04

## 2025-01-01 RX ADMIN — ALBUTEROL SULFATE 2.5 MG: 2.5 SOLUTION RESPIRATORY (INHALATION) at 15:42

## 2025-01-01 RX ADMIN — ALLOPURINOL 100 MG: 100 TABLET ORAL at 08:31

## 2025-01-01 RX ADMIN — MIDODRINE HYDROCHLORIDE 5 MG: 5 TABLET ORAL at 08:21

## 2025-01-01 RX ADMIN — FERROUS SULFATE TAB 325 MG (65 MG ELEMENTAL FE) 325 MG: 325 (65 FE) TAB at 20:21

## 2025-01-01 RX ADMIN — METOPROLOL SUCCINATE 25 MG: 25 TABLET, EXTENDED RELEASE ORAL at 10:33

## 2025-01-01 RX ADMIN — LEVOTHYROXINE SODIUM 100 MCG: 100 TABLET ORAL at 06:23

## 2025-01-01 RX ADMIN — IPRATROPIUM BROMIDE AND ALBUTEROL SULFATE 1 DOSE: .5; 2.5 SOLUTION RESPIRATORY (INHALATION) at 15:38

## 2025-01-01 RX ADMIN — IPRATROPIUM BROMIDE AND ALBUTEROL SULFATE 1 DOSE: .5; 2.5 SOLUTION RESPIRATORY (INHALATION) at 12:10

## 2025-01-01 RX ADMIN — GUAIFENESIN SYRUP AND DEXTROMETHORPHAN 5 ML: 100; 10 SYRUP ORAL at 18:33

## 2025-01-01 RX ADMIN — METOLAZONE 2.5 MG: 2.5 TABLET ORAL at 09:29

## 2025-01-01 RX ADMIN — BUMETANIDE 2 MG: 1 TABLET ORAL at 08:38

## 2025-01-01 RX ADMIN — VANCOMYCIN HYDROCHLORIDE 1000 MG: 1 INJECTION, POWDER, LYOPHILIZED, FOR SOLUTION INTRAVENOUS at 13:20

## 2025-01-01 RX ADMIN — ALBUTEROL SULFATE 2.5 MG: 2.5 SOLUTION RESPIRATORY (INHALATION) at 19:37

## 2025-01-01 RX ADMIN — METHYLPREDNISOLONE SODIUM SUCCINATE 40 MG: 40 INJECTION INTRAMUSCULAR; INTRAVENOUS at 08:39

## 2025-01-01 RX ADMIN — MIDODRINE HYDROCHLORIDE 5 MG: 5 TABLET ORAL at 12:14

## 2025-01-01 RX ADMIN — ALBUTEROL SULFATE 2.5 MG: 2.5 SOLUTION RESPIRATORY (INHALATION) at 15:24

## 2025-01-01 RX ADMIN — POTASSIUM CHLORIDE 10 MEQ: 10 INJECTION, SOLUTION INTRAVENOUS at 10:25

## 2025-01-01 RX ADMIN — IPRATROPIUM BROMIDE AND ALBUTEROL SULFATE 1 DOSE: .5; 2.5 SOLUTION RESPIRATORY (INHALATION) at 19:55

## 2025-01-01 RX ADMIN — AMIODARONE HYDROCHLORIDE 150 MG: 50 INJECTION, SOLUTION INTRAVENOUS at 16:29

## 2025-01-01 RX ADMIN — AMIODARONE HYDROCHLORIDE 200 MG: 200 TABLET ORAL at 08:22

## 2025-01-01 RX ADMIN — SODIUM CHLORIDE, PRESERVATIVE FREE 10 ML: 5 INJECTION INTRAVENOUS at 20:28

## 2025-01-01 RX ADMIN — GUAIFENESIN 200 MG: 200 SOLUTION ORAL at 08:38

## 2025-01-01 ASSESSMENT — PAIN - FUNCTIONAL ASSESSMENT
PAIN_FUNCTIONAL_ASSESSMENT: PREVENTS OR INTERFERES SOME ACTIVE ACTIVITIES AND ADLS
PAIN_FUNCTIONAL_ASSESSMENT: NONE - DENIES PAIN

## 2025-01-01 ASSESSMENT — PAIN DESCRIPTION - LOCATION: LOCATION: GENERALIZED

## 2025-01-01 ASSESSMENT — PAIN SCALES - GENERAL: PAINLEVEL_OUTOF10: 2

## 2025-01-01 ASSESSMENT — PAIN DESCRIPTION - DESCRIPTORS: DESCRIPTORS: ACHING;DISCOMFORT

## 2025-01-01 NOTE — FLOWSHEET NOTE
Patient's  stated received medications/prescriptions on 12/31/24 and took them home last evening. All instructions, education, and medications reviewed at bedside. All questions and concerns addressed. Appropriate for discharge home.

## 2025-01-01 NOTE — PROGRESS NOTES
4 Eyes Skin Assessment     NAME:  Josefina Garcia  YOB: 1945  MEDICAL RECORD NUMBER:  [id number]    The patient is being assessed for  Admission    I agree that at least one RN has performed a thorough Head to Toe Skin Assessment on the patient. ALL assessment sites listed below have been assessed.      Areas assessed by both nurses:    Head, Face, Ears, Shoulders, Back, Chest, Arms, Elbows, Hands, Sacrum. Buttock, Coccyx, Ischium, Legs. Feet and Heels, and Under Medical Devices         Does the Patient have a Wound? No noted wound(s)       Abhi Prevention initiated by RN: No  Wound Care Orders initiated by RN: No    Pressure Injury (Stage 3,4, Unstageable, DTI, NWPT, and Complex wounds) if present, place Wound referral order by RN under : No    New Ostomies, if present place, Ostomy referral order under : No     Nurse 1 eSignature: Electronically signed by Sumanth Faustin RN on 12/27/24 at 5:33 AM EST    **SHARE this note so that the co-signing nurse can place an eSignature**    Nurse 2 eSignature: Electronically signed by Brie Merino RN on 12/27/24 at 5:37 AM EST  
BP <100 manually.  BP/water meds held.  
Critical INR called via perfect serve to jesse alvarado, no new orders.  
Internal Medicine Progress Note    Patient's name: Josefina Garcia  : 1945  Chief complaints (on day of admission): No chief complaint on file.  Admission date: 2024  Date of service: 2024   Room: [address] SURG  Primary care physician: Teofilo Dutton Jr., MD  Reason for visit: Follow-up for chf    Subjective  Josefina seen and evaluated laying in bed.  She is awake, alert, no acute distress.   at bedside.  She reports overall she is feeling okay.  Patient states legs are still mildly swollen.  She received an echo while here- EF 28% Patient has no other reported complaints.  Patient denies shortness of breath or chest pain.  Denies fevers or chills.  She is eating and drinking okay today.  No further diarrhea.  Patient is starting to ambulate without dyspnea on exertion.  Cardiology is adjusting her dose of diuretics again today with the addition of Zaroxolyn.  Blood pressures are borderline in the 90s.    Review of Systems  Full 10 point ROS reviewed and negative unless documented above    Hospital Medications  Current Facility-Administered Medications   Medication Dose Route Frequency Provider Last Rate Last Admin    warfarin (COUMADIN) tablet 1 mg  1 mg Oral Once Richy Olea MD        sacubitril-valsartan (ENTRESTO) 24-26 MG per tablet 0.5 tablet  0.5 tablet Oral BID Rick Fajardo DO        [START ON 2025] metOLazone (ZAROXOLYN) tablet 2.5 mg  2.5 mg Oral Once per day on  Rick Fajardo DO        [START ON 2025] digoxin (LANOXIN) tablet 62.5 mcg  62.5 mcg Oral Once per day on  Rick Fajardo DO        bumetanide (BUMEX) tablet 2 mg  2 mg Oral Daily Rick Fajardo DO   2 mg at 24 0833    sulfur hexafluoride microspheres (LUMASON) 60.7-25 MG injection 2 mL  2 mL IntraVENous ONCE PRN Rick Fajardo DO        ipratropium 0.5 mg-albuterol 2.5 mg (DUONEB) nebulizer solution 1 Dose  1 Dose Inhalation 4x 
PROGRESS NOTE       PATIENT PROBLEM LIST:  Patient Active Problem List   Diagnosis Code    Hypertension I10    Hypothyroid E03.9    Valvular heart disease I38    CHF exacerbation ([facility]) I50.9    Atrial fibrillation ([facility]) I48.91    Herpes zoster B02.9    Acute on chronic systolic and diastolic heart failure, NYHA class 3 ([facility]) I50.43    Red blood cell antibody positive R76.8    Abrasion T14.8XXA    Coagulopathy ([facility]) D68.9    Absolute anemia D64.9    Bilateral carotid artery stenosis I65.23    Atrial fibrillation with RVR ([facility]) I48.91    HFrEF (heart failure with reduced ejection fraction) ([facility]) I50.20    Pulmonary edema J81.1    Moderate protein-calorie malnutrition ([facility]) E44.0    Thoracic ascending aortic aneurysm ([facility]) I71.21    Acute on chronic right-sided congestive heart failure ([facility]) I50.813    Cellulitis of right hand L03.113    CAD (coronary artery disease) I25.10    Acute on chronic systolic CHF (congestive heart failure) ([facility]) I50.23    Acute on chronic congestive heart failure, unspecified heart failure type ([facility]) I50.9    Supratherapeutic INR R79.1    History of mitral valve replacement with mechanical valve Z95.2    History of mechanical aortic valve replacement Z95.2    Chronic renal insufficiency, stage III (moderate) ([facility]) N18.30    Acute on chronic clinical systolic heart failure ([facility]) I50.23       SUBJECTIVE:  Josefina Garcia states ***    REVIEW OF SYSTEMS:  General ROS: negative for - fatigue, malaise,  weight gain or weight loss  Psychological ROS: negative for - anxiety , depression  Ophthalmic ROS: negative for - decreased vision or visual distortion.  ENT ROS: negative  Allergy and Immunology ROS: negative  Hematological and Lymphatic ROS: negative  Endocrine: no heat or cold intolerance and no polyphagia, polydipsia, or polyuria  Respiratory ROS: positive for - {:[id number]}  Cardiovascular ROS: positive for - {:[id number]}.  Gastrointestinal ROS: no abdominal pain, change in bowel habits, or 
Patient c/o shortness of breath, RR 38, SpO2 91% on Airvo placed back BiPAP, bedside RN aware.  
Pharmacy Consultation Note  (Warfarin Dosing and Monitoring)    Initial consult date: 12/27  Consulting Provider: Lefty JENNINGS Jose is a 79 y.o. female for whom pharmacy has been asked to manage warfarin therapy.     Weight:   Wt Readings from Last 1 Encounters:   12/27/24 47.2 kg (104 lb)       TSH:    Lab Results   Component Value Date/Time    TSH 3.69 12/06/2024 11:34 AM       Hepatic Function Panel:                            Lab Results   Component Value Date/Time    ALKPHOS 75 12/27/2024 05:30 AM    ALT 9 12/27/2024 05:30 AM    AST 24 12/27/2024 05:30 AM    BILITOT 1.7 12/27/2024 05:30 AM    BILIDIR <0.2 09/25/2016 10:07 AM    IBILI 0.7 09/25/2016 10:07 AM       Current significant warfarin drug-drug interactions include:   -Allopurinol: may SIGNIFICANTLY enhance the anticoagulatory effect of warfarin  -Levothyroxine: may enhance anticoagulatory effect of warfarin.    Recent Labs     12/26/24  1553 12/27/24  0530   HGB 11.6 11.3*    198     Date Warfarin Dose INR Heparin or LMWH Comment   12/27 NO DOSE 4.4 x                                  Assessment:  Patient is a 79 y.o. female on warfarin for Atrial Fibrillation.  Patient's home warfarin dosing regimen is 1 mg on Tuesdays and Fridays; 2 mg on all other days..   Goal INR 2 - 3  Consult initially placed by Isaura Guerin and goal INR was placed as 2.5-3.5; while patient does have history of valvular heart disease, goal INR on visit in December 2023 was 2-3.  I spoke with Dr. Olea, who said goal INR of 2-3 was fine, thus I discontinued the consult from Isaura Guerin with goal INR 2.5-3.5 and put in new consult by Dr. Olea of 2-3.   INR 4.4 today    Plan:  No warfarin tonight, given supratherapeutic INR.  Daily PT/INR until the INR is stable within the therapeutic range  Pharmacist will follow and monitor/adjust dosing as necessary      Dorian Mayo PharmD 12/27/2024 9:22 AM   Ext: [phone]    JUAN: [phone]  SEY: [phone]  SJW: [phone]     
Pharmacy Consultation Note  (Warfarin Dosing and Monitoring)    Initial consult date: 12/27  Consulting Provider: Lefty Rocha MICHAELA Garcia is a 79 y.o. female for whom pharmacy has been asked to manage warfarin therapy.     Weight:   Wt Readings from Last 1 Encounters:   12/27/24 47.2 kg (104 lb)       TSH:    Lab Results   Component Value Date/Time    TSH 3.69 12/06/2024 11:34 AM       Hepatic Function Panel:                            Lab Results   Component Value Date/Time    ALKPHOS 67 12/28/2024 03:19 AM    ALT 7 12/28/2024 03:19 AM    AST 16 12/28/2024 03:19 AM    BILITOT 1.2 12/28/2024 03:19 AM    BILIDIR <0.2 09/25/2016 10:07 AM    IBILI 0.7 09/25/2016 10:07 AM       Current significant warfarin drug-drug interactions include:   -Allopurinol: may SIGNIFICANTLY enhance the anticoagulatory effect of warfarin  -Levothyroxine: may enhance anticoagulatory effect of warfarin.    Recent Labs     12/26/24  1553 12/27/24  0530 12/28/24  0319   HGB 11.6 11.3* 10.2*    198 176     Date Warfarin Dose INR Heparin or LMWH Comment   12/27 NO DOSE 4.4 x    12/28 No dose 6.5 x                           Assessment:  Patient is a 79 y.o. female on warfarin for Atrial Fibrillation.  Patient's home warfarin dosing regimen is 1 mg on Tuesdays and Fridays; 2 mg on all other days..   Goal INR 2 - 3  Consult initially placed by Isaura Guerin and goal INR was placed as 2.5-3.5; while patient does have history of valvular heart disease, goal INR on visit in December 2023 was 2-3.  I spoke with Dr. Olea, who said goal INR of 2-3 was fine, thus I discontinued the consult from Isaura Guerin with goal INR 2.5-3.5 and put in new consult by Dr. Olea of 2-3.   INR 6.5 today    Plan:  No warfarin tonight, given supratherapeutic INR.  Daily PT/INR until the INR is stable within the therapeutic range  Pharmacist will follow and monitor/adjust dosing as necessary      Shonna Fuller, PharmD, BCPS, BCCCP 12/28/2024 10:08 AM    SEB: 
Pharmacy Consultation Note  (Warfarin Dosing and Monitoring)    Initial consult date: 12/27  Consulting Provider: Lefty Rocha MICHAELA Garcia is a 79 y.o. female for whom pharmacy has been asked to manage warfarin therapy.     Weight:   Wt Readings from Last 1 Encounters:   12/27/24 47.2 kg (104 lb)       TSH:    Lab Results   Component Value Date/Time    TSH 3.69 12/06/2024 11:34 AM       Hepatic Function Panel:                            Lab Results   Component Value Date/Time    ALKPHOS 70 12/29/2024 05:10 AM    ALT 7 12/29/2024 05:10 AM    AST 20 12/29/2024 05:10 AM    BILITOT 1.6 12/29/2024 05:10 AM    BILIDIR <0.2 09/25/2016 10:07 AM    IBILI 0.7 09/25/2016 10:07 AM       Current significant warfarin drug-drug interactions include:   -Allopurinol: may SIGNIFICANTLY enhance the anticoagulatory effect of warfarin  -Levothyroxine: may enhance anticoagulatory effect of warfarin.    Recent Labs     12/27/24  0530 12/28/24  0319 12/29/24  0510   HGB 11.3* 10.2* 10.5*    176 177     Date Warfarin Dose INR Heparin or LMWH Comment   12/27 NO DOSE 4.4 x    12/28 No dose 6.5 x    12/29 HOLD 4.3 x                    Assessment:  Patient is a 79 y.o. female on warfarin for Atrial Fibrillation.  Patient's home warfarin dosing regimen is 1 mg on Tuesdays and Fridays; 2 mg on all other days..   Goal INR 2 - 3  Consult initially placed by Isaura Guerin and goal INR was placed as 2.5-3.5; while patient does have history of valvular heart disease, goal INR on visit in December 2023 was 2-3.  I spoke with Dr. Olea, who said goal INR of 2-3 was fine, thus I discontinued the consult from Isaura Guerin with goal INR 2.5-3.5 and put in new consult by Dr. Olea of 2-3.   INR 4.3 today    Plan:  No warfarin tonight, given supratherapeutic INR.  Daily PT/INR until the INR is stable within the therapeutic range  Pharmacist will follow and monitor/adjust dosing as necessary    Vandana Cisneros, [facility], PharmD, BCPS 12/29/2024 10:37 AM 
Pharmacy Consultation Note  (Warfarin Dosing and Monitoring)    Initial consult date: 12/27  Consulting Provider: Lefty Rocha MICHAELA Garcia is a 79 y.o. female for whom pharmacy has been asked to manage warfarin therapy.     Weight:   Wt Readings from Last 1 Encounters:   12/27/24 47.2 kg (104 lb)       TSH:    Lab Results   Component Value Date/Time    TSH 3.69 12/06/2024 11:34 AM       Hepatic Function Panel:                            Lab Results   Component Value Date/Time    ALKPHOS 74 12/30/2024 08:00 AM    ALT 9 12/30/2024 08:00 AM    AST 23 12/30/2024 08:00 AM    BILITOT 1.9 12/30/2024 08:00 AM    BILIDIR <0.2 09/25/2016 10:07 AM    IBILI 0.7 09/25/2016 10:07 AM       Current significant warfarin drug-drug interactions include:   -Allopurinol: may SIGNIFICANTLY enhance the anticoagulatory effect of warfarin  -Levothyroxine: may enhance anticoagulatory effect of warfarin.    Recent Labs     12/28/24  0319 12/29/24  0510 12/30/24  0800   HGB 10.2* 10.5* 10.9*    177 181     Date Warfarin Dose INR Heparin or LMWH Comment   12/27 NO DOSE 4.4 x    12/28 No dose 6.5 x    12/29 HOLD 4.3 x    12/30 2 mg 2.9 x             Assessment:  Patient is a 79 y.o. female on warfarin for Atrial Fibrillation.  Patient's home warfarin dosing regimen is 1 mg on Tuesdays and Fridays; 2 mg on all other days..   Goal INR 2 - 3  Consult initially placed by Isaura Guerin and goal INR was placed as 2.5-3.5; while patient does have history of valvular heart disease, goal INR on visit in December 2023 was 2-3.  I spoke with Dr. Olea, who said goal INR of 2-3 was fine, thus I discontinued the consult from Isaura Guerin with goal INR 2.5-3.5 and put in new consult by Dr. Olea of 2-3.   INR 2.9 today    Plan:  Warfarin 2 mg tonight  Daily PT/INR until the INR is stable within the therapeutic range  Pharmacist will follow and monitor/adjust dosing as necessary    Vandana Cisneros, [facility], PharmD, BCPS 12/30/2024 11:09 AM   Ext: [phone] 
Pharmacy Consultation Note  (Warfarin Dosing and Monitoring)    Initial consult date: 12/27  Consulting Provider: Lefty Rocha MICHAELA Garcia is a 79 y.o. female for whom pharmacy has been asked to manage warfarin therapy.     Weight:   Wt Readings from Last 1 Encounters:   12/31/24 47.6 kg (105 lb)       TSH:    Lab Results   Component Value Date/Time    TSH 0.31 12/31/2024 05:13 AM       Hepatic Function Panel:                            Lab Results   Component Value Date/Time    ALKPHOS 64 01/01/2025 03:30 AM    ALT 10 01/01/2025 03:30 AM    AST 25 01/01/2025 03:30 AM    BILITOT 1.3 01/01/2025 03:30 AM    BILIDIR <0.2 09/25/2016 10:07 AM    IBILI 0.7 09/25/2016 10:07 AM       Current significant warfarin drug-drug interactions include:   -Allopurinol: may SIGNIFICANTLY enhance the anticoagulatory effect of warfarin  -Levothyroxine: may enhance anticoagulatory effect of warfarin.    Recent Labs     12/30/24  0800 12/31/24  0513 01/01/25  0330   HGB 10.9* 10.2* 10.5*    155 159     Date Warfarin Dose INR Heparin or LMWH Comment   12/27 NO DOSE 4.4 x    12/28 No dose 6.5 x    12/29 HOLD 4.3 x    12/30 2 mg 2.9 x    12/31 1 mg 2.5 x    1/1 2 mg 2.7 x      Assessment:  Patient is a 79 y.o. female on warfarin for Atrial Fibrillation.  Patient's home warfarin dosing regimen is 1 mg on Tuesdays and Fridays; 2 mg on all other days.  Goal INR 2 - 3.     Plan:  Warfarin 2 mg tonight  Daily PT/INR until the INR is stable within the therapeutic range  Pharmacist will follow and monitor/adjust dosing as necessary    Maria Del Carmen Helms RPH, PharmD, BCPS 1/1/2025 11:04 AM   Ext: [phone]     JUAN: [phone]  SEY: [phone]  SJW: [phone]     
Physical Therapy  Facility/Department: [address] MED SURG  Physical Therapy Initial Assessment    Name: Josefina Garcia  : 1945  MRN: [id number]  Date of Service: 2024        Patient Diagnosis(es): Acute on Chronic CHF  Past Medical History:  has a past medical history of A-fib (HCC), Acute exacerbation of CHF (congestive heart failure) (HCC), Acute renal failure (HCC), Aneurysm (HCC), Aneurysm of ascending aorta without rupture (HCC), CAD (coronary artery disease), CRF (chronic renal failure), Epistaxis, Gastrointestinal bleeding, lower, H/O anemia of chronic disorder, Hypertension, and Thyroid disease.  Past Surgical History:  has a past surgical history that includes Cardiac surgery; Aortic valvuloplasty; Mitral valvuloplasty; Tricuspid valvuloplasty; Colonoscopy; Upper gastrointestinal endoscopy; Cardiac defibrillator placement (Left, 2022); hernia repair (Right, 2023); Hernia mesh removal; Colonoscopy (2024); and Upper gastrointestinal endoscopy (2024).    Evaluating Therapist: Marlene Bradley PT    Room #:  0620/0620-A  Diagnosis:  Acute on chronic clinical systolic heart failure (HCC) [I50.23]  PMHx/PSHx:  Afib, CHF, Aneurysm, CAD, HTN  Precautions:  falls, O2    Social:  Pt lives with  in a 2 floor plan 4 steps  to enter.  Prior to admission independent without device     Initial Evaluation  Date: 24 Treatment      Short Term/ Long Term   Goals   Was pt agreeable to Eval/treatment? yes     Does pt have pain? No c/o pain     Bed Mobility  Rolling: independent  Supine to sit: independent  Sit to supine: NT  Scooting: independent  independent   Transfers Sit to stand: independent  Stand to sit: independent  Stand pivot: independent  independent   Ambulation    50 feet and 75 feet with no device with supervision  150 feet with no device independent   Stair Negotiation  Ascended and descended  NT   4-12 steps with 1 rail independent   LE strength     4-/5    4/5 
SBP still <100.  92/58 manually.  BP/water meds still on hold.  
Spiritual Health History and Assessment/Progress Note  [facility]    Initial Encounter,  ,  ,      Name: Josefina Garcia MRN: [id number]    Age: 79 y.o.     Sex: female   Language: English   Yazdanism: Mormonism   Acute on chronic clinical systolic heart failure (HCC)     Date: 12/31/2024                           Spiritual Assessment began in [address] MED SURG        Referral/Consult From: Rounding   Encounter Overview/Reason: Initial Encounter  Service Provided For: Patient    Imani, Belief, Meaning:   Patient has beliefs or practices that help with coping during difficult times  Family/Friends No family/friends present      Importance and Influence:  Patient unable to assess at this time  Family/Friends No family/friends present    Community:  Patient feels well-supported. Support system includes: Spouse/Partner and Children  Family/Friends No family/friends present    Assessment and Plan of Care:     Patient Interventions include: Facilitated expression of thoughts and feelings and Affirmed coping skills/support systems  Family/Friends Interventions include: No family/friends present    Patient Plan of Care: Spiritual Care available upon further referral  Family/Friends Plan of Care: No family/friends present    Electronically signed by Chaplain Colleen on 12/31/2024 at 3:56 PM    
  Ext: [phone]     JUAN: [phone]  SEY: [phone]  W: [phone]     
(IRON 325) tablet 325 mg  325 mg Oral BID WC Isaura Guerin APRN - CNP   325 mg at 12/29/24 0806    folic acid (FOLVITE) tablet 1 mg  1 mg Oral Daily Isaura Guerin APRN - CNP   1 mg at 12/29/24 0806    levothyroxine (SYNTHROID) tablet 125 mcg  125 mcg Oral Daily Isaura Guerin APRN - CNP   125 mcg at 12/29/24 0655    metoprolol succinate (TOPROL XL) extended release tablet 25 mg  25 mg Oral Daily Isaura Guerin APRN - CNP   25 mg at 12/29/24 0807    potassium chloride (KLOR-CON M) extended release tablet 10 mEq  10 mEq Oral Daily Isaura Guerin APRN - CNP   10 mEq at 12/29/24 0806    [Held by provider] spironolactone (ALDACTONE) tablet 12.5 mg  12.5 mg Oral Daily Isaura Guerin APRN - CNP   12.5 mg at 12/28/24 0810    sodium chloride flush 0.9 % injection 10 mL  10 mL IntraVENous 2 times per day Isaura Guerin APRN - CNP   10 mL at 12/29/24 0808    sodium chloride flush 0.9 % injection 10 mL  10 mL IntraVENous PRN Isaura Guerin APRN - CNP        0.9 % sodium chloride infusion   IntraVENous PRN Isaura Guerin APRN - CNP        senna (SENOKOT) tablet 8.6 mg  1 tablet Oral Daily PRN Isaura Guerin APRN - CNP        acetaminophen (TYLENOL) tablet 650 mg  650 mg Oral Q6H PRN Isaura Guerin APRN - CNP        Or    acetaminophen (TYLENOL) suppository 650 mg  650 mg Rectal Q6H PRN Isaura Guerin, APRN - CNP        melatonin tablet 3 mg  3 mg Oral Nightly Isaura Guerin APRN - CNP   3 mg at 12/27/24 1946    prochlorperazine (COMPAZINE) tablet 10 mg  10 mg Oral Q8H PRN Isaura Guerin APRN - CNP        Or    prochlorperazine (COMPAZINE) injection 10 mg  10 mg IntraVENous Q6H PRN Isaura Guerin APRN - CNP        warfarin placeholder: dosing by pharmacy   Other RX Placeholder Lefty, Richy S, MD        digoxin (LANOXIN) tablet 62.5 mcg  62.5 mcg Oral Daily Rick Fajardo DO   62.5 mcg at 12/29/24 0806    sacubitril-valsartan (ENTRESTO) 24-26 MG per tablet 0.5 tablet  0.5 tablet Oral 
Daily LacivitaIsaura, APRN - CNP   1 mg at 12/28/24 0810    levothyroxine (SYNTHROID) tablet 125 mcg  125 mcg Oral Daily LacivitaIsaura, APRN - CNP   125 mcg at 12/28/24 0625    metoprolol succinate (TOPROL XL) extended release tablet 25 mg  25 mg Oral Daily LacivitaIsaura, APRN - CNP   25 mg at 12/28/24 0809    potassium chloride (KLOR-CON M) extended release tablet 10 mEq  10 mEq Oral Daily LacivitaIsaura, APRN - CNP   10 mEq at 12/28/24 0809    spironolactone (ALDACTONE) tablet 12.5 mg  12.5 mg Oral Daily LacivitaIsaura, APRN - CNP   12.5 mg at 12/28/24 0810    sodium chloride flush 0.9 % injection 10 mL  10 mL IntraVENous 2 times per day LacivIsaura pinzon, APRN - CNP   10 mL at 12/28/24 0812    sodium chloride flush 0.9 % injection 10 mL  10 mL IntraVENous PRN LacIsaura diez APRN - CNP        0.9 % sodium chloride infusion   IntraVENous PRN Isaura Guerin, APRN - CNP        senna (SENOKOT) tablet 8.6 mg  1 tablet Oral Daily PRN LacivIsaura pinzon, APRN - CNP        acetaminophen (TYLENOL) tablet 650 mg  650 mg Oral Q6H PRN LacivIsaura pinzon, APRN - CNP        Or    acetaminophen (TYLENOL) suppository 650 mg  650 mg Rectal Q6H PRN LacIsaura diez, APRN - CNP        melatonin tablet 3 mg  3 mg Oral Nightly LacivIsaura pinzon, APRN - CNP   3 mg at 12/27/24 1946    prochlorperazine (COMPAZINE) tablet 10 mg  10 mg Oral Q8H PRN Isaura Guerin, APRN - CNP        Or    prochlorperazine (COMPAZINE) injection 10 mg  10 mg IntraVENous Q6H PRN LeslyivIsaura pinzon, APRN - CNP        warfarin placeholder: dosing by pharmacy   Other RX Placeholder Richy Olea MD        digoxin (LANOXIN) tablet 62.5 mcg  62.5 mcg Oral Daily Rick Fajardo DO   62.5 mcg at 12/28/24 0809    sacubitril-valsartan (ENTRESTO) 24-26 MG per tablet 0.5 tablet  0.5 tablet Oral QPM Rick Fajardo DO   0.5 tablet at 12/27/24 1946       PRN Medications  sodium chloride flush, sodium chloride, senna, acetaminophen **OR** acetaminophen, 
retraining/tolerance tasks for facilitation of postural control with dynamic challenges during ADLs .      Positioning to improve functional independence        Recommended Adaptive Equipment: TBD     Home Living: Pt lives with ,  split level , steps/rail   Bathroom setup: tub/shower      Prior Level of Function: Independent with ADLs , and  with IADLs; ambulated with no device       Pain Level: no pain observed ;   Cognition: A&O: 4/4;    Memory:  good    Sequencing:  good    Problem solving:  good    Judgement/safety:  good      Functional Assessment:  AM-PAC Daily Activity Raw Score: 19/24   Initial Eval Status  Date: 12/27/2024 Treatment Status  Date: STGs = LTGs  Time frame: 10-14 days   Feeding Independent     Grooming Set-up   Independent   UB Dressing set-up   Independent   LB Dressing SBA   Mod I    Bathing SBA   Mod I    Toileting SBA   Mod I    Bed Mobility  Independent   Supine to sit      Functional Transfers SBA   Sit-stand from bed, chair   Mod I    Functional Mobility SBA ,no device   Ambulating in room   Mod I  with good tolerance    Balance Sitting:     Static:  Independent    Dynamic:Independent  Standing: SBA  Independent   Activity Tolerance No SOB observed   On room air   O2 sats >90%   Good  with ADL activity    Visual/  Perceptual Glasses: none by bedside        UE ROM/strength  AROM present throughout   Tolerate UE therapeutic activity/exercises to increase strength/endurance for ADL/xfer activity       Hand Dominance right    Hearing: WFL   Sensation:  No c/o numbness or tingling   Tone: WFL   Edema: none observed     Comments: Upon arrival patient lying in bed .  At end of session, patient sitting in chair  with call light and phone within reach, all lines and tubes intact.   present    Overall patient demonstrated  decreased independence and safety during completion of ADL/functional transfer/mobility tasks.  Pt would benefit from continued skilled OT to increase safety and 
second opinion for perhaps advanced care at the [facility] within the next month.  While we made some improvements nonetheless she has persistently demonstrated swelling in her lower extremities but at least she is not as short of breath ambulatory.  Likewise her blood pressure remains a significant barrier to appropriately and effectively titrate her guideline medical regimen.  If stable discharge tomorrow once she receives her support hose ordered 48 hours ago and to make sure that her blood pressure and serum creatinine do not suddenly change with today's adjustment of medicine.  She is to follow-up with her primary internist Dr. Teofilo Dutton.  I will see her in follow-up when she is been reassessed at the [facility].  Additionally she is to maintain strict SBE prophylaxis.    I have spent more than 26 minutes face to face with Josefina Garcia and reviewing notes and laboratory data, with greater than 50% of this time instructing and counseling the patient  face to face regarding my findings and recommendations and I have answered all questions as posed to me by Ms. Garcia and her  who is present in the room..    Rick Fajardo, DO FACP,FACC,FSCAI      NOTE:  This report was transcribed using voice recognition software.  Every effort was made to ensure accuracy; however, inadvertent computerized transcription errors may be present        
stable      Electronically signed by Richy Olea MD on 12/30/2024 at 12:56 PM

## 2025-02-27 ENCOUNTER — APPOINTMENT (OUTPATIENT)
Dept: GENERAL RADIOLOGY | Age: 80
End: 2025-02-27
Payer: MEDICARE

## 2025-02-27 ENCOUNTER — HOSPITAL ENCOUNTER (EMERGENCY)
Age: 80
Discharge: ANOTHER HOSPITAL | End: 2025-02-28
Attending: STUDENT IN AN ORGANIZED HEALTH CARE EDUCATION/TRAINING PROGRAM
Payer: MEDICARE

## 2025-02-27 DIAGNOSIS — D64.9 ANEMIA, UNSPECIFIED TYPE: ICD-10-CM

## 2025-02-27 DIAGNOSIS — R79.1 SUPRATHERAPEUTIC INR: ICD-10-CM

## 2025-02-27 DIAGNOSIS — R53.83 OTHER FATIGUE: Primary | ICD-10-CM

## 2025-02-27 LAB
ALBUMIN SERPL-MCNC: 4 G/DL (ref 3.5–5.2)
ALP SERPL-CCNC: 61 U/L (ref 35–104)
ALT SERPL-CCNC: <5 U/L (ref 0–32)
ANION GAP SERPL CALCULATED.3IONS-SCNC: 12 MMOL/L (ref 7–16)
AST SERPL-CCNC: 27 U/L (ref 0–31)
BASOPHILS # BLD: 0.02 K/UL (ref 0–0.2)
BASOPHILS NFR BLD: 1 % (ref 0–2)
BILIRUB SERPL-MCNC: 0.5 MG/DL (ref 0–1.2)
BILIRUB UR QL STRIP: NEGATIVE
BNP SERPL-MCNC: ABNORMAL PG/ML (ref 0–450)
BUN SERPL-MCNC: 44 MG/DL (ref 6–23)
CALCIUM SERPL-MCNC: 9.8 MG/DL (ref 8.6–10.2)
CHLORIDE SERPL-SCNC: 100 MMOL/L (ref 98–107)
CLARITY UR: CLEAR
CO2 SERPL-SCNC: 30 MMOL/L (ref 22–29)
COLOR UR: YELLOW
CREAT SERPL-MCNC: 2 MG/DL (ref 0.5–1)
EOSINOPHIL # BLD: 0.32 K/UL (ref 0.05–0.5)
EOSINOPHILS RELATIVE PERCENT: 8 % (ref 0–6)
ERYTHROCYTE [DISTWIDTH] IN BLOOD BY AUTOMATED COUNT: 19.9 % (ref 11.5–15)
GFR, ESTIMATED: 25 ML/MIN/1.73M2
GLUCOSE SERPL-MCNC: 109 MG/DL (ref 74–99)
GLUCOSE UR STRIP-MCNC: 500 MG/DL
HCT VFR BLD AUTO: 25.6 % (ref 34–48)
HGB BLD-MCNC: 7.7 G/DL (ref 11.5–15.5)
HGB UR QL STRIP.AUTO: NEGATIVE
IMM GRANULOCYTES # BLD AUTO: <0.03 K/UL (ref 0–0.58)
IMM GRANULOCYTES NFR BLD: 0 % (ref 0–5)
INR PPP: 3.8
KETONES UR STRIP-MCNC: NEGATIVE MG/DL
LEUKOCYTE ESTERASE UR QL STRIP: NEGATIVE
LYMPHOCYTES NFR BLD: 0.72 K/UL (ref 1.5–4)
LYMPHOCYTES RELATIVE PERCENT: 17 % (ref 20–42)
MCH RBC QN AUTO: 27.1 PG (ref 26–35)
MCHC RBC AUTO-ENTMCNC: 30.1 G/DL (ref 32–34.5)
MCV RBC AUTO: 90.1 FL (ref 80–99.9)
MONOCYTES NFR BLD: 0.43 K/UL (ref 0.1–0.95)
MONOCYTES NFR BLD: 10 % (ref 2–12)
NEUTROPHILS NFR BLD: 64 % (ref 43–80)
NEUTS SEG NFR BLD: 2.66 K/UL (ref 1.8–7.3)
NITRITE UR QL STRIP: NEGATIVE
PARTIAL THROMBOPLASTIN TIME: 41.4 SEC (ref 24.5–35.1)
PH UR STRIP: 6 [PH] (ref 5–8)
PLATELET # BLD AUTO: 166 K/UL (ref 130–450)
PMV BLD AUTO: 10.6 FL (ref 7–12)
POTASSIUM SERPL-SCNC: 4.6 MMOL/L (ref 3.5–5)
PROT SERPL-MCNC: 7.4 G/DL (ref 6.4–8.3)
PROT UR STRIP-MCNC: NEGATIVE MG/DL
PROTHROMBIN TIME: 42.4 SEC (ref 9.3–12.4)
RBC # BLD AUTO: 2.84 M/UL (ref 3.5–5.5)
RBC #/AREA URNS HPF: ABNORMAL /HPF
SODIUM SERPL-SCNC: 142 MMOL/L (ref 132–146)
SP GR UR STRIP: 1.01 (ref 1–1.03)
TROPONIN I SERPL HS-MCNC: 38 NG/L (ref 0–9)
UROBILINOGEN UR STRIP-ACNC: 0.2 EU/DL (ref 0–1)
WBC #/AREA URNS HPF: ABNORMAL /HPF
WBC OTHER # BLD: 4.2 K/UL (ref 4.5–11.5)

## 2025-02-27 PROCEDURE — 85025 COMPLETE CBC W/AUTO DIFF WBC: CPT

## 2025-02-27 PROCEDURE — 71045 X-RAY EXAM CHEST 1 VIEW: CPT

## 2025-02-27 PROCEDURE — 83880 ASSAY OF NATRIURETIC PEPTIDE: CPT

## 2025-02-27 PROCEDURE — 81001 URINALYSIS AUTO W/SCOPE: CPT

## 2025-02-27 PROCEDURE — 93005 ELECTROCARDIOGRAM TRACING: CPT | Performed by: NURSE PRACTITIONER

## 2025-02-27 PROCEDURE — 85730 THROMBOPLASTIN TIME PARTIAL: CPT

## 2025-02-27 PROCEDURE — 84484 ASSAY OF TROPONIN QUANT: CPT

## 2025-02-27 PROCEDURE — 85610 PROTHROMBIN TIME: CPT

## 2025-02-27 PROCEDURE — 80053 COMPREHEN METABOLIC PANEL: CPT

## 2025-02-27 PROCEDURE — 99285 EMERGENCY DEPT VISIT HI MDM: CPT

## 2025-02-27 PROCEDURE — 73090 X-RAY EXAM OF FOREARM: CPT

## 2025-02-27 RX ORDER — ALLOPURINOL 100 MG/1
100 TABLET ORAL DAILY
Status: CANCELLED | OUTPATIENT
Start: 2025-02-28

## 2025-02-27 RX ORDER — ONDANSETRON 4 MG/1
4 TABLET, ORALLY DISINTEGRATING ORAL EVERY 8 HOURS PRN
Status: CANCELLED | OUTPATIENT
Start: 2025-02-27

## 2025-02-27 RX ORDER — SODIUM CHLORIDE 9 MG/ML
INJECTION, SOLUTION INTRAVENOUS CONTINUOUS
Status: CANCELLED | OUTPATIENT
Start: 2025-02-28

## 2025-02-27 RX ORDER — ACETAMINOPHEN 325 MG/1
650 TABLET ORAL EVERY 6 HOURS PRN
Status: CANCELLED | OUTPATIENT
Start: 2025-02-27

## 2025-02-27 RX ORDER — FERROUS SULFATE 325(65) MG
325 TABLET ORAL 2 TIMES DAILY
Status: CANCELLED | OUTPATIENT
Start: 2025-02-28

## 2025-02-27 RX ORDER — METOPROLOL SUCCINATE 25 MG/1
25 TABLET, EXTENDED RELEASE ORAL DAILY
Status: CANCELLED | OUTPATIENT
Start: 2025-02-28

## 2025-02-27 RX ORDER — POTASSIUM CHLORIDE 7.45 MG/ML
10 INJECTION INTRAVENOUS PRN
Status: CANCELLED | OUTPATIENT
Start: 2025-02-27

## 2025-02-27 RX ORDER — IPRATROPIUM BROMIDE AND ALBUTEROL SULFATE 2.5; .5 MG/3ML; MG/3ML
1 SOLUTION RESPIRATORY (INHALATION)
Status: CANCELLED | OUTPATIENT
Start: 2025-02-28

## 2025-02-27 RX ORDER — ONDANSETRON 2 MG/ML
4 INJECTION INTRAMUSCULAR; INTRAVENOUS EVERY 6 HOURS PRN
Status: CANCELLED | OUTPATIENT
Start: 2025-02-27

## 2025-02-27 RX ORDER — BUMETANIDE 1 MG/1
2 TABLET ORAL DAILY
Status: CANCELLED | OUTPATIENT
Start: 2025-02-28

## 2025-02-27 RX ORDER — SODIUM CHLORIDE 9 MG/ML
INJECTION, SOLUTION INTRAVENOUS PRN
Status: CANCELLED | OUTPATIENT
Start: 2025-02-27

## 2025-02-27 RX ORDER — SODIUM CHLORIDE 0.9 % (FLUSH) 0.9 %
10 SYRINGE (ML) INJECTION PRN
Status: CANCELLED | OUTPATIENT
Start: 2025-02-27

## 2025-02-27 RX ORDER — SENNOSIDES A AND B 8.6 MG/1
1 TABLET, FILM COATED ORAL DAILY PRN
Status: CANCELLED | OUTPATIENT
Start: 2025-02-27

## 2025-02-27 RX ORDER — SODIUM CHLORIDE 0.9 % (FLUSH) 0.9 %
10 SYRINGE (ML) INJECTION EVERY 12 HOURS SCHEDULED
Status: CANCELLED | OUTPATIENT
Start: 2025-02-28

## 2025-02-27 RX ORDER — SACUBITRIL AND VALSARTAN 24; 26 MG/1; MG/1
0.5 TABLET, FILM COATED ORAL 2 TIMES DAILY
Status: CANCELLED | OUTPATIENT
Start: 2025-02-28

## 2025-02-27 RX ORDER — MAGNESIUM SULFATE IN WATER 40 MG/ML
2000 INJECTION, SOLUTION INTRAVENOUS PRN
Status: CANCELLED | OUTPATIENT
Start: 2025-02-27

## 2025-02-27 RX ORDER — LEVOTHYROXINE SODIUM 125 UG/1
125 TABLET ORAL DAILY
Status: CANCELLED | OUTPATIENT
Start: 2025-02-28

## 2025-02-27 RX ORDER — PROCHLORPERAZINE MALEATE 10 MG
10 TABLET ORAL EVERY 8 HOURS PRN
Status: CANCELLED | OUTPATIENT
Start: 2025-02-27

## 2025-02-27 RX ORDER — ENOXAPARIN SODIUM 100 MG/ML
40 INJECTION SUBCUTANEOUS DAILY
Status: CANCELLED | OUTPATIENT
Start: 2025-02-28

## 2025-02-27 RX ORDER — ACETAMINOPHEN 650 MG/1
650 SUPPOSITORY RECTAL EVERY 6 HOURS PRN
Status: CANCELLED | OUTPATIENT
Start: 2025-02-27

## 2025-02-27 RX ORDER — POTASSIUM CHLORIDE 1500 MG/1
40 TABLET, EXTENDED RELEASE ORAL PRN
Status: CANCELLED | OUTPATIENT
Start: 2025-02-27

## 2025-02-27 RX ORDER — DIGOXIN 0.06 MG/1
62.5 TABLET ORAL
Status: CANCELLED | OUTPATIENT
Start: 2025-02-28

## 2025-02-27 ASSESSMENT — LIFESTYLE VARIABLES
HOW MANY STANDARD DRINKS CONTAINING ALCOHOL DO YOU HAVE ON A TYPICAL DAY: PATIENT DOES NOT DRINK
HOW OFTEN DO YOU HAVE A DRINK CONTAINING ALCOHOL: NEVER

## 2025-02-28 ENCOUNTER — HOSPITAL ENCOUNTER (INPATIENT)
Age: 80
LOS: 6 days | Discharge: HOME | DRG: 812 | End: 2025-03-06
Attending: INTERNAL MEDICINE | Admitting: INTERNAL MEDICINE
Payer: MEDICARE

## 2025-02-28 ENCOUNTER — APPOINTMENT (OUTPATIENT)
Dept: ULTRASOUND IMAGING | Age: 80
DRG: 812 | End: 2025-02-28
Attending: INTERNAL MEDICINE
Payer: MEDICARE

## 2025-02-28 VITALS
RESPIRATION RATE: 16 BRPM | DIASTOLIC BLOOD PRESSURE: 50 MMHG | HEART RATE: 88 BPM | SYSTOLIC BLOOD PRESSURE: 90 MMHG | OXYGEN SATURATION: 96 % | TEMPERATURE: 97.5 F

## 2025-02-28 DIAGNOSIS — R60.0 EDEMA OF LEFT UPPER ARM: Primary | ICD-10-CM

## 2025-02-28 PROBLEM — D64.9 ANEMIA: Status: ACTIVE | Noted: 2025-02-28

## 2025-02-28 LAB
ALBUMIN SERPL-MCNC: 3.5 G/DL (ref 3.5–5.2)
ALP SERPL-CCNC: 53 U/L (ref 35–104)
ALT SERPL-CCNC: 13 U/L (ref 0–32)
ANION GAP SERPL CALCULATED.3IONS-SCNC: 9 MMOL/L (ref 7–16)
AST SERPL-CCNC: 22 U/L (ref 0–31)
BASOPHILS # BLD: 0.03 K/UL (ref 0–0.2)
BASOPHILS # BLD: 0.04 K/UL (ref 0–0.2)
BASOPHILS NFR BLD: 1 % (ref 0–2)
BASOPHILS NFR BLD: 1 % (ref 0–2)
BILIRUB SERPL-MCNC: 0.5 MG/DL (ref 0–1.2)
BILIRUB UR QL STRIP: NEGATIVE
BUN SERPL-MCNC: 41 MG/DL (ref 6–23)
CALCIUM SERPL-MCNC: 9.3 MG/DL (ref 8.6–10.2)
CHLORIDE SERPL-SCNC: 102 MMOL/L (ref 98–107)
CHLORIDE UR-SCNC: <20 MMOL/L
CLARITY UR: CLEAR
CO2 SERPL-SCNC: 30 MMOL/L (ref 22–29)
COLOR UR: YELLOW
COMMENT: ABNORMAL
CREAT SERPL-MCNC: 1.8 MG/DL (ref 0.5–1)
CREAT UR-MCNC: 61.7 MG/DL (ref 29–226)
EKG ATRIAL RATE: 85 BPM
EKG Q-T INTERVAL: 462 MS
EKG QRS DURATION: 164 MS
EKG QTC CALCULATION (BAZETT): 562 MS
EKG R AXIS: -167 DEGREES
EKG T AXIS: -28 DEGREES
EKG VENTRICULAR RATE: 89 BPM
EOSINOPHIL # BLD: 0.13 K/UL (ref 0.05–0.5)
EOSINOPHIL # BLD: 0.35 K/UL (ref 0.05–0.5)
EOSINOPHILS RELATIVE PERCENT: 4 % (ref 0–6)
EOSINOPHILS RELATIVE PERCENT: 7 % (ref 0–6)
ERYTHROCYTE [DISTWIDTH] IN BLOOD BY AUTOMATED COUNT: 19.9 % (ref 11.5–15)
ERYTHROCYTE [DISTWIDTH] IN BLOOD BY AUTOMATED COUNT: 20.5 % (ref 11.5–15)
GFR, ESTIMATED: 29 ML/MIN/1.73M2
GLUCOSE SERPL-MCNC: 91 MG/DL (ref 74–99)
GLUCOSE UR STRIP-MCNC: >=1000 MG/DL
HCT VFR BLD AUTO: 21.7 % (ref 34–48)
HCT VFR BLD AUTO: 24.2 % (ref 34–48)
HCT VFR BLD AUTO: 24.5 % (ref 34–48)
HCT VFR BLD AUTO: 24.9 % (ref 34–48)
HGB BLD-MCNC: 6.5 G/DL (ref 11.5–15.5)
HGB BLD-MCNC: 7 G/DL (ref 11.5–15.5)
HGB BLD-MCNC: 7.1 G/DL (ref 11.5–15.5)
HGB BLD-MCNC: 7.2 G/DL (ref 11.5–15.5)
HGB UR QL STRIP.AUTO: NEGATIVE
IMM GRANULOCYTES # BLD AUTO: <0.03 K/UL (ref 0–0.58)
IMM GRANULOCYTES NFR BLD: 0 % (ref 0–5)
IMM RETICS NFR: 23.3 % (ref 3–15.9)
INR PPP: 3.8
KETONES UR STRIP-MCNC: NEGATIVE MG/DL
LEUKOCYTE ESTERASE UR QL STRIP: NEGATIVE
LYMPHOCYTES NFR BLD: 0.51 K/UL (ref 1.5–4)
LYMPHOCYTES NFR BLD: 0.64 K/UL (ref 1.5–4)
LYMPHOCYTES RELATIVE PERCENT: 13 % (ref 20–42)
LYMPHOCYTES RELATIVE PERCENT: 14 % (ref 20–42)
MCH RBC QN AUTO: 27.2 PG (ref 26–35)
MCH RBC QN AUTO: 27.3 PG (ref 26–35)
MCHC RBC AUTO-ENTMCNC: 28.6 G/DL (ref 32–34.5)
MCHC RBC AUTO-ENTMCNC: 29.3 G/DL (ref 32–34.5)
MCV RBC AUTO: 93.1 FL (ref 80–99.9)
MCV RBC AUTO: 95.3 FL (ref 80–99.9)
MONOCYTES NFR BLD: 0.19 K/UL (ref 0.1–0.95)
MONOCYTES NFR BLD: 0.47 K/UL (ref 0.1–0.95)
MONOCYTES NFR BLD: 10 % (ref 2–12)
MONOCYTES NFR BLD: 5 % (ref 2–12)
NEUTROPHILS NFR BLD: 69 % (ref 43–80)
NEUTROPHILS NFR BLD: 76 % (ref 43–80)
NEUTS SEG NFR BLD: 2.74 K/UL (ref 1.8–7.3)
NEUTS SEG NFR BLD: 3.43 K/UL (ref 1.8–7.3)
NITRITE UR QL STRIP: NEGATIVE
PH UR STRIP: 6 [PH] (ref 5–8)
PLATELET # BLD AUTO: 145 K/UL (ref 130–450)
PLATELET # BLD AUTO: 173 K/UL (ref 130–450)
PMV BLD AUTO: 10.5 FL (ref 7–12)
PMV BLD AUTO: 11.5 FL (ref 7–12)
POTASSIUM SERPL-SCNC: 3.8 MMOL/L (ref 3.5–5)
PROT SERPL-MCNC: 6.6 G/DL (ref 6.4–8.3)
PROT UR STRIP-MCNC: NEGATIVE MG/DL
PROTHROMBIN TIME: 40 SEC (ref 9.3–12.4)
RBC # BLD AUTO: 2.55 M/UL (ref 3.5–5.5)
RBC # BLD AUTO: 2.6 M/UL (ref 3.5–5.5)
RBC # BLD: ABNORMAL 10*6/UL
RETIC HEMOGLOBIN: 28.5 PG (ref 28.2–36.6)
RETICS # AUTO: 0.09 M/UL
RETICS/RBC NFR AUTO: 3.3 % (ref 0.4–1.9)
SODIUM SERPL-SCNC: 141 MMOL/L (ref 132–146)
SODIUM UR-SCNC: 29 MMOL/L
SP GR UR STRIP: 1.01 (ref 1–1.03)
UROBILINOGEN UR STRIP-ACNC: 0.2 EU/DL (ref 0–1)
UUN UR-MCNC: 618 MG/DL (ref 800–1666)
WBC OTHER # BLD: 3.6 K/UL (ref 4.5–11.5)
WBC OTHER # BLD: 4.9 K/UL (ref 4.5–11.5)

## 2025-02-28 PROCEDURE — 93971 EXTREMITY STUDY: CPT

## 2025-02-28 PROCEDURE — 85018 HEMOGLOBIN: CPT

## 2025-02-28 PROCEDURE — 86870 RBC ANTIBODY IDENTIFICATION: CPT

## 2025-02-28 PROCEDURE — 2580000003 HC RX 258: Performed by: NURSE PRACTITIONER

## 2025-02-28 PROCEDURE — 36415 COLL VENOUS BLD VENIPUNCTURE: CPT

## 2025-02-28 PROCEDURE — 81003 URINALYSIS AUTO W/O SCOPE: CPT

## 2025-02-28 PROCEDURE — 83550 IRON BINDING TEST: CPT

## 2025-02-28 PROCEDURE — 86850 RBC ANTIBODY SCREEN: CPT

## 2025-02-28 PROCEDURE — 94640 AIRWAY INHALATION TREATMENT: CPT

## 2025-02-28 PROCEDURE — 93010 ELECTROCARDIOGRAM REPORT: CPT | Performed by: INTERNAL MEDICINE

## 2025-02-28 PROCEDURE — 82728 ASSAY OF FERRITIN: CPT

## 2025-02-28 PROCEDURE — 85025 COMPLETE CBC W/AUTO DIFF WBC: CPT

## 2025-02-28 PROCEDURE — 86900 BLOOD TYPING SEROLOGIC ABO: CPT

## 2025-02-28 PROCEDURE — 51798 US URINE CAPACITY MEASURE: CPT

## 2025-02-28 PROCEDURE — 6360000002 HC RX W HCPCS: Performed by: NURSE PRACTITIONER

## 2025-02-28 PROCEDURE — 84540 ASSAY OF URINE/UREA-N: CPT

## 2025-02-28 PROCEDURE — 80053 COMPREHEN METABOLIC PANEL: CPT

## 2025-02-28 PROCEDURE — 84300 ASSAY OF URINE SODIUM: CPT

## 2025-02-28 PROCEDURE — 86860 RBC ANTIBODY ELUTION: CPT

## 2025-02-28 PROCEDURE — 6370000000 HC RX 637 (ALT 250 FOR IP): Performed by: NURSE PRACTITIONER

## 2025-02-28 PROCEDURE — 2060000000 HC ICU INTERMEDIATE R&B

## 2025-02-28 PROCEDURE — 83540 ASSAY OF IRON: CPT

## 2025-02-28 PROCEDURE — 85014 HEMATOCRIT: CPT

## 2025-02-28 PROCEDURE — 86920 COMPATIBILITY TEST SPIN: CPT

## 2025-02-28 PROCEDURE — 85610 PROTHROMBIN TIME: CPT

## 2025-02-28 PROCEDURE — 2500000003 HC RX 250 WO HCPCS: Performed by: NURSE PRACTITIONER

## 2025-02-28 PROCEDURE — 86901 BLOOD TYPING SEROLOGIC RH(D): CPT

## 2025-02-28 PROCEDURE — 85045 AUTOMATED RETICULOCYTE COUNT: CPT

## 2025-02-28 PROCEDURE — 82436 ASSAY OF URINE CHLORIDE: CPT

## 2025-02-28 PROCEDURE — 82570 ASSAY OF URINE CREATININE: CPT

## 2025-02-28 PROCEDURE — 86922 COMPATIBILITY TEST ANTIGLOB: CPT

## 2025-02-28 RX ORDER — ONDANSETRON 4 MG/1
4 TABLET, ORALLY DISINTEGRATING ORAL EVERY 8 HOURS PRN
Status: DISCONTINUED | OUTPATIENT
Start: 2025-02-28 | End: 2025-02-28

## 2025-02-28 RX ORDER — SACUBITRIL AND VALSARTAN 24; 26 MG/1; MG/1
0.5 TABLET, FILM COATED ORAL 2 TIMES DAILY
Status: DISCONTINUED | OUTPATIENT
Start: 2025-02-28 | End: 2025-03-06 | Stop reason: HOSPADM

## 2025-02-28 RX ORDER — SENNOSIDES A AND B 8.6 MG/1
1 TABLET, FILM COATED ORAL DAILY PRN
Status: DISCONTINUED | OUTPATIENT
Start: 2025-02-28 | End: 2025-03-06 | Stop reason: HOSPADM

## 2025-02-28 RX ORDER — ACETAMINOPHEN 325 MG/1
650 TABLET ORAL EVERY 6 HOURS PRN
Status: DISCONTINUED | OUTPATIENT
Start: 2025-02-28 | End: 2025-03-06 | Stop reason: HOSPADM

## 2025-02-28 RX ORDER — FERROUS SULFATE 325(65) MG
325 TABLET ORAL 2 TIMES DAILY
Status: DISCONTINUED | OUTPATIENT
Start: 2025-02-28 | End: 2025-03-06 | Stop reason: HOSPADM

## 2025-02-28 RX ORDER — POTASSIUM CHLORIDE 1500 MG/1
40 TABLET, EXTENDED RELEASE ORAL PRN
Status: DISCONTINUED | OUTPATIENT
Start: 2025-02-28 | End: 2025-02-28

## 2025-02-28 RX ORDER — METOPROLOL SUCCINATE 25 MG/1
25 TABLET, EXTENDED RELEASE ORAL DAILY
Status: DISCONTINUED | OUTPATIENT
Start: 2025-02-28 | End: 2025-03-06 | Stop reason: HOSPADM

## 2025-02-28 RX ORDER — MAGNESIUM SULFATE IN WATER 40 MG/ML
2000 INJECTION, SOLUTION INTRAVENOUS PRN
Status: DISCONTINUED | OUTPATIENT
Start: 2025-02-28 | End: 2025-02-28

## 2025-02-28 RX ORDER — PROCHLORPERAZINE MALEATE 10 MG
10 TABLET ORAL EVERY 8 HOURS PRN
Status: DISCONTINUED | OUTPATIENT
Start: 2025-02-28 | End: 2025-03-06 | Stop reason: HOSPADM

## 2025-02-28 RX ORDER — ACETAMINOPHEN 650 MG/1
650 SUPPOSITORY RECTAL EVERY 6 HOURS PRN
Status: DISCONTINUED | OUTPATIENT
Start: 2025-02-28 | End: 2025-03-06 | Stop reason: HOSPADM

## 2025-02-28 RX ORDER — SODIUM CHLORIDE 9 MG/ML
INJECTION, SOLUTION INTRAVENOUS PRN
Status: DISCONTINUED | OUTPATIENT
Start: 2025-02-28 | End: 2025-03-06 | Stop reason: HOSPADM

## 2025-02-28 RX ORDER — SODIUM CHLORIDE 9 MG/ML
INJECTION, SOLUTION INTRAVENOUS CONTINUOUS
Status: DISCONTINUED | OUTPATIENT
Start: 2025-02-28 | End: 2025-03-01

## 2025-02-28 RX ORDER — ONDANSETRON 2 MG/ML
4 INJECTION INTRAMUSCULAR; INTRAVENOUS EVERY 6 HOURS PRN
Status: DISCONTINUED | OUTPATIENT
Start: 2025-02-28 | End: 2025-02-28

## 2025-02-28 RX ORDER — ENOXAPARIN SODIUM 100 MG/ML
40 INJECTION SUBCUTANEOUS DAILY
Status: DISCONTINUED | OUTPATIENT
Start: 2025-02-28 | End: 2025-02-28

## 2025-02-28 RX ORDER — ALLOPURINOL 100 MG/1
100 TABLET ORAL DAILY
Status: DISCONTINUED | OUTPATIENT
Start: 2025-02-28 | End: 2025-03-06 | Stop reason: HOSPADM

## 2025-02-28 RX ORDER — PROCHLORPERAZINE MALEATE 10 MG
10 TABLET ORAL EVERY 8 HOURS PRN
Status: DISCONTINUED | OUTPATIENT
Start: 2025-02-28 | End: 2025-02-28 | Stop reason: SDUPTHER

## 2025-02-28 RX ORDER — BUMETANIDE 1 MG/1
2 TABLET ORAL DAILY
Status: DISCONTINUED | OUTPATIENT
Start: 2025-02-28 | End: 2025-03-06 | Stop reason: HOSPADM

## 2025-02-28 RX ORDER — PROCHLORPERAZINE EDISYLATE 5 MG/ML
10 INJECTION INTRAMUSCULAR; INTRAVENOUS EVERY 6 HOURS PRN
Status: DISCONTINUED | OUTPATIENT
Start: 2025-02-28 | End: 2025-03-06 | Stop reason: HOSPADM

## 2025-02-28 RX ORDER — POTASSIUM CHLORIDE 7.45 MG/ML
10 INJECTION INTRAVENOUS PRN
Status: DISCONTINUED | OUTPATIENT
Start: 2025-02-28 | End: 2025-02-28

## 2025-02-28 RX ORDER — SODIUM CHLORIDE 9 MG/ML
INJECTION, SOLUTION INTRAVENOUS PRN
Status: DISCONTINUED | OUTPATIENT
Start: 2025-02-28 | End: 2025-03-03

## 2025-02-28 RX ORDER — DIGOXIN 125 MCG
62.5 TABLET ORAL
Status: DISCONTINUED | OUTPATIENT
Start: 2025-02-28 | End: 2025-03-06 | Stop reason: HOSPADM

## 2025-02-28 RX ORDER — IPRATROPIUM BROMIDE AND ALBUTEROL SULFATE 2.5; .5 MG/3ML; MG/3ML
1 SOLUTION RESPIRATORY (INHALATION)
Status: DISCONTINUED | OUTPATIENT
Start: 2025-02-28 | End: 2025-03-06 | Stop reason: HOSPADM

## 2025-02-28 RX ORDER — SODIUM CHLORIDE 0.9 % (FLUSH) 0.9 %
10 SYRINGE (ML) INJECTION PRN
Status: DISCONTINUED | OUTPATIENT
Start: 2025-02-28 | End: 2025-03-06 | Stop reason: HOSPADM

## 2025-02-28 RX ORDER — IPRATROPIUM BROMIDE AND ALBUTEROL SULFATE 2.5; .5 MG/3ML; MG/3ML
1 SOLUTION RESPIRATORY (INHALATION)
Status: DISCONTINUED | OUTPATIENT
Start: 2025-02-28 | End: 2025-02-28

## 2025-02-28 RX ORDER — SODIUM CHLORIDE 0.9 % (FLUSH) 0.9 %
10 SYRINGE (ML) INJECTION EVERY 12 HOURS SCHEDULED
Status: DISCONTINUED | OUTPATIENT
Start: 2025-02-28 | End: 2025-03-06 | Stop reason: HOSPADM

## 2025-02-28 RX ADMIN — SODIUM CHLORIDE, PRESERVATIVE FREE 10 ML: 5 INJECTION INTRAVENOUS at 16:51

## 2025-02-28 RX ADMIN — ALLOPURINOL 100 MG: 100 TABLET ORAL at 08:11

## 2025-02-28 RX ADMIN — SODIUM CHLORIDE, PRESERVATIVE FREE 10 ML: 5 INJECTION INTRAVENOUS at 08:12

## 2025-02-28 RX ADMIN — DIGOXIN 62.5 MCG: 0.12 TABLET ORAL at 16:51

## 2025-02-28 RX ADMIN — IPRATROPIUM BROMIDE AND ALBUTEROL SULFATE 1 DOSE: .5; 2.5 SOLUTION RESPIRATORY (INHALATION) at 20:12

## 2025-02-28 RX ADMIN — SODIUM CHLORIDE 40 MG: 9 INJECTION INTRAMUSCULAR; INTRAVENOUS; SUBCUTANEOUS at 06:05

## 2025-02-28 RX ADMIN — SODIUM CHLORIDE: 9 INJECTION, SOLUTION INTRAVENOUS at 04:55

## 2025-02-28 RX ADMIN — IPRATROPIUM BROMIDE AND ALBUTEROL SULFATE 1 DOSE: .5; 2.5 SOLUTION RESPIRATORY (INHALATION) at 07:59

## 2025-02-28 RX ADMIN — LEVOTHYROXINE SODIUM 125 MCG: 0.1 TABLET ORAL at 06:04

## 2025-02-28 RX ADMIN — EMPAGLIFLOZIN 10 MG: 10 TABLET, FILM COATED ORAL at 08:11

## 2025-02-28 RX ADMIN — SODIUM CHLORIDE 40 MG: 9 INJECTION INTRAMUSCULAR; INTRAVENOUS; SUBCUTANEOUS at 16:51

## 2025-02-28 RX ADMIN — SODIUM CHLORIDE: 9 INJECTION, SOLUTION INTRAVENOUS at 16:55

## 2025-02-28 ASSESSMENT — PAIN - FUNCTIONAL ASSESSMENT: PAIN_FUNCTIONAL_ASSESSMENT: 0-10

## 2025-02-28 ASSESSMENT — PAIN SCALES - GENERAL
PAINLEVEL_OUTOF10: 0
PAINLEVEL_OUTOF10: 0

## 2025-02-28 NOTE — CARE COORDINATION
Social Work/Discharge Planning;  Met with patient and her  Gagandeep and completed initial assessment.  Explained Social Work role and discussed transition of care/discharge planning.  Patient lives with her  in a split level house.  PTA she is independent with no adaptive device.  She has a nebulizer at home.  Plan is home at discharge and she denies any home care needs at this time. Will continue to follow and assist if needed.   Electronically signed by LISSETH Yoder on 2/28/2025 at 3:26 PM

## 2025-02-28 NOTE — ACP (ADVANCE CARE PLANNING)
Advance Care Planning   Healthcare Decision Maker:    Primary Decision Maker: GarciaGagandeep - [facility] - [phone]    Click here to complete Healthcare Decision Makers including selection of the Healthcare Decision Maker Relationship (ie \"Primary\").

## 2025-02-28 NOTE — ED NOTES
Report called to JOSE Diehl at [facility].  PAS here at this time to transport patient.

## 2025-02-28 NOTE — ED PROVIDER NOTES
Shared ISAÍAS-ED Attending Visit.  CC: No             [facility] EMERGENCY DEPARTMENT  EMERGENCY DEPARTMENT ENCOUNTER      Pt Name: Josefina Garcia  MRN: [id number]  Birthdate 1945  Date of evaluation: 2/27/2025  Provider: SOCORRO Cutler CNP  11:24 AM    CHIEF COMPLAINT       Chief Complaint   Patient presents with    Joint Swelling     Left forearm swelling. NKI. Began yesterday         HISTORY OF PRESENT ILLNESS    Josefina Garcia is a 80 y.o. female who presents to the emergency department for localized left forearm swelling.  Patient states that she noticed that her left forearm looks slightly swollen to her 1 day ago.  Patient's  also states that she has been slightly more fatigued as of recent.  She denies any chest pain or shortness of breath she has a history of A-fib and CHF.  States that she is on a lot of medications including diuretics and Entresto.  She states that she has not had any injuries or falls.  Patient states that she has had no bleeding from anywhere including gums urine and has no dark or tarry stools.  She denies any abdominal pain.  Patient states that she is just concerned for the swelling in the left forearm.  Patient states that she is on Coumadin and has been taking it every day has not missed any doses.    The history is provided by the patient and the spouse.       Nursing Notes were reviewed.    REVIEW OF SYSTEMS       Review of Systems   All other systems reviewed and are negative.      Except as noted above the remainder of the review of systems was reviewed and negative.       PAST MEDICAL HISTORY     Past Medical History:   Diagnosis Date    A-fib (HCC)     Acute exacerbation of CHF (congestive heart failure) ([facility]) 07/23/2015    alpha one neg PiM >34uM    Acute renal failure 10/27/2011    Aneurysm     thoracic aortic    Aneurysm of ascending aorta without rupture     stable ,small . follows with Dr Limon last seen 1/2024    CAD  so her stool is darker normally.  Patient denies any chest pain or shortness of breath.  Patient at this time will be admitted and is agreeable for admission for the above.  Patient was seen and evaluated Dr. Marc ed attending.    Endorsement of Care      The remaining care of Josefina Garcia was endorsed to Dr. marc  on 2/27/25 at 11:23 PM EST.  Original ED Provider Note Prepared and electronically signed by:  SOCORRO Cutler CNP     REASSESSMENT     ED Course as of 03/01/25 1124   Thu Feb 27, 2025   2333 Spoke with Isaura for Dr. Lam for admission at [facility] [FG]      ED Course User Index  [FG] Carlitos Marc,      Time: 2256  Re-evaluation. Patient’s symptoms show no change  Repeat physical examination is not changed .  Patient at this time was seen and evaluated by Dr. Mcclellan at this time the patient is agreeable with inpatient admission to [facility] for her anemia symptomatic fatigue and hypercoagulation.    CONSULTS:  None    PROCEDURES:  Unless otherwise noted below, none         FINAL IMPRESSION      1. Other fatigue    2. Anemia, unspecified type    3. Supratherapeutic INR          DISPOSITION/PLAN   DISPOSITION Decision To Transfer 02/27/2025 11:33:04 PM   DISPOSITION CONDITION Stable           PATIENT REFERRED TO:  No follow-up provider specified.    DISCHARGE MEDICATIONS:  Discharge Medication List as of 2/28/2025  3:52 AM        Controlled Substances Monitoring:          No data to display                (Please note that portions of this note were completed with a voice recognition program.  Efforts were made to edit the dictations but occasionally words are mis-transcribed.)    SOCORRO Cutler CNP (electronically signed)  Attending Emergency Physician         Comfort Guzman APRN - CNP  03/01/25 1125

## 2025-02-28 NOTE — CARE COORDINATION
Internal Medicine On-call Care Coordination Note    I was called by the ED physician because they recommended admission for this patient and we cover their PCP.  The history as I understand it after discussion with the ED physician is as follows:    Presents with L arm swelling  Has also been having dark stools  Hgb was 10.5 on 1/1 now 7.7  On coumadin for afib, valve replacement    I placed admission orders.  Including:    General admission orders  Reconciled home meds  GI consult  IV protonix  NPO  Gentle IVF  INR    Dr. Lam, or our coverage will see the patient tomorrow for H&P.    Electronically signed by SOCORRO Hinds CNP on 2/27/2025 at 11:38 PM

## 2025-02-28 NOTE — RT PROTOCOL NOTE
RT Nebulizer Bronchodilator Protocol Note    There is a bronchodilator order in the chart from a provider indicating to follow the RT Bronchodilator Protocol and there is an “Initiate RT Bronchodilator Protocol” order as well (see protocol at bottom of note).    CXR Findings:  XR CHEST PORTABLE    Result Date: 2/27/2025  No acute process. Stable cardiomegaly.       The findings from the last RT Protocol Assessment were as follows:  Smoking: Chronic pulmonary disease  Respiratory Pattern: Regular pattern and RR 12-20 bpm  Breath Sounds: Intermittent or unilateral wheezes  Cough: Strong, spontaneous, non-productive  Indication for Bronchodilator Therapy:    Bronchodilator Assessment Score: 6    Aerosolized bronchodilator medication orders have been revised according to the RT Nebulizer Bronchodilator Protocol below.    Respiratory Therapist to perform RT Therapy Protocol Assessment initially then follow the protocol.  Repeat RT Therapy Protocol Assessment PRN for score 0-3 or on second treatment, BID, and PRN for scores above 3.    No Indications - adjust the frequency to every 6 hours PRN wheezing or bronchospasm, if no treatments needed after 48 hours then discontinue using Per Protocol order mode.     If indication present, adjust the RT bronchodilator orders based on the Bronchodilator Assessment Score as indicated below.  If a patient is on this medication at home then do not decrease Frequency below that used at home.    0-3 - enter or revise RT bronchodilator order(s) to equivalent RT Bronchodilator order with Frequency of every 4 hours PRN for wheezing or increased work of breathing using Per Protocol order mode.       4-6 - enter or revise RT Bronchodilator order(s) to two equivalent RT bronchodilator orders with one order with BID Frequency and one order with Frequency of every 4 hours PRN wheezing or increased work of breathing using Per Protocol order mode.         7-10 - enter or revise RT Bronchodilator

## 2025-03-01 LAB
25(OH)D3 SERPL-MCNC: 60.4 NG/ML (ref 30–100)
ALBUMIN SERPL-MCNC: 3.3 G/DL (ref 3.5–5.2)
ALP SERPL-CCNC: 47 U/L (ref 35–104)
ALT SERPL-CCNC: 10 U/L (ref 0–32)
ANION GAP SERPL CALCULATED.3IONS-SCNC: 9 MMOL/L (ref 7–16)
AST SERPL-CCNC: 22 U/L (ref 0–31)
BASOPHILS # BLD: 0.03 K/UL (ref 0–0.2)
BASOPHILS NFR BLD: 1 % (ref 0–2)
BILIRUB SERPL-MCNC: 0.6 MG/DL (ref 0–1.2)
BUN SERPL-MCNC: 35 MG/DL (ref 6–23)
CALCIUM SERPL-MCNC: 8.6 MG/DL (ref 8.6–10.2)
CHLORIDE SERPL-SCNC: 105 MMOL/L (ref 98–107)
CO2 SERPL-SCNC: 28 MMOL/L (ref 22–29)
CREAT SERPL-MCNC: 1.7 MG/DL (ref 0.5–1)
EOSINOPHIL # BLD: 0.27 K/UL (ref 0.05–0.5)
EOSINOPHILS RELATIVE PERCENT: 7 % (ref 0–6)
ERYTHROCYTE [DISTWIDTH] IN BLOOD BY AUTOMATED COUNT: 19.9 % (ref 11.5–15)
FERRITIN SERPL-MCNC: 71 NG/ML
FOLATE SERPL-MCNC: >20 NG/ML (ref 4.8–24.2)
GFR, ESTIMATED: 31 ML/MIN/1.73M2
GLUCOSE SERPL-MCNC: 87 MG/DL (ref 74–99)
HCT VFR BLD AUTO: 21 % (ref 34–48)
HCT VFR BLD AUTO: 26 % (ref 34–48)
HGB BLD-MCNC: 6.2 G/DL (ref 11.5–15.5)
HGB BLD-MCNC: 7.9 G/DL (ref 11.5–15.5)
IMM GRANULOCYTES # BLD AUTO: <0.03 K/UL (ref 0–0.58)
IMM GRANULOCYTES NFR BLD: 0 % (ref 0–5)
INR PPP: 3.1
IRON SATN MFR SERPL: 13 % (ref 15–50)
IRON SERPL-MCNC: 45 UG/DL (ref 37–145)
LYMPHOCYTES NFR BLD: 0.52 K/UL (ref 1.5–4)
LYMPHOCYTES RELATIVE PERCENT: 14 % (ref 20–42)
MAGNESIUM SERPL-MCNC: 2.2 MG/DL (ref 1.6–2.6)
MCH RBC QN AUTO: 27.3 PG (ref 26–35)
MCHC RBC AUTO-ENTMCNC: 29.5 G/DL (ref 32–34.5)
MCV RBC AUTO: 92.5 FL (ref 80–99.9)
MONOCYTES NFR BLD: 0.47 K/UL (ref 0.1–0.95)
MONOCYTES NFR BLD: 12 % (ref 2–12)
NEUTROPHILS NFR BLD: 66 % (ref 43–80)
NEUTS SEG NFR BLD: 2.49 K/UL (ref 1.8–7.3)
PHOSPHATE SERPL-MCNC: 3.6 MG/DL (ref 2.5–4.5)
PLATELET # BLD AUTO: 140 K/UL (ref 130–450)
PMV BLD AUTO: 11.4 FL (ref 7–12)
POTASSIUM SERPL-SCNC: 3.5 MMOL/L (ref 3.5–5)
PROT SERPL-MCNC: 6.2 G/DL (ref 6.4–8.3)
PROTHROMBIN TIME: 34.3 SEC (ref 9.3–12.4)
PTH-INTACT SERPL-MCNC: 35 PG/ML (ref 15–65)
RBC # BLD AUTO: 2.27 M/UL (ref 3.5–5.5)
RBC # BLD: ABNORMAL 10*6/UL
SODIUM SERPL-SCNC: 142 MMOL/L (ref 132–146)
TIBC SERPL-MCNC: 351 UG/DL (ref 250–450)
VIT B12 SERPL-MCNC: 690 PG/ML (ref 211–946)
WBC OTHER # BLD: 3.8 K/UL (ref 4.5–11.5)

## 2025-03-01 PROCEDURE — 6360000002 HC RX W HCPCS: Performed by: INTERNAL MEDICINE

## 2025-03-01 PROCEDURE — 83970 ASSAY OF PARATHORMONE: CPT

## 2025-03-01 PROCEDURE — 82306 VITAMIN D 25 HYDROXY: CPT

## 2025-03-01 PROCEDURE — 85025 COMPLETE CBC W/AUTO DIFF WBC: CPT

## 2025-03-01 PROCEDURE — 2500000003 HC RX 250 WO HCPCS: Performed by: NURSE PRACTITIONER

## 2025-03-01 PROCEDURE — 36415 COLL VENOUS BLD VENIPUNCTURE: CPT

## 2025-03-01 PROCEDURE — 36430 TRANSFUSION BLD/BLD COMPNT: CPT

## 2025-03-01 PROCEDURE — 30233N1 TRANSFUSION OF NONAUTOLOGOUS RED BLOOD CELLS INTO PERIPHERAL VEIN, PERCUTANEOUS APPROACH: ICD-10-PCS | Performed by: INTERNAL MEDICINE

## 2025-03-01 PROCEDURE — 82607 VITAMIN B-12: CPT

## 2025-03-01 PROCEDURE — 94640 AIRWAY INHALATION TREATMENT: CPT

## 2025-03-01 PROCEDURE — 84100 ASSAY OF PHOSPHORUS: CPT

## 2025-03-01 PROCEDURE — 6360000002 HC RX W HCPCS: Performed by: NURSE PRACTITIONER

## 2025-03-01 PROCEDURE — 2580000003 HC RX 258: Performed by: INTERNAL MEDICINE

## 2025-03-01 PROCEDURE — 85018 HEMOGLOBIN: CPT

## 2025-03-01 PROCEDURE — 2580000003 HC RX 258: Performed by: NURSE PRACTITIONER

## 2025-03-01 PROCEDURE — P9016 RBC LEUKOCYTES REDUCED: HCPCS

## 2025-03-01 PROCEDURE — 85610 PROTHROMBIN TIME: CPT

## 2025-03-01 PROCEDURE — 6370000000 HC RX 637 (ALT 250 FOR IP): Performed by: NURSE PRACTITIONER

## 2025-03-01 PROCEDURE — 85014 HEMATOCRIT: CPT

## 2025-03-01 PROCEDURE — 2060000000 HC ICU INTERMEDIATE R&B

## 2025-03-01 PROCEDURE — 83735 ASSAY OF MAGNESIUM: CPT

## 2025-03-01 PROCEDURE — 82746 ASSAY OF FOLIC ACID SERUM: CPT

## 2025-03-01 PROCEDURE — 80053 COMPREHEN METABOLIC PANEL: CPT

## 2025-03-01 PROCEDURE — 6370000000 HC RX 637 (ALT 250 FOR IP): Performed by: INTERNAL MEDICINE

## 2025-03-01 RX ORDER — WARFARIN SODIUM 2 MG/1
2 TABLET ORAL
Status: COMPLETED | OUTPATIENT
Start: 2025-03-01 | End: 2025-03-01

## 2025-03-01 RX ADMIN — SODIUM CHLORIDE 25 MG: 9 INJECTION, SOLUTION INTRAVENOUS at 17:50

## 2025-03-01 RX ADMIN — SODIUM CHLORIDE 975 MG: 9 INJECTION, SOLUTION INTRAVENOUS at 18:12

## 2025-03-01 RX ADMIN — SODIUM CHLORIDE, PRESERVATIVE FREE 10 ML: 5 INJECTION INTRAVENOUS at 21:50

## 2025-03-01 RX ADMIN — SODIUM CHLORIDE 40 MG: 9 INJECTION INTRAMUSCULAR; INTRAVENOUS; SUBCUTANEOUS at 17:39

## 2025-03-01 RX ADMIN — SODIUM CHLORIDE 40 MG: 9 INJECTION INTRAMUSCULAR; INTRAVENOUS; SUBCUTANEOUS at 05:59

## 2025-03-01 RX ADMIN — IPRATROPIUM BROMIDE AND ALBUTEROL SULFATE 1 DOSE: .5; 2.5 SOLUTION RESPIRATORY (INHALATION) at 20:02

## 2025-03-01 RX ADMIN — ACETAMINOPHEN 650 MG: 325 TABLET ORAL at 21:48

## 2025-03-01 RX ADMIN — WARFARIN SODIUM 2 MG: 2 TABLET ORAL at 17:39

## 2025-03-01 RX ADMIN — LEVOTHYROXINE SODIUM 125 MCG: 0.1 TABLET ORAL at 05:59

## 2025-03-01 RX ADMIN — ALLOPURINOL 100 MG: 100 TABLET ORAL at 08:19

## 2025-03-01 RX ADMIN — IPRATROPIUM BROMIDE AND ALBUTEROL SULFATE 1 DOSE: .5; 2.5 SOLUTION RESPIRATORY (INHALATION) at 09:26

## 2025-03-01 RX ADMIN — SODIUM CHLORIDE, PRESERVATIVE FREE 10 ML: 5 INJECTION INTRAVENOUS at 17:39

## 2025-03-01 ASSESSMENT — PAIN DESCRIPTION - ONSET: ONSET: GRADUAL

## 2025-03-01 ASSESSMENT — PAIN SCALES - GENERAL
PAINLEVEL_OUTOF10: 0
PAINLEVEL_OUTOF10: 6

## 2025-03-01 ASSESSMENT — PAIN - FUNCTIONAL ASSESSMENT: PAIN_FUNCTIONAL_ASSESSMENT: ACTIVITIES ARE NOT PREVENTED

## 2025-03-01 ASSESSMENT — PAIN DESCRIPTION - PAIN TYPE: TYPE: ACUTE PAIN

## 2025-03-01 ASSESSMENT — PAIN DESCRIPTION - FREQUENCY: FREQUENCY: INTERMITTENT

## 2025-03-01 ASSESSMENT — PAIN DESCRIPTION - DESCRIPTORS: DESCRIPTORS: ACHING;SORE

## 2025-03-01 ASSESSMENT — PAIN DESCRIPTION - LOCATION: LOCATION: NECK

## 2025-03-01 ASSESSMENT — PAIN DESCRIPTION - ORIENTATION: ORIENTATION: RIGHT

## 2025-03-01 NOTE — CONSENT
Informed Consent for Blood Component Transfusion Note    I have discussed with the patient the rationale for blood component transfusion; its benefits in treating or preventing fatigue, organ damage, or death; and its risk which includes mild transfusion reactions, rare risk of blood borne infection, or more serious but rare reactions. I have discussed the alternatives to transfusion, including the risk and consequences of not receiving transfusion. The patient had an opportunity to ask questions and had agreed to proceed with transfusion of blood components.    Electronically signed by Mariama Wolfe MD on 3/1/25 at 3:19 PM EST

## 2025-03-02 ENCOUNTER — APPOINTMENT (OUTPATIENT)
Dept: NUCLEAR MEDICINE | Age: 80
DRG: 812 | End: 2025-03-02
Attending: INTERNAL MEDICINE
Payer: MEDICARE

## 2025-03-02 LAB
ABO/RH: NORMAL
ALBUMIN SERPL-MCNC: 3.6 G/DL (ref 3.5–5.2)
ALP SERPL-CCNC: 55 U/L (ref 35–104)
ALT SERPL-CCNC: 12 U/L (ref 0–32)
ANION GAP SERPL CALCULATED.3IONS-SCNC: 11 MMOL/L (ref 7–16)
ANTIBODY IDENTIFICATION: NORMAL
ANTIBODY IDENTIFICATION: NORMAL
ANTIBODY SCREEN: POSITIVE
ARM BAND NUMBER: NORMAL
AST SERPL-CCNC: 24 U/L (ref 0–31)
BASOPHILS # BLD: 0.08 K/UL (ref 0–0.2)
BASOPHILS NFR BLD: 2 % (ref 0–2)
BILIRUB SERPL-MCNC: 1 MG/DL (ref 0–1.2)
BLOOD BANK BLOOD PRODUCT EXPIRATION DATE: NORMAL
BLOOD BANK COMMENT: NORMAL
BLOOD BANK COMMENT: NORMAL
BLOOD BANK DISPENSE STATUS: NORMAL
BLOOD BANK ISBT PRODUCT BLOOD TYPE: 5100
BLOOD BANK PRODUCT CODE: NORMAL
BLOOD BANK SAMPLE EXPIRATION: NORMAL
BLOOD BANK UNIT TYPE AND RH: NORMAL
BPU ID: NORMAL
BUN SERPL-MCNC: 28 MG/DL (ref 6–23)
CALCIUM SERPL-MCNC: 8.9 MG/DL (ref 8.6–10.2)
CHLORIDE SERPL-SCNC: 103 MMOL/L (ref 98–107)
CO2 SERPL-SCNC: 25 MMOL/L (ref 22–29)
COMPONENT: NORMAL
CREAT SERPL-MCNC: 1.5 MG/DL (ref 0.5–1)
CROSSMATCH RESULT: NORMAL
ELUATE ANTIBODY IDENTIFICATION: NORMAL
EOSINOPHIL # BLD: 0.45 K/UL (ref 0.05–0.5)
EOSINOPHILS RELATIVE PERCENT: 10 % (ref 0–6)
ERYTHROCYTE [DISTWIDTH] IN BLOOD BY AUTOMATED COUNT: 19.3 % (ref 11.5–15)
GFR, ESTIMATED: 35 ML/MIN/1.73M2
GLUCOSE SERPL-MCNC: 80 MG/DL (ref 74–99)
HCT VFR BLD AUTO: 25.4 % (ref 34–48)
HCT VFR BLD AUTO: 28.3 % (ref 34–48)
HGB BLD-MCNC: 7.7 G/DL (ref 11.5–15.5)
HGB BLD-MCNC: 8.3 G/DL (ref 11.5–15.5)
INR PPP: 2.7
LYMPHOCYTES NFR BLD: 0.16 K/UL (ref 1.5–4)
LYMPHOCYTES RELATIVE PERCENT: 4 % (ref 20–42)
MAGNESIUM SERPL-MCNC: 2.4 MG/DL (ref 1.6–2.6)
MCH RBC QN AUTO: 28.5 PG (ref 26–35)
MCHC RBC AUTO-ENTMCNC: 30.3 G/DL (ref 32–34.5)
MCV RBC AUTO: 94.1 FL (ref 80–99.9)
MONOCYTES NFR BLD: 0.21 K/UL (ref 0.1–0.95)
MONOCYTES NFR BLD: 5 % (ref 2–12)
NEUTROPHILS NFR BLD: 80 % (ref 43–80)
NEUTS SEG NFR BLD: 3.7 K/UL (ref 1.8–7.3)
PHOSPHATE SERPL-MCNC: 3.1 MG/DL (ref 2.5–4.5)
PLATELET, FLUORESCENCE: 130 K/UL (ref 130–450)
PMV BLD AUTO: 11.5 FL (ref 7–12)
POTASSIUM SERPL-SCNC: 3.6 MMOL/L (ref 3.5–5)
PROT SERPL-MCNC: 6.8 G/DL (ref 6.4–8.3)
PROTHROMBIN TIME: 28.2 SEC (ref 9.3–12.4)
RBC # BLD AUTO: 2.7 M/UL (ref 3.5–5.5)
RBC # BLD: ABNORMAL 10*6/UL
SODIUM SERPL-SCNC: 139 MMOL/L (ref 132–146)
TRANSFUSION STATUS: NORMAL
UNIT DIVISION: 0
UNIT ISSUE DATE/TIME: NORMAL
WBC # BLD: ABNORMAL 10*3/UL
WBC OTHER # BLD: 4.6 K/UL (ref 4.5–11.5)

## 2025-03-02 PROCEDURE — 85025 COMPLETE CBC W/AUTO DIFF WBC: CPT

## 2025-03-02 PROCEDURE — 2580000003 HC RX 258: Performed by: NURSE PRACTITIONER

## 2025-03-02 PROCEDURE — 85610 PROTHROMBIN TIME: CPT

## 2025-03-02 PROCEDURE — 6360000002 HC RX W HCPCS: Performed by: NURSE PRACTITIONER

## 2025-03-02 PROCEDURE — 94640 AIRWAY INHALATION TREATMENT: CPT

## 2025-03-02 PROCEDURE — 6370000000 HC RX 637 (ALT 250 FOR IP): Performed by: NURSE PRACTITIONER

## 2025-03-02 PROCEDURE — 2500000003 HC RX 250 WO HCPCS: Performed by: NURSE PRACTITIONER

## 2025-03-02 PROCEDURE — 6370000000 HC RX 637 (ALT 250 FOR IP): Performed by: INTERNAL MEDICINE

## 2025-03-02 PROCEDURE — 85018 HEMOGLOBIN: CPT

## 2025-03-02 PROCEDURE — 3430000000 HC RX DIAGNOSTIC RADIOPHARMACEUTICAL: Performed by: RADIOLOGY

## 2025-03-02 PROCEDURE — A9560 TC99M LABELED RBC: HCPCS | Performed by: RADIOLOGY

## 2025-03-02 PROCEDURE — 80053 COMPREHEN METABOLIC PANEL: CPT

## 2025-03-02 PROCEDURE — 6360000002 HC RX W HCPCS: Performed by: INTERNAL MEDICINE

## 2025-03-02 PROCEDURE — 83735 ASSAY OF MAGNESIUM: CPT

## 2025-03-02 PROCEDURE — 85014 HEMATOCRIT: CPT

## 2025-03-02 PROCEDURE — 78278 ACUTE GI BLOOD LOSS IMAGING: CPT

## 2025-03-02 PROCEDURE — 36415 COLL VENOUS BLD VENIPUNCTURE: CPT

## 2025-03-02 PROCEDURE — 2060000000 HC ICU INTERMEDIATE R&B

## 2025-03-02 PROCEDURE — 84100 ASSAY OF PHOSPHORUS: CPT

## 2025-03-02 RX ORDER — BUMETANIDE 0.25 MG/ML
1 INJECTION, SOLUTION INTRAMUSCULAR; INTRAVENOUS ONCE
Status: COMPLETED | OUTPATIENT
Start: 2025-03-02 | End: 2025-03-02

## 2025-03-02 RX ORDER — WARFARIN SODIUM 2 MG/1
2 TABLET ORAL
Status: COMPLETED | OUTPATIENT
Start: 2025-03-02 | End: 2025-03-02

## 2025-03-02 RX ADMIN — SODIUM CHLORIDE, PRESERVATIVE FREE 10 ML: 5 INJECTION INTRAVENOUS at 21:17

## 2025-03-02 RX ADMIN — IPRATROPIUM BROMIDE AND ALBUTEROL SULFATE 1 DOSE: .5; 2.5 SOLUTION RESPIRATORY (INHALATION) at 09:03

## 2025-03-02 RX ADMIN — BUMETANIDE 1 MG: 0.25 INJECTION INTRAMUSCULAR; INTRAVENOUS at 17:25

## 2025-03-02 RX ADMIN — BUMETANIDE 2 MG: 1 TABLET ORAL at 13:40

## 2025-03-02 RX ADMIN — SODIUM CHLORIDE 40 MG: 9 INJECTION INTRAMUSCULAR; INTRAVENOUS; SUBCUTANEOUS at 06:08

## 2025-03-02 RX ADMIN — WARFARIN SODIUM 2 MG: 2 TABLET ORAL at 17:14

## 2025-03-02 RX ADMIN — METOPROLOL SUCCINATE 25 MG: 25 TABLET, EXTENDED RELEASE ORAL at 10:00

## 2025-03-02 RX ADMIN — Medication 20 MILLICURIE: at 10:22

## 2025-03-02 RX ADMIN — ACETAMINOPHEN 650 MG: 325 TABLET ORAL at 13:22

## 2025-03-02 RX ADMIN — LEVOTHYROXINE SODIUM 125 MCG: 0.1 TABLET ORAL at 06:08

## 2025-03-02 RX ADMIN — SODIUM CHLORIDE 40 MG: 9 INJECTION INTRAMUSCULAR; INTRAVENOUS; SUBCUTANEOUS at 17:15

## 2025-03-02 RX ADMIN — ACETAMINOPHEN 650 MG: 325 TABLET ORAL at 21:15

## 2025-03-02 RX ADMIN — SODIUM CHLORIDE, PRESERVATIVE FREE 10 ML: 5 INJECTION INTRAVENOUS at 10:00

## 2025-03-02 RX ADMIN — ALLOPURINOL 100 MG: 100 TABLET ORAL at 10:00

## 2025-03-02 RX ADMIN — IPRATROPIUM BROMIDE AND ALBUTEROL SULFATE 1 DOSE: .5; 2.5 SOLUTION RESPIRATORY (INHALATION) at 20:03

## 2025-03-02 ASSESSMENT — PAIN DESCRIPTION - FREQUENCY: FREQUENCY: INTERMITTENT

## 2025-03-02 ASSESSMENT — PAIN DESCRIPTION - LOCATION
LOCATION: NECK
LOCATION: SHOULDER

## 2025-03-02 ASSESSMENT — PAIN DESCRIPTION - DESCRIPTORS
DESCRIPTORS: ACHING
DESCRIPTORS: ACHING

## 2025-03-02 ASSESSMENT — PAIN DESCRIPTION - PAIN TYPE: TYPE: ACUTE PAIN

## 2025-03-02 ASSESSMENT — PAIN SCALES - GENERAL
PAINLEVEL_OUTOF10: 7
PAINLEVEL_OUTOF10: 6
PAINLEVEL_OUTOF10: 0

## 2025-03-02 ASSESSMENT — PAIN DESCRIPTION - ORIENTATION: ORIENTATION: RIGHT;POSTERIOR

## 2025-03-03 ENCOUNTER — ANESTHESIA EVENT (OUTPATIENT)
Dept: ENDOSCOPY | Age: 80
DRG: 812 | End: 2025-03-03
Payer: MEDICARE

## 2025-03-03 ENCOUNTER — ANESTHESIA (OUTPATIENT)
Dept: ENDOSCOPY | Age: 80
DRG: 812 | End: 2025-03-03
Payer: MEDICARE

## 2025-03-03 LAB
ALBUMIN SERPL-MCNC: 3.8 G/DL (ref 3.5–5.2)
ALP SERPL-CCNC: 63 U/L (ref 35–104)
ALT SERPL-CCNC: 16 U/L (ref 0–32)
ANION GAP SERPL CALCULATED.3IONS-SCNC: 11 MMOL/L (ref 7–16)
AST SERPL-CCNC: 28 U/L (ref 0–31)
BASOPHILS # BLD: 0.04 K/UL (ref 0–0.2)
BASOPHILS NFR BLD: 1 % (ref 0–2)
BILIRUB SERPL-MCNC: 1.2 MG/DL (ref 0–1.2)
BUN SERPL-MCNC: 28 MG/DL (ref 6–23)
CALCIUM SERPL-MCNC: 9 MG/DL (ref 8.6–10.2)
CHLORIDE SERPL-SCNC: 99 MMOL/L (ref 98–107)
CO2 SERPL-SCNC: 25 MMOL/L (ref 22–29)
CREAT SERPL-MCNC: 1.5 MG/DL (ref 0.5–1)
EOSINOPHIL # BLD: 0.54 K/UL (ref 0.05–0.5)
EOSINOPHILS RELATIVE PERCENT: 8 % (ref 0–6)
ERYTHROCYTE [DISTWIDTH] IN BLOOD BY AUTOMATED COUNT: 19.3 % (ref 11.5–15)
GFR, ESTIMATED: 34 ML/MIN/1.73M2
GLUCOSE SERPL-MCNC: 76 MG/DL (ref 74–99)
HCT VFR BLD AUTO: 26.4 % (ref 34–48)
HCT VFR BLD AUTO: 28.7 % (ref 34–48)
HGB BLD-MCNC: 8 G/DL (ref 11.5–15.5)
HGB BLD-MCNC: 8.6 G/DL (ref 11.5–15.5)
IMM GRANULOCYTES # BLD AUTO: 0.07 K/UL (ref 0–0.58)
IMM GRANULOCYTES NFR BLD: 1 % (ref 0–5)
INR PPP: 2.7
LDH SERPL-CCNC: 438 U/L (ref 135–214)
LYMPHOCYTES NFR BLD: 0.63 K/UL (ref 1.5–4)
LYMPHOCYTES RELATIVE PERCENT: 9 % (ref 20–42)
MAGNESIUM SERPL-MCNC: 2.6 MG/DL (ref 1.6–2.6)
MCH RBC QN AUTO: 28.1 PG (ref 26–35)
MCHC RBC AUTO-ENTMCNC: 30 G/DL (ref 32–34.5)
MCV RBC AUTO: 93.8 FL (ref 80–99.9)
MONOCYTES NFR BLD: 0.68 K/UL (ref 0.1–0.95)
MONOCYTES NFR BLD: 10 % (ref 2–12)
NEUTROPHILS NFR BLD: 71 % (ref 43–80)
NEUTS SEG NFR BLD: 4.82 K/UL (ref 1.8–7.3)
PATH REV BLD -IMP: NORMAL
PHOSPHATE SERPL-MCNC: 3.1 MG/DL (ref 2.5–4.5)
PLATELET # BLD AUTO: 162 K/UL (ref 130–450)
PMV BLD AUTO: 11.7 FL (ref 7–12)
POTASSIUM SERPL-SCNC: 3.6 MMOL/L (ref 3.5–5)
PROT SERPL-MCNC: 7.2 G/DL (ref 6.4–8.3)
PROTHROMBIN TIME: 29.6 SEC (ref 9.3–12.4)
RBC # BLD AUTO: 3.06 M/UL (ref 3.5–5.5)
SODIUM SERPL-SCNC: 135 MMOL/L (ref 132–146)
WBC OTHER # BLD: 6.8 K/UL (ref 4.5–11.5)

## 2025-03-03 PROCEDURE — 2580000003 HC RX 258: Performed by: INTERNAL MEDICINE

## 2025-03-03 PROCEDURE — 94640 AIRWAY INHALATION TREATMENT: CPT

## 2025-03-03 PROCEDURE — 2060000000 HC ICU INTERMEDIATE R&B

## 2025-03-03 PROCEDURE — 85014 HEMATOCRIT: CPT

## 2025-03-03 PROCEDURE — 7100000010 HC PHASE II RECOVERY - FIRST 15 MIN: Performed by: INTERNAL MEDICINE

## 2025-03-03 PROCEDURE — 2500000003 HC RX 250 WO HCPCS: Performed by: NURSE PRACTITIONER

## 2025-03-03 PROCEDURE — 2709999900 HC NON-CHARGEABLE SUPPLY: Performed by: INTERNAL MEDICINE

## 2025-03-03 PROCEDURE — 83735 ASSAY OF MAGNESIUM: CPT

## 2025-03-03 PROCEDURE — 6360000002 HC RX W HCPCS: Performed by: NURSE PRACTITIONER

## 2025-03-03 PROCEDURE — 83615 LACTATE (LD) (LDH) ENZYME: CPT

## 2025-03-03 PROCEDURE — 3609017100 HC EGD: Performed by: INTERNAL MEDICINE

## 2025-03-03 PROCEDURE — 6370000000 HC RX 637 (ALT 250 FOR IP): Performed by: NURSE PRACTITIONER

## 2025-03-03 PROCEDURE — 36415 COLL VENOUS BLD VENIPUNCTURE: CPT

## 2025-03-03 PROCEDURE — 85025 COMPLETE CBC W/AUTO DIFF WBC: CPT

## 2025-03-03 PROCEDURE — 0DJ08ZZ INSPECTION OF UPPER INTESTINAL TRACT, VIA NATURAL OR ARTIFICIAL OPENING ENDOSCOPIC: ICD-10-PCS | Performed by: INTERNAL MEDICINE

## 2025-03-03 PROCEDURE — 84100 ASSAY OF PHOSPHORUS: CPT

## 2025-03-03 PROCEDURE — 7100000011 HC PHASE II RECOVERY - ADDTL 15 MIN: Performed by: INTERNAL MEDICINE

## 2025-03-03 PROCEDURE — 85610 PROTHROMBIN TIME: CPT

## 2025-03-03 PROCEDURE — 2580000003 HC RX 258: Performed by: NURSE ANESTHETIST, CERTIFIED REGISTERED

## 2025-03-03 PROCEDURE — 6360000002 HC RX W HCPCS: Performed by: INTERNAL MEDICINE

## 2025-03-03 PROCEDURE — 85018 HEMOGLOBIN: CPT

## 2025-03-03 PROCEDURE — 6360000002 HC RX W HCPCS: Performed by: NURSE ANESTHETIST, CERTIFIED REGISTERED

## 2025-03-03 PROCEDURE — 6370000000 HC RX 637 (ALT 250 FOR IP): Performed by: INTERNAL MEDICINE

## 2025-03-03 PROCEDURE — 2580000003 HC RX 258: Performed by: NURSE PRACTITIONER

## 2025-03-03 PROCEDURE — 3700000000 HC ANESTHESIA ATTENDED CARE: Performed by: INTERNAL MEDICINE

## 2025-03-03 PROCEDURE — 3700000001 HC ADD 15 MINUTES (ANESTHESIA): Performed by: INTERNAL MEDICINE

## 2025-03-03 PROCEDURE — 80053 COMPREHEN METABOLIC PANEL: CPT

## 2025-03-03 RX ORDER — MIDODRINE HYDROCHLORIDE 5 MG/1
5 TABLET ORAL ONCE
Status: COMPLETED | OUTPATIENT
Start: 2025-03-03 | End: 2025-03-03

## 2025-03-03 RX ORDER — PHENYLEPHRINE HCL IN 0.9% NACL 1 MG/10 ML
SYRINGE (ML) INTRAVENOUS
Status: DISCONTINUED | OUTPATIENT
Start: 2025-03-03 | End: 2025-03-03 | Stop reason: SDUPTHER

## 2025-03-03 RX ORDER — MIDODRINE HYDROCHLORIDE 5 MG/1
2.5 TABLET ORAL
Status: DISCONTINUED | OUTPATIENT
Start: 2025-03-03 | End: 2025-03-03

## 2025-03-03 RX ORDER — WARFARIN SODIUM 1 MG/1
1 TABLET ORAL
Status: COMPLETED | OUTPATIENT
Start: 2025-03-03 | End: 2025-03-03

## 2025-03-03 RX ORDER — PROPOFOL 10 MG/ML
INJECTION, EMULSION INTRAVENOUS
Status: DISCONTINUED | OUTPATIENT
Start: 2025-03-03 | End: 2025-03-03 | Stop reason: SDUPTHER

## 2025-03-03 RX ORDER — SODIUM CHLORIDE 9 MG/ML
INJECTION, SOLUTION INTRAVENOUS
Status: DISCONTINUED | OUTPATIENT
Start: 2025-03-03 | End: 2025-03-03 | Stop reason: SDUPTHER

## 2025-03-03 RX ADMIN — DIGOXIN 62.5 MCG: 0.12 TABLET ORAL at 18:22

## 2025-03-03 RX ADMIN — SODIUM CHLORIDE, PRESERVATIVE FREE 10 ML: 5 INJECTION INTRAVENOUS at 21:15

## 2025-03-03 RX ADMIN — MIDODRINE HYDROCHLORIDE 5 MG: 5 TABLET ORAL at 01:13

## 2025-03-03 RX ADMIN — SODIUM CHLORIDE: 9 INJECTION, SOLUTION INTRAVENOUS at 13:58

## 2025-03-03 RX ADMIN — CEPHALEXIN 250 MG: 250 CAPSULE ORAL at 21:15

## 2025-03-03 RX ADMIN — SODIUM CHLORIDE 25 MG: 9 INJECTION, SOLUTION INTRAVENOUS at 17:06

## 2025-03-03 RX ADMIN — SODIUM CHLORIDE 40 MG: 9 INJECTION INTRAMUSCULAR; INTRAVENOUS; SUBCUTANEOUS at 06:48

## 2025-03-03 RX ADMIN — METOPROLOL SUCCINATE 25 MG: 25 TABLET, EXTENDED RELEASE ORAL at 08:44

## 2025-03-03 RX ADMIN — BUMETANIDE 2 MG: 1 TABLET ORAL at 21:30

## 2025-03-03 RX ADMIN — Medication 100 MCG: at 14:21

## 2025-03-03 RX ADMIN — PROPOFOL 30 MG: 10 INJECTION, EMULSION INTRAVENOUS at 14:16

## 2025-03-03 RX ADMIN — IPRATROPIUM BROMIDE AND ALBUTEROL SULFATE 1 DOSE: .5; 2.5 SOLUTION RESPIRATORY (INHALATION) at 07:41

## 2025-03-03 RX ADMIN — Medication 100 MCG: at 14:26

## 2025-03-03 RX ADMIN — PROPOFOL 10 MG: 10 INJECTION, EMULSION INTRAVENOUS at 14:20

## 2025-03-03 RX ADMIN — SODIUM CHLORIDE 100 MG: 9 INJECTION, SOLUTION INTRAVENOUS at 21:27

## 2025-03-03 RX ADMIN — CEPHALEXIN 250 MG: 250 CAPSULE ORAL at 16:59

## 2025-03-03 RX ADMIN — WARFARIN SODIUM 1 MG: 1 TABLET ORAL at 18:22

## 2025-03-03 RX ADMIN — SODIUM CHLORIDE 40 MG: 9 INJECTION INTRAMUSCULAR; INTRAVENOUS; SUBCUTANEOUS at 18:22

## 2025-03-03 RX ADMIN — SODIUM CHLORIDE, PRESERVATIVE FREE 10 ML: 5 INJECTION INTRAVENOUS at 08:24

## 2025-03-03 RX ADMIN — Medication 100 MCG: at 14:16

## 2025-03-03 RX ADMIN — LEVOTHYROXINE SODIUM 125 MCG: 0.1 TABLET ORAL at 06:47

## 2025-03-03 RX ADMIN — IPRATROPIUM BROMIDE AND ALBUTEROL SULFATE 1 DOSE: .5; 2.5 SOLUTION RESPIRATORY (INHALATION) at 19:19

## 2025-03-03 RX ADMIN — ALLOPURINOL 100 MG: 100 TABLET ORAL at 08:24

## 2025-03-03 RX ADMIN — ACETAMINOPHEN 650 MG: 325 TABLET ORAL at 21:25

## 2025-03-03 ASSESSMENT — ENCOUNTER SYMPTOMS: SHORTNESS OF BREATH: 1

## 2025-03-03 ASSESSMENT — PAIN DESCRIPTION - DESCRIPTORS: DESCRIPTORS: ACHING;DISCOMFORT

## 2025-03-03 ASSESSMENT — PAIN SCALES - GENERAL: PAINLEVEL_OUTOF10: 6

## 2025-03-03 ASSESSMENT — PAIN - FUNCTIONAL ASSESSMENT: PAIN_FUNCTIONAL_ASSESSMENT: NONE - DENIES PAIN

## 2025-03-03 ASSESSMENT — PAIN DESCRIPTION - LOCATION: LOCATION: SHOULDER

## 2025-03-03 ASSESSMENT — PAIN DESCRIPTION - ORIENTATION: ORIENTATION: RIGHT

## 2025-03-03 NOTE — ANESTHESIA PRE PROCEDURE
verbalized an understanding and agreed to proceed.  NPO status confirmed.    Anesthetic plan discussed with care team members and agreed upon.    Lefty Adkins,    3/3/2025  2:10 PM

## 2025-03-03 NOTE — BRIEF OP NOTE
Brief Postoperative Note      Patient: Josefina Garcia  YOB: 1945  MRN: [id number]    Date of Procedure: 3/3/2025    Pre-Op Diagnosis Codes:      * Anemia due to acute blood loss [D62]    Post-Op Diagnosis:  normal to at least 4th portion of the duodenum       Procedure(s):  ESOPHAGOGASTRODUODENOSCOPY    Surgeon(s):  Wolf Jenkins MD    Assistant:  * No surgical staff found *    Anesthesia: Monitor Anesthesia Care    Estimated Blood Loss (mL): 0    Complications: None    Specimens:   * No specimens in log *    Implants:  * No implants in log *      Drains:   [REMOVED] External Urinary Catheter (Removed)       Findings:  Infection Present At Time Of Surgery (PATOS) (choose all levels that have infection present):  No infection present  Other Findings: none    Electronically signed by Wolf Jenkins MD on 3/3/2025 at 2:23 PM

## 2025-03-03 NOTE — ANESTHESIA POSTPROCEDURE EVALUATION
Department of Anesthesiology  Postprocedure Note    Patient: Josefina Garcia  MRN: [id number]  YOB: 1945  Date of evaluation: 3/3/2025    Procedure Summary       Date: 03/03/25 Room / Location: [place] [address] / [facility]    Anesthesia Start: 1411 Anesthesia Stop: 1424    Procedure: ESOPHAGOGASTRODUODENOSCOPY Diagnosis:       Anemia due to acute blood loss      (Anemia due to acute blood loss [D62])    Surgeons: Wolf Jenkins MD Responsible Provider: Lefty Adkins DO    Anesthesia Type: MAC ASA Status: 4            Anesthesia Type: MAC    Idalia Phase I: Idalia Score: 10    Idalia Phase II:      Anesthesia Post Evaluation    Patient location during evaluation: bedside  Patient participation: complete - patient participated  Level of consciousness: awake  Pain score: 0  Airway patency: patent  Nausea & Vomiting: no nausea and no vomiting  Cardiovascular status: blood pressure returned to baseline and hemodynamically stable  Respiratory status: acceptable  Hydration status: stable  Pain management: adequate and satisfactory to patient        No notable events documented.

## 2025-03-04 ENCOUNTER — APPOINTMENT (OUTPATIENT)
Dept: GENERAL RADIOLOGY | Age: 80
DRG: 812 | End: 2025-03-04
Attending: INTERNAL MEDICINE
Payer: MEDICARE

## 2025-03-04 LAB
ALBUMIN SERPL-MCNC: 3.7 G/DL (ref 3.5–5.2)
ALP SERPL-CCNC: 68 U/L (ref 35–104)
ALT SERPL-CCNC: 10 U/L (ref 0–32)
ANION GAP SERPL CALCULATED.3IONS-SCNC: 14 MMOL/L (ref 7–16)
AST SERPL-CCNC: 25 U/L (ref 0–31)
BASOPHILS # BLD: 0.04 K/UL (ref 0–0.2)
BASOPHILS NFR BLD: 0 % (ref 0–2)
BILIRUB SERPL-MCNC: 1 MG/DL (ref 0–1.2)
BUN SERPL-MCNC: 29 MG/DL (ref 6–23)
CALCIUM SERPL-MCNC: 8.5 MG/DL (ref 8.6–10.2)
CHLORIDE SERPL-SCNC: 99 MMOL/L (ref 98–107)
CO2 SERPL-SCNC: 24 MMOL/L (ref 22–29)
CREAT SERPL-MCNC: 1.6 MG/DL (ref 0.5–1)
EOSINOPHIL # BLD: 0.08 K/UL (ref 0.05–0.5)
EOSINOPHILS RELATIVE PERCENT: 1 % (ref 0–6)
ERYTHROCYTE [DISTWIDTH] IN BLOOD BY AUTOMATED COUNT: 19.6 % (ref 11.5–15)
GFR, ESTIMATED: 32 ML/MIN/1.73M2
GLUCOSE SERPL-MCNC: 92 MG/DL (ref 74–99)
HCT VFR BLD AUTO: 27.4 % (ref 34–48)
HGB BLD-MCNC: 8.1 G/DL (ref 11.5–15.5)
IMM GRANULOCYTES # BLD AUTO: 0.05 K/UL (ref 0–0.58)
IMM GRANULOCYTES NFR BLD: 1 % (ref 0–5)
INR PPP: 2.8
LACTATE BLDV-SCNC: 1.5 MMOL/L (ref 0.5–2.2)
LYMPHOCYTES NFR BLD: 0.37 K/UL (ref 1.5–4)
LYMPHOCYTES RELATIVE PERCENT: 3 % (ref 20–42)
MAGNESIUM SERPL-MCNC: 2.1 MG/DL (ref 1.6–2.6)
MCH RBC QN AUTO: 27.8 PG (ref 26–35)
MCHC RBC AUTO-ENTMCNC: 29.6 G/DL (ref 32–34.5)
MCV RBC AUTO: 94.2 FL (ref 80–99.9)
MONOCYTES NFR BLD: 0.81 K/UL (ref 0.1–0.95)
MONOCYTES NFR BLD: 7 % (ref 2–12)
NEUTROPHILS NFR BLD: 88 % (ref 43–80)
NEUTS SEG NFR BLD: 9.68 K/UL (ref 1.8–7.3)
PLATELET # BLD AUTO: 162 K/UL (ref 130–450)
PMV BLD AUTO: 11.5 FL (ref 7–12)
POTASSIUM SERPL-SCNC: 3.5 MMOL/L (ref 3.5–5)
PROT SERPL-MCNC: 7.1 G/DL (ref 6.4–8.3)
PROTHROMBIN TIME: 30.7 SEC (ref 9.3–12.4)
RBC # BLD AUTO: 2.91 M/UL (ref 3.5–5.5)
RBC # BLD: ABNORMAL 10*6/UL
RBC # BLD: ABNORMAL 10*6/UL
SODIUM SERPL-SCNC: 137 MMOL/L (ref 132–146)
WBC OTHER # BLD: 11 K/UL (ref 4.5–11.5)

## 2025-03-04 PROCEDURE — 6370000000 HC RX 637 (ALT 250 FOR IP): Performed by: INTERNAL MEDICINE

## 2025-03-04 PROCEDURE — 6360000002 HC RX W HCPCS: Performed by: INTERNAL MEDICINE

## 2025-03-04 PROCEDURE — 2060000000 HC ICU INTERMEDIATE R&B

## 2025-03-04 PROCEDURE — 83605 ASSAY OF LACTIC ACID: CPT

## 2025-03-04 PROCEDURE — 6370000000 HC RX 637 (ALT 250 FOR IP): Performed by: NURSE PRACTITIONER

## 2025-03-04 PROCEDURE — 6360000002 HC RX W HCPCS: Performed by: NURSE PRACTITIONER

## 2025-03-04 PROCEDURE — 80053 COMPREHEN METABOLIC PANEL: CPT

## 2025-03-04 PROCEDURE — 85025 COMPLETE CBC W/AUTO DIFF WBC: CPT

## 2025-03-04 PROCEDURE — 2500000003 HC RX 250 WO HCPCS: Performed by: NURSE PRACTITIONER

## 2025-03-04 PROCEDURE — 94640 AIRWAY INHALATION TREATMENT: CPT

## 2025-03-04 PROCEDURE — 85610 PROTHROMBIN TIME: CPT

## 2025-03-04 PROCEDURE — 2580000003 HC RX 258: Performed by: NURSE PRACTITIONER

## 2025-03-04 PROCEDURE — 73070 X-RAY EXAM OF ELBOW: CPT

## 2025-03-04 PROCEDURE — 36415 COLL VENOUS BLD VENIPUNCTURE: CPT

## 2025-03-04 PROCEDURE — 83735 ASSAY OF MAGNESIUM: CPT

## 2025-03-04 PROCEDURE — 2580000003 HC RX 258: Performed by: INTERNAL MEDICINE

## 2025-03-04 RX ORDER — WARFARIN SODIUM 1 MG/1
1 TABLET ORAL
Status: COMPLETED | OUTPATIENT
Start: 2025-03-04 | End: 2025-03-04

## 2025-03-04 RX ORDER — BUMETANIDE 0.25 MG/ML
2 INJECTION, SOLUTION INTRAMUSCULAR; INTRAVENOUS ONCE
Status: COMPLETED | OUTPATIENT
Start: 2025-03-04 | End: 2025-03-04

## 2025-03-04 RX ORDER — LOPERAMIDE HYDROCHLORIDE 2 MG/1
2 CAPSULE ORAL 4 TIMES DAILY PRN
Status: DISCONTINUED | OUTPATIENT
Start: 2025-03-04 | End: 2025-03-06 | Stop reason: HOSPADM

## 2025-03-04 RX ADMIN — CEPHALEXIN 250 MG: 250 CAPSULE ORAL at 06:30

## 2025-03-04 RX ADMIN — SODIUM CHLORIDE 40 MG: 9 INJECTION INTRAMUSCULAR; INTRAVENOUS; SUBCUTANEOUS at 06:30

## 2025-03-04 RX ADMIN — SODIUM CHLORIDE 40 MG: 9 INJECTION INTRAMUSCULAR; INTRAVENOUS; SUBCUTANEOUS at 16:32

## 2025-03-04 RX ADMIN — EPOETIN ALFA-EPBX 10000 UNITS: 10000 INJECTION, SOLUTION INTRAVENOUS; SUBCUTANEOUS at 17:28

## 2025-03-04 RX ADMIN — IPRATROPIUM BROMIDE AND ALBUTEROL SULFATE 1 DOSE: .5; 2.5 SOLUTION RESPIRATORY (INHALATION) at 07:45

## 2025-03-04 RX ADMIN — WARFARIN SODIUM 1 MG: 1 TABLET ORAL at 16:32

## 2025-03-04 RX ADMIN — BUMETANIDE 2 MG: 0.25 INJECTION INTRAMUSCULAR; INTRAVENOUS at 17:27

## 2025-03-04 RX ADMIN — LEVOTHYROXINE SODIUM 125 MCG: 0.1 TABLET ORAL at 06:30

## 2025-03-04 RX ADMIN — IPRATROPIUM BROMIDE AND ALBUTEROL SULFATE 1 DOSE: .5; 2.5 SOLUTION RESPIRATORY (INHALATION) at 19:23

## 2025-03-04 RX ADMIN — CEPHALEXIN 250 MG: 250 CAPSULE ORAL at 10:04

## 2025-03-04 RX ADMIN — LOPERAMIDE HYDROCHLORIDE 2 MG: 2 CAPSULE ORAL at 18:06

## 2025-03-04 RX ADMIN — SODIUM CHLORIDE, PRESERVATIVE FREE 10 ML: 5 INJECTION INTRAVENOUS at 07:38

## 2025-03-04 RX ADMIN — CEPHALEXIN 250 MG: 250 CAPSULE ORAL at 21:20

## 2025-03-04 RX ADMIN — SODIUM CHLORIDE 125 MG: 900 INJECTION INTRAVENOUS at 15:12

## 2025-03-04 RX ADMIN — ALLOPURINOL 100 MG: 100 TABLET ORAL at 07:38

## 2025-03-04 RX ADMIN — SODIUM CHLORIDE, PRESERVATIVE FREE 10 ML: 5 INJECTION INTRAVENOUS at 19:55

## 2025-03-04 ASSESSMENT — PAIN SCALES - GENERAL
PAINLEVEL_OUTOF10: 0

## 2025-03-04 NOTE — OP NOTE
consideration, we will have to begin the patient with wireless capsule study.          MILA WU MD      D:  03/04/2025 06:54:43     T:  03/04/2025 07:59:53     DAVID/ANGELITO  Job #:  [id number]     Doc#:  [id number]

## 2025-03-04 NOTE — PLAN OF CARE
Problem: Chronic Conditions and Co-morbidities  Goal: Patient's chronic conditions and co-morbidity symptoms are monitored and maintained or improved  3/4/2025 0832 by Arya Downs RN  Outcome: Progressing     Problem: Discharge Planning  Goal: Discharge to home or other facility with appropriate resources  3/4/2025 0832 by Arya Downs RN  Outcome: Progressing     Problem: Pain  Goal: Verbalizes/displays adequate comfort level or baseline comfort level  3/4/2025 0832 by Arya Downs RN  Outcome: Progressing     Problem: Safety - Adult  Goal: Free from fall injury  3/4/2025 0832 by Arya Downs RN  Outcome: Progressing

## 2025-03-05 ENCOUNTER — APPOINTMENT (OUTPATIENT)
Dept: CT IMAGING | Age: 80
DRG: 812 | End: 2025-03-05
Attending: INTERNAL MEDICINE
Payer: MEDICARE

## 2025-03-05 LAB
ALBUMIN SERPL-MCNC: 3.8 G/DL (ref 3.5–5.2)
ALBUMIN SERPL-MCNC: 3.8 G/DL (ref 3.5–5.2)
ALP SERPL-CCNC: 68 U/L (ref 35–104)
ALP SERPL-CCNC: 72 U/L (ref 35–104)
ALT SERPL-CCNC: 11 U/L (ref 0–32)
ALT SERPL-CCNC: 14 U/L (ref 0–32)
ANION GAP SERPL CALCULATED.3IONS-SCNC: 14 MMOL/L (ref 7–16)
ANION GAP SERPL CALCULATED.3IONS-SCNC: 14 MMOL/L (ref 7–16)
AST SERPL-CCNC: 27 U/L (ref 0–31)
AST SERPL-CCNC: 27 U/L (ref 0–31)
BASOPHILS # BLD: 0.1 K/UL (ref 0–0.2)
BASOPHILS NFR BLD: 2 % (ref 0–2)
BILIRUB SERPL-MCNC: 1 MG/DL (ref 0–1.2)
BILIRUB SERPL-MCNC: 1.1 MG/DL (ref 0–1.2)
BUN SERPL-MCNC: 25 MG/DL (ref 6–23)
BUN SERPL-MCNC: 26 MG/DL (ref 6–23)
CALCIUM SERPL-MCNC: 8.7 MG/DL (ref 8.6–10.2)
CALCIUM SERPL-MCNC: 8.7 MG/DL (ref 8.6–10.2)
CHLORIDE SERPL-SCNC: 98 MMOL/L (ref 98–107)
CHLORIDE SERPL-SCNC: 98 MMOL/L (ref 98–107)
CO2 SERPL-SCNC: 25 MMOL/L (ref 22–29)
CO2 SERPL-SCNC: 25 MMOL/L (ref 22–29)
CREAT SERPL-MCNC: 1.5 MG/DL (ref 0.5–1)
CREAT SERPL-MCNC: 1.6 MG/DL (ref 0.5–1)
EOSINOPHIL # BLD: 0.2 K/UL (ref 0.05–0.5)
EOSINOPHILS RELATIVE PERCENT: 4 % (ref 0–6)
ERYTHROCYTE [DISTWIDTH] IN BLOOD BY AUTOMATED COUNT: 20.5 % (ref 11.5–15)
ERYTHROCYTE [DISTWIDTH] IN BLOOD BY AUTOMATED COUNT: 20.5 % (ref 11.5–15)
GFR, ESTIMATED: 32 ML/MIN/1.73M2
GFR, ESTIMATED: 35 ML/MIN/1.73M2
GLUCOSE SERPL-MCNC: 102 MG/DL (ref 74–99)
GLUCOSE SERPL-MCNC: 81 MG/DL (ref 74–99)
HCT VFR BLD AUTO: 29.1 % (ref 34–48)
HCT VFR BLD AUTO: 30 % (ref 34–48)
HGB BLD-MCNC: 8.6 G/DL (ref 11.5–15.5)
HGB BLD-MCNC: 8.7 G/DL (ref 11.5–15.5)
INR PPP: 2.2
INR PPP: 2.3
LYMPHOCYTES NFR BLD: 0.3 K/UL (ref 1.5–4)
LYMPHOCYTES RELATIVE PERCENT: 5 % (ref 20–42)
MAGNESIUM SERPL-MCNC: 2.2 MG/DL (ref 1.6–2.6)
MAGNESIUM SERPL-MCNC: 2.3 MG/DL (ref 1.6–2.6)
MCH RBC QN AUTO: 27.6 PG (ref 26–35)
MCH RBC QN AUTO: 28.4 PG (ref 26–35)
MCHC RBC AUTO-ENTMCNC: 29 G/DL (ref 32–34.5)
MCHC RBC AUTO-ENTMCNC: 29.6 G/DL (ref 32–34.5)
MCV RBC AUTO: 95.2 FL (ref 80–99.9)
MCV RBC AUTO: 96 FL (ref 80–99.9)
MONOCYTES NFR BLD: 0.3 K/UL (ref 0.1–0.95)
MONOCYTES NFR BLD: 5 % (ref 2–12)
MYELOCYTES ABSOLUTE COUNT: 0.05 K/UL
MYELOCYTES: 1 %
NEUTROPHILS NFR BLD: 83 % (ref 43–80)
NEUTS SEG NFR BLD: 4.75 K/UL (ref 1.8–7.3)
PLATELET # BLD AUTO: 161 K/UL (ref 130–450)
PLATELET # BLD AUTO: 165 K/UL (ref 130–450)
PMV BLD AUTO: 11.9 FL (ref 7–12)
PMV BLD AUTO: 12 FL (ref 7–12)
POTASSIUM SERPL-SCNC: 3 MMOL/L (ref 3.5–5)
POTASSIUM SERPL-SCNC: 3.4 MMOL/L (ref 3.5–5)
PROT SERPL-MCNC: 7.2 G/DL (ref 6.4–8.3)
PROT SERPL-MCNC: 7.4 G/DL (ref 6.4–8.3)
PROTHROMBIN TIME: 24.3 SEC (ref 9.3–12.4)
PROTHROMBIN TIME: 24.8 SEC (ref 9.3–12.4)
RBC # BLD AUTO: 3.03 M/UL (ref 3.5–5.5)
RBC # BLD AUTO: 3.15 M/UL (ref 3.5–5.5)
RBC # BLD: ABNORMAL 10*6/UL
SODIUM SERPL-SCNC: 137 MMOL/L (ref 132–146)
SODIUM SERPL-SCNC: 137 MMOL/L (ref 132–146)
WBC OTHER # BLD: 5.7 K/UL (ref 4.5–11.5)
WBC OTHER # BLD: 6.4 K/UL (ref 4.5–11.5)

## 2025-03-05 PROCEDURE — 2500000003 HC RX 250 WO HCPCS: Performed by: NURSE PRACTITIONER

## 2025-03-05 PROCEDURE — 80053 COMPREHEN METABOLIC PANEL: CPT

## 2025-03-05 PROCEDURE — 87040 BLOOD CULTURE FOR BACTERIA: CPT

## 2025-03-05 PROCEDURE — 85027 COMPLETE CBC AUTOMATED: CPT

## 2025-03-05 PROCEDURE — 6370000000 HC RX 637 (ALT 250 FOR IP): Performed by: INTERNAL MEDICINE

## 2025-03-05 PROCEDURE — 2060000000 HC ICU INTERMEDIATE R&B

## 2025-03-05 PROCEDURE — 2580000003 HC RX 258: Performed by: NURSE PRACTITIONER

## 2025-03-05 PROCEDURE — 36415 COLL VENOUS BLD VENIPUNCTURE: CPT

## 2025-03-05 PROCEDURE — 6360000002 HC RX W HCPCS: Performed by: NURSE PRACTITIONER

## 2025-03-05 PROCEDURE — 85610 PROTHROMBIN TIME: CPT

## 2025-03-05 PROCEDURE — 71250 CT THORAX DX C-: CPT

## 2025-03-05 PROCEDURE — 83735 ASSAY OF MAGNESIUM: CPT

## 2025-03-05 PROCEDURE — 6370000000 HC RX 637 (ALT 250 FOR IP): Performed by: NURSE PRACTITIONER

## 2025-03-05 PROCEDURE — 94640 AIRWAY INHALATION TREATMENT: CPT

## 2025-03-05 PROCEDURE — 85025 COMPLETE CBC W/AUTO DIFF WBC: CPT

## 2025-03-05 RX ORDER — BUMETANIDE 1 MG/1
2 TABLET ORAL DAILY
Status: DISCONTINUED | OUTPATIENT
Start: 2025-03-05 | End: 2025-03-05 | Stop reason: SDUPTHER

## 2025-03-05 RX ORDER — WARFARIN SODIUM 2 MG/1
2 TABLET ORAL
Status: COMPLETED | OUTPATIENT
Start: 2025-03-05 | End: 2025-03-05

## 2025-03-05 RX ORDER — POTASSIUM CHLORIDE 1500 MG/1
40 TABLET, EXTENDED RELEASE ORAL ONCE
Status: COMPLETED | OUTPATIENT
Start: 2025-03-05 | End: 2025-03-05

## 2025-03-05 RX ADMIN — POTASSIUM CHLORIDE 40 MEQ: 1500 TABLET, EXTENDED RELEASE ORAL at 12:44

## 2025-03-05 RX ADMIN — DIGOXIN 62.5 MCG: 0.12 TABLET ORAL at 16:48

## 2025-03-05 RX ADMIN — LEVOTHYROXINE SODIUM 125 MCG: 0.1 TABLET ORAL at 05:38

## 2025-03-05 RX ADMIN — WARFARIN SODIUM 2 MG: 2 TABLET ORAL at 16:48

## 2025-03-05 RX ADMIN — SODIUM CHLORIDE 40 MG: 9 INJECTION INTRAMUSCULAR; INTRAVENOUS; SUBCUTANEOUS at 05:38

## 2025-03-05 RX ADMIN — ALLOPURINOL 100 MG: 100 TABLET ORAL at 08:08

## 2025-03-05 RX ADMIN — IPRATROPIUM BROMIDE AND ALBUTEROL SULFATE 1 DOSE: .5; 2.5 SOLUTION RESPIRATORY (INHALATION) at 10:18

## 2025-03-05 RX ADMIN — SODIUM CHLORIDE 40 MG: 9 INJECTION INTRAMUSCULAR; INTRAVENOUS; SUBCUTANEOUS at 16:47

## 2025-03-05 RX ADMIN — IPRATROPIUM BROMIDE AND ALBUTEROL SULFATE 1 DOSE: .5; 2.5 SOLUTION RESPIRATORY (INHALATION) at 21:30

## 2025-03-05 RX ADMIN — SODIUM CHLORIDE, PRESERVATIVE FREE 10 ML: 5 INJECTION INTRAVENOUS at 21:30

## 2025-03-05 RX ADMIN — CEPHALEXIN 250 MG: 250 CAPSULE ORAL at 14:25

## 2025-03-05 RX ADMIN — BUMETANIDE 2 MG: 1 TABLET ORAL at 14:33

## 2025-03-05 RX ADMIN — CEPHALEXIN 250 MG: 250 CAPSULE ORAL at 05:38

## 2025-03-05 RX ADMIN — SODIUM CHLORIDE, PRESERVATIVE FREE 10 ML: 5 INJECTION INTRAVENOUS at 08:08

## 2025-03-05 RX ADMIN — CEPHALEXIN 250 MG: 250 CAPSULE ORAL at 21:30

## 2025-03-05 ASSESSMENT — PAIN SCALES - GENERAL
PAINLEVEL_OUTOF10: 0
PAINLEVEL_OUTOF10: 0

## 2025-03-05 NOTE — PLAN OF CARE
Problem: Chronic Conditions and Co-morbidities  Goal: Patient's chronic conditions and co-morbidity symptoms are monitored and maintained or improved  Outcome: Progressing     Problem: Discharge Planning  Goal: Discharge to home or other facility with appropriate resources  Outcome: Progressing     Problem: Pain  Goal: Verbalizes/displays adequate comfort level or baseline comfort level  Outcome: Progressing     Problem: Safety - Adult  Goal: Free from fall injury  Outcome: Progressing      Recommended lid scrubs.

## 2025-03-05 NOTE — PLAN OF CARE
Problem: Chronic Conditions and Co-morbidities  Goal: Patient's chronic conditions and co-morbidity symptoms are monitored and maintained or improved  3/5/2025 0944 by Sandra Oliva RN  Outcome: Progressing  3/5/2025 0416 by Arya Downs RN  Outcome: Progressing     Problem: Discharge Planning  Goal: Discharge to home or other facility with appropriate resources  3/5/2025 0944 by Sandra Oliva RN  Outcome: Progressing  3/5/2025 0416 by Arya Downs RN  Outcome: Progressing     Problem: Pain  Goal: Verbalizes/displays adequate comfort level or baseline comfort level  3/5/2025 0944 by Sandra Oliva RN  Outcome: Progressing  3/5/2025 0416 by Arya Downs RN  Outcome: Progressing     Problem: Safety - Adult  Goal: Free from fall injury  3/5/2025 0944 by Sandra Oliva RN  Outcome: Progressing  3/5/2025 0416 by Arya Downs RN  Outcome: Progressing

## 2025-03-06 ENCOUNTER — APPOINTMENT (OUTPATIENT)
Dept: GENERAL RADIOLOGY | Age: 80
DRG: 812 | End: 2025-03-06
Attending: INTERNAL MEDICINE
Payer: MEDICARE

## 2025-03-06 VITALS
TEMPERATURE: 98 F | DIASTOLIC BLOOD PRESSURE: 57 MMHG | BODY MASS INDEX: 19.88 KG/M2 | WEIGHT: 105.2 LBS | OXYGEN SATURATION: 94 % | HEART RATE: 121 BPM | SYSTOLIC BLOOD PRESSURE: 98 MMHG | RESPIRATION RATE: 16 BRPM

## 2025-03-06 LAB
ALBUMIN SERPL-MCNC: 3.6 G/DL (ref 3.5–5.2)
ALP SERPL-CCNC: 63 U/L (ref 35–104)
ALT SERPL-CCNC: 12 U/L (ref 0–32)
ANION GAP SERPL CALCULATED.3IONS-SCNC: 12 MMOL/L (ref 7–16)
AST SERPL-CCNC: 24 U/L (ref 0–31)
BASOPHILS # BLD: 0 K/UL (ref 0–0.2)
BASOPHILS NFR BLD: 0 % (ref 0–2)
BILIRUB SERPL-MCNC: 0.8 MG/DL (ref 0–1.2)
BUN SERPL-MCNC: 23 MG/DL (ref 6–23)
CALCIUM SERPL-MCNC: 8.3 MG/DL (ref 8.6–10.2)
CHLORIDE SERPL-SCNC: 103 MMOL/L (ref 98–107)
CO2 SERPL-SCNC: 25 MMOL/L (ref 22–29)
CREAT SERPL-MCNC: 1.5 MG/DL (ref 0.5–1)
EOSINOPHIL # BLD: 0.46 K/UL (ref 0.05–0.5)
EOSINOPHILS RELATIVE PERCENT: 7 % (ref 0–6)
ERYTHROCYTE [DISTWIDTH] IN BLOOD BY AUTOMATED COUNT: 20.3 % (ref 11.5–15)
GFR, ESTIMATED: 34 ML/MIN/1.73M2
GLUCOSE SERPL-MCNC: 102 MG/DL (ref 74–99)
HCT VFR BLD AUTO: 27 % (ref 34–48)
HCT VFR BLD AUTO: 28 % (ref 34–48)
HGB BLD-MCNC: 7.9 G/DL (ref 11.5–15.5)
HGB BLD-MCNC: 8.2 G/DL (ref 11.5–15.5)
INR PPP: 2.7
LYMPHOCYTES NFR BLD: 0.12 K/UL (ref 1.5–4)
LYMPHOCYTES RELATIVE PERCENT: 2 % (ref 20–42)
MAGNESIUM SERPL-MCNC: 2.1 MG/DL (ref 1.6–2.6)
MCH RBC QN AUTO: 27.8 PG (ref 26–35)
MCHC RBC AUTO-ENTMCNC: 29.3 G/DL (ref 32–34.5)
MCV RBC AUTO: 95.1 FL (ref 80–99.9)
MONOCYTES NFR BLD: 0.52 K/UL (ref 0.1–0.95)
MONOCYTES NFR BLD: 8 % (ref 2–12)
MYELOCYTES ABSOLUTE COUNT: 0.06 K/UL
MYELOCYTES: 1 %
NEUTROPHILS NFR BLD: 82 % (ref 43–80)
NEUTS SEG NFR BLD: 5.34 K/UL (ref 1.8–7.3)
PLATELET # BLD AUTO: 149 K/UL (ref 130–450)
PMV BLD AUTO: 10.9 FL (ref 7–12)
POTASSIUM SERPL-SCNC: 3.7 MMOL/L (ref 3.5–5)
PROT SERPL-MCNC: 6.9 G/DL (ref 6.4–8.3)
PROTHROMBIN TIME: 29 SEC (ref 9.3–12.4)
RBC # BLD AUTO: 2.84 M/UL (ref 3.5–5.5)
RBC # BLD: ABNORMAL 10*6/UL
SODIUM SERPL-SCNC: 140 MMOL/L (ref 132–146)
WBC OTHER # BLD: 6.5 K/UL (ref 4.5–11.5)

## 2025-03-06 PROCEDURE — 2580000003 HC RX 258: Performed by: NURSE PRACTITIONER

## 2025-03-06 PROCEDURE — 6370000000 HC RX 637 (ALT 250 FOR IP): Performed by: INTERNAL MEDICINE

## 2025-03-06 PROCEDURE — 85025 COMPLETE CBC W/AUTO DIFF WBC: CPT

## 2025-03-06 PROCEDURE — 2580000003 HC RX 258: Performed by: INTERNAL MEDICINE

## 2025-03-06 PROCEDURE — 94640 AIRWAY INHALATION TREATMENT: CPT

## 2025-03-06 PROCEDURE — 6360000002 HC RX W HCPCS: Performed by: INTERNAL MEDICINE

## 2025-03-06 PROCEDURE — 2500000003 HC RX 250 WO HCPCS: Performed by: RADIOLOGY

## 2025-03-06 PROCEDURE — 36415 COLL VENOUS BLD VENIPUNCTURE: CPT

## 2025-03-06 PROCEDURE — 85610 PROTHROMBIN TIME: CPT

## 2025-03-06 PROCEDURE — 2500000003 HC RX 250 WO HCPCS: Performed by: NURSE PRACTITIONER

## 2025-03-06 PROCEDURE — 6370000000 HC RX 637 (ALT 250 FOR IP): Performed by: NURSE PRACTITIONER

## 2025-03-06 PROCEDURE — 6360000002 HC RX W HCPCS: Performed by: NURSE PRACTITIONER

## 2025-03-06 PROCEDURE — 85018 HEMOGLOBIN: CPT

## 2025-03-06 PROCEDURE — 85014 HEMATOCRIT: CPT

## 2025-03-06 PROCEDURE — 83735 ASSAY OF MAGNESIUM: CPT

## 2025-03-06 PROCEDURE — 80053 COMPREHEN METABOLIC PANEL: CPT

## 2025-03-06 PROCEDURE — 74250 X-RAY XM SM INT 1CNTRST STD: CPT

## 2025-03-06 RX ORDER — WARFARIN SODIUM 1 MG/1
1 TABLET ORAL
Status: DISCONTINUED | OUTPATIENT
Start: 2025-03-06 | End: 2025-03-06 | Stop reason: HOSPADM

## 2025-03-06 RX ADMIN — IPRATROPIUM BROMIDE AND ALBUTEROL SULFATE 1 DOSE: .5; 2.5 SOLUTION RESPIRATORY (INHALATION) at 08:13

## 2025-03-06 RX ADMIN — BARIUM SULFATE 176 G: 960 POWDER, FOR SUSPENSION ORAL at 08:49

## 2025-03-06 RX ADMIN — SODIUM CHLORIDE 40 MG: 9 INJECTION INTRAMUSCULAR; INTRAVENOUS; SUBCUTANEOUS at 06:15

## 2025-03-06 RX ADMIN — SODIUM CHLORIDE, PRESERVATIVE FREE 10 ML: 5 INJECTION INTRAVENOUS at 11:56

## 2025-03-06 RX ADMIN — CEPHALEXIN 250 MG: 250 CAPSULE ORAL at 06:15

## 2025-03-06 RX ADMIN — ALLOPURINOL 100 MG: 100 TABLET ORAL at 11:56

## 2025-03-06 RX ADMIN — METOPROLOL SUCCINATE 25 MG: 25 TABLET, EXTENDED RELEASE ORAL at 11:56

## 2025-03-06 RX ADMIN — LEVOTHYROXINE SODIUM 125 MCG: 0.1 TABLET ORAL at 06:15

## 2025-03-06 RX ADMIN — BUMETANIDE 2 MG: 1 TABLET ORAL at 11:56

## 2025-03-06 RX ADMIN — SODIUM CHLORIDE 125 MG: 900 INJECTION INTRAVENOUS at 12:00

## 2025-03-06 ASSESSMENT — PAIN SCALES - WONG BAKER: WONGBAKER_NUMERICALRESPONSE: NO HURT

## 2025-03-06 ASSESSMENT — PAIN SCALES - GENERAL: PAINLEVEL_OUTOF10: 0

## 2025-03-06 NOTE — CARE COORDINATION
Social Work/Discharge Planning:  Chart reviewed.  Patient plan remains home at discharge with her .  No needs at discharge.  Electronically signed by LISSETH Yoder on 3/6/2025 at 10:53 AM

## 2025-03-06 NOTE — PLAN OF CARE
Problem: Chronic Conditions and Co-morbidities  Goal: Patient's chronic conditions and co-morbidity symptoms are monitored and maintained or improved  3/6/2025 0221 by Kay Rosenthal, RN  Outcome: Progressing     Problem: Discharge Planning  Goal: Discharge to home or other facility with appropriate resources  Outcome: Progressing     Problem: Pain  Goal: Verbalizes/displays adequate comfort level or baseline comfort level  3/6/2025 1042 by Maria Del Carmen Keller RN  Outcome: Progressing  3/6/2025 0221 by Kay Rosenthal, RN  Outcome: Progressing     Problem: Safety - Adult  Goal: Free from fall injury  3/6/2025 1042 by Maria Del Carmen Keller, RN  Outcome: Progressing  3/6/2025 0221 by Kay Rosenthal, RN  Outcome: Progressing

## 2025-03-06 NOTE — DISCHARGE SUMMARY
Hypotension  Continue Toprol XL and Bumex  Entresto remains on hold for now due to mild JAIR  BP remains low but fairly stable -- consider cutting back diuretics however defer to Cardiology     Hypothyroidism  Continue Synthroid      BRIEF PHYSICAL EXAMINATION AND LABORATORIES ON DAY OF DISCHARGE:  VITALS:  BP (!) 98/57   Pulse (!) 121   Temp 98 °F (36.7 °C) (Oral)   Resp 16   Wt 47.7 kg (105 lb 3.2 oz)   SpO2 94%   BMI 19.88 kg/m²     Physical Exam:  General: awake and alert, oriented, pleasant, in no distress  Eyes: conjunctivae/corneas clear, sclera non icteric  Skin: warm and dry, no rashes or lesions noted  Lungs: clear but diminished to bilateral bases, respirations even and non-labored on room air  Heart: irregularly irregular, S1S2 present, +murmur  Abdomen: soft, non-tender, non-distended, normal bowel sounds  Extremities: generalized weakness, trace edema to the left elbow with slight erythema noted, trace edema to the right elbow with no erythema, tubi  to BUE, no pain on palpation  Neurologic: following commands, speech is clear    LABS::  Recent Labs     03/05/25  0724 03/05/25  0950 03/06/25  0335    137 140   K 3.0* 3.4* 3.7   CL 98 98 103   CO2 25 25 25   BUN 25* 26* 23   CREATININE 1.5* 1.6* 1.5*   GLUCOSE 81 102* 102*   CALCIUM 8.7 8.7 8.3*     Recent Labs     03/05/25  0724 03/05/25  0950 03/06/25  0335   ALKPHOS 68 72 63   ALT 11 14 12   AST 27 27 24   BILITOT 1.1 1.0 0.8     Recent Labs     03/05/25  0724 03/05/25  0950 03/06/25  0335 03/06/25  1214   WBC 5.7 6.4 6.5  --    RBC 3.15* 3.03* 2.84*  --    HGB 8.7* 8.6* 7.9* 8.2*   HCT 30.0* 29.1* 27.0* 28.0*   MCV 95.2 96.0 95.1  --    MCH 27.6 28.4 27.8  --    MCHC 29.0* 29.6* 29.3*  --    RDW 20.5* 20.5* 20.3*  --     165 149  --    MPV 11.9 12.0 10.9  --      Lab Results   Component Value Date    LABA1C 5.6 03/20/2024    LABA1C 5.0 11/28/2021    LABA1C 5.9 08/03/2015     Lab Results   Component Value Date    INR 2.7

## 2025-03-06 NOTE — PLAN OF CARE
Problem: Chronic Conditions and Co-morbidities  Goal: Patient's chronic conditions and co-morbidity symptoms are monitored and maintained or improved  3/6/2025 1553 by Maria Del Carmen Keller RN  Outcome: Adequate for Discharge  3/6/2025 0221 by Kay Rosenthal, RN  Outcome: Progressing     Problem: Discharge Planning  Goal: Discharge to home or other facility with appropriate resources  3/6/2025 1553 by Maria Del Carmen Keller RN  Outcome: Adequate for Discharge  3/6/2025 1042 by Maria Del Carmen Keller RN  Outcome: Progressing     Problem: Pain  Goal: Verbalizes/displays adequate comfort level or baseline comfort level  3/6/2025 1553 by Maria Del Carmen Keller RN  Outcome: Adequate for Discharge  3/6/2025 1042 by Maria Del Carmen Keller RN  Outcome: Progressing  3/6/2025 0221 by Kay Rosenthal, RN  Outcome: Progressing     Problem: Safety - Adult  Goal: Free from fall injury  3/6/2025 1553 by Maria Del Carmen Keller RN  Outcome: Adequate for Discharge  3/6/2025 1042 by Maria Del Carmen Keller RN  Outcome: Progressing  3/6/2025 0221 by Kay Rosenthal, RN  Outcome: Progressing

## 2025-03-07 NOTE — CONSULTS
[place] [address]                              CONSULTATION      PATIENT NAME: RULA GUZMAN          : 1945  MED REC NO: [id number]                        ROOM: 0426  ACCOUNT NO: [id number]                       ADMIT DATE: 2025  PROVIDER: Wolf Jenkins MD      CONSULT DATE: 2025    REFERRING PHYSICIAN:  Teofilo Dutton Jr, MD    PROBLEM:  Anemia, acute on chronic.    HISTORY:  This is a woman who is currently 80 years of age with a complicated story.  She is known to me over time.  She was last seen in April of last year.  She had been evaluated endoscopically in the past in , , again in 2018.  Colonoscopies are unremarkable.  She has a history of compensated cirrhosis and it has been attributed to a history of chronic congestive heart failure based on the absence of an alternative explanation and extensive history of rheumatic heart disease.  When she was seen back in  last , the issue was anemia and a positive Cologuard.  In 2023, she was hospitalized for congestive heart failure and stools were Hemoccult negative.  In 2021, her hemoglobin was 9.6.  In May 2022, it was 10.6, and in 2023, it was 11.4.  During her hospitalization in 2023, her hemoglobin was 12.6.  In 2023, it was 9.8.  Iron studies were not diagnostic of iron deficiency.  She has been on warfarin for years because of mechanical heart valve x2 and her INR is closely watched, but frequently slips above the desired range.  She has had no recognized bleeding.  At that time, she underwent an endoscopic re-evaluation of the upper and lower tracts and there were no findings.    On this occasion, she came to the hospital not because of any concerns about bleeding, but she does acknowledge that over the last several weeks she has been feeling more tired.  
      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 (1945) female with a history of HFrEF, most recent EF:  Lab Results   Component Value Date    LVEF 32 05/10/2022       Patient was seen on initial consultation on 02/28/2025. She has no further questions and will follow up with Dr Fajardo after discharge.     Barriers identified during consult contributing to HF Hospitalization:  [] Limited medication adherence   [] Poor health literacy, education regarding HF medications provided   [] Pill box provided to patient  [] Difficulty affording medications  [] Difficulty obtaining/ managing medications  [] Prescription assistance information given     [] Not weighing themselves daily  [x] Weight log provided for easy monitoring  [] Scale provided     [] Not following low sodium diet  [] Food insecurity   [x] 2 gram sodium diet education provided   [] Low sodium recipes provided  [] Sodium free seasoning provided   [] Low sodium meal delivery options given to patient  [] Dietician consulted     [] Lack of transportation to appointments     [] Depression, given chronic illness  [] Primary team notified     [] Goals of care need addressed  [] Palliative care consulted     [] CHF CHW consulted, to assist with       Chart Reviewed:  Diet: ADULT DIET; Regular; Low Sodium (2 gm)   Daily Weights: Patient Vitals for the past 96 hrs (Last 3 readings):   Weight   03/04/25 0412 51.6 kg (113 lb 11.2 oz)   03/03/25 0600 50.8 kg (112 lb)   03/02/25 0600 50.8 kg (112 lb)     I/O: No intake or output data in the 24 hours ending 03/04/25 0934    [] Nursing staff/manager notified of inaccurate tompkins weights or I/O      Discharge Plan:  Above identified barriers reviewed and needs addressed    Patient/family educated on daily monitoring tools for CHF, made aware of signs and symptoms of worsening HF and to notify provider immediately 
      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 She was engaged and asked appropriate questions throughout the education session. She is feeling a little better today.     Barriers identified during consult contributing to HF Hospitalization:  [] Limited medication adherence   [] Poor health literacy, education regarding HF medications provided   [] Pill box provided to patient  [] Difficulty affording medications  [] Difficulty obtaining/ managing medications  [] Prescription assistance information given     [x] Not weighing themselves daily  [x] Weight log provided for easy monitoring  [] Scale provided     [x] Not following low sodium diet  [] Food insecurity   [x] 2 gram sodium diet education provided   [] Low sodium recipes provided  [] Sodium free seasoning provided   [] Low sodium meal delivery options given to patient  [] Dietician consulted     [] Lack of transportation to appointments     [] Depression, given chronic illness  [] Primary team notified     [] Goals of care need addressed  [] Palliative care consulted     [] CHF CHW consulted, to assist with     Chart Reviewed:  Diet: ADULT DIET; Regular; Low Sodium (2 gm)   Daily Weights: Patient Vitals for the past 96 hrs (Last 3 readings):   Weight   02/28/25 0415 45.6 kg (100 lb 8 oz)     I/O:   Intake/Output Summary (Last 24 hours) at 2/28/2025 1312  Last data filed at 2/28/2025 0933  Gross per 24 hour   Intake 0 ml   Output --   Net 0 ml       [] Nursing staff/manager notified of inaccurate tompkins weights or I/O        Discharge Plan:  Above identified barriers reviewed and needs addressed    Patient/family educated 
Department of Orthopedic Surgery  Attending Consult Note          Reason for Consult:  Left arm swelling    HISTORY OF PRESENT ILLNESS:       Patient is a 80 y.o. female complaining of left arm swelling.  Patient has been having left arm swelling going on for the last week.  Patient states no discomfort over the region.  Patient able to flex and extend the elbow with no discomfort.  Mild bruising and redness over the lateral aspect the arm, minimal tenderness over the region.  Patient denies any trauma to the region.      Past Medical History:        Diagnosis Date    A-fib (HCC)     Acute exacerbation of CHF (congestive heart failure) (HCC) 07/23/2015    alpha one neg PiM >34uM    Acute renal failure 10/27/2011    Aneurysm     thoracic aortic    Aneurysm of ascending aorta without rupture     stable ,small . follows with Dr Limon last seen 1/2024    CAD (coronary artery disease)     follows with Dr Fajardo    CRF (chronic renal failure)     Epistaxis 06/26/2014    Gastrointestinal bleeding, lower 09/23/2012    H/O anemia of chronic disorder 09/23/2012    Hypertension     Thyroid disease      Past Surgical History:        Procedure Laterality Date    AORTIC VALVULOPLASTY      1992    CARDIAC DEFIBRILLATOR PLACEMENT Left 05/09/2022    [employer] CRT-D  (Dr. Vega)    CARDIAC SURGERY      COLONOSCOPY      COLONOSCOPY  4/23/2024    COLONOSCOPY CONTROL HEMORRHAGE WITH CLIPPING performed by Wolf Jenkins MD at [facility] ENDOSCOPY    HERNIA MESH REMOVAL      HERNIA REPAIR Right 01/05/2023    RIGHT FEMORAL HERNIA  REPAIR WITH MESH performed by Cindi Garcia MD at [facility] OR    MITRAL VALVULOPLASTY      1992    TRICUSPID VALVULOPLASTY      2011    UPPER GASTROINTESTINAL ENDOSCOPY      UPPER GASTROINTESTINAL ENDOSCOPY  4/23/2024    ESOPHAGOGASTRODUODENOSCOPY performed by Wolf Jenkins MD at [facility] ENDOSCOPY    UPPER GASTROINTESTINAL ENDOSCOPY N/A 3/3/2025    ESOPHAGOGASTRODUODENOSCOPY performed by Wolf Jenkins MD 
Nephrology Consult Note  Patient's Name: Josefina Garcia  9:21 AM  2/28/2025    Nephrologist:     Reason for Consult:  CKD  Requesting Physician: Dr. ASHLY Lam    Chief Complaint:  L ARM SWELLING, ANEMIA    History Obtained From:  patient and past medical records    History of Present Ilness:    Josefina Garcia is a 80 y.o. female with prior history CKD 3B with A1 e-GFR=36-42ml/min with a baseline serum cr 1.24-1.42mg/dl presumed sec to microvascular disease due to HTN, ASVD, Valvular heart diease. She states she developed edema around the elbow area on the L arm and her  was concerned as it was on te L it was heart related and had her come to ED. She had no CP or increase SOB. In the ED HgB 7.7 and dropped to 7.1mg/dl. She has a Hx of Chronic Fe++ Def Anemia and has followed with Heme/Onc for SCOTT and IV Fe++. It was noted her cr was 2.0 mg/dl on admission and 1.8mg/dl today. She denies any urinary tract symptoms    Past Medical History:   Diagnosis Date    A-fib (HCC)     Acute exacerbation of CHF (congestive heart failure) (HCC) 07/23/2015    alpha one neg PiM >34uM    Acute renal failure 10/27/2011    Aneurysm     thoracic aortic    Aneurysm of ascending aorta without rupture     stable ,small . follows with Dr Limon last seen 1/2024    CAD (coronary artery disease)     follows with Dr Fajardo    CRF (chronic renal failure)     Epistaxis 06/26/2014    Gastrointestinal bleeding, lower 09/23/2012    H/O anemia of chronic disorder 09/23/2012    Hypertension     Thyroid disease        Past Surgical History:   Procedure Laterality Date    AORTIC VALVULOPLASTY      1992    CARDIAC DEFIBRILLATOR PLACEMENT Left 05/09/2022    [employer] CRT-D  (Dr. Vega)    CARDIAC SURGERY      COLONOSCOPY      COLONOSCOPY  4/23/2024    COLONOSCOPY CONTROL HEMORRHAGE WITH CLIPPING performed by Wolf Jenkins MD at [facility] ENDOSCOPY    HERNIA MESH REMOVAL      HERNIA REPAIR Right 01/05/2023    RIGHT FEMORAL HERNIA  REPAIR 
Patient's chart updated to reflect:      .    - HF care plan, HF education points and HF discharge instructions.  -Orders: 2 gram sodium diet, daily weights, I/O.  -PCP or cardiology follow up appointments to be scheduled within 7 days of hospital discharge.  -CHF education session will be provided to the patient prior to hospital discharge.    Angelica Singer RN   Heart Failure Navigator   
heart failure (HCC) 05/03/2022    Anemia 02/28/2025    Acute on chronic clinical systolic heart failure ([facility]) 12/27/2024    Supratherapeutic INR 12/20/2023    History of mitral valve replacement with mechanical valve 12/20/2023    History of mechanical aortic valve replacement 12/20/2023    Chronic renal insufficiency, stage III (moderate) ([facility]) 12/20/2023    Acute on chronic congestive heart failure, unspecified heart failure type ([facility]) 12/15/2023    Acute on chronic systolic CHF (congestive heart failure) ([facility]) 12/06/2023    CAD (coronary artery disease) 09/02/2023    Cellulitis of right hand 09/01/2023    Thoracic ascending aortic aneurysm 02/01/2022    Moderate protein-calorie malnutrition 12/03/2021    Atrial fibrillation with RVR ([facility]) 11/28/2021    HFrEF (heart failure with reduced ejection fraction) ([facility]) 11/28/2021    Pulmonary edema 11/28/2021    Bilateral carotid artery stenosis 05/11/2021    Abrasion 02/22/2021    Coagulopathy 02/22/2021    Absolute anemia 02/22/2021    Red blood cell antibody positive 06/25/2019    Herpes zoster 09/25/2016    Acute on chronic systolic and diastolic heart failure, NYHA class 3 ([facility]) 09/24/2016    CHF exacerbation ([facility]) 07/23/2015    Atrial fibrillation ([facility]) 07/23/2015    Acute kidney injury superimposed on CKD 10/27/2011    Hypertension 10/27/2011    Hypothyroid 10/27/2011    Valvular heart disease 10/27/2011        Past Surgical History:   Procedure Laterality Date    AORTIC VALVULOPLASTY      1992    CARDIAC DEFIBRILLATOR PLACEMENT Left 05/09/2022    [employer] CRT-D  (Dr. Vega)    CARDIAC SURGERY      COLONOSCOPY      COLONOSCOPY  4/23/2024    COLONOSCOPY CONTROL HEMORRHAGE WITH CLIPPING performed by Wolf Jenkins MD at [facility] ENDOSCOPY    HERNIA MESH REMOVAL      HERNIA REPAIR Right 01/05/2023    RIGHT FEMORAL HERNIA  REPAIR WITH MESH performed by Cindi Garcia MD at [facility] [place]    MITRAL VALVULOPLASTY      1992    TRICUSPID VALVULOPLASTY      2011    UPPER 
more than 55 minutes face to face with Josefina Garcia,and reviewing notes and laboratory data with greater than 50% of this time instructing and counseling the patient regarding my findings and recommendations and I have answered all questions as posed to me by Ms. Garcia.  And her family members at her bedside thank you, Nicolás Lam DO and Teofilo Dutton Jr., MD for allowing me to consult in the care of this patient.    Rick Fajardo,  DO, FACP, FACC, [facility]

## 2025-03-07 NOTE — PROGRESS NOTES
Physician Progress Note      PATIENT:               RULA GUZMAN  [facility] #:                  [id number]  :                       1945  ADMIT DATE:       2025 4:16 AM  DISCH DATE:        3/6/2025 4:19 PM  RESPONDING  PROVIDER #:        Nicolás RUSH DO          QUERY TEXT:    Patient admitted with acute on chronic blood loss. Documentation in IM   progress notes last dated 3/6 reflects edema LUE erythema concerning for   cellulitis.  If possible, please document in the progress notes and discharge   summary if cellulitis was:    The medical record reflects the following:  Risk Factors: Effusion left elbow.  Clinical Indicators: Per IM documentation, Edema CANDACE ? cellulitis LUE, X-ray   noting effusion of the left elbow. US LUE negative. Noted to have some   increased edema and erythema 3/3 -- concerning for cellulitis.  Treatment: Keflex 250 mg PO q 8 hr.  Options provided:  -- Cellulitis LUE confirmed after study  -- Cellulitis LUE ruled out after study, edema only  -- Other - I will add my own diagnosis  -- Disagree - Not applicable / Not valid  -- Disagree - Clinically unable to determine / Unknown  -- Refer to Clinical Documentation Reviewer    PROVIDER RESPONSE TEXT:    Cellulitis LUE confirmed after study.    Query created by: Poornima Mcdonald on 3/6/2025 2:21 PM      Electronically signed by:  Nicolás RUSH DO 3/6/2025 9:14 PM          
24 hour chart check complete    
24 hour chart check complete    
4 Eyes Skin Assessment     NAME:  Josefina Garcia  YOB: 1945  MEDICAL RECORD NUMBER:  [id number]    The patient is being assessed for  {Reason for Assessment:49965}    I agree that at least one RN has performed a thorough Head to Toe Skin Assessment on the patient. ALL assessment sites listed below have been assessed.      Areas assessed by both nurses:    Head, Face, Ears, Shoulders, Back, Chest, Arms, Elbows, Hands, Sacrum. Buttock, Coccyx, Ischium, Legs. Feet and Heels, and Under Medical Devices         Does the Patient have a Wound? No noted wound(s)       Abhi Prevention initiated by RN: No  Wound Care Orders initiated by RN: No    Pressure Injury (Stage 3,4, Unstageable, DTI, NWPT, and Complex wounds) if present, place Wound referral order by RN under : No    New Ostomies, if present place, Ostomy referral order under : No     Nurse 1 eSignature: Electronically signed by Sparkle Rocha RN on 2/28/25 at 7:57 AM EST    **SHARE this note so that the co-signing nurse can place an eSignature**    Nurse 2 eSignature: {Esignature:[id number]}    
Admission chart check complete.     
Attempted to call regarding [facility] anesthesiology regarding wrong note put in on pt for R hernia femoral repair, possible mesh   
[facility]  Hematology/Oncology  Consult      Patient Name: Josefina Garcia  YOB: 1945  PCP: Teofilo Dutton Jr., MD   Referring Provider: [address] [facility] / [place] [id number]     Reason for Consultation: No chief complaint on file.       History of Present Illness: This is an 80-year-old female patient with a PMH of anemia. 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 Last iron panel appropriate, no need for further iron currently. Hgb 12.6. WBC and platelets remain WNL. Aranesp only with Hgb < 10 which was held at last visit 1/10/25 as her Hgb was above 10. Continues on oral iron and folate, refill PRN S/p Defibrillator placement. Follows with Dr. Fajardo with cardiology. Hernia and reportedly cirrhosis. Follows with Dr. Burdick. Patient presented to the ED for evaluation of left arm swelling doppler pending in progress. Hgb was 7.7 on admission.  Heme occult stool positive. GI consulted with no indication for colonoscopy normal 2018 and 2024. Nephrology consulted with stable kidney function. Iron profile pending, b12, folate pending. Consultation for acute on chronic anemia.     Review of systems: Over 10 systems were reviewed and all were negative except as mention above.      Diagnostic Data:     Past Medical History:   Diagnosis Date    A-fib (HCC)     Acute exacerbation of CHF (congestive heart failure) (HCC) 07/23/2015    alpha one neg PiM >34uM    Acute renal failure 10/27/2011    Aneurysm     thoracic aortic    Aneurysm of ascending aorta without rupture     stable ,small . follows with Dr Limon last seen 1/2024    CAD (coronary artery disease)     follows with Dr Fajardo    CRF (chronic renal failure)     Epistaxis 06/26/2014    Gastrointestinal bleeding, lower 09/23/2012    H/O anemia of chronic disorder 09/23/2012    Hypertension     Thyroid disease        Patient Active Problem List    Diagnosis Date Noted    Acute on chronic right-sided congestive 
CLINICAL PHARMACY NOTE: MEDS TO BEDS    Total # of Prescriptions Filled: 1   The following medications were delivered to the patient:  Cephalexin 250    Additional Documentation:   
Call received from [facility] stating they did not receive consult yesterday. New consult placed to Dr Hu.   
Called OR circulator regarding trying to reach anesthesia Dr Adkins due to incorrect pt note being placed in chart.  
Called blood bank regarding pt's PRBC. PRBC has to be sent for testing [place] per blood bank dept. Floor will be updated once they're ready.  
Consent for blood transfusion obtained and type and screen sent to lab.   
Dr Arredondo's answering service notified of consult put out yesterday, but patient has not been seen.   
[facility]  
Dr Lam notified of patient's low BP of 86/46 this morning. Orders received to hold Bumex.     
EGD and colonoscopy 10 months ago negative  Mechanical valves x 2 and long term warfarin and long term Fe  2 BM a day, dark always but not melena  Came in for unrelated issues  Follows hematology and nephrology  Occ parenteral iron    BUN/ Cr noted  Hgb with normal MCV 7.7 to 7.1 with hydration  INR 3.8    FEDERICO soft nonmelenic stool Heme positive    No indication for colonoscopy normal 2018 and 2024  Consider but WCE needed but EGD has defibrillator and not available in house  Will feed  
Hct stable  Discharge imminent  In anticipation of OP capsule endoscopy, will get SBBT which is requirement at  to proceed.  
Hemoc consult placed  
Internal Medicine Progress Note    Patient's name: Josefina Garcia  : 1945  Admission date: 2025  Date of service: 3/1/2025   Room: [place] [address]  Primary care physician: Teofilo Dutton Jr., MD    Subjective  Josefina was seen and examined at bedside. Her  is at the bedside. She is awake, alert, NAD. Awaiting blood transfusion for Hgb of 6.2. No other issues reported by nursing staff.    Review of Systems  There are no new complaints of chest pain, shortness of breath, abdominal pain, nausea, vomiting, diarrhea, constipation.    Hospital Medications  Current Facility-Administered Medications   Medication Dose Route Frequency Provider Last Rate Last Admin    allopurinol (ZYLOPRIM) tablet 100 mg  100 mg Oral Daily Isaura Guerin APRN - CNP   100 mg at 25 0819    [Held by provider] bumetanide (BUMEX) tablet 2 mg  2 mg Oral Daily Isaura Guerin APRN - CNP        digoxin (LANOXIN) tablet 62.5 mcg  62.5 mcg Oral Once per day on  Isaura Guerin APRN - CNP   62.5 mcg at 25 1651    [Held by provider] empagliflozin (JARDIANCE) tablet 10 mg  10 mg Oral Daily Isaura Guerin APRN - CNP   10 mg at 25 0811    [Held by provider] ferrous sulfate (IRON 325) tablet 325 mg  325 mg Oral BID Isaura Guerin APRN - CNP        levothyroxine (SYNTHROID) tablet 125 mcg  125 mcg Oral Daily Isaura Guerin APRN - CNP   125 mcg at 25 0559    metoprolol succinate (TOPROL XL) extended release tablet 25 mg  25 mg Oral Daily Isaura Guerin APRN - CNP        [Held by provider] sacubitril-valsartan (ENTRESTO) 24-26 MG per tablet 0.5 tablet  0.5 tablet Oral BID Isaura Guerin APRN - CNP        sodium chloride flush 0.9 % injection 10 mL  10 mL IntraVENous 2 times per day Isaura Guerin APRN - CNP   10 mL at 25 0812    sodium chloride flush 0.9 % injection 10 mL  10 mL IntraVENous PRN Isaura Guerin, APRN - CNP   10 mL at 25 1940    
Internal Medicine Progress Note    Patient's name: Josefina Garcia  : 1945  Chief complaints (on day of admission): left arm swelling  Admission date: 2025  Date of service: 3/4/2025   Room: [address] INTERMEDIATE  Primary care physician: Teofilo Dutton Jr., MD  Reason for visit: Follow-up for anemia    Subjective  Josefina is seen lying in bed asleep, in no distress. She does easily awaken to voice. She reports being a little tired this morning. She had some shortness of breath last evening but is feeling better now. Still with increased swelling and erythema in the left elbow, denies any pain or discomfort. She denies any fever or chills. Denies any nausea or vomiting. EGD completed yesterday with no evidence of bleeding. She is asking how much longer she needs to stay in the hospital. No other issues or concerns from nursing.    Review of Systems  Full 10 point review of systems negative unless mentioned above.    Hospital Medications  Current Facility-Administered Medications   Medication Dose Route Frequency Provider Last Rate Last Admin    cephALEXin (KEFLEX) capsule 250 mg  250 mg Oral 4 times per day Franchesca Lentz APRN - CNP   250 mg at 25 0630    ferric gluconate (FERRLECIT) 125 mg in sodium chloride 0.9 % 100 mL IVPB  125 mg IntraVENous Once per day on  Marli Box MD        allopurinol (ZYLOPRIM) tablet 100 mg  100 mg Oral Daily Isaura Guerin APRN - CNP   100 mg at 25 0824    bumetanide (BUMEX) tablet 2 mg  2 mg Oral Daily GreenwoodMariama MD   2 mg at 25 2130    digoxin (LANOXIN) tablet 62.5 mcg  62.5 mcg Oral Once per day on  Isaura Guerin APRN - CNP   62.5 mcg at 25 1822    [Held by provider] empagliflozin (JARDIANCE) tablet 10 mg  10 mg Oral Daily Isaura Guerin APRN - CNP   10 mg at 25 0811    [Held by provider] ferrous sulfate (IRON 325) tablet 325 mg  325 mg Oral BID Isaura Guerin APRN - 
Internal Medicine Progress Note    Patient's name: Joseifna Garcia  : 1945  Admission date: 2025  Date of service: 3/2/2025   Room: [address]  Primary care physician: Teofilo Dutton Jr., MD    Subjective  Josefina was seen and examined at bedside. She had 1 u pRBC yesterday.   Waiting on next steps.  Confused on what testing can be done here.    Review of Systems  There are no new complaints of chest pain, shortness of breath, abdominal pain, nausea, vomiting, diarrhea, constipation.    Hospital Medications  Current Facility-Administered Medications   Medication Dose Route Frequency Provider Last Rate Last Admin    warfarin (COUMADIN) tablet 2 mg  2 mg Oral Once Nicolás Lam DO        allopurinol (ZYLOPRIM) tablet 100 mg  100 mg Oral Daily Isaura Guerin APRN - CNP   100 mg at 25 1000    [Held by provider] bumetanide (BUMEX) tablet 2 mg  2 mg Oral Daily Isaura Guerin APRN - CNP        digoxin (LANOXIN) tablet 62.5 mcg  62.5 mcg Oral Once per day on  Isaura Guerin APRN - CNP   62.5 mcg at 25 1651    [Held by provider] empagliflozin (JARDIANCE) tablet 10 mg  10 mg Oral Daily Isaura Guerin APRN - CNP   10 mg at 25 0811    [Held by provider] ferrous sulfate (IRON 325) tablet 325 mg  325 mg Oral BID Isaura Guerin APRN - CNP        levothyroxine (SYNTHROID) tablet 125 mcg  125 mcg Oral Daily Isaura Guerin APRN - CNP   125 mcg at 25 0608    metoprolol succinate (TOPROL XL) extended release tablet 25 mg  25 mg Oral Daily Isaura Guerin APRN - CNP   25 mg at 25 1000    [Held by provider] sacubitril-valsartan (ENTRESTO) 24-26 MG per tablet 0.5 tablet  0.5 tablet Oral BID Isaura Guerin APRN - CNP        sodium chloride flush 0.9 % injection 10 mL  10 mL IntraVENous 2 times per day Isaura Guerin APRN - CNP   10 mL at 25 1000    sodium chloride flush 0.9 % injection 10 mL  10 mL IntraVENous PRN 
Isaura BAINS notified of new asymptomatic hypotension, 80/40 manually.   
Message left for Isaura BAINS regarding Hgb 6.5.  
Message sent to Isaura BAINS. Patient still with minimal output since administration of IV bumex. Patient is not retaining. She does have crackles and exertional dyspnea, with progressive peripheral edema since yesterday. She is still on room air. Questioned for possible albumin?    Message also sent to nephrology for their recommendations.   
Messaged Dr Lam regarding pts low bps   
NM bleeding scan negative: no surprise  Hct stable  INR 2.7   at bedside  Will proceed with EGD  
Nephrology Progress Note  Patient's Name: Josefina Garcia  1:58 PM  3/4/2025    Nephrologist:     Reason for Consult:  CKD  Requesting Physician: Dr. ASHLY Lam    Chief Complaint:  L ARM SWELLING, ANEMIA    History Obtained From:  patient and past medical records    History of Present Ilness from the 2/28/25 note:    Josefina Garcia is a 80 y.o. female with prior history CKD 3B with A1 e-GFR=36-42ml/min with a baseline serum cr 1.24-1.42mg/dl presumed sec to microvascular disease due to HTN, ASVD, Valvular heart diease. She states she developed edema around the elbow area on the L arm and her  was concerned as it was on te L it was heart related and had her come to ED. She had no CP or increase SOB. In the ED HgB 7.7 and dropped to 7.1mg/dl. She has a Hx of Chronic Fe++ Def Anemia and has followed with Heme/Onc for SCOTT and IV Fe++. It was noted her cr was 2.0 mg/dl on admission and 1.8mg/dl today. She denies any urinary tract symptoms    INTERVAL HX:    3/4/25: Pt awake alert and up in bed. No CP or SOB, no Hx of lightheaded or dizziness, continued edema of the LUE >RUE with inner arm erythema    Past Medical History:   Diagnosis Date    A-fib (HCC)     Acute exacerbation of CHF (congestive heart failure) ([facility]) 07/23/2015    alpha one neg PiM >34uM    Acute renal failure 10/27/2011    Aneurysm     thoracic aortic    Aneurysm of ascending aorta without rupture     stable ,small . follows with Dr Limon last seen 1/2024    CAD (coronary artery disease)     follows with Dr Fajardo    CRF (chronic renal failure)     Epistaxis 06/26/2014    Gastrointestinal bleeding, lower 09/23/2012    H/O anemia of chronic disorder 09/23/2012    Hypertension     Thyroid disease        Past Surgical History:   Procedure Laterality Date    AORTIC VALVULOPLASTY      1992    CARDIAC DEFIBRILLATOR PLACEMENT Left 05/09/2022    [employer] CRT-D  (Dr. Vega)    CARDIAC SURGERY      COLONOSCOPY      COLONOSCOPY  4/23/2024    
Nephrology Progress Note  Patient's Name: Josefina Garcia  3:30 PM  3/3/2025    Nephrologist:     Reason for Consult:  CKD  Requesting Physician: Dr. ASHLY Lam    Chief Complaint:  L ARM SWELLING, ANEMIA    History Obtained From:  patient and past medical records    History of Present Ilness from the 2/28/25 note:    Josefina Garcia is a 80 y.o. female with prior history CKD 3B with A1 e-GFR=36-42ml/min with a baseline serum cr 1.24-1.42mg/dl presumed sec to microvascular disease due to HTN, ASVD, Valvular heart diease. She states she developed edema around the elbow area on the L arm and her  was concerned as it was on te L it was heart related and had her come to ED. She had no CP or increase SOB. In the ED HgB 7.7 and dropped to 7.1mg/dl. She has a Hx of Chronic Fe++ Def Anemia and has followed with Heme/Onc for SCOTT and IV Fe++. It was noted her cr was 2.0 mg/dl on admission and 1.8mg/dl today. She denies any urinary tract symptoms    INTERVAL HX:    3/3/25: Pt awake alert and up in bed. No CP or SOB, continued edema of the LUE >RUE with inner arm erythema    Past Medical History:   Diagnosis Date    A-fib (HCC)     Acute exacerbation of CHF (congestive heart failure) ([facility]) 07/23/2015    alpha one neg PiM >34uM    Acute renal failure 10/27/2011    Aneurysm     thoracic aortic    Aneurysm of ascending aorta without rupture     stable ,small . follows with Dr Limon last seen 1/2024    CAD (coronary artery disease)     follows with Dr Fajardo    CRF (chronic renal failure)     Epistaxis 06/26/2014    Gastrointestinal bleeding, lower 09/23/2012    H/O anemia of chronic disorder 09/23/2012    Hypertension     Thyroid disease        Past Surgical History:   Procedure Laterality Date    AORTIC VALVULOPLASTY      1992    CARDIAC DEFIBRILLATOR PLACEMENT Left 05/09/2022    [employer] CRT-D  (Dr. Vega)    CARDIAC SURGERY      COLONOSCOPY      COLONOSCOPY  4/23/2024    COLONOSCOPY CONTROL HEMORRHAGE WITH 
Nephrology and GI consults placed.   
New consult placed w ortho surg     Ortho resident stated Dr Arredondo is on call. Attempted to call, message left w answering service   
Notified Dr Genaro mcgowan did not receive consult from yesterday and new consult was placed.   
Notified Dr Lam of lactic acid results.   
Notified Dr Lam regarding family concern that patient did not receive IV Bumex dose today. Orders received to give 1x dose of Bumex.   
Notified Dr. Wolfe of patient still reporting significant dyspnea and minimal output post PO Bumex administration.   
PROGRESS NOTE       PATIENT PROBLEM LIST:  Patient Active Problem List   Diagnosis Code    Acute kidney injury superimposed on CKD N17.9, N18.9    Hypertension I10    Hypothyroid E03.9    Valvular heart disease I38    CHF exacerbation ([facility]) I50.9    Atrial fibrillation ([facility]) I48.91    Herpes zoster B02.9    Acute on chronic systolic and diastolic heart failure, NYHA class 3 ([facility]) I50.43    Red blood cell antibody positive R76.8    Abrasion T14.8XXA    Coagulopathy D68.9    Absolute anemia D64.9    Bilateral carotid artery stenosis I65.23    Atrial fibrillation with RVR ([facility]) I48.91    HFrEF (heart failure with reduced ejection fraction) ([facility]) I50.20    Pulmonary edema J81.1    Moderate protein-calorie malnutrition E44.0    Thoracic ascending aortic aneurysm I71.21    Acute on chronic right-sided congestive heart failure ([facility]) I50.813    Cellulitis of right hand L03.113    CAD (coronary artery disease) I25.10    Acute on chronic systolic CHF (congestive heart failure) ([facility]) I50.23    Acute on chronic congestive heart failure, unspecified heart failure type ([facility]) I50.9    Supratherapeutic INR R79.1    History of mitral valve replacement with mechanical valve Z95.2    History of mechanical aortic valve replacement Z95.2    Chronic renal insufficiency, stage III (moderate) ([facility]) N18.30    Acute on chronic clinical systolic heart failure ([facility]) I50.23    Anemia D64.9       SUBJECTIVE:  Josefina Garcia has informed me that she is going for another endoscopy with the hope of seeking etiology of her bleeding.  Her LD is elevated at 438 units/L.  Upper limits 214.  REVIEW OF SYSTEMS:  General ROS: negative for - fatigue, malaise,  weight gain or weight loss  Psychological ROS: negative for - anxiety , depression  Ophthalmic ROS: negative for - decreased vision or visual distortion.  ENT ROS: negative  Allergy and Immunology ROS: negative  Hematological and Lymphatic ROS: negative  Endocrine: no heat or cold intolerance 
Patient educated and instructed on use of incentive spirometer. Patient provided teach back.   
Peripheral IV placed via ultrasound at approximately 1800 this afternoon infiltrated while infusing normal saline at 75 ml/hr. Small swelling and redness at site. No IV team available this evening, message left with CNM requesting assistance with new line insertion.   
Pharmacy Consultation Note  (Warfarin Dosing and Monitoring)    Initial consult date: 2/28/2025  Consulting Provider: Dr. Nicolás JENNINGS Jose is a 80 y.o. female for whom pharmacy has been asked to manage warfarin therapy.     Weight:   Wt Readings from Last 1 Encounters:   02/28/25 45.6 kg (100 lb 8 oz)       TSH:    Lab Results   Component Value Date/Time    TSH 0.31 12/31/2024 05:13 AM       Hepatic Function Panel:                            Lab Results   Component Value Date/Time    ALKPHOS 53 02/28/2025 06:27 AM    ALT 13 02/28/2025 06:27 AM    AST 22 02/28/2025 06:27 AM    BILITOT 0.5 02/28/2025 06:27 AM    BILIDIR <0.2 09/25/2016 10:07 AM    IBILI 0.7 09/25/2016 10:07 AM       Current significant warfarin drug-drug interactions include: No significant interactions    Recent Labs     02/27/25  2115 02/28/25  0627 02/28/25  1210   HGB 7.7* 7.1* 7.2*    145  --      Date Warfarin Dose INR Heparin or LMWH Comment   2/28 HOLD 3.8 --                                  Assessment:  Patient is a 80 y.o. female on warfarin for Atrial Fibrillation, Mechanical Heart Valve (mitral), and Mechanical Heart Valve (atrial).  Patient's home warfarin dosing regimen is documented as warfarin 1 mg on Tuesdays and Fridays and warfarin 2 mg all other days.   Goal INR 2.5 - 3.5  INR 3.8 today    Plan:  Will hold warfarin tonight as INR supratherapeutic  Daily PT/INR until the INR is stable within the therapeutic range  Pharmacist will follow and monitor/adjust dosing as necessary    Douglas Romero, PharmD, BCCCP  2/28/2025  3:19 PM    JUAN: [phone]  SEY: [phone]  SJW: [phone]  
Pharmacy Consultation Note  (Warfarin Dosing and Monitoring)    Initial consult date: 2/28/2025  Consulting Provider: Dr. Nicolás JENNINGS Jose is a 80 y.o. female for whom pharmacy has been asked to manage warfarin therapy.     Weight:   Wt Readings from Last 1 Encounters:   03/01/25 41.4 kg (91 lb 4.8 oz)       TSH:    Lab Results   Component Value Date/Time    TSH 0.31 12/31/2024 05:13 AM       Hepatic Function Panel:                            Lab Results   Component Value Date/Time    ALKPHOS 47 03/01/2025 04:00 AM    ALT 10 03/01/2025 04:00 AM    AST 22 03/01/2025 04:00 AM    BILITOT 0.6 03/01/2025 04:00 AM    BILIDIR <0.2 09/25/2016 10:07 AM    IBILI 0.7 09/25/2016 10:07 AM       Current significant warfarin drug-drug interactions include:   -Allopurinol: may SIGNIFICANTLY enhance anticoagulatory effect of warfarin  -Levothyroxine: may enhance anticoagulatory effect of warfarin    Recent Labs     02/28/25  0627 02/28/25  1210 02/28/25  1500 02/28/25  2250 03/01/25  0400   HGB 7.1*   < > 7.0* 6.5* 6.2*     --  173  --  140    < > = values in this interval not displayed.     Date Warfarin Dose INR Heparin or LMWH Comment   2/28 HOLD 3.8 x    3/1 2 mg 3.1 x                           Assessment:  Patient is a 80 y.o. female on warfarin for Atrial Fibrillation, Mechanical Heart Valve (mitral), and Mechanical Heart Valve (atrial).  Patient's home warfarin dosing regimen is documented as warfarin 1 mg on Tuesdays and Fridays and warfarin 2 mg all other days.   Goal INR 2.5 - 3.5  INR 3.1 today    Plan:  Will resume home dose of warfarin tonight  Warfarin 2 mg po x 1 tonight.  Daily PT/INR until the INR is stable within the therapeutic range  Pharmacist will follow and monitor/adjust dosing as necessary    Dorian Mayo RPh  3/1/2025  9:35 AM    SEB: [phone]  SEY: [phone]  SJW: [phone]  
Pharmacy Consultation Note  (Warfarin Dosing and Monitoring)    Initial consult date: 2/28/2025  Consulting Provider: Dr. Nicolás JENNINGS Jose is a 80 y.o. female for whom pharmacy has been asked to manage warfarin therapy.     Weight:   Wt Readings from Last 1 Encounters:   03/03/25 50.8 kg (112 lb)       TSH:    Lab Results   Component Value Date/Time    TSH 0.31 12/31/2024 05:13 AM       Hepatic Function Panel:                            Lab Results   Component Value Date/Time    ALKPHOS 63 03/03/2025 06:20 AM    ALT 16 03/03/2025 06:20 AM    AST 28 03/03/2025 06:20 AM    BILITOT 1.2 03/03/2025 06:20 AM    BILIDIR <0.2 09/25/2016 10:07 AM    IBILI 0.7 09/25/2016 10:07 AM       Current significant warfarin drug-drug interactions include:   -Allopurinol: may SIGNIFICANTLY enhance anticoagulatory effect of warfarin  -Levothyroxine: may enhance anticoagulatory effect of warfarin    Recent Labs     02/28/25  1500 02/28/25  2250 03/01/25  0400 03/01/25  1803 03/02/25  1354 03/03/25  0015 03/03/25  0620   HGB 7.0*   < > 6.2*   < > 8.3* 8.0* 8.6*     --  140  --   --   --  162    < > = values in this interval not displayed.     Date Warfarin Dose INR Heparin or LMWH Comment   2/28 HOLD 3.8 x    3/1 2 mg 3.1 x    3/2 2 mg 2.7 x    3/3 1 mg 2.7 x             Assessment:  Patient is a 80 y.o. female on warfarin for Atrial Fibrillation, Mechanical Heart Valve (mitral), and Mechanical Heart Valve (atrial).  Patient's home warfarin dosing regimen is documented as warfarin 1 mg on Tuesdays and Fridays and warfarin 2 mg all other days.   Goal INR 2.5 - 3.5  INR 2.7 today    Plan:  Will give warfarin 1 mg tonight  Will continue to monitor hemoglobin  Daily PT/INR until the INR is stable within the therapeutic range  Pharmacist will follow and monitor/adjust dosing as necessary    Douglas Romero, PharmALBA, BCCCP  3/3/2025  12:51 PM    SEB: [phone]  SEY: [phone]  [facility]: [phone]  
Pharmacy Consultation Note  (Warfarin Dosing and Monitoring)    Initial consult date: 2/28/2025  Consulting Provider: Dr. Nicolás JENNINGS Jose is a 80 y.o. female for whom pharmacy has been asked to manage warfarin therapy.     Weight:   Wt Readings from Last 1 Encounters:   03/04/25 51.6 kg (113 lb 11.2 oz)       TSH:    Lab Results   Component Value Date/Time    TSH 0.31 12/31/2024 05:13 AM       Hepatic Function Panel:                            Lab Results   Component Value Date/Time    ALKPHOS 63 03/03/2025 06:20 AM    ALT 16 03/03/2025 06:20 AM    AST 28 03/03/2025 06:20 AM    BILITOT 1.2 03/03/2025 06:20 AM    BILIDIR <0.2 09/25/2016 10:07 AM    IBILI 0.7 09/25/2016 10:07 AM       Current significant warfarin drug-drug interactions include:   -Allopurinol: may SIGNIFICANTLY enhance anticoagulatory effect of warfarin  -Levothyroxine: may enhance anticoagulatory effect of warfarin    Recent Labs     03/03/25  0015 03/03/25  0620 03/04/25  1052   HGB 8.0* 8.6* 8.1*   PLT  --  162 162     Date Warfarin Dose INR Heparin or LMWH Comment   2/28 HOLD 3.8 x    3/1 2 mg 3.1 x    3/2 2 mg 2.7 x    3/3 1 mg 2.7 x    3/4 1 mg 2.8 x                    Assessment:  Patient is a 80 y.o. female on warfarin for Atrial Fibrillation, Mechanical Heart Valve (mitral), and Mechanical Heart Valve (atrial).  Patient's home warfarin dosing regimen is documented as warfarin 1 mg on Tuesdays and Fridays and warfarin 2 mg all other days.   Goal INR 2.5 - 3.5  INR 2.8 today    Plan:  Will give warfarin 1 mg tonight  Will continue to monitor hemoglobin  Daily PT/INR until the INR is stable within the therapeutic range  Pharmacist will follow and monitor/adjust dosing as necessary    Douglas Romero, PharmD, BCCCP  3/4/2025  11:30 AM    SEB: [phone]  SEY: [phone]  SJW: [phone]  
Pharmacy Consultation Note  (Warfarin Dosing and Monitoring)    Initial consult date: 2/28/2025  Consulting Provider: Dr. Nicolás JENNINGS Jose is a 80 y.o. female for whom pharmacy has been asked to manage warfarin therapy.     Weight:   Wt Readings from Last 1 Encounters:   03/05/25 49.8 kg (109 lb 11.2 oz)       TSH:    Lab Results   Component Value Date/Time    TSH 0.31 12/31/2024 05:13 AM       Hepatic Function Panel:                          Lab Results   Component Value Date/Time    ALKPHOS 72 03/05/2025 09:50 AM    ALT 14 03/05/2025 09:50 AM    AST 27 03/05/2025 09:50 AM    BILITOT 1.0 03/05/2025 09:50 AM    BILIDIR <0.2 09/25/2016 10:07 AM    IBILI 0.7 09/25/2016 10:07 AM       Current significant warfarin drug-drug interactions include:   -Allopurinol: may SIGNIFICANTLY enhance anticoagulatory effect of warfarin  -Levothyroxine: may enhance anticoagulatory effect of warfarin    Recent Labs     03/04/25  1052 03/05/25  0724 03/05/25  0950   HGB 8.1* 8.7* 8.6*    161 165     Date Warfarin Dose INR Heparin or LMWH Comment   2/28 HOLD 3.8 x    3/1 2 mg 3.1 x    3/2 2 mg 2.7 x    3/3 1 mg 2.7 x    3/4 1 mg 2.8 x    3/5 2 mg 2.3 x             Assessment:  Patient is a 80 y.o. female on warfarin for Atrial Fibrillation, Mechanical Heart Valve (mitral), and Mechanical Heart Valve (atrial).  Patient's home warfarin dosing regimen is documented as warfarin 1 mg on Tuesdays and Fridays and warfarin 2 mg all other days.   Goal INR 2.5 - 3.5  INR 2.3 today    Plan:  Will give warfarin 2 mg tonight  Will continue to monitor hemoglobin  Daily PT/INR until the INR is stable within the therapeutic range  Pharmacist will follow and monitor/adjust dosing as necessary    Douglas Romero, PharmD, BCCCP  3/5/2025  1:52 PM    JUAN: [phone]  SEY: [phone]  SJW: [phone]  
Pharmacy Consultation Note  (Warfarin Dosing and Monitoring)    Initial consult date: 2/28/2025  Consulting Provider: Dr. Nicolás JENNINGS Jose is a 80 y.o. female for whom pharmacy has been asked to manage warfarin therapy.     Weight:   Wt Readings from Last 1 Encounters:   03/06/25 47.7 kg (105 lb 3.2 oz)       TSH:    Lab Results   Component Value Date/Time    TSH 0.31 12/31/2024 05:13 AM       Hepatic Function Panel:                          Lab Results   Component Value Date/Time    ALKPHOS 63 03/06/2025 03:35 AM    ALT 12 03/06/2025 03:35 AM    AST 24 03/06/2025 03:35 AM    BILITOT 0.8 03/06/2025 03:35 AM    BILIDIR <0.2 09/25/2016 10:07 AM    IBILI 0.7 09/25/2016 10:07 AM       Current significant warfarin drug-drug interactions include:   -Allopurinol: may SIGNIFICANTLY enhance anticoagulatory effect of warfarin  -Levothyroxine: may enhance anticoagulatory effect of warfarin    Recent Labs     03/05/25  0724 03/05/25  0950 03/06/25  0335   HGB 8.7* 8.6* 7.9*    165 149     Date Warfarin Dose INR Heparin or LMWH Comment   2/28 HOLD 3.8 x    3/1 2 mg 3.1 x    3/2 2 mg 2.7 x    3/3 1 mg 2.7 x    3/4 1 mg 2.8 x    3/5 2 mg 2.3 x    3/6 1 mg 2.7 x                    Assessment:  Patient is a 80 y.o. female on warfarin for Atrial Fibrillation, Mechanical Heart Valve (mitral), and Mechanical Heart Valve (atrial).  Patient's home warfarin dosing regimen is documented as warfarin 1 mg on Tuesdays and Fridays and warfarin 2 mg all other days.   Goal INR 2.5 - 3.5  INR 2.7 today    Plan:  Will give warfarin 1 mg tonight  Will continue to monitor hemoglobin  Daily PT/INR until the INR is stable within the therapeutic range  Pharmacist will follow and monitor/adjust dosing as necessary    Douglas Romero, PharmD, BCCCP  3/6/2025  12:22 PM    SEB: [phone]  SEY: [phone]  SJW: [phone]  
Pt's been bladder scanned has 245mL residual. Informed Dr. Box, no intervention at this point.  
Report given to 4S. CMR verified tele picture.   
The continued drop in hct represents volume expansion  This no doubt is small bowel bleeding  Warfarin mandatory  Recent EGD and colon normal  Has required parenteral iron in the past but no recent hx of transfusion requirements  Strange she came in with swollen arm but does acknowledge feeling poorly for a time leading up to this  No change in color of stools or frequency over baseline, heme positive not frankly bloody stools  Has antibodies on top of it so transfusion delayed    Will plan bleeding scan and EGD but not colonoscopy prior to decision re: wireless capsule study  
Wyattve sent to [facility] regarding patient not receiving daily dose of bumex this AM and is now wheezing and complaining of shortness of breath.   
necessary    Dorian Mayo [facility]  3/2/2025  10:39 AM    SEB: [phone]  SEY: [phone]  SJW: [phone]  
hypertension.    Left Atrium: Left atrium is moderately dilated. Left atrial volume index is severely increased (>48 mL/m2).    Right Atrium: Right atrium is moderately dilated.    Image quality is adequate.      Assessment   Active Hospital Problems    Diagnosis     Anemia [D64.9]     Supratherapeutic INR [R79.1]     History of mitral valve replacement with mechanical valve [Z95.2]     History of mechanical aortic valve replacement [Z95.2]     Chronic renal insufficiency, stage III (moderate) ([facility]) [N18.30]     CAD (coronary artery disease) [I25.10]     HFrEF (heart failure with reduced ejection fraction) ([facility]) [I50.20]     Atrial fibrillation ([facility]) [I48.91]     Hypothyroid [E03.9]     Hypertension [I10]     Valvular heart disease [I38]     Acute kidney injury superimposed on CKD [N17.9, N18.9]          Plan  Acute on Chronic Blood Loss Anemia in the setting of Probable GI Bleed  Hold oral iron for now  Coumadin unable to be held due to mechanical valves  Continue PPI  S/p NM Bleeding Scan 3/2 -- no evidence of bleeding  Currently NPO with plans for EGD 3/3  Monitor H&H -- s/p 1 unit PRBC 3/1  Transfuse to keep hemoglobin >7.0  GI and Hematology consults appreciated     Edema LUE  X-ray noting effusion of the left elbow  US LUE negative  Noted to have some increased edema and erythema this AM -- ?cellulitis vs septic bursitis  Add Keflex  Consult Orthopedic Surgery to evaluate     Acute kidney injury superimposed on CKD stage III  Baseline serum creatinine around 1.2-1.5  Serum creatinine 2.0 on admission  S/p IV fluids with improvement -- continue to hold Entresto and Jardiance  Bumex resumed 2/28  Monitor BMP  Nephrology consult appreciated     Atrial fibrillation with Secondary Hypercoagulable State/History of mechanical mitral and aortic valves  Continue Digoxin and Metoprolol  Continue Coumadin -- pharmacy to dose  Follow INR  Monitor telemetry     Chronic Systolic Congestive Heart Failure  Currently appears 
RVR ([facility]) I48.91    HFrEF (heart failure with reduced ejection fraction) ([facility]) I50.20    Pulmonary edema J81.1    Moderate protein-calorie malnutrition E44.0    Thoracic ascending aortic aneurysm I71.21    Acute on chronic right-sided congestive heart failure ([facility]) I50.813    Cellulitis of right hand L03.113    CAD (coronary artery disease) I25.10    Acute on chronic systolic CHF (congestive heart failure) ([facility]) I50.23    Acute on chronic congestive heart failure, unspecified heart failure type ([facility]) I50.9    Supratherapeutic INR R79.1    History of mitral valve replacement with mechanical valve Z95.2    History of mechanical aortic valve replacement Z95.2    Chronic renal insufficiency, stage III (moderate) ([facility]) N18.30    Acute on chronic clinical systolic heart failure ([facility]) I50.23    Anemia D64.9       RECOMMENDATIONS:  It is of great importance that we find the source of her profound anemia and resolve the issue to the best of her ability as she does require permanent anticoagulation on her present mechanical valves.  Likewise we obviously need to avoid the risk of endocarditis and she is being placed on cephalexin for the erythema on the inner aspect of her left forearm .  I have spent more than 25 minutes face to face with Josefina Garcia and reviewing notes and laboratory data, with greater than 50% of this time instructing and counseling the patient and her  face to face regarding my findings and recommendations and I have answered all questions as posed to me by Ms. Garcia and her     Rick KEVIN Fajardo, DO FACP,FACC,[facility]      NOTE:  This report was transcribed using voice recognition software.  Every effort was made to ensure accuracy; however, inadvertent computerized transcription errors may be present        
failure with reduced ejection fraction) ([facility]) I50.20    Pulmonary edema J81.1    Moderate protein-calorie malnutrition E44.0    Thoracic ascending aortic aneurysm I71.21    Acute on chronic right-sided congestive heart failure ([facility]) I50.813    Cellulitis of right hand L03.113    CAD (coronary artery disease) I25.10    Acute on chronic systolic CHF (congestive heart failure) ([facility]) I50.23    Acute on chronic congestive heart failure, unspecified heart failure type ([facility]) I50.9    Supratherapeutic INR R79.1    History of mitral valve replacement with mechanical valve Z95.2    History of mechanical aortic valve replacement Z95.2    Chronic renal insufficiency, stage III (moderate) ([facility]) N18.30    Acute on chronic clinical systolic heart failure ([facility]) I50.23    Anemia D64.9       RECOMMENDATIONS:  Despite renal dysfunction, he has marked diminution in left ventricular systolic function and requires guideline medications albeit adjusted accordingly.  Likewise concerned for her swelling in the left forearm i.e. venous thrombus.  Just as concerning is her ongoing anemia much more so than in the past and we will obtain an LDH to make sure there is no significant hemolysis.  Discussed fully with Dr. Marli Burdick-nephrology    I have spent more than 26 minutes face to face with Josefina M Jose and reviewing notes and laboratory data, with greater than 50% of this time instructing and counseling the patient and her  face to face regarding my findings and recommendations and I have answered all questions as posed to me by Ms. Garcia and her     Rick Fajardo, DO FACP,FACC,[facility]      NOTE:  This report was transcribed using voice recognition software.  Every effort was made to ensure accuracy; however, inadvertent computerized transcription errors may be present        
insufficiency, stage III (moderate) ([facility]) N18.30    Acute on chronic clinical systolic heart failure ([facility]) I50.23    Anemia D64.9       RECOMMENDATIONS:  I have discussed in detail with both Mr. Mrs. Garcia that if we cannot find a source of bleeding and that she incurs recurrent drop in hemoglobin then serious consideration will need to be made regarding her mechanical mitral valve which will be no straightforward matter considering her rather complex cardiovascular history.  She is presently receiving blood transfusion.    I have spent more than 25 minutes face to face with Josefina Garcia and reviewing notes and laboratory data, with greater than 50% of this time instructing and counseling the patient and her  face to face regarding my findings and recommendations and I have answered all questions as posed to me by Ms. Garcia.    Rick Fajardo, DO FACP,FACC,[facility]      NOTE:  This report was transcribed using voice recognition software.  Every effort was made to ensure accuracy; however, inadvertent computerized transcription errors may be present        
Tana,  FACP,FACC,[facility]      NOTE:  This report was transcribed using voice recognition software.  Every effort was made to ensure accuracy; however, inadvertent computerized transcription errors may be present        
editing of the note. I have reviewed and edited the note above based on my findings during my history, exam, and decision making on the same day of service.     My additional thoughts:   She was seen and examined in her room resting comfortably in bed  Her  was present at bedside  She told me that she is feeling a lot better  She did have some tachycardia and hypotension yesterday  Diuretics were restarted yesterday but we might need to hold them-defer to cardiology/nephrology  Case discussed with cardiology-they are concerned the patient could have endocarditis-blood cultures have been sent  I will defer the need for an echocardiogram and cardiology  Unable to perform CTA chest due to history of anaphylaxis to iodine-check dry CT chest due to her upper extremity edema  No discharge today, but may be tomorrow  Continue Keflex although I doubt upper extremity cellulitis    Electronically signed by Nicolás Lam DO on 3/5/2025 at 11:17 AM    I can be reached through [employer].    
were discussed with Dr. Lam.    Electronically signed by SOCORRO Anderson CNP on 3/6/2025 at 7:36 AM    Addendum: I have personally participated in a face-to-face history and physical exam on the date of service with the patient. I have discussed the case with the nurse practitioner. I also participated in medical decision making on the date of service and I agree with all of the pertinent clinical information unless indicated in my editing of the note. I have reviewed and edited the note above based on my findings during my history, exam, and decision making on the same day of service.     My additional thoughts:   She was seen and examined in the small bowel follow-through area  The swelling and erythema of her arms got better with the Tubigrip's-- Finish the Keflex  Small bowel follow-through pending so she can get a capsule endoscopy as an outpatient  GI's input is appreciated  Nephrology's input is appreciated-blood pressures Stable  Possible discharge home later today      Electronically signed by Nicolás Lam DO on 3/6/2025 at 8:58 AM    I can be reached through [employer].    
anemia.    Ferritin 71, iron saturation 13% consistent with iron deficiency.  Patient has been on oral iron supplementation. S/p 1 g INFeD. Hb stable at 8.1. On Retacrit.   B12 folate normal.  Reticulocyte count 85,000.  Bilirubin normal.  No significant analysis.  Creatinine 1.7.  GI has been consulted. EGD bleeding scan with no obvious bleeding.  No obvious GI bleeding at this time.  Will continue to follow.            Jamie Chin MD  Electronically signed 3/4/2025 at 3:07 PM      
diease  PLAN:  Follow     Blood Pressure:   BP goal<120/80(KDIGO Target)-BP low this AM but aymptomatic  HTN CKD I-IV  TOD renal and Cardiac  Carotid Bruit- US of the carotids shows a R CCA and ICA of 50-68%-follows with Dr. 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

## 2025-03-10 ENCOUNTER — APPOINTMENT (OUTPATIENT)
Dept: CT IMAGING | Age: 80
End: 2025-03-10
Payer: MEDICARE

## 2025-03-10 ENCOUNTER — HOSPITAL ENCOUNTER (EMERGENCY)
Age: 80
Discharge: HOME | End: 2025-03-10
Attending: EMERGENCY MEDICINE
Payer: MEDICARE

## 2025-03-10 ENCOUNTER — APPOINTMENT (OUTPATIENT)
Dept: GENERAL RADIOLOGY | Age: 80
End: 2025-03-10
Payer: MEDICARE

## 2025-03-10 VITALS
HEIGHT: 61 IN | WEIGHT: 100 LBS | RESPIRATION RATE: 16 BRPM | HEART RATE: 60 BPM | BODY MASS INDEX: 18.88 KG/M2 | TEMPERATURE: 97.9 F | SYSTOLIC BLOOD PRESSURE: 102 MMHG | DIASTOLIC BLOOD PRESSURE: 60 MMHG | OXYGEN SATURATION: 100 %

## 2025-03-10 DIAGNOSIS — R60.0 LOWER EXTREMITY EDEMA: ICD-10-CM

## 2025-03-10 DIAGNOSIS — J90 PLEURAL EFFUSION: ICD-10-CM

## 2025-03-10 DIAGNOSIS — I50.9 CHRONIC CONGESTIVE HEART FAILURE, UNSPECIFIED HEART FAILURE TYPE (HCC): Primary | ICD-10-CM

## 2025-03-10 DIAGNOSIS — I50.9 ACUTE ON CHRONIC CONGESTIVE HEART FAILURE, UNSPECIFIED HEART FAILURE TYPE (HCC): ICD-10-CM

## 2025-03-10 LAB
ALBUMIN SERPL-MCNC: 3.7 G/DL (ref 3.5–5.2)
ALP SERPL-CCNC: 63 U/L (ref 35–104)
ALT SERPL-CCNC: 13 U/L (ref 0–32)
ANION GAP SERPL CALCULATED.3IONS-SCNC: 11 MMOL/L (ref 7–16)
AST SERPL-CCNC: 36 U/L (ref 0–31)
BASOPHILS # BLD: 0.03 K/UL (ref 0–0.2)
BASOPHILS NFR BLD: 1 % (ref 0–2)
BILIRUB SERPL-MCNC: 0.8 MG/DL (ref 0–1.2)
BILIRUB UR QL STRIP: NEGATIVE
BNP SERPL-MCNC: ABNORMAL PG/ML (ref 0–450)
BUN SERPL-MCNC: 22 MG/DL (ref 6–23)
CALCIUM SERPL-MCNC: 9.1 MG/DL (ref 8.6–10.2)
CHLORIDE SERPL-SCNC: 102 MMOL/L (ref 98–107)
CLARITY UR: CLEAR
CO2 SERPL-SCNC: 27 MMOL/L (ref 22–29)
COLOR UR: YELLOW
CREAT SERPL-MCNC: 1.5 MG/DL (ref 0.5–1)
D-DIMER QUANTITATIVE: 480 NG/ML DDU (ref 0–230)
EOSINOPHIL # BLD: 0.17 K/UL (ref 0.05–0.5)
EOSINOPHILS RELATIVE PERCENT: 3 % (ref 0–6)
EPI CELLS #/AREA URNS HPF: ABNORMAL /HPF
ERYTHROCYTE [DISTWIDTH] IN BLOOD BY AUTOMATED COUNT: 20.6 % (ref 11.5–15)
GFR, ESTIMATED: 36 ML/MIN/1.73M2
GLUCOSE SERPL-MCNC: 95 MG/DL (ref 74–99)
GLUCOSE UR STRIP-MCNC: NEGATIVE MG/DL
HCT VFR BLD AUTO: 33.2 % (ref 34–48)
HGB BLD-MCNC: 9.7 G/DL (ref 11.5–15.5)
HGB UR QL STRIP.AUTO: ABNORMAL
IMM GRANULOCYTES # BLD AUTO: <0.03 K/UL (ref 0–0.58)
IMM GRANULOCYTES NFR BLD: 0 % (ref 0–5)
INR PPP: 2.5
KETONES UR STRIP-MCNC: NEGATIVE MG/DL
LACTATE BLDV-SCNC: 1.2 MMOL/L (ref 0.5–2.2)
LEUKOCYTE ESTERASE UR QL STRIP: ABNORMAL
LYMPHOCYTES NFR BLD: 0.55 K/UL (ref 1.5–4)
LYMPHOCYTES RELATIVE PERCENT: 8 % (ref 20–42)
MCH RBC QN AUTO: 28.8 PG (ref 26–35)
MCHC RBC AUTO-ENTMCNC: 29.2 G/DL (ref 32–34.5)
MCV RBC AUTO: 98.5 FL (ref 80–99.9)
MICROORGANISM SPEC CULT: NORMAL
MICROORGANISM SPEC CULT: NORMAL
MONOCYTES NFR BLD: 0.52 K/UL (ref 0.1–0.95)
MONOCYTES NFR BLD: 8 % (ref 2–12)
NEUTROPHILS NFR BLD: 81 % (ref 43–80)
NEUTS SEG NFR BLD: 5.37 K/UL (ref 1.8–7.3)
NITRITE UR QL STRIP: NEGATIVE
PH UR STRIP: 6 [PH] (ref 5–8)
PLATELET # BLD AUTO: 195 K/UL (ref 130–450)
PMV BLD AUTO: 10.9 FL (ref 7–12)
POTASSIUM SERPL-SCNC: 4.2 MMOL/L (ref 3.5–5)
PROT SERPL-MCNC: 7 G/DL (ref 6.4–8.3)
PROT UR STRIP-MCNC: NEGATIVE MG/DL
PROTHROMBIN TIME: 26.2 SEC (ref 9.3–12.4)
RBC # BLD AUTO: 3.37 M/UL (ref 3.5–5.5)
RBC # BLD: ABNORMAL 10*6/UL
RBC #/AREA URNS HPF: ABNORMAL /HPF
RENAL EPITHELIAL, UA: PRESENT /HPF
SERVICE CMNT-IMP: NORMAL
SERVICE CMNT-IMP: NORMAL
SODIUM SERPL-SCNC: 140 MMOL/L (ref 132–146)
SP GR UR STRIP: 1.02 (ref 1–1.03)
SPECIMEN DESCRIPTION: NORMAL
SPECIMEN DESCRIPTION: NORMAL
TROPONIN I SERPL HS-MCNC: 36 NG/L (ref 0–9)
TROPONIN I SERPL HS-MCNC: 41 NG/L (ref 0–9)
UROBILINOGEN UR STRIP-ACNC: 0.2 EU/DL (ref 0–1)
WBC #/AREA URNS HPF: ABNORMAL /HPF
WBC OTHER # BLD: 6.7 K/UL (ref 4.5–11.5)

## 2025-03-10 PROCEDURE — 6360000002 HC RX W HCPCS: Performed by: EMERGENCY MEDICINE

## 2025-03-10 PROCEDURE — 85610 PROTHROMBIN TIME: CPT

## 2025-03-10 PROCEDURE — 99285 EMERGENCY DEPT VISIT HI MDM: CPT

## 2025-03-10 PROCEDURE — 74176 CT ABD & PELVIS W/O CONTRAST: CPT

## 2025-03-10 PROCEDURE — 87086 URINE CULTURE/COLONY COUNT: CPT

## 2025-03-10 PROCEDURE — 71046 X-RAY EXAM CHEST 2 VIEWS: CPT

## 2025-03-10 PROCEDURE — 83880 ASSAY OF NATRIURETIC PEPTIDE: CPT

## 2025-03-10 PROCEDURE — 85379 FIBRIN DEGRADATION QUANT: CPT

## 2025-03-10 PROCEDURE — 81001 URINALYSIS AUTO W/SCOPE: CPT

## 2025-03-10 PROCEDURE — 85025 COMPLETE CBC W/AUTO DIFF WBC: CPT

## 2025-03-10 PROCEDURE — 96374 THER/PROPH/DIAG INJ IV PUSH: CPT

## 2025-03-10 PROCEDURE — 83605 ASSAY OF LACTIC ACID: CPT

## 2025-03-10 PROCEDURE — 84484 ASSAY OF TROPONIN QUANT: CPT

## 2025-03-10 PROCEDURE — 80053 COMPREHEN METABOLIC PANEL: CPT

## 2025-03-10 RX ORDER — WARFARIN SODIUM 2.5 MG/1
TABLET ORAL
COMMUNITY
Start: 2025-02-15 | End: 2025-03-10 | Stop reason: ALTCHOICE

## 2025-03-10 RX ORDER — BUMETANIDE 1 MG/1
TABLET ORAL
COMMUNITY
Start: 2025-01-24 | End: 2025-03-10 | Stop reason: ALTCHOICE

## 2025-03-10 RX ORDER — FUROSEMIDE 10 MG/ML
20 INJECTION INTRAMUSCULAR; INTRAVENOUS ONCE
Status: COMPLETED | OUTPATIENT
Start: 2025-03-10 | End: 2025-03-10

## 2025-03-10 RX ORDER — WARFARIN SODIUM 2 MG/1
3 TABLET ORAL EVERY EVENING
Status: ON HOLD | COMMUNITY
Start: 2025-02-15

## 2025-03-10 RX ADMIN — FUROSEMIDE 20 MG: 10 INJECTION, SOLUTION INTRAMUSCULAR; INTRAVENOUS at 18:13

## 2025-03-10 ASSESSMENT — PAIN - FUNCTIONAL ASSESSMENT: PAIN_FUNCTIONAL_ASSESSMENT: NONE - DENIES PAIN

## 2025-03-10 NOTE — ED PROVIDER NOTES
Shared ISAÍAS-ED Attending Visit.  CC: No         [facility] EMERGENCY DEPARTMENT  ED  Encounter Note  Admit Date/RoomTime: 3/10/2025  1:12 PM  ED Room: [place] [address]  NAME: Josefina Garcia  : 1945  MRN: [id number]  PCP: Teofilo Dutton Jr., MD    CHIEF COMPLAINT     Edema (Pt states\" Im swelling all up\" just discharged from here for same last Thursday.)    HISTORY OF PRESENT ILLNESS        Josefina Garcia is a 80 y.o. female who presents to the ED by private vehicle for peripheral edema, beginning this morning ago. The complaint has been progressively worsening and are moderate in severity.  Admits 3 pillow orthopnea                          80-year-old lady returns to the hospital after recently be admitted reporting worsening swelling to the legs left hand.  Denies associate palpitation chest pain abdominal pain fevers chills.  Denies change changes to diet medication alcohol intake.  Past medical history significant for atrial fibrillation hypertension hypothyroidism anemia acute on chronic kidney disease heart failure with a EF of 28%.  Status post mechanical valve for the mechanical valve aorta and mitral placement current medications list Bumex and oxygen Coumadin Compazine Synthroid Lopressor folic acid.  Incidental finding on last evaluation and admission is positive for left upper extremity cellulitis    REVIEW OF SYSTEMS     Pertinent positives and negatives are stated within HPI, all other systems reviewed and are negative.    Past Medical History:  has a past medical history of A-fib (HCC), Acute exacerbation of CHF (congestive heart failure) (HCC), Acute renal failure, Aneurysm, Aneurysm of ascending aorta without rupture, CAD (coronary artery disease), CRF (chronic renal failure), Epistaxis, Gastrointestinal bleeding, lower, H/O anemia of chronic disorder, Hypertension, and Thyroid disease.  Surgical History:  has a past surgical history that includes Cardiac

## 2025-03-10 NOTE — ED TRIAGE NOTES
FIRST PROVIDER CONTACT ASSESSMENT NOTE       Department of Emergency Medicine                 First Provider Note            3/10/25  11:35 AM EDT    Date of Encounter: No admission date for patient encounter.    Patient Name: Josefina Garcia  : 1945  MRN: [id number]    Chief Complaint: Edema (Pt states\" Im swelling all up\" just discharged from here for same last Thursday.)      History of Present Illness:   Josefina Garcia is a 80 y.o. female who presents to the ED for swelling in extremities.   States she was just here for the same.   Taking water pills, doesn't feel they are doing much.   Mild SOB     Focused Physical Exam:  VS:    ED Triage Vitals [03/10/25 1120]   BP Systolic BP Percentile Diastolic BP Percentile Temp Temp src Pulse Resp SpO2   -- -- -- -- -- -- -- --      Height Weight - Scale         1.549 m (5' 1\") 45.4 kg (100 lb)              Physical Ex: Constitutional: Alert and non-toxic.    Medical History:  has a past medical history of A-fib (HCC), Acute exacerbation of CHF (congestive heart failure) (HCC), Acute renal failure, Aneurysm, Aneurysm of ascending aorta without rupture, CAD (coronary artery disease), CRF (chronic renal failure), Epistaxis, Gastrointestinal bleeding, lower, H/O anemia of chronic disorder, Hypertension, and Thyroid disease.  Surgical History:  has a past surgical history that includes Cardiac surgery; Aortic valvuloplasty; Mitral valvuloplasty; Tricuspid valvuloplasty; Colonoscopy; Upper gastrointestinal endoscopy; Cardiac defibrillator placement (Left, 2022); hernia repair (Right, 2023); Hernia mesh removal; Colonoscopy (2024); Upper gastrointestinal endoscopy (2024); and Upper gastrointestinal endoscopy (N/A, 3/3/2025).  Social History:  reports that she quit smoking about 24 years ago. Her smoking use included cigarettes. She started smoking about 49 years ago. She has a 12.5 pack-year smoking history. She has never used smokeless

## 2025-03-10 NOTE — PROGRESS NOTES
Database initiated pharmacy and medications verified with the patient. She is A&O comes in from home with . She uses no assistive devices and is RA at baseline.

## 2025-03-10 NOTE — DISCHARGE INSTRUCTIONS
Call family doctor and specialist to schedule an appointment    Increase bumex to twice daily for 2 days and follow with chf clinic    Have your doctor obtain copies of all results and records and re-review them with you    If you develop new chest tightness shortness of exercise intolerance return to the emergency room    Please take your papers with you to all followup appointments    If symptoms reoccur or worsen or if you believe you need emergency services for any other reason return to [facility]    XR CHEST (2 VW)   Final Result   Cardiomegaly with pulmonary vascular congestion and mild bilateral pleural   effusions.  The right pleural effusion has progressed since prior study.         CT ABDOMEN PELVIS WO CONTRAST Additional Contrast? None   Final Result   Small to moderate-sized right pleural effusion with small to moderate amount   of ascites and anasarca.      There is no evidence of acute abdominal/pelvic abnormality.

## 2025-03-10 NOTE — ED NOTES
Patient ambulated.  Her o2 st was 92-94% and her heart rate was 100-105.  She had no complaints of lightheadedness, dizziness, or SOB. Dr. Garcia notified.

## 2025-03-11 LAB
MICROORGANISM SPEC CULT: NO GROWTH
SERVICE CMNT-IMP: NORMAL
SPECIMEN DESCRIPTION: NORMAL

## 2025-03-12 ENCOUNTER — HOSPITAL ENCOUNTER (OUTPATIENT)
Dept: OTHER | Age: 80
Setting detail: THERAPIES SERIES
Discharge: HOME | End: 2025-03-12
Payer: MEDICARE

## 2025-03-12 VITALS
DIASTOLIC BLOOD PRESSURE: 56 MMHG | SYSTOLIC BLOOD PRESSURE: 105 MMHG | OXYGEN SATURATION: 97 % | HEART RATE: 83 BPM | WEIGHT: 106.8 LBS | RESPIRATION RATE: 16 BRPM | BODY MASS INDEX: 20.18 KG/M2

## 2025-03-12 LAB
ANION GAP SERPL CALCULATED.3IONS-SCNC: 9 MMOL/L (ref 7–16)
BNP SERPL-MCNC: ABNORMAL PG/ML (ref 0–450)
BUN SERPL-MCNC: 18 MG/DL (ref 6–23)
CALCIUM SERPL-MCNC: 9.1 MG/DL (ref 8.6–10.2)
CHLORIDE SERPL-SCNC: 101 MMOL/L (ref 98–107)
CO2 SERPL-SCNC: 29 MMOL/L (ref 22–29)
CREAT SERPL-MCNC: 1.3 MG/DL (ref 0.5–1)
GFR, ESTIMATED: 41 ML/MIN/1.73M2
GLUCOSE SERPL-MCNC: 91 MG/DL (ref 74–99)
POTASSIUM SERPL-SCNC: 3.7 MMOL/L (ref 3.5–5)
SODIUM SERPL-SCNC: 139 MMOL/L (ref 132–146)

## 2025-03-12 PROCEDURE — 99205 OFFICE O/P NEW HI 60 MIN: CPT

## 2025-03-12 PROCEDURE — 83880 ASSAY OF NATRIURETIC PEPTIDE: CPT

## 2025-03-12 PROCEDURE — 6360000002 HC RX W HCPCS

## 2025-03-12 PROCEDURE — 80048 BASIC METABOLIC PNL TOTAL CA: CPT

## 2025-03-12 PROCEDURE — 96374 THER/PROPH/DIAG INJ IV PUSH: CPT

## 2025-03-12 PROCEDURE — 36415 COLL VENOUS BLD VENIPUNCTURE: CPT

## 2025-03-12 PROCEDURE — 2500000003 HC RX 250 WO HCPCS: Performed by: INTERNAL MEDICINE

## 2025-03-12 RX ORDER — SODIUM CHLORIDE 0.9 % (FLUSH) 0.9 %
5-40 SYRINGE (ML) INJECTION 2 TIMES DAILY
Status: DISCONTINUED | OUTPATIENT
Start: 2025-03-12 | End: 2025-03-13 | Stop reason: HOSPADM

## 2025-03-12 RX ORDER — BUMETANIDE 0.25 MG/ML
INJECTION, SOLUTION INTRAMUSCULAR; INTRAVENOUS
Status: COMPLETED
Start: 2025-03-12 | End: 2025-03-12

## 2025-03-12 RX ORDER — BUMETANIDE 0.25 MG/ML
2 INJECTION, SOLUTION INTRAMUSCULAR; INTRAVENOUS ONCE
Status: COMPLETED | OUTPATIENT
Start: 2025-03-12 | End: 2025-03-12

## 2025-03-12 RX ADMIN — BUMETANIDE 2 MG: 0.25 INJECTION INTRAMUSCULAR; INTRAVENOUS at 12:35

## 2025-03-12 RX ADMIN — SODIUM CHLORIDE, PRESERVATIVE FREE 10 ML: 5 INJECTION INTRAVENOUS at 12:37

## 2025-03-12 RX ADMIN — BUMETANIDE 2 MG: 0.25 INJECTION, SOLUTION INTRAMUSCULAR; INTRAVENOUS at 12:35

## 2025-03-12 ASSESSMENT — PATIENT HEALTH QUESTIONNAIRE - PHQ9
SUM OF ALL RESPONSES TO PHQ QUESTIONS 1-9: 0
2. FEELING DOWN, DEPRESSED OR HOPELESS: NOT AT ALL
SUM OF ALL RESPONSES TO PHQ QUESTIONS 1-9: 0
1. LITTLE INTEREST OR PLEASURE IN DOING THINGS: NOT AT ALL
SUM OF ALL RESPONSES TO PHQ QUESTIONS 1-9: 0
SUM OF ALL RESPONSES TO PHQ QUESTIONS 1-9: 0

## 2025-03-12 NOTE — PROGRESS NOTES
Hospital medication assistance program information given   [] Sample medications provided to patient to help bridge gap until affordability           Scheduled to follow up in CHF clinic on:   Future Appointments   Date Time Provider Department Center   3/26/2025  8:45 AM G[facility] CHF ROOM 3 [facility] HOD   5/6/2025 10:30 AM CATA FUENTES VAS US 1 [facility] CARDIO SE [facility]   5/6/2025 11:00 AM Angel Berry MD VASC/MED [facility]   5/20/2025  9:00 AM SCHEDULE, YUNGYX XIOMARA DEVICE [facility] ELECTR [facility]   5/20/2025  9:00 AM Antonia Vega MD [facility]

## 2025-03-13 ENCOUNTER — HOSPITAL ENCOUNTER (OUTPATIENT)
Age: 80
Discharge: HOME | End: 2025-03-13
Payer: MEDICARE

## 2025-03-13 LAB
ALBUMIN SERPL-MCNC: 3.8 G/DL (ref 3.5–5.2)
ALP SERPL-CCNC: 70 U/L (ref 35–104)
ALT SERPL-CCNC: 10 U/L (ref 0–32)
ANION GAP SERPL CALCULATED.3IONS-SCNC: 10 MMOL/L (ref 7–16)
AST SERPL-CCNC: 28 U/L (ref 0–31)
BILIRUB SERPL-MCNC: 0.7 MG/DL (ref 0–1.2)
BUN SERPL-MCNC: 17 MG/DL (ref 6–23)
CALCIUM SERPL-MCNC: 8.9 MG/DL (ref 8.6–10.2)
CHLORIDE SERPL-SCNC: 100 MMOL/L (ref 98–107)
CO2 SERPL-SCNC: 30 MMOL/L (ref 22–29)
CREAT SERPL-MCNC: 1.3 MG/DL (ref 0.5–1)
ERYTHROCYTE [DISTWIDTH] IN BLOOD BY AUTOMATED COUNT: 19.3 % (ref 11.5–15)
GFR, ESTIMATED: 43 ML/MIN/1.73M2
GLUCOSE SERPL-MCNC: 85 MG/DL (ref 74–99)
HCT VFR BLD AUTO: 31.9 % (ref 34–48)
HGB BLD-MCNC: 9.7 G/DL (ref 11.5–15.5)
MAGNESIUM SERPL-MCNC: 1.9 MG/DL (ref 1.6–2.6)
MCH RBC QN AUTO: 29 PG (ref 26–35)
MCHC RBC AUTO-ENTMCNC: 30.4 G/DL (ref 32–34.5)
MCV RBC AUTO: 95.2 FL (ref 80–99.9)
PHOSPHATE SERPL-MCNC: 2 MG/DL (ref 2.5–4.5)
PLATELET # BLD AUTO: 177 K/UL (ref 130–450)
PMV BLD AUTO: 10.6 FL (ref 7–12)
POTASSIUM SERPL-SCNC: 3.6 MMOL/L (ref 3.5–5)
PROT SERPL-MCNC: 7.3 G/DL (ref 6.4–8.3)
RBC # BLD AUTO: 3.35 M/UL (ref 3.5–5.5)
SODIUM SERPL-SCNC: 140 MMOL/L (ref 132–146)
WBC OTHER # BLD: 4.8 K/UL (ref 4.5–11.5)

## 2025-03-13 PROCEDURE — 80053 COMPREHEN METABOLIC PANEL: CPT

## 2025-03-13 PROCEDURE — 36415 COLL VENOUS BLD VENIPUNCTURE: CPT

## 2025-03-13 PROCEDURE — 85027 COMPLETE CBC AUTOMATED: CPT

## 2025-03-13 PROCEDURE — 84100 ASSAY OF PHOSPHORUS: CPT

## 2025-03-13 PROCEDURE — 83735 ASSAY OF MAGNESIUM: CPT

## 2025-03-14 LAB
EKG ATRIAL RATE: 78 BPM
EKG Q-T INTERVAL: 452 MS
EKG QRS DURATION: 168 MS
EKG QTC CALCULATION (BAZETT): 540 MS
EKG R AXIS: -159 DEGREES
EKG T AXIS: -13 DEGREES
EKG VENTRICULAR RATE: 86 BPM

## 2025-03-16 ENCOUNTER — HOSPITAL ENCOUNTER (EMERGENCY)
Age: 80
Discharge: ANOTHER HOSPITAL | End: 2025-03-16
Attending: EMERGENCY MEDICINE
Payer: MEDICARE

## 2025-03-16 ENCOUNTER — APPOINTMENT (OUTPATIENT)
Dept: GENERAL RADIOLOGY | Age: 80
End: 2025-03-16
Payer: MEDICARE

## 2025-03-16 ENCOUNTER — HOSPITAL ENCOUNTER (INPATIENT)
Age: 80
LOS: 9 days | Discharge: HOME | DRG: 291 | End: 2025-03-25
Attending: INTERNAL MEDICINE | Admitting: INTERNAL MEDICINE
Payer: MEDICARE

## 2025-03-16 VITALS
HEART RATE: 80 BPM | TEMPERATURE: 98.3 F | SYSTOLIC BLOOD PRESSURE: 94 MMHG | OXYGEN SATURATION: 97 % | DIASTOLIC BLOOD PRESSURE: 47 MMHG | RESPIRATION RATE: 22 BRPM

## 2025-03-16 DIAGNOSIS — J90 PLEURAL EFFUSION ON RIGHT: ICD-10-CM

## 2025-03-16 DIAGNOSIS — I50.9 ACUTE CONGESTIVE HEART FAILURE, UNSPECIFIED HEART FAILURE TYPE (HCC): ICD-10-CM

## 2025-03-16 DIAGNOSIS — J96.01 ACUTE RESPIRATORY FAILURE WITH HYPOXIA: Primary | ICD-10-CM

## 2025-03-16 DIAGNOSIS — D64.9 ANEMIA, UNSPECIFIED TYPE: ICD-10-CM

## 2025-03-16 DIAGNOSIS — N18.9 CHRONIC RENAL IMPAIRMENT, UNSPECIFIED CKD STAGE: ICD-10-CM

## 2025-03-16 PROBLEM — I50.43 CHF (CONGESTIVE HEART FAILURE), NYHA CLASS I, ACUTE ON CHRONIC, COMBINED (HCC): Status: ACTIVE | Noted: 2025-03-16

## 2025-03-16 LAB
ALBUMIN SERPL-MCNC: 3.9 G/DL (ref 3.5–5.2)
ALP SERPL-CCNC: 70 U/L (ref 35–104)
ALT SERPL-CCNC: 10 U/L (ref 0–32)
ANION GAP SERPL CALCULATED.3IONS-SCNC: 9 MMOL/L (ref 7–16)
AST SERPL-CCNC: 40 U/L (ref 0–31)
BASOPHILS # BLD: 0.03 K/UL (ref 0–0.2)
BASOPHILS NFR BLD: 1 % (ref 0–2)
BILIRUB SERPL-MCNC: 1 MG/DL (ref 0–1.2)
BNP SERPL-MCNC: ABNORMAL PG/ML (ref 0–450)
BUN SERPL-MCNC: 21 MG/DL (ref 6–23)
CALCIUM SERPL-MCNC: 9.6 MG/DL (ref 8.6–10.2)
CHLORIDE SERPL-SCNC: 94 MMOL/L (ref 98–107)
CO2 SERPL-SCNC: 36 MMOL/L (ref 22–29)
CREAT SERPL-MCNC: 1.4 MG/DL (ref 0.5–1)
EOSINOPHIL # BLD: 0.07 K/UL (ref 0.05–0.5)
EOSINOPHILS RELATIVE PERCENT: 2 % (ref 0–6)
ERYTHROCYTE [DISTWIDTH] IN BLOOD BY AUTOMATED COUNT: 18.5 % (ref 11.5–15)
FLUAV RNA RESP QL NAA+PROBE: NOT DETECTED
FLUBV RNA RESP QL NAA+PROBE: NOT DETECTED
GFR, ESTIMATED: 38 ML/MIN/1.73M2
GLUCOSE SERPL-MCNC: 89 MG/DL (ref 74–99)
HCT VFR BLD AUTO: 32.7 % (ref 34–48)
HGB BLD-MCNC: 10.6 G/DL (ref 11.5–15.5)
IMM GRANULOCYTES # BLD AUTO: <0.03 K/UL (ref 0–0.58)
IMM GRANULOCYTES NFR BLD: 0 % (ref 0–5)
INR PPP: 2.7
LYMPHOCYTES NFR BLD: 0.3 K/UL (ref 1.5–4)
LYMPHOCYTES RELATIVE PERCENT: 8 % (ref 20–42)
MAGNESIUM SERPL-MCNC: 1.8 MG/DL (ref 1.6–2.6)
MCH RBC QN AUTO: 29.7 PG (ref 26–35)
MCHC RBC AUTO-ENTMCNC: 32.4 G/DL (ref 32–34.5)
MCV RBC AUTO: 91.6 FL (ref 80–99.9)
MONOCYTES NFR BLD: 0.48 K/UL (ref 0.1–0.95)
MONOCYTES NFR BLD: 13 % (ref 2–12)
NEUTROPHILS NFR BLD: 76 % (ref 43–80)
NEUTS SEG NFR BLD: 2.78 K/UL (ref 1.8–7.3)
PARTIAL THROMBOPLASTIN TIME: 37.4 SEC (ref 24.5–35.1)
PLATELET # BLD AUTO: 176 K/UL (ref 130–450)
PMV BLD AUTO: 11.7 FL (ref 7–12)
POTASSIUM SERPL-SCNC: 3.5 MMOL/L (ref 3.5–5)
PROT SERPL-MCNC: 7.5 G/DL (ref 6.4–8.3)
PROTHROMBIN TIME: 30.2 SEC (ref 9.3–12.4)
RBC # BLD AUTO: 3.57 M/UL (ref 3.5–5.5)
RSV BY PCR: NOT DETECTED
SARS-COV-2 RNA RESP QL NAA+PROBE: NOT DETECTED
SODIUM SERPL-SCNC: 139 MMOL/L (ref 132–146)
SOURCE: NORMAL
SPECIMEN DESCRIPTION: NORMAL
SPECIMEN SOURCE: NORMAL
TROPONIN I SERPL HS-MCNC: 56 NG/L (ref 0–9)
TROPONIN I SERPL HS-MCNC: 58 NG/L (ref 0–9)
WBC OTHER # BLD: 3.7 K/UL (ref 4.5–11.5)

## 2025-03-16 PROCEDURE — 83735 ASSAY OF MAGNESIUM: CPT

## 2025-03-16 PROCEDURE — 80053 COMPREHEN METABOLIC PANEL: CPT

## 2025-03-16 PROCEDURE — 87634 RSV DNA/RNA AMP PROBE: CPT

## 2025-03-16 PROCEDURE — 85730 THROMBOPLASTIN TIME PARTIAL: CPT

## 2025-03-16 PROCEDURE — 6370000000 HC RX 637 (ALT 250 FOR IP): Performed by: EMERGENCY MEDICINE

## 2025-03-16 PROCEDURE — 6370000000 HC RX 637 (ALT 250 FOR IP): Performed by: INTERNAL MEDICINE

## 2025-03-16 PROCEDURE — 84484 ASSAY OF TROPONIN QUANT: CPT

## 2025-03-16 PROCEDURE — 71045 X-RAY EXAM CHEST 1 VIEW: CPT

## 2025-03-16 PROCEDURE — 83880 ASSAY OF NATRIURETIC PEPTIDE: CPT

## 2025-03-16 PROCEDURE — 93005 ELECTROCARDIOGRAM TRACING: CPT | Performed by: EMERGENCY MEDICINE

## 2025-03-16 PROCEDURE — 85610 PROTHROMBIN TIME: CPT

## 2025-03-16 PROCEDURE — 2060000000 HC ICU INTERMEDIATE R&B

## 2025-03-16 PROCEDURE — 2700000000 HC OXYGEN THERAPY PER DAY

## 2025-03-16 PROCEDURE — 99285 EMERGENCY DEPT VISIT HI MDM: CPT

## 2025-03-16 PROCEDURE — 2500000003 HC RX 250 WO HCPCS: Performed by: INTERNAL MEDICINE

## 2025-03-16 PROCEDURE — 87636 SARSCOV2 & INF A&B AMP PRB: CPT

## 2025-03-16 PROCEDURE — 85025 COMPLETE CBC W/AUTO DIFF WBC: CPT

## 2025-03-16 RX ORDER — ACETAMINOPHEN 325 MG/1
650 TABLET ORAL EVERY 6 HOURS PRN
Status: DISCONTINUED | OUTPATIENT
Start: 2025-03-16 | End: 2025-03-25 | Stop reason: HOSPADM

## 2025-03-16 RX ORDER — ONDANSETRON 4 MG/1
4 TABLET, ORALLY DISINTEGRATING ORAL EVERY 8 HOURS PRN
Status: CANCELLED | OUTPATIENT
Start: 2025-03-16

## 2025-03-16 RX ORDER — ENOXAPARIN SODIUM 100 MG/ML
30 INJECTION SUBCUTANEOUS DAILY
Status: DISCONTINUED | OUTPATIENT
Start: 2025-03-16 | End: 2025-03-16

## 2025-03-16 RX ORDER — ACETAMINOPHEN 650 MG/1
650 SUPPOSITORY RECTAL EVERY 6 HOURS PRN
Status: CANCELLED | OUTPATIENT
Start: 2025-03-16

## 2025-03-16 RX ORDER — ONDANSETRON 2 MG/ML
4 INJECTION INTRAMUSCULAR; INTRAVENOUS EVERY 6 HOURS PRN
Status: CANCELLED | OUTPATIENT
Start: 2025-03-16

## 2025-03-16 RX ORDER — ONDANSETRON 4 MG/1
4 TABLET, ORALLY DISINTEGRATING ORAL EVERY 8 HOURS PRN
Status: DISCONTINUED | OUTPATIENT
Start: 2025-03-16 | End: 2025-03-25 | Stop reason: HOSPADM

## 2025-03-16 RX ORDER — BUMETANIDE 1 MG/1
2 TABLET ORAL DAILY
Status: DISCONTINUED | OUTPATIENT
Start: 2025-03-17 | End: 2025-03-17

## 2025-03-16 RX ORDER — BUMETANIDE 1 MG/1
2 TABLET ORAL DAILY
Status: CANCELLED | OUTPATIENT
Start: 2025-03-17

## 2025-03-16 RX ORDER — METOPROLOL SUCCINATE 25 MG/1
25 TABLET, EXTENDED RELEASE ORAL DAILY
Status: DISCONTINUED | OUTPATIENT
Start: 2025-03-16 | End: 2025-03-25 | Stop reason: HOSPADM

## 2025-03-16 RX ORDER — DIGOXIN 0.06 MG/1
62.5 TABLET ORAL
Status: CANCELLED | OUTPATIENT
Start: 2025-03-17

## 2025-03-16 RX ORDER — ACETAMINOPHEN 325 MG/1
650 TABLET ORAL EVERY 6 HOURS PRN
Status: CANCELLED | OUTPATIENT
Start: 2025-03-16

## 2025-03-16 RX ORDER — WARFARIN SODIUM 2 MG/1
2 TABLET ORAL
Status: COMPLETED | OUTPATIENT
Start: 2025-03-16 | End: 2025-03-16

## 2025-03-16 RX ORDER — SODIUM CHLORIDE 9 MG/ML
INJECTION, SOLUTION INTRAVENOUS PRN
Status: CANCELLED | OUTPATIENT
Start: 2025-03-16

## 2025-03-16 RX ORDER — SODIUM CHLORIDE 0.9 % (FLUSH) 0.9 %
10 SYRINGE (ML) INJECTION EVERY 12 HOURS SCHEDULED
Status: DISCONTINUED | OUTPATIENT
Start: 2025-03-16 | End: 2025-03-25 | Stop reason: HOSPADM

## 2025-03-16 RX ORDER — SENNOSIDES A AND B 8.6 MG/1
1 TABLET, FILM COATED ORAL DAILY PRN
Status: DISCONTINUED | OUTPATIENT
Start: 2025-03-16 | End: 2025-03-25 | Stop reason: HOSPADM

## 2025-03-16 RX ORDER — SODIUM CHLORIDE 0.9 % (FLUSH) 0.9 %
10 SYRINGE (ML) INJECTION PRN
Status: DISCONTINUED | OUTPATIENT
Start: 2025-03-16 | End: 2025-03-25 | Stop reason: HOSPADM

## 2025-03-16 RX ORDER — SENNOSIDES A AND B 8.6 MG/1
1 TABLET, FILM COATED ORAL DAILY PRN
Status: CANCELLED | OUTPATIENT
Start: 2025-03-16

## 2025-03-16 RX ORDER — ACETAMINOPHEN 650 MG/1
650 SUPPOSITORY RECTAL EVERY 6 HOURS PRN
Status: DISCONTINUED | OUTPATIENT
Start: 2025-03-16 | End: 2025-03-25 | Stop reason: HOSPADM

## 2025-03-16 RX ORDER — METOPROLOL SUCCINATE 25 MG/1
25 TABLET, EXTENDED RELEASE ORAL DAILY
Status: CANCELLED | OUTPATIENT
Start: 2025-03-16

## 2025-03-16 RX ORDER — SODIUM CHLORIDE 0.9 % (FLUSH) 0.9 %
10 SYRINGE (ML) INJECTION EVERY 12 HOURS SCHEDULED
Status: CANCELLED | OUTPATIENT
Start: 2025-03-16

## 2025-03-16 RX ORDER — MIDODRINE HYDROCHLORIDE 5 MG/1
5 TABLET ORAL ONCE
Status: COMPLETED | OUTPATIENT
Start: 2025-03-16 | End: 2025-03-16

## 2025-03-16 RX ORDER — BUMETANIDE 0.25 MG/ML
1 INJECTION, SOLUTION INTRAMUSCULAR; INTRAVENOUS ONCE
Status: DISCONTINUED | OUTPATIENT
Start: 2025-03-16 | End: 2025-03-16

## 2025-03-16 RX ORDER — LEVOTHYROXINE SODIUM 125 UG/1
125 TABLET ORAL DAILY
Status: CANCELLED | OUTPATIENT
Start: 2025-03-17

## 2025-03-16 RX ORDER — SODIUM CHLORIDE 0.9 % (FLUSH) 0.9 %
10 SYRINGE (ML) INJECTION PRN
Status: CANCELLED | OUTPATIENT
Start: 2025-03-16

## 2025-03-16 RX ORDER — DIGOXIN 125 MCG
62.5 TABLET ORAL
Status: DISCONTINUED | OUTPATIENT
Start: 2025-03-17 | End: 2025-03-25 | Stop reason: HOSPADM

## 2025-03-16 RX ORDER — SODIUM CHLORIDE 9 MG/ML
INJECTION, SOLUTION INTRAVENOUS PRN
Status: DISCONTINUED | OUTPATIENT
Start: 2025-03-16 | End: 2025-03-25 | Stop reason: HOSPADM

## 2025-03-16 RX ORDER — BUMETANIDE 1 MG/1
1 TABLET ORAL DAILY
Status: DISCONTINUED | OUTPATIENT
Start: 2025-03-16 | End: 2025-03-16 | Stop reason: HOSPADM

## 2025-03-16 RX ORDER — ONDANSETRON 2 MG/ML
4 INJECTION INTRAMUSCULAR; INTRAVENOUS EVERY 6 HOURS PRN
Status: DISCONTINUED | OUTPATIENT
Start: 2025-03-16 | End: 2025-03-25 | Stop reason: HOSPADM

## 2025-03-16 RX ORDER — FERROUS SULFATE 325(65) MG
325 TABLET ORAL 2 TIMES DAILY
Status: CANCELLED | OUTPATIENT
Start: 2025-03-16

## 2025-03-16 RX ADMIN — SODIUM CHLORIDE, PRESERVATIVE FREE 10 ML: 5 INJECTION INTRAVENOUS at 20:42

## 2025-03-16 RX ADMIN — BUMETANIDE 1 MG: 1 TABLET ORAL at 12:38

## 2025-03-16 RX ADMIN — METOPROLOL SUCCINATE 25 MG: 25 TABLET, EXTENDED RELEASE ORAL at 20:37

## 2025-03-16 RX ADMIN — WARFARIN SODIUM 2 MG: 2 TABLET ORAL at 20:37

## 2025-03-16 RX ADMIN — MIDODRINE HYDROCHLORIDE 5 MG: 5 TABLET ORAL at 13:29

## 2025-03-16 ASSESSMENT — PAIN - FUNCTIONAL ASSESSMENT: PAIN_FUNCTIONAL_ASSESSMENT: NONE - DENIES PAIN

## 2025-03-16 NOTE — CARE COORDINATION
Paged by Transfer center due to patient needing admission for acute chf exacerbation.   Patient was found to be fluid overloaded. Bumex given in ER with midodrine.    Orders placed. Coverage to see in am.     Michelle Bhardwaj, DO

## 2025-03-16 NOTE — H&P
Internal Medicine History & Physical     Name: Josefina Garcia  : 1945  Chief Complaint: cough  Primary Care Physician: Teofilo Dutton Jr., MD  Admission date: 3/16/2025  Date of service: 3/17/2025     History of Present Illness  Josefina is a 80 y.o. year old female with a past medical history of hypertension, hyperlipidemia, CAD, CKD and atrial fibrillation/valvular heart disease anticoagulated with Coumadin. She presented to the ER on 3/16 with complaints of cough and swelling in her legs that have been ongoing for the last week. She reports that since her last hospitalization she has noticed increased swelling in her legs that has been intermittent and moderate in severity. She reports that the swelling seems to be good in the morning but worsens as the day goes on and she is on her feet, will improve some with rest. She denies any shortness of breath or difficulty breathing but does have a slight cough. Denies any chest congestion, fever or chills. She reports that she has lost about 9 pounds since her last hospitalization. Give the swelling and cough she decided to come to the ER for evaluation. Upon arrival to the ER she was noted to be somewhat hypotensive with a BP 98/42. Lab work was obtained and noted BUN 21, creatinine 1.4, WBC 3.7, hemoglobin 10.6 and pro-BNP 32,619. Rapid influenza/COVID were negative.CXR was obtained and noted cardiomegaly with mild vascular congestion. She was given IV diuretics and admitted for further management.    Currently she is seen up walking around her room in no distress. She reports that she does seem to be feeling a little better. Still has an occasional dry cough. Denies any fever or chills. Denies any nausea or vomiting. Does feel like her legs look a little better although still feel swollen. In discussion with nursing her SpO2 has been stable on room air. No other issues or concerns from nursing. Much of the HPI has been obtained through extensive chart

## 2025-03-16 NOTE — ED PROVIDER NOTES
[facility] EMERGENCY DEPARTMENT  EMERGENCY DEPARTMENT ENCOUNTER        Pt Name: Josefina Garcia  MRN: [id number]  Birthdate 1945  Date of evaluation: 3/16/2025  Provider: Franchesca Stoddard DO  PCP: Teofilo Dutton Jr., MD  Note Started: 10:37 AM EDT 3/16/25    CHIEF COMPLAINT       Chief Complaint   Patient presents with    Cold Symptoms     Congested. Cough. Started yesterday       HISTORY OF PRESENT ILLNESS: 1 or more Elements   History From: patient and spouse    Limitations to history : None    Josefina Garcia is a 80 y.o. female who presents with wet cough beginning yesterday.  She denies fever, chills, body ache, chest pain, shortness of breath, sore throat, GI symptoms.  She advises some baseline lower extremity edema that is now starting to extend into her upper legs toward her groins, spouse however states that she has lost 9 pounds in water weight in the last week or so.  The complaint has been persistent, moderate in severity, and worsened by nothing.  Patient denies fever/chills, sore throat, congestion, chest pain, shortness of breath, headache, visual disturbances, focal paresthesias, focal weakness, abdominal pain, nausea, vomiting, diarrhea, constipation, dysuria, hematuria, trauma, neck or back pain, rash or other complaints.        Nursing Notes were all reviewed and agreed with or any disagreements were addressed in the HPI.    REVIEW OF SYSTEMS :           Positives and Pertinent negatives as per HPI.     SURGICAL HISTORY     Past Surgical History:   Procedure Laterality Date    AORTIC VALVULOPLASTY      1992    CARDIAC DEFIBRILLATOR PLACEMENT Left 05/09/2022    [employer] CRT-D  (Dr. Vega)    CARDIAC SURGERY      COLONOSCOPY      COLONOSCOPY  4/23/2024    COLONOSCOPY CONTROL HEMORRHAGE WITH CLIPPING performed by Wolf Jenkins MD at [facility]    HERNIA MESH REMOVAL      HERNIA REPAIR Right 01/05/2023    RIGHT FEMORAL HERNIA  REPAIR WITH MESH

## 2025-03-16 NOTE — ED NOTES
Per Kyara at access center, bed assignment [facility] 445, [phone]. access center responsible for transport set up

## 2025-03-17 LAB
ALBUMIN SERPL-MCNC: 3.6 G/DL (ref 3.5–5.2)
ALP SERPL-CCNC: 64 U/L (ref 35–104)
ALT SERPL-CCNC: 10 U/L (ref 0–32)
ANION GAP SERPL CALCULATED.3IONS-SCNC: 10 MMOL/L (ref 7–16)
ANION GAP SERPL CALCULATED.3IONS-SCNC: 9 MMOL/L (ref 7–16)
AST SERPL-CCNC: 29 U/L (ref 0–31)
BASOPHILS # BLD: 0.02 K/UL (ref 0–0.2)
BASOPHILS NFR BLD: 1 % (ref 0–2)
BILIRUB SERPL-MCNC: 0.9 MG/DL (ref 0–1.2)
BUN SERPL-MCNC: 25 MG/DL (ref 6–23)
BUN SERPL-MCNC: 25 MG/DL (ref 6–23)
CALCIUM SERPL-MCNC: 8.9 MG/DL (ref 8.6–10.2)
CALCIUM SERPL-MCNC: 9.1 MG/DL (ref 8.6–10.2)
CHLORIDE SERPL-SCNC: 91 MMOL/L (ref 98–107)
CHLORIDE SERPL-SCNC: 96 MMOL/L (ref 98–107)
CO2 SERPL-SCNC: 36 MMOL/L (ref 22–29)
CO2 SERPL-SCNC: 37 MMOL/L (ref 22–29)
CREAT SERPL-MCNC: 1.4 MG/DL (ref 0.5–1)
CREAT SERPL-MCNC: 1.4 MG/DL (ref 0.5–1)
EKG ATRIAL RATE: 86 BPM
EKG Q-T INTERVAL: 414 MS
EKG QRS DURATION: 118 MS
EKG QTC CALCULATION (BAZETT): 495 MS
EKG R AXIS: 178 DEGREES
EKG T AXIS: -58 DEGREES
EKG VENTRICULAR RATE: 86 BPM
EOSINOPHIL # BLD: 0.25 K/UL (ref 0.05–0.5)
EOSINOPHILS RELATIVE PERCENT: 6 % (ref 0–6)
ERYTHROCYTE [DISTWIDTH] IN BLOOD BY AUTOMATED COUNT: 18.3 % (ref 11.5–15)
GFR, ESTIMATED: 37 ML/MIN/1.73M2
GFR, ESTIMATED: 38 ML/MIN/1.73M2
GLUCOSE SERPL-MCNC: 110 MG/DL (ref 74–99)
GLUCOSE SERPL-MCNC: 84 MG/DL (ref 74–99)
HCT VFR BLD AUTO: 30.4 % (ref 34–48)
HGB BLD-MCNC: 9.1 G/DL (ref 11.5–15.5)
IMM GRANULOCYTES # BLD AUTO: <0.03 K/UL (ref 0–0.58)
IMM GRANULOCYTES NFR BLD: 0 % (ref 0–5)
INR PPP: 3.5
LYMPHOCYTES NFR BLD: 0.47 K/UL (ref 1.5–4)
LYMPHOCYTES RELATIVE PERCENT: 11 % (ref 20–42)
MAGNESIUM SERPL-MCNC: 1.8 MG/DL (ref 1.6–2.6)
MCH RBC QN AUTO: 29 PG (ref 26–35)
MCHC RBC AUTO-ENTMCNC: 29.9 G/DL (ref 32–34.5)
MCV RBC AUTO: 96.8 FL (ref 80–99.9)
MONOCYTES NFR BLD: 0.6 K/UL (ref 0.1–0.95)
MONOCYTES NFR BLD: 15 % (ref 2–12)
NEUTROPHILS NFR BLD: 68 % (ref 43–80)
NEUTS SEG NFR BLD: 2.8 K/UL (ref 1.8–7.3)
PLATELET # BLD AUTO: 167 K/UL (ref 130–450)
PMV BLD AUTO: 12.1 FL (ref 7–12)
POTASSIUM SERPL-SCNC: 3.1 MMOL/L (ref 3.5–5)
POTASSIUM SERPL-SCNC: 4 MMOL/L (ref 3.5–5)
PROT SERPL-MCNC: 7 G/DL (ref 6.4–8.3)
PROTHROMBIN TIME: 36.9 SEC (ref 9.3–12.4)
RBC # BLD AUTO: 3.14 M/UL (ref 3.5–5.5)
RBC # BLD: ABNORMAL 10*6/UL
SODIUM SERPL-SCNC: 138 MMOL/L (ref 132–146)
SODIUM SERPL-SCNC: 141 MMOL/L (ref 132–146)
WBC OTHER # BLD: 4.2 K/UL (ref 4.5–11.5)

## 2025-03-17 PROCEDURE — 6370000000 HC RX 637 (ALT 250 FOR IP): Performed by: INTERNAL MEDICINE

## 2025-03-17 PROCEDURE — 36415 COLL VENOUS BLD VENIPUNCTURE: CPT

## 2025-03-17 PROCEDURE — 85610 PROTHROMBIN TIME: CPT

## 2025-03-17 PROCEDURE — 6370000000 HC RX 637 (ALT 250 FOR IP): Performed by: NURSE PRACTITIONER

## 2025-03-17 PROCEDURE — 83735 ASSAY OF MAGNESIUM: CPT

## 2025-03-17 PROCEDURE — 80053 COMPREHEN METABOLIC PANEL: CPT

## 2025-03-17 PROCEDURE — 80048 BASIC METABOLIC PNL TOTAL CA: CPT

## 2025-03-17 PROCEDURE — 2580000003 HC RX 258: Performed by: INTERNAL MEDICINE

## 2025-03-17 PROCEDURE — 85025 COMPLETE CBC W/AUTO DIFF WBC: CPT

## 2025-03-17 PROCEDURE — 6360000002 HC RX W HCPCS: Performed by: INTERNAL MEDICINE

## 2025-03-17 PROCEDURE — 2700000000 HC OXYGEN THERAPY PER DAY

## 2025-03-17 PROCEDURE — 2500000003 HC RX 250 WO HCPCS: Performed by: INTERNAL MEDICINE

## 2025-03-17 PROCEDURE — 2060000000 HC ICU INTERMEDIATE R&B

## 2025-03-17 RX ORDER — BENZONATATE 100 MG/1
100 CAPSULE ORAL 3 TIMES DAILY PRN
Status: DISCONTINUED | OUTPATIENT
Start: 2025-03-17 | End: 2025-03-25 | Stop reason: HOSPADM

## 2025-03-17 RX ORDER — SPIRONOLACTONE 25 MG/1
12.5 TABLET ORAL DAILY
Status: DISCONTINUED | OUTPATIENT
Start: 2025-03-17 | End: 2025-03-20

## 2025-03-17 RX ORDER — WARFARIN SODIUM 1 MG/1
0.5 TABLET ORAL
Status: COMPLETED | OUTPATIENT
Start: 2025-03-17 | End: 2025-03-17

## 2025-03-17 RX ORDER — POTASSIUM CHLORIDE 1500 MG/1
40 TABLET, EXTENDED RELEASE ORAL ONCE
Status: COMPLETED | OUTPATIENT
Start: 2025-03-17 | End: 2025-03-17

## 2025-03-17 RX ORDER — BUMETANIDE 0.25 MG/ML
1 INJECTION, SOLUTION INTRAMUSCULAR; INTRAVENOUS ONCE
Status: COMPLETED | OUTPATIENT
Start: 2025-03-17 | End: 2025-03-17

## 2025-03-17 RX ORDER — METOLAZONE 2.5 MG/1
2.5 TABLET ORAL
Status: DISCONTINUED | OUTPATIENT
Start: 2025-03-17 | End: 2025-03-25 | Stop reason: HOSPADM

## 2025-03-17 RX ADMIN — SODIUM CHLORIDE 125 MG: 9 INJECTION, SOLUTION INTRAVENOUS at 12:59

## 2025-03-17 RX ADMIN — BUMETANIDE 0.2 MG/HR: 0.25 INJECTION INTRAMUSCULAR; INTRAVENOUS at 14:02

## 2025-03-17 RX ADMIN — WARFARIN SODIUM 0.5 MG: 1 TABLET ORAL at 17:18

## 2025-03-17 RX ADMIN — BUMETANIDE 1 MG: 0.25 INJECTION INTRAMUSCULAR; INTRAVENOUS at 14:02

## 2025-03-17 RX ADMIN — BENZONATATE 100 MG: 100 CAPSULE ORAL at 16:11

## 2025-03-17 RX ADMIN — DIGOXIN 62.5 MCG: 0.12 TABLET ORAL at 16:11

## 2025-03-17 RX ADMIN — SACUBITRIL AND VALSARTAN 0.5 TABLET: 24; 26 TABLET, FILM COATED ORAL at 15:07

## 2025-03-17 RX ADMIN — BUMETANIDE 2 MG: 1 TABLET ORAL at 08:08

## 2025-03-17 RX ADMIN — LEVOTHYROXINE SODIUM 125 MCG: 25 TABLET ORAL at 06:36

## 2025-03-17 RX ADMIN — METOPROLOL SUCCINATE 25 MG: 25 TABLET, EXTENDED RELEASE ORAL at 08:08

## 2025-03-17 RX ADMIN — SODIUM CHLORIDE, PRESERVATIVE FREE 10 ML: 5 INJECTION INTRAVENOUS at 08:08

## 2025-03-17 RX ADMIN — BENZONATATE 100 MG: 100 CAPSULE ORAL at 23:29

## 2025-03-17 RX ADMIN — POTASSIUM CHLORIDE 40 MEQ: 1500 TABLET, EXTENDED RELEASE ORAL at 10:48

## 2025-03-17 ASSESSMENT — PAIN SCALES - GENERAL
PAINLEVEL_OUTOF10: 0

## 2025-03-17 NOTE — CARE COORDINATION
Social Work/Discharge Planning;  Social Work consult noted.  Patient is a readmit, discharged 3/6.  Patient known to this worker from previous admission.  Met with patient and her  Gagandeep and completed assessment.  Explained Social Work role.  Patient lives with her  in a split level house.  PTA she is independent with no adaptive device.  She has a nebulizer at home.  Patient PCP is Dr. Nato Lopez, whom she states she saw following her previous hospital stay.  She is currently requiring two liters oxygen and RN to wean as tolerated.  She plans to return home at discharge and denies any home care needs at this time.  Will continue to follow and assist with discharge planning.  Electronically signed by LISSETH Yoder on 3/17/2025 at 12:56 PM

## 2025-03-17 NOTE — ACP (ADVANCE CARE PLANNING)
Advance Care Planning   Healthcare Decision Maker:    Primary Decision Maker: GarciaGagandeep - [facility] - [phone]    Click here to complete Healthcare Decision Makers including selection of the Healthcare Decision Maker Relationship (ie \"Primary\").

## 2025-03-17 NOTE — PLAN OF CARE
Patient's chart updated to reflect:      .    - HF care plan, HF education points and HF discharge instructions.  -Orders: 2 gram sodium diet, daily weights, I/O.  -PCP or cardiology follow up appointments to be scheduled within 7 days of hospital discharge.  -CHF education session will be provided to the patient prior to hospital discharge.    Angelica Singer RN   Heart Failure Navigator

## 2025-03-18 LAB
ALBUMIN SERPL-MCNC: 4 G/DL (ref 3.5–5.2)
ALP SERPL-CCNC: 76 U/L (ref 35–104)
ALT SERPL-CCNC: 11 U/L (ref 0–32)
ANION GAP SERPL CALCULATED.3IONS-SCNC: 11 MMOL/L (ref 7–16)
ANION GAP SERPL CALCULATED.3IONS-SCNC: 8 MMOL/L (ref 7–16)
AST SERPL-CCNC: 31 U/L (ref 0–31)
B PARAP IS1001 DNA NPH QL NAA+NON-PROBE: NOT DETECTED
B PERT DNA SPEC QL NAA+PROBE: NOT DETECTED
BASOPHILS # BLD: 0 K/UL (ref 0–0.2)
BASOPHILS NFR BLD: 0 % (ref 0–2)
BILIRUB SERPL-MCNC: 1.6 MG/DL (ref 0–1.2)
BUN SERPL-MCNC: 25 MG/DL (ref 6–23)
BUN SERPL-MCNC: 26 MG/DL (ref 6–23)
C PNEUM DNA NPH QL NAA+NON-PROBE: NOT DETECTED
CALCIUM SERPL-MCNC: 9.1 MG/DL (ref 8.6–10.2)
CALCIUM SERPL-MCNC: 9.2 MG/DL (ref 8.6–10.2)
CHLORIDE SERPL-SCNC: 89 MMOL/L (ref 98–107)
CHLORIDE SERPL-SCNC: 93 MMOL/L (ref 98–107)
CO2 SERPL-SCNC: 37 MMOL/L (ref 22–29)
CO2 SERPL-SCNC: 38 MMOL/L (ref 22–29)
CREAT SERPL-MCNC: 1.4 MG/DL (ref 0.5–1)
CREAT SERPL-MCNC: 1.4 MG/DL (ref 0.5–1)
EOSINOPHIL # BLD: 0.05 K/UL (ref 0.05–0.5)
EOSINOPHILS RELATIVE PERCENT: 1 % (ref 0–6)
ERYTHROCYTE [DISTWIDTH] IN BLOOD BY AUTOMATED COUNT: 18.3 % (ref 11.5–15)
FLUAV RNA NPH QL NAA+NON-PROBE: NOT DETECTED
FLUBV RNA NPH QL NAA+NON-PROBE: NOT DETECTED
GFR, ESTIMATED: 37 ML/MIN/1.73M2
GFR, ESTIMATED: 39 ML/MIN/1.73M2
GLUCOSE SERPL-MCNC: 108 MG/DL (ref 74–99)
GLUCOSE SERPL-MCNC: 126 MG/DL (ref 74–99)
HADV DNA NPH QL NAA+NON-PROBE: NOT DETECTED
HCOV 229E RNA NPH QL NAA+NON-PROBE: NOT DETECTED
HCOV HKU1 RNA NPH QL NAA+NON-PROBE: NOT DETECTED
HCOV NL63 RNA NPH QL NAA+NON-PROBE: NOT DETECTED
HCOV OC43 RNA NPH QL NAA+NON-PROBE: NOT DETECTED
HCT VFR BLD AUTO: 34 % (ref 34–48)
HGB BLD-MCNC: 10.1 G/DL (ref 11.5–15.5)
HMPV RNA NPH QL NAA+NON-PROBE: NOT DETECTED
HPIV1 RNA NPH QL NAA+NON-PROBE: NOT DETECTED
HPIV2 RNA NPH QL NAA+NON-PROBE: NOT DETECTED
HPIV3 RNA NPH QL NAA+NON-PROBE: NOT DETECTED
HPIV4 RNA NPH QL NAA+NON-PROBE: NOT DETECTED
INR PPP: 3.6
LYMPHOCYTES NFR BLD: 0.41 K/UL (ref 1.5–4)
LYMPHOCYTES RELATIVE PERCENT: 7 % (ref 20–42)
M PNEUMO DNA NPH QL NAA+NON-PROBE: NOT DETECTED
MAGNESIUM SERPL-MCNC: 1.8 MG/DL (ref 1.6–2.6)
MCH RBC QN AUTO: 28.5 PG (ref 26–35)
MCHC RBC AUTO-ENTMCNC: 29.7 G/DL (ref 32–34.5)
MCV RBC AUTO: 95.8 FL (ref 80–99.9)
MONOCYTES NFR BLD: 0.41 K/UL (ref 0.1–0.95)
MONOCYTES NFR BLD: 7 % (ref 2–12)
NEUTROPHILS NFR BLD: 85 % (ref 43–80)
NEUTS SEG NFR BLD: 4.94 K/UL (ref 1.8–7.3)
PLATELET # BLD AUTO: 178 K/UL (ref 130–450)
PMV BLD AUTO: 11.9 FL (ref 7–12)
POTASSIUM SERPL-SCNC: 3.3 MMOL/L (ref 3.5–5)
POTASSIUM SERPL-SCNC: 3.6 MMOL/L (ref 3.5–5)
PROT SERPL-MCNC: 7.5 G/DL (ref 6.4–8.3)
PROTHROMBIN TIME: 38.3 SEC (ref 9.3–12.4)
RBC # BLD AUTO: 3.55 M/UL (ref 3.5–5.5)
RBC # BLD: ABNORMAL 10*6/UL
RSV RNA NPH QL NAA+NON-PROBE: DETECTED
RV+EV RNA NPH QL NAA+NON-PROBE: NOT DETECTED
SARS-COV-2 RNA NPH QL NAA+NON-PROBE: NOT DETECTED
SODIUM SERPL-SCNC: 137 MMOL/L (ref 132–146)
SODIUM SERPL-SCNC: 139 MMOL/L (ref 132–146)
SPECIMEN DESCRIPTION: ABNORMAL
WBC OTHER # BLD: 5.8 K/UL (ref 4.5–11.5)

## 2025-03-18 PROCEDURE — 80048 BASIC METABOLIC PNL TOTAL CA: CPT

## 2025-03-18 PROCEDURE — 6370000000 HC RX 637 (ALT 250 FOR IP): Performed by: NURSE PRACTITIONER

## 2025-03-18 PROCEDURE — 83735 ASSAY OF MAGNESIUM: CPT

## 2025-03-18 PROCEDURE — 94669 MECHANICAL CHEST WALL OSCILL: CPT

## 2025-03-18 PROCEDURE — 6360000002 HC RX W HCPCS: Performed by: INTERNAL MEDICINE

## 2025-03-18 PROCEDURE — 85610 PROTHROMBIN TIME: CPT

## 2025-03-18 PROCEDURE — 0202U NFCT DS 22 TRGT SARS-COV-2: CPT

## 2025-03-18 PROCEDURE — 6370000000 HC RX 637 (ALT 250 FOR IP): Performed by: INTERNAL MEDICINE

## 2025-03-18 PROCEDURE — 36415 COLL VENOUS BLD VENIPUNCTURE: CPT

## 2025-03-18 PROCEDURE — 2500000003 HC RX 250 WO HCPCS: Performed by: INTERNAL MEDICINE

## 2025-03-18 PROCEDURE — 85025 COMPLETE CBC W/AUTO DIFF WBC: CPT

## 2025-03-18 PROCEDURE — 2060000000 HC ICU INTERMEDIATE R&B

## 2025-03-18 PROCEDURE — 94640 AIRWAY INHALATION TREATMENT: CPT

## 2025-03-18 PROCEDURE — 80053 COMPREHEN METABOLIC PANEL: CPT

## 2025-03-18 RX ORDER — GUAIFENESIN/DEXTROMETHORPHAN 100-10MG/5
5 SYRUP ORAL EVERY 4 HOURS PRN
Status: DISCONTINUED | OUTPATIENT
Start: 2025-03-18 | End: 2025-03-25 | Stop reason: HOSPADM

## 2025-03-18 RX ORDER — POTASSIUM CHLORIDE 1500 MG/1
40 TABLET, EXTENDED RELEASE ORAL ONCE
Status: COMPLETED | OUTPATIENT
Start: 2025-03-18 | End: 2025-03-18

## 2025-03-18 RX ORDER — WARFARIN SODIUM 1 MG/1
1 TABLET ORAL
Status: COMPLETED | OUTPATIENT
Start: 2025-03-18 | End: 2025-03-18

## 2025-03-18 RX ORDER — ACETAZOLAMIDE 250 MG/1
375 TABLET ORAL DAILY
Status: DISCONTINUED | OUTPATIENT
Start: 2025-03-18 | End: 2025-03-25 | Stop reason: HOSPADM

## 2025-03-18 RX ORDER — POTASSIUM CHLORIDE 1500 MG/1
20 TABLET, EXTENDED RELEASE ORAL ONCE
Status: COMPLETED | OUTPATIENT
Start: 2025-03-18 | End: 2025-03-18

## 2025-03-18 RX ORDER — GUAIFENESIN 400 MG/1
400 TABLET ORAL 3 TIMES DAILY
Status: DISCONTINUED | OUTPATIENT
Start: 2025-03-18 | End: 2025-03-25 | Stop reason: HOSPADM

## 2025-03-18 RX ORDER — IPRATROPIUM BROMIDE AND ALBUTEROL SULFATE 2.5; .5 MG/3ML; MG/3ML
1 SOLUTION RESPIRATORY (INHALATION)
Status: DISCONTINUED | OUTPATIENT
Start: 2025-03-18 | End: 2025-03-23

## 2025-03-18 RX ADMIN — GUAIFENESIN 400 MG: 400 TABLET ORAL at 20:44

## 2025-03-18 RX ADMIN — METOPROLOL SUCCINATE 25 MG: 25 TABLET, EXTENDED RELEASE ORAL at 08:11

## 2025-03-18 RX ADMIN — GUAIFENESIN SYRUP AND DEXTROMETHORPHAN 5 ML: 100; 10 SYRUP ORAL at 04:34

## 2025-03-18 RX ADMIN — IPRATROPIUM BROMIDE AND ALBUTEROL SULFATE 1 DOSE: .5; 2.5 SOLUTION RESPIRATORY (INHALATION) at 19:55

## 2025-03-18 RX ADMIN — IPRATROPIUM BROMIDE AND ALBUTEROL SULFATE 1 DOSE: .5; 2.5 SOLUTION RESPIRATORY (INHALATION) at 08:08

## 2025-03-18 RX ADMIN — WARFARIN SODIUM 1 MG: 1 TABLET ORAL at 17:54

## 2025-03-18 RX ADMIN — BENZONATATE 100 MG: 100 CAPSULE ORAL at 06:04

## 2025-03-18 RX ADMIN — ACETAZOLAMIDE 375 MG: 250 TABLET ORAL at 10:39

## 2025-03-18 RX ADMIN — IPRATROPIUM BROMIDE AND ALBUTEROL SULFATE 1 DOSE: .5; 2.5 SOLUTION RESPIRATORY (INHALATION) at 16:39

## 2025-03-18 RX ADMIN — EPOETIN ALFA-EPBX 10000 UNITS: 10000 INJECTION, SOLUTION INTRAVENOUS; SUBCUTANEOUS at 17:54

## 2025-03-18 RX ADMIN — GUAIFENESIN 400 MG: 400 TABLET ORAL at 08:11

## 2025-03-18 RX ADMIN — SODIUM CHLORIDE, PRESERVATIVE FREE 10 ML: 5 INJECTION INTRAVENOUS at 20:46

## 2025-03-18 RX ADMIN — POTASSIUM CHLORIDE 20 MEQ: 1500 TABLET, EXTENDED RELEASE ORAL at 14:09

## 2025-03-18 RX ADMIN — LEVOTHYROXINE SODIUM 125 MCG: 25 TABLET ORAL at 06:04

## 2025-03-18 RX ADMIN — GUAIFENESIN 400 MG: 400 TABLET ORAL at 14:09

## 2025-03-18 RX ADMIN — IPRATROPIUM BROMIDE AND ALBUTEROL SULFATE 1 DOSE: .5; 2.5 SOLUTION RESPIRATORY (INHALATION) at 12:42

## 2025-03-18 RX ADMIN — POTASSIUM CHLORIDE 40 MEQ: 1500 TABLET, EXTENDED RELEASE ORAL at 10:39

## 2025-03-18 ASSESSMENT — PAIN SCALES - GENERAL: PAINLEVEL_OUTOF10: 0

## 2025-03-18 NOTE — CARE COORDINATION
Social Work/Discharge Planning:  Chart reviewed.  Patient weaned to room air.  Plan remains home with  at discharge.  Anticipate no needs.  Will continue to follow.  Electronically signed by LISSETH Yoder on 3/18/2025 at 3:35 PM

## 2025-03-18 NOTE — PLAN OF CARE
Problem: Chronic Conditions and Co-morbidities  Goal: Patient's chronic conditions and co-morbidity symptoms are monitored and maintained or improved  3/18/2025 0843 by Palmira Burns RN  Outcome: Progressing     Problem: Discharge Planning  Goal: Discharge to home or other facility with appropriate resources  3/18/2025 0843 by Palmira Burns RN  Outcome: Progressing     Problem: ABCDS Injury Assessment  Goal: Absence of physical injury  3/18/2025 0843 by Palmira Burns RN  Outcome: Progressing     Problem: Safety - Adult  Goal: Free from fall injury  3/18/2025 0843 by Palmira Burns RN  Outcome: Progressing     Problem: Respiratory - Adult  Goal: Achieves optimal ventilation and oxygenation  3/18/2025 0843 by Palmira Burns RN  Outcome: Progressing

## 2025-03-19 LAB
ALBUMIN SERPL-MCNC: 3.8 G/DL (ref 3.5–5.2)
ALP SERPL-CCNC: 71 U/L (ref 35–104)
ALT SERPL-CCNC: 11 U/L (ref 0–32)
ANION GAP SERPL CALCULATED.3IONS-SCNC: 13 MMOL/L (ref 7–16)
AST SERPL-CCNC: 32 U/L (ref 0–31)
BASOPHILS # BLD: 0.04 K/UL (ref 0–0.2)
BASOPHILS NFR BLD: 1 % (ref 0–2)
BILIRUB SERPL-MCNC: 1.1 MG/DL (ref 0–1.2)
BUN SERPL-MCNC: 25 MG/DL (ref 6–23)
CALCIUM SERPL-MCNC: 9.1 MG/DL (ref 8.6–10.2)
CHLORIDE SERPL-SCNC: 93 MMOL/L (ref 98–107)
CO2 SERPL-SCNC: 31 MMOL/L (ref 22–29)
CREAT SERPL-MCNC: 1.3 MG/DL (ref 0.5–1)
EOSINOPHIL # BLD: 0.02 K/UL (ref 0.05–0.5)
EOSINOPHILS RELATIVE PERCENT: 0 % (ref 0–6)
ERYTHROCYTE [DISTWIDTH] IN BLOOD BY AUTOMATED COUNT: 18.4 % (ref 11.5–15)
GFR, ESTIMATED: 42 ML/MIN/1.73M2
GLUCOSE SERPL-MCNC: 129 MG/DL (ref 74–99)
HCT VFR BLD AUTO: 32.8 % (ref 34–48)
HGB BLD-MCNC: 9.6 G/DL (ref 11.5–15.5)
IMM GRANULOCYTES # BLD AUTO: <0.03 K/UL (ref 0–0.58)
IMM GRANULOCYTES NFR BLD: 0 % (ref 0–5)
INR PPP: 4.3
LYMPHOCYTES NFR BLD: 0.57 K/UL (ref 1.5–4)
LYMPHOCYTES RELATIVE PERCENT: 11 % (ref 20–42)
MAGNESIUM SERPL-MCNC: 1.9 MG/DL (ref 1.6–2.6)
MCH RBC QN AUTO: 28.5 PG (ref 26–35)
MCHC RBC AUTO-ENTMCNC: 29.3 G/DL (ref 32–34.5)
MCV RBC AUTO: 97.3 FL (ref 80–99.9)
MONOCYTES NFR BLD: 0.6 K/UL (ref 0.1–0.95)
MONOCYTES NFR BLD: 12 % (ref 2–12)
NEUTROPHILS NFR BLD: 75 % (ref 43–80)
NEUTS SEG NFR BLD: 3.74 K/UL (ref 1.8–7.3)
PHOSPHATE SERPL-MCNC: 2.7 MG/DL (ref 2.5–4.5)
PLATELET # BLD AUTO: 171 K/UL (ref 130–450)
PMV BLD AUTO: 11.6 FL (ref 7–12)
POTASSIUM SERPL-SCNC: 3.4 MMOL/L (ref 3.5–5)
PROT SERPL-MCNC: 7.6 G/DL (ref 6.4–8.3)
PROTHROMBIN TIME: 47.6 SEC (ref 9.3–12.4)
RBC # BLD AUTO: 3.37 M/UL (ref 3.5–5.5)
RBC # BLD: ABNORMAL 10*6/UL
SODIUM SERPL-SCNC: 137 MMOL/L (ref 132–146)
WBC OTHER # BLD: 5 K/UL (ref 4.5–11.5)

## 2025-03-19 PROCEDURE — 2500000003 HC RX 250 WO HCPCS: Performed by: INTERNAL MEDICINE

## 2025-03-19 PROCEDURE — 85025 COMPLETE CBC W/AUTO DIFF WBC: CPT

## 2025-03-19 PROCEDURE — 94669 MECHANICAL CHEST WALL OSCILL: CPT

## 2025-03-19 PROCEDURE — 6370000000 HC RX 637 (ALT 250 FOR IP): Performed by: INTERNAL MEDICINE

## 2025-03-19 PROCEDURE — 93295 DEV INTERROG REMOTE 1/2/MLT: CPT | Performed by: INTERNAL MEDICINE

## 2025-03-19 PROCEDURE — 80053 COMPREHEN METABOLIC PANEL: CPT

## 2025-03-19 PROCEDURE — 93005 ELECTROCARDIOGRAM TRACING: CPT | Performed by: INTERNAL MEDICINE

## 2025-03-19 PROCEDURE — 85610 PROTHROMBIN TIME: CPT

## 2025-03-19 PROCEDURE — 36415 COLL VENOUS BLD VENIPUNCTURE: CPT

## 2025-03-19 PROCEDURE — 6370000000 HC RX 637 (ALT 250 FOR IP): Performed by: NURSE PRACTITIONER

## 2025-03-19 PROCEDURE — 93296 REM INTERROG EVL PM/IDS: CPT | Performed by: INTERNAL MEDICINE

## 2025-03-19 PROCEDURE — 94640 AIRWAY INHALATION TREATMENT: CPT

## 2025-03-19 PROCEDURE — 2060000000 HC ICU INTERMEDIATE R&B

## 2025-03-19 PROCEDURE — 83735 ASSAY OF MAGNESIUM: CPT

## 2025-03-19 PROCEDURE — 93297 REM INTERROG DEV EVAL ICPMS: CPT | Performed by: INTERNAL MEDICINE

## 2025-03-19 PROCEDURE — 84100 ASSAY OF PHOSPHORUS: CPT

## 2025-03-19 RX ORDER — POTASSIUM CHLORIDE 1500 MG/1
20 TABLET, EXTENDED RELEASE ORAL ONCE
Status: COMPLETED | OUTPATIENT
Start: 2025-03-19 | End: 2025-03-19

## 2025-03-19 RX ORDER — BENZONATATE 100 MG/1
100 CAPSULE ORAL 3 TIMES DAILY PRN
Qty: 21 CAPSULE | Refills: 0 | Status: SHIPPED | OUTPATIENT
Start: 2025-03-19 | End: 2025-03-25

## 2025-03-19 RX ORDER — GUAIFENESIN/DEXTROMETHORPHAN 100-10MG/5
5 SYRUP ORAL EVERY 4 HOURS PRN
Qty: 120 ML | Refills: 0 | Status: SHIPPED | OUTPATIENT
Start: 2025-03-19 | End: 2025-03-25

## 2025-03-19 RX ADMIN — SACUBITRIL AND VALSARTAN 0.5 TABLET: 24; 26 TABLET, FILM COATED ORAL at 20:14

## 2025-03-19 RX ADMIN — SODIUM CHLORIDE, PRESERVATIVE FREE 10 ML: 5 INJECTION INTRAVENOUS at 20:14

## 2025-03-19 RX ADMIN — IPRATROPIUM BROMIDE AND ALBUTEROL SULFATE 1 DOSE: .5; 2.5 SOLUTION RESPIRATORY (INHALATION) at 20:03

## 2025-03-19 RX ADMIN — DIGOXIN 62.5 MCG: 0.12 TABLET ORAL at 16:42

## 2025-03-19 RX ADMIN — LEVOTHYROXINE SODIUM 125 MCG: 25 TABLET ORAL at 06:50

## 2025-03-19 RX ADMIN — Medication 3 MG: at 23:21

## 2025-03-19 RX ADMIN — IPRATROPIUM BROMIDE AND ALBUTEROL SULFATE 1 DOSE: .5; 2.5 SOLUTION RESPIRATORY (INHALATION) at 15:53

## 2025-03-19 RX ADMIN — POTASSIUM CHLORIDE 20 MEQ: 1500 TABLET, EXTENDED RELEASE ORAL at 11:19

## 2025-03-19 RX ADMIN — SODIUM CHLORIDE, PRESERVATIVE FREE 10 ML: 5 INJECTION INTRAVENOUS at 08:07

## 2025-03-19 RX ADMIN — IPRATROPIUM BROMIDE AND ALBUTEROL SULFATE 1 DOSE: .5; 2.5 SOLUTION RESPIRATORY (INHALATION) at 08:18

## 2025-03-19 RX ADMIN — GUAIFENESIN 400 MG: 400 TABLET ORAL at 20:14

## 2025-03-19 RX ADMIN — GUAIFENESIN 400 MG: 400 TABLET ORAL at 08:07

## 2025-03-19 RX ADMIN — IPRATROPIUM BROMIDE AND ALBUTEROL SULFATE 1 DOSE: .5; 2.5 SOLUTION RESPIRATORY (INHALATION) at 12:01

## 2025-03-19 RX ADMIN — GUAIFENESIN 400 MG: 400 TABLET ORAL at 14:30

## 2025-03-19 ASSESSMENT — PAIN SCALES - GENERAL
PAINLEVEL_OUTOF10: 0
PAINLEVEL_OUTOF10: 0

## 2025-03-19 NOTE — PLAN OF CARE
Problem: Chronic Conditions and Co-morbidities  Goal: Patient's chronic conditions and co-morbidity symptoms are monitored and maintained or improved  3/19/2025 0440 by Palmira Burns RN  Outcome: Progressing     Problem: Discharge Planning  Goal: Discharge to home or other facility with appropriate resources  3/19/2025 0440 by Palmira Burns RN  Outcome: Progressing     Problem: ABCDS Injury Assessment  Goal: Absence of physical injury  3/19/2025 0440 by Palmira Burns RN  Outcome: Progressing     Problem: Safety - Adult  Goal: Free from fall injury  3/19/2025 0440 by Palmira Burns RN  Outcome: Progressing     Problem: Respiratory - Adult  Goal: Achieves optimal ventilation and oxygenation  3/19/2025 0440 by Palmira Burns RN  Outcome: Progressing

## 2025-03-20 ENCOUNTER — APPOINTMENT (OUTPATIENT)
Dept: GENERAL RADIOLOGY | Age: 80
DRG: 291 | End: 2025-03-20
Attending: INTERNAL MEDICINE
Payer: MEDICARE

## 2025-03-20 LAB
ALBUMIN SERPL-MCNC: 3.6 G/DL (ref 3.5–5.2)
ALP SERPL-CCNC: 65 U/L (ref 35–104)
ALT SERPL-CCNC: 10 U/L (ref 0–32)
ANION GAP SERPL CALCULATED.3IONS-SCNC: 10 MMOL/L (ref 7–16)
AST SERPL-CCNC: 29 U/L (ref 0–31)
BASOPHILS # BLD: 0.03 K/UL (ref 0–0.2)
BASOPHILS NFR BLD: 1 % (ref 0–2)
BILIRUB SERPL-MCNC: 0.8 MG/DL (ref 0–1.2)
BUN SERPL-MCNC: 30 MG/DL (ref 6–23)
CALCIUM SERPL-MCNC: 8.9 MG/DL (ref 8.6–10.2)
CHLORIDE SERPL-SCNC: 94 MMOL/L (ref 98–107)
CO2 SERPL-SCNC: 34 MMOL/L (ref 22–29)
CREAT SERPL-MCNC: 1.3 MG/DL (ref 0.5–1)
EOSINOPHIL # BLD: 0.05 K/UL (ref 0.05–0.5)
EOSINOPHILS RELATIVE PERCENT: 1 % (ref 0–6)
ERYTHROCYTE [DISTWIDTH] IN BLOOD BY AUTOMATED COUNT: 18 % (ref 11.5–15)
GFR, ESTIMATED: 43 ML/MIN/1.73M2
GLUCOSE SERPL-MCNC: 105 MG/DL (ref 74–99)
HCT VFR BLD AUTO: 32.3 % (ref 34–48)
HGB BLD-MCNC: 9.6 G/DL (ref 11.5–15.5)
IMM GRANULOCYTES # BLD AUTO: <0.03 K/UL (ref 0–0.58)
IMM GRANULOCYTES NFR BLD: 0 % (ref 0–5)
INR PPP: 3.9
LYMPHOCYTES NFR BLD: 0.35 K/UL (ref 1.5–4)
LYMPHOCYTES RELATIVE PERCENT: 7 % (ref 20–42)
MAGNESIUM SERPL-MCNC: 2.1 MG/DL (ref 1.6–2.6)
MCH RBC QN AUTO: 28.7 PG (ref 26–35)
MCHC RBC AUTO-ENTMCNC: 29.7 G/DL (ref 32–34.5)
MCV RBC AUTO: 96.7 FL (ref 80–99.9)
MONOCYTES NFR BLD: 0.66 K/UL (ref 0.1–0.95)
MONOCYTES NFR BLD: 13 % (ref 2–12)
NEUTROPHILS NFR BLD: 78 % (ref 43–80)
NEUTS SEG NFR BLD: 3.81 K/UL (ref 1.8–7.3)
PHOSPHATE SERPL-MCNC: 2.7 MG/DL (ref 2.5–4.5)
PLATELET # BLD AUTO: 154 K/UL (ref 130–450)
PMV BLD AUTO: 11.8 FL (ref 7–12)
POTASSIUM SERPL-SCNC: 3 MMOL/L (ref 3.5–5)
PROT SERPL-MCNC: 6.9 G/DL (ref 6.4–8.3)
PROTHROMBIN TIME: 42 SEC (ref 9.3–12.4)
RBC # BLD AUTO: 3.34 M/UL (ref 3.5–5.5)
RBC # BLD: ABNORMAL 10*6/UL
SODIUM SERPL-SCNC: 138 MMOL/L (ref 132–146)
WBC OTHER # BLD: 4.9 K/UL (ref 4.5–11.5)

## 2025-03-20 PROCEDURE — 6370000000 HC RX 637 (ALT 250 FOR IP): Performed by: NURSE PRACTITIONER

## 2025-03-20 PROCEDURE — 2700000000 HC OXYGEN THERAPY PER DAY

## 2025-03-20 PROCEDURE — 36415 COLL VENOUS BLD VENIPUNCTURE: CPT

## 2025-03-20 PROCEDURE — 2500000003 HC RX 250 WO HCPCS: Performed by: INTERNAL MEDICINE

## 2025-03-20 PROCEDURE — 83735 ASSAY OF MAGNESIUM: CPT

## 2025-03-20 PROCEDURE — 2060000000 HC ICU INTERMEDIATE R&B

## 2025-03-20 PROCEDURE — 2580000003 HC RX 258: Performed by: INTERNAL MEDICINE

## 2025-03-20 PROCEDURE — 71046 X-RAY EXAM CHEST 2 VIEWS: CPT

## 2025-03-20 PROCEDURE — 6370000000 HC RX 637 (ALT 250 FOR IP): Performed by: INTERNAL MEDICINE

## 2025-03-20 PROCEDURE — 6360000002 HC RX W HCPCS: Performed by: INTERNAL MEDICINE

## 2025-03-20 PROCEDURE — 94640 AIRWAY INHALATION TREATMENT: CPT

## 2025-03-20 PROCEDURE — 85025 COMPLETE CBC W/AUTO DIFF WBC: CPT

## 2025-03-20 PROCEDURE — 6360000002 HC RX W HCPCS: Performed by: NURSE PRACTITIONER

## 2025-03-20 PROCEDURE — 80053 COMPREHEN METABOLIC PANEL: CPT

## 2025-03-20 PROCEDURE — 84100 ASSAY OF PHOSPHORUS: CPT

## 2025-03-20 PROCEDURE — 94669 MECHANICAL CHEST WALL OSCILL: CPT

## 2025-03-20 PROCEDURE — 85610 PROTHROMBIN TIME: CPT

## 2025-03-20 RX ORDER — MIDODRINE HYDROCHLORIDE 5 MG/1
5 TABLET ORAL ONCE
Status: COMPLETED | OUTPATIENT
Start: 2025-03-20 | End: 2025-03-20

## 2025-03-20 RX ORDER — 0.9 % SODIUM CHLORIDE 0.9 %
125 INTRAVENOUS SOLUTION INTRAVENOUS ONCE
Status: COMPLETED | OUTPATIENT
Start: 2025-03-20 | End: 2025-03-21

## 2025-03-20 RX ORDER — POTASSIUM CHLORIDE 1500 MG/1
40 TABLET, EXTENDED RELEASE ORAL PRN
Status: DISCONTINUED | OUTPATIENT
Start: 2025-03-20 | End: 2025-03-25 | Stop reason: HOSPADM

## 2025-03-20 RX ORDER — POTASSIUM CHLORIDE 7.45 MG/ML
10 INJECTION INTRAVENOUS PRN
Status: DISCONTINUED | OUTPATIENT
Start: 2025-03-20 | End: 2025-03-25 | Stop reason: HOSPADM

## 2025-03-20 RX ORDER — MAGNESIUM SULFATE IN WATER 40 MG/ML
2000 INJECTION, SOLUTION INTRAVENOUS PRN
Status: DISCONTINUED | OUTPATIENT
Start: 2025-03-20 | End: 2025-03-25 | Stop reason: HOSPADM

## 2025-03-20 RX ORDER — SPIRONOLACTONE 25 MG/1
25 TABLET ORAL DAILY
Status: DISCONTINUED | OUTPATIENT
Start: 2025-03-21 | End: 2025-03-25 | Stop reason: HOSPADM

## 2025-03-20 RX ORDER — POTASSIUM CHLORIDE 1500 MG/1
20 TABLET, EXTENDED RELEASE ORAL ONCE
Status: COMPLETED | OUTPATIENT
Start: 2025-03-20 | End: 2025-03-20

## 2025-03-20 RX ORDER — METHYLPREDNISOLONE SODIUM SUCCINATE 40 MG/ML
40 INJECTION INTRAMUSCULAR; INTRAVENOUS EVERY 12 HOURS
Status: DISCONTINUED | OUTPATIENT
Start: 2025-03-20 | End: 2025-03-21

## 2025-03-20 RX ADMIN — SODIUM CHLORIDE, PRESERVATIVE FREE 10 ML: 5 INJECTION INTRAVENOUS at 20:15

## 2025-03-20 RX ADMIN — POTASSIUM CHLORIDE 20 MEQ: 1500 TABLET, EXTENDED RELEASE ORAL at 07:40

## 2025-03-20 RX ADMIN — GUAIFENESIN 400 MG: 400 TABLET ORAL at 07:40

## 2025-03-20 RX ADMIN — GUAIFENESIN 400 MG: 400 TABLET ORAL at 20:15

## 2025-03-20 RX ADMIN — LEVOTHYROXINE SODIUM 125 MCG: 25 TABLET ORAL at 06:02

## 2025-03-20 RX ADMIN — GUAIFENESIN 400 MG: 400 TABLET ORAL at 13:42

## 2025-03-20 RX ADMIN — SODIUM CHLORIDE 125 MG: 9 INJECTION, SOLUTION INTRAVENOUS at 08:34

## 2025-03-20 RX ADMIN — BENZONATATE 100 MG: 100 CAPSULE ORAL at 13:46

## 2025-03-20 RX ADMIN — SPIRONOLACTONE 12.5 MG: 25 TABLET ORAL at 06:02

## 2025-03-20 RX ADMIN — IPRATROPIUM BROMIDE AND ALBUTEROL SULFATE 1 DOSE: .5; 2.5 SOLUTION RESPIRATORY (INHALATION) at 12:59

## 2025-03-20 RX ADMIN — SACUBITRIL AND VALSARTAN 0.5 TABLET: 24; 26 TABLET, FILM COATED ORAL at 07:40

## 2025-03-20 RX ADMIN — METOPROLOL SUCCINATE 25 MG: 25 TABLET, EXTENDED RELEASE ORAL at 07:41

## 2025-03-20 RX ADMIN — SODIUM CHLORIDE 125 ML: 0.9 INJECTION, SOLUTION INTRAVENOUS at 23:32

## 2025-03-20 RX ADMIN — IPRATROPIUM BROMIDE AND ALBUTEROL SULFATE 1 DOSE: .5; 2.5 SOLUTION RESPIRATORY (INHALATION) at 15:38

## 2025-03-20 RX ADMIN — SODIUM CHLORIDE, PRESERVATIVE FREE 10 ML: 5 INJECTION INTRAVENOUS at 07:42

## 2025-03-20 RX ADMIN — MIDODRINE HYDROCHLORIDE 5 MG: 5 TABLET ORAL at 17:56

## 2025-03-20 RX ADMIN — MIDODRINE HYDROCHLORIDE 5 MG: 5 TABLET ORAL at 22:44

## 2025-03-20 RX ADMIN — IPRATROPIUM BROMIDE AND ALBUTEROL SULFATE 1 DOSE: .5; 2.5 SOLUTION RESPIRATORY (INHALATION) at 19:31

## 2025-03-20 RX ADMIN — METHYLPREDNISOLONE SODIUM SUCCINATE 40 MG: 40 INJECTION INTRAMUSCULAR; INTRAVENOUS at 17:57

## 2025-03-20 RX ADMIN — IPRATROPIUM BROMIDE AND ALBUTEROL SULFATE 1 DOSE: .5; 2.5 SOLUTION RESPIRATORY (INHALATION) at 08:13

## 2025-03-20 RX ADMIN — GUAIFENESIN SYRUP AND DEXTROMETHORPHAN 5 ML: 100; 10 SYRUP ORAL at 06:06

## 2025-03-20 RX ADMIN — METHYLPREDNISOLONE SODIUM SUCCINATE 40 MG: 40 INJECTION INTRAMUSCULAR; INTRAVENOUS at 06:34

## 2025-03-20 ASSESSMENT — PAIN SCALES - GENERAL
PAINLEVEL_OUTOF10: 0

## 2025-03-20 NOTE — PLAN OF CARE
Problem: Chronic Conditions and Co-morbidities  Goal: Patient's chronic conditions and co-morbidity symptoms are monitored and maintained or improved  3/20/2025 0517 by Arya Downs RN  Outcome: Progressing     Problem: Discharge Planning  Goal: Discharge to home or other facility with appropriate resources  3/20/2025 0517 by Arya Downs RN  Outcome: Progressing     Problem: ABCDS Injury Assessment  Goal: Absence of physical injury  3/20/2025 0517 by Arya Downs RN  Outcome: Progressing     Problem: Safety - Adult  Goal: Free from fall injury  3/20/2025 0517 by Arya Downs RN  Outcome: Progressing

## 2025-03-21 LAB
ALBUMIN SERPL-MCNC: 3.4 G/DL (ref 3.5–5.2)
ALP SERPL-CCNC: 64 U/L (ref 35–104)
ALT SERPL-CCNC: 11 U/L (ref 0–32)
ANION GAP SERPL CALCULATED.3IONS-SCNC: 11 MMOL/L (ref 7–16)
AST SERPL-CCNC: 29 U/L (ref 0–31)
BASOPHILS # BLD: 0 K/UL (ref 0–0.2)
BASOPHILS NFR BLD: 0 % (ref 0–2)
BILIRUB SERPL-MCNC: 0.6 MG/DL (ref 0–1.2)
BUN SERPL-MCNC: 48 MG/DL (ref 6–23)
CALCIUM SERPL-MCNC: 8.9 MG/DL (ref 8.6–10.2)
CHLORIDE SERPL-SCNC: 96 MMOL/L (ref 98–107)
CO2 SERPL-SCNC: 29 MMOL/L (ref 22–29)
CREAT SERPL-MCNC: 1.8 MG/DL (ref 0.5–1)
EKG ATRIAL RATE: 136 BPM
EKG Q-T INTERVAL: 364 MS
EKG QRS DURATION: 158 MS
EKG QTC CALCULATION (BAZETT): 535 MS
EKG R AXIS: -86 DEGREES
EKG T AXIS: 126 DEGREES
EKG VENTRICULAR RATE: 130 BPM
EOSINOPHIL # BLD: 0 K/UL (ref 0.05–0.5)
EOSINOPHILS RELATIVE PERCENT: 0 % (ref 0–6)
ERYTHROCYTE [DISTWIDTH] IN BLOOD BY AUTOMATED COUNT: 17.6 % (ref 11.5–15)
GFR, ESTIMATED: 29 ML/MIN/1.73M2
GLUCOSE SERPL-MCNC: 150 MG/DL (ref 74–99)
HCT VFR BLD AUTO: 30 % (ref 34–48)
HGB BLD-MCNC: 9 G/DL (ref 11.5–15.5)
INR PPP: 4.6
LYMPHOCYTES NFR BLD: 0.15 K/UL (ref 1.5–4)
LYMPHOCYTES RELATIVE PERCENT: 4 % (ref 20–42)
MAGNESIUM SERPL-MCNC: 2.1 MG/DL (ref 1.6–2.6)
MCH RBC QN AUTO: 28.5 PG (ref 26–35)
MCHC RBC AUTO-ENTMCNC: 30 G/DL (ref 32–34.5)
MCV RBC AUTO: 94.9 FL (ref 80–99.9)
MONOCYTES NFR BLD: 0 % (ref 2–12)
MONOCYTES NFR BLD: 0 K/UL (ref 0.1–0.95)
NEUTROPHILS NFR BLD: 97 % (ref 43–80)
NEUTS SEG NFR BLD: 4.05 K/UL (ref 1.8–7.3)
PHOSPHATE SERPL-MCNC: 3.2 MG/DL (ref 2.5–4.5)
PLATELET # BLD AUTO: 170 K/UL (ref 130–450)
PMV BLD AUTO: 12.1 FL (ref 7–12)
POTASSIUM SERPL-SCNC: 3.7 MMOL/L (ref 3.5–5)
PROT SERPL-MCNC: 6.9 G/DL (ref 6.4–8.3)
PROTHROMBIN TIME: 50.1 SEC (ref 9.3–12.4)
RBC # BLD AUTO: 3.16 M/UL (ref 3.5–5.5)
RBC # BLD: ABNORMAL 10*6/UL
SODIUM SERPL-SCNC: 136 MMOL/L (ref 132–146)
WBC OTHER # BLD: 4.2 K/UL (ref 4.5–11.5)

## 2025-03-21 PROCEDURE — 6360000002 HC RX W HCPCS: Performed by: NURSE PRACTITIONER

## 2025-03-21 PROCEDURE — 6370000000 HC RX 637 (ALT 250 FOR IP): Performed by: INTERNAL MEDICINE

## 2025-03-21 PROCEDURE — 6370000000 HC RX 637 (ALT 250 FOR IP): Performed by: NURSE PRACTITIONER

## 2025-03-21 PROCEDURE — 85025 COMPLETE CBC W/AUTO DIFF WBC: CPT

## 2025-03-21 PROCEDURE — 2700000000 HC OXYGEN THERAPY PER DAY

## 2025-03-21 PROCEDURE — 80053 COMPREHEN METABOLIC PANEL: CPT

## 2025-03-21 PROCEDURE — 94640 AIRWAY INHALATION TREATMENT: CPT

## 2025-03-21 PROCEDURE — 2580000003 HC RX 258: Performed by: STUDENT IN AN ORGANIZED HEALTH CARE EDUCATION/TRAINING PROGRAM

## 2025-03-21 PROCEDURE — 84100 ASSAY OF PHOSPHORUS: CPT

## 2025-03-21 PROCEDURE — 94669 MECHANICAL CHEST WALL OSCILL: CPT

## 2025-03-21 PROCEDURE — 51798 US URINE CAPACITY MEASURE: CPT

## 2025-03-21 PROCEDURE — 2500000003 HC RX 250 WO HCPCS: Performed by: INTERNAL MEDICINE

## 2025-03-21 PROCEDURE — 2060000000 HC ICU INTERMEDIATE R&B

## 2025-03-21 PROCEDURE — 85610 PROTHROMBIN TIME: CPT

## 2025-03-21 PROCEDURE — 83735 ASSAY OF MAGNESIUM: CPT

## 2025-03-21 RX ORDER — MIDODRINE HYDROCHLORIDE 5 MG/1
10 TABLET ORAL ONCE
Status: COMPLETED | OUTPATIENT
Start: 2025-03-21 | End: 2025-03-21

## 2025-03-21 RX ORDER — AMIODARONE HYDROCHLORIDE 200 MG/1
200 TABLET ORAL ONCE
Status: COMPLETED | OUTPATIENT
Start: 2025-03-21 | End: 2025-03-21

## 2025-03-21 RX ORDER — PREDNISONE 20 MG/1
40 TABLET ORAL DAILY
Status: DISCONTINUED | OUTPATIENT
Start: 2025-03-21 | End: 2025-03-23

## 2025-03-21 RX ORDER — SODIUM CHLORIDE 9 MG/ML
INJECTION, SOLUTION INTRAVENOUS CONTINUOUS
Status: ACTIVE | OUTPATIENT
Start: 2025-03-21 | End: 2025-03-21

## 2025-03-21 RX ADMIN — SODIUM CHLORIDE, PRESERVATIVE FREE 10 ML: 5 INJECTION INTRAVENOUS at 07:58

## 2025-03-21 RX ADMIN — IPRATROPIUM BROMIDE AND ALBUTEROL SULFATE 1 DOSE: .5; 2.5 SOLUTION RESPIRATORY (INHALATION) at 15:40

## 2025-03-21 RX ADMIN — IPRATROPIUM BROMIDE AND ALBUTEROL SULFATE 1 DOSE: .5; 2.5 SOLUTION RESPIRATORY (INHALATION) at 09:02

## 2025-03-21 RX ADMIN — IPRATROPIUM BROMIDE AND ALBUTEROL SULFATE 1 DOSE: .5; 2.5 SOLUTION RESPIRATORY (INHALATION) at 13:34

## 2025-03-21 RX ADMIN — PREDNISONE 40 MG: 20 TABLET ORAL at 08:34

## 2025-03-21 RX ADMIN — GUAIFENESIN 400 MG: 400 TABLET ORAL at 07:58

## 2025-03-21 RX ADMIN — LEVOTHYROXINE SODIUM 125 MCG: 25 TABLET ORAL at 06:10

## 2025-03-21 RX ADMIN — MIDODRINE HYDROCHLORIDE 10 MG: 5 TABLET ORAL at 01:20

## 2025-03-21 RX ADMIN — METHYLPREDNISOLONE SODIUM SUCCINATE 40 MG: 40 INJECTION INTRAMUSCULAR; INTRAVENOUS at 06:10

## 2025-03-21 RX ADMIN — AMIODARONE HYDROCHLORIDE 200 MG: 200 TABLET ORAL at 16:50

## 2025-03-21 RX ADMIN — GUAIFENESIN 400 MG: 400 TABLET ORAL at 20:19

## 2025-03-21 RX ADMIN — SODIUM CHLORIDE: 0.9 INJECTION, SOLUTION INTRAVENOUS at 13:32

## 2025-03-21 RX ADMIN — GUAIFENESIN 400 MG: 400 TABLET ORAL at 14:49

## 2025-03-21 RX ADMIN — IPRATROPIUM BROMIDE AND ALBUTEROL SULFATE 1 DOSE: .5; 2.5 SOLUTION RESPIRATORY (INHALATION) at 20:01

## 2025-03-21 RX ADMIN — DIGOXIN 62.5 MCG: 0.12 TABLET ORAL at 16:18

## 2025-03-21 ASSESSMENT — PAIN SCALES - GENERAL
PAINLEVEL_OUTOF10: 0

## 2025-03-21 NOTE — PLAN OF CARE
Problem: Chronic Conditions and Co-morbidities  Goal: Patient's chronic conditions and co-morbidity symptoms are monitored and maintained or improved  Outcome: Progressing     Problem: Discharge Planning  Goal: Discharge to home or other facility with appropriate resources  Outcome: Progressing     Problem: ABCDS Injury Assessment  Goal: Absence of physical injury  Outcome: Progressing     Problem: Safety - Adult  Goal: Free from fall injury  Outcome: Progressing     Problem: Respiratory - Adult  Goal: Achieves optimal ventilation and oxygenation  Outcome: Progressing     Problem: Cardiovascular - Adult  Goal: Maintains optimal cardiac output and hemodynamic stability  Outcome: Progressing     Problem: Skin/Tissue Integrity - Adult  Goal: Skin integrity remains intact  Outcome: Progressing  Flowsheets (Taken 3/21/2025 0800)  Skin Integrity Remains Intact: Monitor for areas of redness and/or skin breakdown     Problem: Metabolic/Fluid and Electrolytes - Adult  Goal: Electrolytes maintained within normal limits  Outcome: Progressing  Goal: Hemodynamic stability and optimal renal function maintained  Outcome: Progressing     Problem: Infection - Adult  Goal: Absence of infection at discharge  Outcome: Progressing     Problem: Pain  Goal: Verbalizes/displays adequate comfort level or baseline comfort level  Outcome: Progressing

## 2025-03-21 NOTE — CARE COORDINATION
CASE MANAGEMENT...Chart reviewed. Patient started with hypotension.Was treated with ivf bolus and has been getting midodrine prn. Still with hypotension this am. Cardio/Renal following - adjusting meds accordingly. Wearing o2 1Lnc. Baseline room air. Met with Mrs Garcia at the bedside and confirmed plan is home at [place] with no needs. Will follow.

## 2025-03-22 LAB
ALBUMIN SERPL-MCNC: 3.9 G/DL (ref 3.5–5.2)
ALP SERPL-CCNC: 70 U/L (ref 35–104)
ALT SERPL-CCNC: 13 U/L (ref 0–32)
ANION GAP SERPL CALCULATED.3IONS-SCNC: 14 MMOL/L (ref 7–16)
AST SERPL-CCNC: 26 U/L (ref 0–31)
BASOPHILS # BLD: 0 K/UL (ref 0–0.2)
BASOPHILS NFR BLD: 0 % (ref 0–2)
BILIRUB SERPL-MCNC: 0.7 MG/DL (ref 0–1.2)
BNP SERPL-MCNC: ABNORMAL PG/ML (ref 0–450)
BUN SERPL-MCNC: 52 MG/DL (ref 6–23)
CALCIUM SERPL-MCNC: 9.3 MG/DL (ref 8.6–10.2)
CHLORIDE SERPL-SCNC: 94 MMOL/L (ref 98–107)
CO2 SERPL-SCNC: 28 MMOL/L (ref 22–29)
CREAT SERPL-MCNC: 1.7 MG/DL (ref 0.5–1)
EOSINOPHIL # BLD: 0 K/UL (ref 0.05–0.5)
EOSINOPHILS RELATIVE PERCENT: 0 % (ref 0–6)
ERYTHROCYTE [DISTWIDTH] IN BLOOD BY AUTOMATED COUNT: 17.6 % (ref 11.5–15)
GFR, ESTIMATED: 29 ML/MIN/1.73M2
GLUCOSE SERPL-MCNC: 119 MG/DL (ref 74–99)
HCT VFR BLD AUTO: 29.4 % (ref 34–48)
HGB BLD-MCNC: 8.9 G/DL (ref 11.5–15.5)
IMM GRANULOCYTES # BLD AUTO: 0.04 K/UL (ref 0–0.58)
IMM GRANULOCYTES NFR BLD: 1 % (ref 0–5)
INR PPP: 3.5
LYMPHOCYTES NFR BLD: 0.2 K/UL (ref 1.5–4)
LYMPHOCYTES RELATIVE PERCENT: 2 % (ref 20–42)
MAGNESIUM SERPL-MCNC: 2.2 MG/DL (ref 1.6–2.6)
MCH RBC QN AUTO: 28.3 PG (ref 26–35)
MCHC RBC AUTO-ENTMCNC: 30.3 G/DL (ref 32–34.5)
MCV RBC AUTO: 93.3 FL (ref 80–99.9)
MONOCYTES NFR BLD: 0.26 K/UL (ref 0.1–0.95)
MONOCYTES NFR BLD: 3 % (ref 2–12)
NEUTROPHILS NFR BLD: 94 % (ref 43–80)
NEUTS SEG NFR BLD: 8.21 K/UL (ref 1.8–7.3)
PHOSPHATE SERPL-MCNC: 2.9 MG/DL (ref 2.5–4.5)
PLATELET # BLD AUTO: 195 K/UL (ref 130–450)
PMV BLD AUTO: 11.7 FL (ref 7–12)
POTASSIUM SERPL-SCNC: 4 MMOL/L (ref 3.5–5)
PROT SERPL-MCNC: 7.3 G/DL (ref 6.4–8.3)
PROTHROMBIN TIME: 37 SEC (ref 9.3–12.4)
RBC # BLD AUTO: 3.15 M/UL (ref 3.5–5.5)
RBC # BLD: ABNORMAL 10*6/UL
SODIUM SERPL-SCNC: 136 MMOL/L (ref 132–146)
TSH SERPL DL<=0.05 MIU/L-ACNC: 0.33 UIU/ML (ref 0.27–4.2)
WBC OTHER # BLD: 8.7 K/UL (ref 4.5–11.5)

## 2025-03-22 PROCEDURE — 85610 PROTHROMBIN TIME: CPT

## 2025-03-22 PROCEDURE — 83735 ASSAY OF MAGNESIUM: CPT

## 2025-03-22 PROCEDURE — 36415 COLL VENOUS BLD VENIPUNCTURE: CPT

## 2025-03-22 PROCEDURE — 6370000000 HC RX 637 (ALT 250 FOR IP): Performed by: STUDENT IN AN ORGANIZED HEALTH CARE EDUCATION/TRAINING PROGRAM

## 2025-03-22 PROCEDURE — 84100 ASSAY OF PHOSPHORUS: CPT

## 2025-03-22 PROCEDURE — 6370000000 HC RX 637 (ALT 250 FOR IP): Performed by: INTERNAL MEDICINE

## 2025-03-22 PROCEDURE — 94669 MECHANICAL CHEST WALL OSCILL: CPT

## 2025-03-22 PROCEDURE — 94640 AIRWAY INHALATION TREATMENT: CPT

## 2025-03-22 PROCEDURE — 2500000003 HC RX 250 WO HCPCS: Performed by: INTERNAL MEDICINE

## 2025-03-22 PROCEDURE — 2700000000 HC OXYGEN THERAPY PER DAY

## 2025-03-22 PROCEDURE — 6370000000 HC RX 637 (ALT 250 FOR IP): Performed by: NURSE PRACTITIONER

## 2025-03-22 PROCEDURE — 85025 COMPLETE CBC W/AUTO DIFF WBC: CPT

## 2025-03-22 PROCEDURE — 84443 ASSAY THYROID STIM HORMONE: CPT

## 2025-03-22 PROCEDURE — 80053 COMPREHEN METABOLIC PANEL: CPT

## 2025-03-22 PROCEDURE — 83880 ASSAY OF NATRIURETIC PEPTIDE: CPT

## 2025-03-22 PROCEDURE — 2060000000 HC ICU INTERMEDIATE R&B

## 2025-03-22 RX ORDER — MIDODRINE HYDROCHLORIDE 5 MG/1
5 TABLET ORAL
Status: DISCONTINUED | OUTPATIENT
Start: 2025-03-22 | End: 2025-03-25 | Stop reason: HOSPADM

## 2025-03-22 RX ADMIN — IPRATROPIUM BROMIDE AND ALBUTEROL SULFATE 1 DOSE: .5; 2.5 SOLUTION RESPIRATORY (INHALATION) at 09:35

## 2025-03-22 RX ADMIN — IPRATROPIUM BROMIDE AND ALBUTEROL SULFATE 1 DOSE: .5; 2.5 SOLUTION RESPIRATORY (INHALATION) at 13:36

## 2025-03-22 RX ADMIN — IPRATROPIUM BROMIDE AND ALBUTEROL SULFATE 1 DOSE: .5; 2.5 SOLUTION RESPIRATORY (INHALATION) at 20:22

## 2025-03-22 RX ADMIN — GUAIFENESIN 400 MG: 400 TABLET ORAL at 20:52

## 2025-03-22 RX ADMIN — PREDNISONE 40 MG: 20 TABLET ORAL at 10:28

## 2025-03-22 RX ADMIN — SODIUM CHLORIDE, PRESERVATIVE FREE 10 ML: 5 INJECTION INTRAVENOUS at 10:29

## 2025-03-22 RX ADMIN — GUAIFENESIN 400 MG: 400 TABLET ORAL at 10:28

## 2025-03-22 RX ADMIN — BENZONATATE 100 MG: 100 CAPSULE ORAL at 03:36

## 2025-03-22 RX ADMIN — IPRATROPIUM BROMIDE AND ALBUTEROL SULFATE 1 DOSE: .5; 2.5 SOLUTION RESPIRATORY (INHALATION) at 16:12

## 2025-03-22 RX ADMIN — GUAIFENESIN 400 MG: 400 TABLET ORAL at 17:46

## 2025-03-22 RX ADMIN — MIDODRINE HYDROCHLORIDE 5 MG: 5 TABLET ORAL at 17:46

## 2025-03-22 RX ADMIN — LEVOTHYROXINE SODIUM 125 MCG: 25 TABLET ORAL at 06:46

## 2025-03-22 RX ADMIN — METOPROLOL SUCCINATE 25 MG: 25 TABLET, EXTENDED RELEASE ORAL at 10:28

## 2025-03-22 RX ADMIN — MIDODRINE HYDROCHLORIDE 5 MG: 5 TABLET ORAL at 12:55

## 2025-03-22 NOTE — PLAN OF CARE
Problem: Chronic Conditions and Co-morbidities  Goal: Patient's chronic conditions and co-morbidity symptoms are monitored and maintained or improved  Outcome: Progressing     Problem: ABCDS Injury Assessment  Goal: Absence of physical injury  Outcome: Progressing     Problem: Safety - Adult  Goal: Free from fall injury  Outcome: Progressing     Problem: Skin/Tissue Integrity - Adult  Goal: Skin integrity remains intact  Outcome: Progressing

## 2025-03-23 LAB
ALBUMIN SERPL-MCNC: 3.7 G/DL (ref 3.5–5.2)
ALP SERPL-CCNC: 59 U/L (ref 35–104)
ALT SERPL-CCNC: 13 U/L (ref 0–32)
ANION GAP SERPL CALCULATED.3IONS-SCNC: 12 MMOL/L (ref 7–16)
AST SERPL-CCNC: 36 U/L (ref 0–31)
ATYPICAL LYMPHOCYTE ABSOLUTE COUNT: 0.07 K/UL (ref 0–0.46)
ATYPICAL LYMPHOCYTES: 1 % (ref 0–4)
BASOPHILS # BLD: 0 K/UL (ref 0–0.2)
BASOPHILS NFR BLD: 0 % (ref 0–2)
BILIRUB SERPL-MCNC: 0.6 MG/DL (ref 0–1.2)
BUN SERPL-MCNC: 50 MG/DL (ref 6–23)
CALCIUM SERPL-MCNC: 9.4 MG/DL (ref 8.6–10.2)
CHLORIDE SERPL-SCNC: 98 MMOL/L (ref 98–107)
CO2 SERPL-SCNC: 27 MMOL/L (ref 22–29)
CREAT SERPL-MCNC: 1.6 MG/DL (ref 0.5–1)
EOSINOPHIL # BLD: 0 K/UL (ref 0.05–0.5)
EOSINOPHILS RELATIVE PERCENT: 0 % (ref 0–6)
ERYTHROCYTE [DISTWIDTH] IN BLOOD BY AUTOMATED COUNT: 18 % (ref 11.5–15)
GFR, ESTIMATED: 31 ML/MIN/1.73M2
GLUCOSE SERPL-MCNC: 125 MG/DL (ref 74–99)
HCT VFR BLD AUTO: 28.1 % (ref 34–48)
HGB BLD-MCNC: 8.5 G/DL (ref 11.5–15.5)
INR PPP: 2.9
LYMPHOCYTES NFR BLD: 0 K/UL (ref 1.5–4)
LYMPHOCYTES RELATIVE PERCENT: 0 % (ref 20–42)
MAGNESIUM SERPL-MCNC: 2.3 MG/DL (ref 1.6–2.6)
MCH RBC QN AUTO: 28.3 PG (ref 26–35)
MCHC RBC AUTO-ENTMCNC: 30.2 G/DL (ref 32–34.5)
MCV RBC AUTO: 93.7 FL (ref 80–99.9)
MONOCYTES NFR BLD: 0.07 K/UL (ref 0.1–0.95)
MONOCYTES NFR BLD: 1 % (ref 2–12)
NEUTROPHILS NFR BLD: 98 % (ref 43–80)
NEUTS SEG NFR BLD: 7.47 K/UL (ref 1.8–7.3)
PHOSPHATE SERPL-MCNC: 3.3 MG/DL (ref 2.5–4.5)
PLATELET # BLD AUTO: 225 K/UL (ref 130–450)
PMV BLD AUTO: 12.2 FL (ref 7–12)
POTASSIUM SERPL-SCNC: 3.9 MMOL/L (ref 3.5–5)
PROT SERPL-MCNC: 6.8 G/DL (ref 6.4–8.3)
PROTHROMBIN TIME: 32.1 SEC (ref 9.3–12.4)
RBC # BLD AUTO: 3 M/UL (ref 3.5–5.5)
RBC # BLD: ABNORMAL 10*6/UL
SODIUM SERPL-SCNC: 137 MMOL/L (ref 132–146)
WBC # BLD: ABNORMAL 10*3/UL
WBC OTHER # BLD: 7.6 K/UL (ref 4.5–11.5)

## 2025-03-23 PROCEDURE — 85025 COMPLETE CBC W/AUTO DIFF WBC: CPT

## 2025-03-23 PROCEDURE — 94669 MECHANICAL CHEST WALL OSCILL: CPT

## 2025-03-23 PROCEDURE — 2700000000 HC OXYGEN THERAPY PER DAY

## 2025-03-23 PROCEDURE — 6370000000 HC RX 637 (ALT 250 FOR IP): Performed by: INTERNAL MEDICINE

## 2025-03-23 PROCEDURE — 2060000000 HC ICU INTERMEDIATE R&B

## 2025-03-23 PROCEDURE — 2500000003 HC RX 250 WO HCPCS: Performed by: INTERNAL MEDICINE

## 2025-03-23 PROCEDURE — 84100 ASSAY OF PHOSPHORUS: CPT

## 2025-03-23 PROCEDURE — 83735 ASSAY OF MAGNESIUM: CPT

## 2025-03-23 PROCEDURE — 6370000000 HC RX 637 (ALT 250 FOR IP): Performed by: NURSE PRACTITIONER

## 2025-03-23 PROCEDURE — 6370000000 HC RX 637 (ALT 250 FOR IP): Performed by: STUDENT IN AN ORGANIZED HEALTH CARE EDUCATION/TRAINING PROGRAM

## 2025-03-23 PROCEDURE — 85610 PROTHROMBIN TIME: CPT

## 2025-03-23 PROCEDURE — 80053 COMPREHEN METABOLIC PANEL: CPT

## 2025-03-23 PROCEDURE — 36415 COLL VENOUS BLD VENIPUNCTURE: CPT

## 2025-03-23 PROCEDURE — 6360000002 HC RX W HCPCS: Performed by: INTERNAL MEDICINE

## 2025-03-23 PROCEDURE — 94640 AIRWAY INHALATION TREATMENT: CPT

## 2025-03-23 RX ORDER — WARFARIN SODIUM 1 MG/1
1 TABLET ORAL
Status: COMPLETED | OUTPATIENT
Start: 2025-03-23 | End: 2025-03-23

## 2025-03-23 RX ORDER — IPRATROPIUM BROMIDE AND ALBUTEROL SULFATE 2.5; .5 MG/3ML; MG/3ML
1 SOLUTION RESPIRATORY (INHALATION)
Status: DISCONTINUED | OUTPATIENT
Start: 2025-03-23 | End: 2025-03-25 | Stop reason: HOSPADM

## 2025-03-23 RX ORDER — METHYLPREDNISOLONE SODIUM SUCCINATE 40 MG/ML
40 INJECTION INTRAMUSCULAR; INTRAVENOUS EVERY 8 HOURS
Status: DISCONTINUED | OUTPATIENT
Start: 2025-03-23 | End: 2025-03-24

## 2025-03-23 RX ADMIN — IPRATROPIUM BROMIDE AND ALBUTEROL SULFATE 1 DOSE: .5; 2.5 SOLUTION RESPIRATORY (INHALATION) at 12:33

## 2025-03-23 RX ADMIN — IPRATROPIUM BROMIDE AND ALBUTEROL SULFATE 1 DOSE: .5; 2.5 SOLUTION RESPIRATORY (INHALATION) at 20:19

## 2025-03-23 RX ADMIN — GUAIFENESIN 400 MG: 400 TABLET ORAL at 16:11

## 2025-03-23 RX ADMIN — MIDODRINE HYDROCHLORIDE 5 MG: 5 TABLET ORAL at 16:11

## 2025-03-23 RX ADMIN — SENNOSIDES 8.6 MG: 8.6 TABLET, COATED ORAL at 10:49

## 2025-03-23 RX ADMIN — SODIUM CHLORIDE, PRESERVATIVE FREE 10 ML: 5 INJECTION INTRAVENOUS at 20:24

## 2025-03-23 RX ADMIN — MIDODRINE HYDROCHLORIDE 5 MG: 5 TABLET ORAL at 10:50

## 2025-03-23 RX ADMIN — IPRATROPIUM BROMIDE AND ALBUTEROL SULFATE 1 DOSE: .5; 2.5 SOLUTION RESPIRATORY (INHALATION) at 16:25

## 2025-03-23 RX ADMIN — METOPROLOL SUCCINATE 25 MG: 25 TABLET, EXTENDED RELEASE ORAL at 10:53

## 2025-03-23 RX ADMIN — WARFARIN SODIUM 1 MG: 1 TABLET ORAL at 17:53

## 2025-03-23 RX ADMIN — GUAIFENESIN SYRUP AND DEXTROMETHORPHAN 5 ML: 100; 10 SYRUP ORAL at 00:41

## 2025-03-23 RX ADMIN — IPRATROPIUM BROMIDE AND ALBUTEROL SULFATE 1 DOSE: .5; 2.5 SOLUTION RESPIRATORY (INHALATION) at 08:46

## 2025-03-23 RX ADMIN — GUAIFENESIN 400 MG: 400 TABLET ORAL at 10:50

## 2025-03-23 RX ADMIN — PREDNISONE 40 MG: 20 TABLET ORAL at 10:50

## 2025-03-23 RX ADMIN — LEVOTHYROXINE SODIUM 125 MCG: 25 TABLET ORAL at 06:05

## 2025-03-23 RX ADMIN — GUAIFENESIN 400 MG: 400 TABLET ORAL at 20:24

## 2025-03-23 RX ADMIN — GUAIFENESIN SYRUP AND DEXTROMETHORPHAN 5 ML: 100; 10 SYRUP ORAL at 20:23

## 2025-03-23 RX ADMIN — METHYLPREDNISOLONE SODIUM SUCCINATE 40 MG: 40 INJECTION INTRAMUSCULAR; INTRAVENOUS at 17:53

## 2025-03-24 ENCOUNTER — APPOINTMENT (OUTPATIENT)
Dept: GENERAL RADIOLOGY | Age: 80
DRG: 291 | End: 2025-03-24
Attending: INTERNAL MEDICINE
Payer: MEDICARE

## 2025-03-24 LAB
ALBUMIN SERPL-MCNC: 3.9 G/DL (ref 3.5–5.2)
ALP SERPL-CCNC: 64 U/L (ref 35–104)
ALT SERPL-CCNC: 14 U/L (ref 0–32)
ANION GAP SERPL CALCULATED.3IONS-SCNC: 13 MMOL/L (ref 7–16)
AST SERPL-CCNC: 22 U/L (ref 0–31)
BASOPHILS # BLD: 0 K/UL (ref 0–0.2)
BASOPHILS NFR BLD: 0 % (ref 0–2)
BILIRUB SERPL-MCNC: 0.7 MG/DL (ref 0–1.2)
BUN SERPL-MCNC: 43 MG/DL (ref 6–23)
CALCIUM SERPL-MCNC: 9.4 MG/DL (ref 8.6–10.2)
CHLORIDE SERPL-SCNC: 104 MMOL/L (ref 98–107)
CO2 SERPL-SCNC: 26 MMOL/L (ref 22–29)
CREAT SERPL-MCNC: 1.4 MG/DL (ref 0.5–1)
EOSINOPHIL # BLD: 0 K/UL (ref 0.05–0.5)
EOSINOPHILS RELATIVE PERCENT: 0 % (ref 0–6)
ERYTHROCYTE [DISTWIDTH] IN BLOOD BY AUTOMATED COUNT: 18.1 % (ref 11.5–15)
GFR, ESTIMATED: 39 ML/MIN/1.73M2
GLUCOSE SERPL-MCNC: 144 MG/DL (ref 74–99)
HCT VFR BLD AUTO: 30.7 % (ref 34–48)
HGB BLD-MCNC: 9.1 G/DL (ref 11.5–15.5)
INR PPP: 2.7
LYMPHOCYTES NFR BLD: 0 K/UL (ref 1.5–4)
LYMPHOCYTES RELATIVE PERCENT: 0 % (ref 20–42)
MAGNESIUM SERPL-MCNC: 2.6 MG/DL (ref 1.6–2.6)
MCH RBC QN AUTO: 28.4 PG (ref 26–35)
MCHC RBC AUTO-ENTMCNC: 29.6 G/DL (ref 32–34.5)
MCV RBC AUTO: 95.9 FL (ref 80–99.9)
METAMYELOCYTES ABSOLUTE COUNT: 0.06 K/UL (ref 0–0.12)
METAMYELOCYTES: 1 % (ref 0–1)
MONOCYTES NFR BLD: 0 % (ref 2–12)
MONOCYTES NFR BLD: 0 K/UL (ref 0.1–0.95)
NEUTROPHILS NFR BLD: 99 % (ref 43–80)
NEUTS SEG NFR BLD: 6.54 K/UL (ref 1.8–7.3)
NUCLEATED RED BLOOD CELLS: 1 PER 100 WBC
PHOSPHATE SERPL-MCNC: 3.2 MG/DL (ref 2.5–4.5)
PLATELET # BLD AUTO: 217 K/UL (ref 130–450)
PMV BLD AUTO: 11.7 FL (ref 7–12)
POTASSIUM SERPL-SCNC: 3.8 MMOL/L (ref 3.5–5)
PROT SERPL-MCNC: 7 G/DL (ref 6.4–8.3)
PROTHROMBIN TIME: 28.1 SEC (ref 9.3–12.4)
RBC # BLD AUTO: 3.2 M/UL (ref 3.5–5.5)
RBC # BLD: ABNORMAL 10*6/UL
SODIUM SERPL-SCNC: 143 MMOL/L (ref 132–146)
WBC OTHER # BLD: 6.6 K/UL (ref 4.5–11.5)

## 2025-03-24 PROCEDURE — 36415 COLL VENOUS BLD VENIPUNCTURE: CPT

## 2025-03-24 PROCEDURE — 6370000000 HC RX 637 (ALT 250 FOR IP): Performed by: INTERNAL MEDICINE

## 2025-03-24 PROCEDURE — 80053 COMPREHEN METABOLIC PANEL: CPT

## 2025-03-24 PROCEDURE — 6370000000 HC RX 637 (ALT 250 FOR IP): Performed by: NURSE PRACTITIONER

## 2025-03-24 PROCEDURE — 6370000000 HC RX 637 (ALT 250 FOR IP): Performed by: RADIOLOGY

## 2025-03-24 PROCEDURE — 2500000003 HC RX 250 WO HCPCS: Performed by: RADIOLOGY

## 2025-03-24 PROCEDURE — 2500000003 HC RX 250 WO HCPCS: Performed by: INTERNAL MEDICINE

## 2025-03-24 PROCEDURE — 6370000000 HC RX 637 (ALT 250 FOR IP): Performed by: STUDENT IN AN ORGANIZED HEALTH CARE EDUCATION/TRAINING PROGRAM

## 2025-03-24 PROCEDURE — 85025 COMPLETE CBC W/AUTO DIFF WBC: CPT

## 2025-03-24 PROCEDURE — 84100 ASSAY OF PHOSPHORUS: CPT

## 2025-03-24 PROCEDURE — 6360000002 HC RX W HCPCS: Performed by: NURSE PRACTITIONER

## 2025-03-24 PROCEDURE — 2700000000 HC OXYGEN THERAPY PER DAY

## 2025-03-24 PROCEDURE — 83735 ASSAY OF MAGNESIUM: CPT

## 2025-03-24 PROCEDURE — 85610 PROTHROMBIN TIME: CPT

## 2025-03-24 PROCEDURE — 74220 X-RAY XM ESOPHAGUS 1CNTRST: CPT

## 2025-03-24 PROCEDURE — 6360000002 HC RX W HCPCS: Performed by: INTERNAL MEDICINE

## 2025-03-24 PROCEDURE — 2060000000 HC ICU INTERMEDIATE R&B

## 2025-03-24 PROCEDURE — 2580000003 HC RX 258: Performed by: INTERNAL MEDICINE

## 2025-03-24 PROCEDURE — 94669 MECHANICAL CHEST WALL OSCILL: CPT

## 2025-03-24 PROCEDURE — BD11ZZZ FLUOROSCOPY OF ESOPHAGUS: ICD-10-PCS | Performed by: RADIOLOGY

## 2025-03-24 PROCEDURE — 94640 AIRWAY INHALATION TREATMENT: CPT

## 2025-03-24 RX ORDER — MIDODRINE HYDROCHLORIDE 5 MG/1
5 TABLET ORAL
Qty: 90 TABLET | Refills: 0 | Status: SHIPPED | OUTPATIENT
Start: 2025-03-24

## 2025-03-24 RX ORDER — PREDNISONE 10 MG/1
TABLET ORAL
Qty: 30 TABLET | Refills: 0 | Status: SHIPPED | OUTPATIENT
Start: 2025-03-24

## 2025-03-24 RX ORDER — WARFARIN SODIUM 1 MG/1
1 TABLET ORAL
Status: COMPLETED | OUTPATIENT
Start: 2025-03-24 | End: 2025-03-24

## 2025-03-24 RX ORDER — FLUCONAZOLE 100 MG/1
100 TABLET ORAL DAILY
Status: DISCONTINUED | OUTPATIENT
Start: 2025-03-24 | End: 2025-03-25 | Stop reason: HOSPADM

## 2025-03-24 RX ORDER — METHYLPREDNISOLONE SODIUM SUCCINATE 40 MG/ML
40 INJECTION INTRAMUSCULAR; INTRAVENOUS EVERY 12 HOURS
Status: DISCONTINUED | OUTPATIENT
Start: 2025-03-24 | End: 2025-03-25 | Stop reason: HOSPADM

## 2025-03-24 RX ADMIN — MIDODRINE HYDROCHLORIDE 5 MG: 5 TABLET ORAL at 09:45

## 2025-03-24 RX ADMIN — SODIUM CHLORIDE 125 MG: 9 INJECTION, SOLUTION INTRAVENOUS at 09:48

## 2025-03-24 RX ADMIN — DIGOXIN 62.5 MCG: 0.12 TABLET ORAL at 16:48

## 2025-03-24 RX ADMIN — FLUCONAZOLE 100 MG: 100 TABLET ORAL at 16:48

## 2025-03-24 RX ADMIN — MIDODRINE HYDROCHLORIDE 5 MG: 5 TABLET ORAL at 16:48

## 2025-03-24 RX ADMIN — IPRATROPIUM BROMIDE AND ALBUTEROL SULFATE 1 DOSE: .5; 2.5 SOLUTION RESPIRATORY (INHALATION) at 00:32

## 2025-03-24 RX ADMIN — Medication 3 MG: at 21:03

## 2025-03-24 RX ADMIN — BARIUM SULFATE 1 TABLET: 700 TABLET ORAL at 13:10

## 2025-03-24 RX ADMIN — IPRATROPIUM BROMIDE AND ALBUTEROL SULFATE 1 DOSE: .5; 2.5 SOLUTION RESPIRATORY (INHALATION) at 23:48

## 2025-03-24 RX ADMIN — METHYLPREDNISOLONE SODIUM SUCCINATE 40 MG: 40 INJECTION INTRAMUSCULAR; INTRAVENOUS at 14:03

## 2025-03-24 RX ADMIN — METOPROLOL SUCCINATE 25 MG: 25 TABLET, EXTENDED RELEASE ORAL at 09:45

## 2025-03-24 RX ADMIN — GUAIFENESIN 400 MG: 400 TABLET ORAL at 09:45

## 2025-03-24 RX ADMIN — WARFARIN SODIUM 1 MG: 1 TABLET ORAL at 16:48

## 2025-03-24 RX ADMIN — BARIUM SULFATE 176 G: 960 POWDER, FOR SUSPENSION ORAL at 13:10

## 2025-03-24 RX ADMIN — SODIUM CHLORIDE, PRESERVATIVE FREE 10 ML: 5 INJECTION INTRAVENOUS at 09:46

## 2025-03-24 RX ADMIN — ANTACID/ANTIFLATULENT 1 EACH: 380; 550; 10; 10 GRANULE, EFFERVESCENT ORAL at 13:09

## 2025-03-24 RX ADMIN — IPRATROPIUM BROMIDE AND ALBUTEROL SULFATE 1 DOSE: .5; 2.5 SOLUTION RESPIRATORY (INHALATION) at 08:48

## 2025-03-24 RX ADMIN — IPRATROPIUM BROMIDE AND ALBUTEROL SULFATE 1 DOSE: .5; 2.5 SOLUTION RESPIRATORY (INHALATION) at 16:22

## 2025-03-24 RX ADMIN — BARIUM SULFATE 140 ML: 980 POWDER, FOR SUSPENSION ORAL at 13:10

## 2025-03-24 RX ADMIN — GUAIFENESIN 400 MG: 400 TABLET ORAL at 16:48

## 2025-03-24 RX ADMIN — ACETAMINOPHEN 650 MG: 325 TABLET ORAL at 21:03

## 2025-03-24 RX ADMIN — LEVOTHYROXINE SODIUM 125 MCG: 25 TABLET ORAL at 06:58

## 2025-03-24 RX ADMIN — IPRATROPIUM BROMIDE AND ALBUTEROL SULFATE 1 DOSE: .5; 2.5 SOLUTION RESPIRATORY (INHALATION) at 03:38

## 2025-03-24 RX ADMIN — MIDODRINE HYDROCHLORIDE 5 MG: 5 TABLET ORAL at 14:02

## 2025-03-24 RX ADMIN — GUAIFENESIN 400 MG: 400 TABLET ORAL at 21:03

## 2025-03-24 RX ADMIN — METHYLPREDNISOLONE SODIUM SUCCINATE 40 MG: 40 INJECTION INTRAMUSCULAR; INTRAVENOUS at 01:35

## 2025-03-24 RX ADMIN — SODIUM CHLORIDE, PRESERVATIVE FREE 10 ML: 5 INJECTION INTRAVENOUS at 21:05

## 2025-03-24 RX ADMIN — IPRATROPIUM BROMIDE AND ALBUTEROL SULFATE 1 DOSE: .5; 2.5 SOLUTION RESPIRATORY (INHALATION) at 20:18

## 2025-03-24 ASSESSMENT — PAIN SCALES - GENERAL
PAINLEVEL_OUTOF10: 0
PAINLEVEL_OUTOF10: 0
PAINLEVEL_OUTOF10: 8

## 2025-03-24 ASSESSMENT — PAIN DESCRIPTION - DESCRIPTORS: DESCRIPTORS: ACHING

## 2025-03-24 ASSESSMENT — PAIN DESCRIPTION - LOCATION: LOCATION: GENERALIZED

## 2025-03-24 NOTE — CONSULTS
[place] [address]                              CONSULTATION      PATIENT NAME: RULA GUZMAN          : 1945  MED REC NO: [id number]                        ROOM: 0446  ACCOUNT NO: [id number]                       ADMIT DATE: 2025  PROVIDER: Wolf Jenkins MD      CONSULT DATE: 2025    REFERRING PHYSICIAN:  MARCUS PASTOR    REASON FOR CONSULTATION:  Dysphagia for 24 hours.    SUBJECTIVE:  This is an 80-year-old woman, known to me.  She was recently hospitalized for anemia, Hemoccult-positive stools in the setting of warfarin therapy for chronic anticoagulation in the setting of mechanical heart valves and chronic congestive heart failure.  Her evaluation on  revealed a normal esophagus, some nonerosive gastritis, and a normal duodenum.  She had undergone a colonoscopy in the setting of a positive Cologuard in 2024.  It was a well-prepped study.  There was a 4 mm polyp removed.  There were no other abnormalities.  Colonoscopy was not repeated during her recent hospital stay.    She was readmitted to the hospital within a week to 10 days of discharge on the , complaining of cough.  Chest x-ray did not reveal any evidence of pneumonia.  She had a respiratory pane that was consistent with respiratory syncytial virus.  She has been seen by Cardiology.  There was some diuresis.  Her creatinine has remained stable.  She has been on Solu-Medrol currently, which was initiated on .  Symptomatically, she has improved, but yesterday she developed a sensation of solid food sticking with some pain in the lower sternal area.  She has had an esophagram completed today.  The results of which are pending, but on personal review, it appears quite normal.  Again, an endoscopic evaluation within the last 3 weeks was devoid of any abnormalities.    CURRENT MEDICATIONS:  Include Lanoxin, 
      Jeff Solano [facility]   Inpatient CHF Nurse Navigator Consult      Cardiologist: Dr Tana Garcia is a 80 y.o. (1945) female with a history of HFrEF, most recent EF:  Lab Results   Component Value Date    LVEF 32 05/10/2022       Patient was awake and alert, laying in bed during the consultation and is agreeable to heart failure education. She was engaged and asked appropriate questions throughout the education session. She is feeling a little better today.     Barriers identified during consult contributing to HF Hospitalization:  [] Limited medication adherence   [] Poor health literacy, education regarding HF medications provided   [] Pill box provided to patient  [] Difficulty affording medications  [] Difficulty obtaining/ managing medications  [] Prescription assistance information given     [] Not weighing themselves daily  [x] Weight log provided for easy monitoring  [] Scale provided     [] Not following low sodium diet  [] Food insecurity   [x] 2 gram sodium diet education provided   [] Low sodium recipes provided  [] Sodium free seasoning provided   [] Low sodium meal delivery options given to patient  [] Dietician consulted     [] Lack of transportation to appointments     [] Depression, given chronic illness  [] Primary team notified     [] Goals of care need addressed  [] Palliative care consulted     [] CHF CHW consulted, to assist with     Chart Reviewed:  Diet: ADULT DIET; Regular; Low Fat/Low Chol/High Fiber/2 gm Na   Daily Weights: Patient Vitals for the past 96 hrs (Last 3 readings):   Weight   03/17/25 0800 46.3 kg (102 lb 1.6 oz)   03/16/25 1645 44.9 kg (99 lb)     I/O:   Intake/Output Summary (Last 24 hours) at 3/17/2025 1103  Last data filed at 3/17/2025 1050  Gross per 24 hour   Intake 130 ml   Output 1000 ml   Net -870 ml       [] Nursing staff/manager notified of inaccurate tompkins weights or I/O        Discharge Plan:  Above identified 
CARDIOLOGY CONSULTATION    Patient Name:  Josefina Garcia    :  1945    Reason for Consultation:   Ischemic cardiomyopathy    History of Present Illness:   Josefina Garcia presents to [facility], following history of recurrent cough which she has had intermittently over the past few months.  She was last discharged from the hospital only a few weeks back having recurrent decompensated heart failure.  On this occasion she notes that her cough initiated on 3/15/2025.  Recently she was seen by her primary internist Dr. Teofilo Dutton who placed her on metolazone which was associated with a 9 pound weight loss in her breathing seemed easier yet her cough persisted.  Presently she denies any retrosternal chest discomfort.  She denies hemoptysis.  She does have a longstanding history of congestive cardiomyopathy and valve replacement.  [facility].  Her last two-dimensional echocardiogram demonstrated a left ventricular ejection fraction of 28%.  This is 1 of multiple rehospitalization's for her as she has difficulty tolerating guideline medications due to lower systolic blood pressure and renal insufficiency..    Past Medical History:   has a past medical history of A-fib (HCC), Acute exacerbation of CHF (congestive heart failure) (HCC), Acute renal failure, Aneurysm, Aneurysm of ascending aorta without rupture, CAD (coronary artery disease), CRF (chronic renal failure), Epistaxis, Gastrointestinal bleeding, lower, H/O anemia of chronic disorder, Hypertension, and Thyroid disease.    Surgical History:   has a past surgical history that includes Cardiac surgery; Aortic valvuloplasty; Mitral valvuloplasty; Tricuspid valvuloplasty; Colonoscopy; Upper gastrointestinal endoscopy; Cardiac defibrillator placement (Left, 2022); hernia repair (Right, 2023); Hernia mesh removal; Colonoscopy (2024); Upper gastrointestinal endoscopy (2024); and 
Nephrology Consult  Note  Patient's Name: Josefina Garcia  10:42 AM  3/17/2025    Nephrologist:     Reason for Consult:  CKD  Requesting Physician: Dr. ASHLY Lam    Chief Complaint:  L ARM SWELLING, ANEMIA    History Obtained From:  patient and past medical records    History of Present Ilness from the 2/28/25 note:    Josefina Garcia is a 80 y.o. female with prior history CKD 3B with A1 e-GFR=36-42ml/min with a baseline serum cr 1.24-1.42mg/dl presumed sec to microvascular disease due to HTN, ASVD, Valvular heart diease. She states she developed edema around the elbow area on the L arm and her  was concerned as it was on te L it was heart related and had her come to ED. She had no CP or increase SOB. In the ED HgB 7.7 and dropped to 7.1mg/dl. She has a Hx of Chronic Fe++ Def Anemia and has followed with Heme/Onc for SCOTT and IV Fe++. It was noted her cr was 2.0 mg/dl on admission and 1.8mg/dl today. She denies any urinary tract symptoms    Addendum to HPI: She was D/Nam 3/6/25 after being worked up for Acute on Chronic Blood Loss S/p NM Bleeding Scan 3/2 -- no evidence of bleeding  S/p EGD 3/3 -- no evidence of bleeding  Plan is for outpatient capsule endoscopy -- SBFT completed 3/6 and no evidence of obstruction  Her cr was 1.5mg/dl at D/C and HgB 8.2.  She presented back to the ED 3/10/25 for edema. She was treated with IV Lasix and D/Nam home.  She presented back to [facility] ED 3/16/25 for cough and edema of the bilat LE  Her BP in the ED 89/45. hgB 10.6 and cr 1.4mg/dl  XR CHEST PORTABLE 3/16/25  Final Result  1. There are no findings of pneumonia  2. Cardiomegaly.  Mild vascular congestion cannot be excluded  3. Small right pleural effusion and right lung base atelectasis.  She had hypoxia 88-89% on 2L NC        INTERVAL HX:    3/17/25: Pt awake alert and up in bed. No CP or SOB, no Hx of lightheaded or dizziness, continued edema  in the bilat LE and UE. The erythema in the LUE resolved post 
spironolactone  25 mg Oral Daily    guaiFENesin  400 mg Oral TID    ipratropium 0.5 mg-albuterol 2.5 mg  1 Dose Inhalation Q4H WA RT    [Held by provider] acetaZOLAMIDE  375 mg Oral Daily    ferric gluconate (FERRLECIT) 125 mg in sodium chloride 0.9 % 100 mL IVPB  125 mg IntraVENous Once per day on     [Held by provider] metOLazone  2.5 mg Oral Once per day on     [Held by provider] sacubitril-valsartan  0.5 tablet Oral BID    epoetin chapo-epbx  10,000 Units SubCUTAneous Weekly    sodium chloride flush  10 mL IntraVENous 2 times per day    digoxin  62.5 mcg Oral Once per day on     levothyroxine  125 mcg Oral Daily    metoprolol succinate  25 mg Oral Daily    warfarin placeholder: dosing by pharmacy   Oral RX Placeholder       Vitals:  Tmax:  VITALS:  BP (!) 96/56   Pulse 89   Temp 97.3 °F (36.3 °C) (Oral)   Resp 18   Ht 1.549 m (5' 1\")   Wt 44.5 kg (98 lb 1.6 oz)   SpO2 95%   BMI 18.54 kg/m²   24HR INTAKE/OUTPUT:    Intake/Output Summary (Last 24 hours) at 3/23/2025 1455  Last data filed at 3/23/2025 1400  Gross per 24 hour   Intake --   Output 800 ml   Net -800 ml     CURRENT PULSE OXIMETRY:  SpO2: 95 %  24HR PULSE OXIMETRY RANGE:  SpO2  Av.2 %  Min: 93 %  Max: 98 %    EXAM:  General: No distress. Alert.  Eyes: PERRL. No sclera icterus. No conjunctival injection.  ENT: No discharge. Pharynx clear.  Neck: Trachea midline. Normal thyroid. No jvd, no hjr.   Resp: Diffuse expiratory wheezing.  No accessory muscle use.  No rales.  Few rhonchi.   CV: Regular rate. Regular rhythm. No murmur No rub.    Abd: Non-tender. Non-distended. No masses. No organmegaly. Normal bowel sounds.   Skin: Warm and dry. No nodule on exposed extremities. No rash on exposed extremities.  Lymph: No cervical LAD. No supraclavicular LAD.   Ext: No joint deformity. No clubbing. No cyanosis. No edema  Neuro: Awake. Follows commands. Positive pupils/gag/corneals. Normal pain

## 2025-03-24 NOTE — CARE COORDINATION
CASE MANAGEMENT..... Met with patient and  at the bedside. On aerosols. iv solumedrol decreased to q 12hrs this am. Bps soft, but stable on midodrine tid. Cardio/Renal on board. Continue monitoring labs/vitals. Tolerating room air. Anticipate dc soon. Confirmed plan is home with no needs. Will follow.

## 2025-03-25 VITALS
SYSTOLIC BLOOD PRESSURE: 106 MMHG | WEIGHT: 106.8 LBS | OXYGEN SATURATION: 94 % | DIASTOLIC BLOOD PRESSURE: 59 MMHG | RESPIRATION RATE: 16 BRPM | TEMPERATURE: 97.5 F | HEART RATE: 93 BPM | BODY MASS INDEX: 20.16 KG/M2 | HEIGHT: 61 IN

## 2025-03-25 LAB
ALBUMIN SERPL-MCNC: 3.7 G/DL (ref 3.5–5.2)
ALP SERPL-CCNC: 61 U/L (ref 35–104)
ALT SERPL-CCNC: 15 U/L (ref 0–32)
ANION GAP SERPL CALCULATED.3IONS-SCNC: 10 MMOL/L (ref 7–16)
AST SERPL-CCNC: 21 U/L (ref 0–31)
BASOPHILS # BLD: 0.01 K/UL (ref 0–0.2)
BASOPHILS NFR BLD: 0 % (ref 0–2)
BILIRUB SERPL-MCNC: 0.7 MG/DL (ref 0–1.2)
BUN SERPL-MCNC: 47 MG/DL (ref 6–23)
CALCIUM SERPL-MCNC: 9.6 MG/DL (ref 8.6–10.2)
CHLORIDE SERPL-SCNC: 103 MMOL/L (ref 98–107)
CO2 SERPL-SCNC: 27 MMOL/L (ref 22–29)
CREAT SERPL-MCNC: 1.6 MG/DL (ref 0.5–1)
EOSINOPHIL # BLD: 0 K/UL (ref 0.05–0.5)
EOSINOPHILS RELATIVE PERCENT: 0 % (ref 0–6)
ERYTHROCYTE [DISTWIDTH] IN BLOOD BY AUTOMATED COUNT: 18.6 % (ref 11.5–15)
GFR, ESTIMATED: 33 ML/MIN/1.73M2
GLUCOSE SERPL-MCNC: 122 MG/DL (ref 74–99)
HCT VFR BLD AUTO: 30 % (ref 34–48)
HGB BLD-MCNC: 9 G/DL (ref 11.5–15.5)
IMM GRANULOCYTES # BLD AUTO: 0.05 K/UL (ref 0–0.58)
IMM GRANULOCYTES NFR BLD: 1 % (ref 0–5)
INR PPP: 3.1
LYMPHOCYTES NFR BLD: 0.21 K/UL (ref 1.5–4)
LYMPHOCYTES RELATIVE PERCENT: 3 % (ref 20–42)
MAGNESIUM SERPL-MCNC: 2.6 MG/DL (ref 1.6–2.6)
MCH RBC QN AUTO: 28.8 PG (ref 26–35)
MCHC RBC AUTO-ENTMCNC: 30 G/DL (ref 32–34.5)
MCV RBC AUTO: 95.8 FL (ref 80–99.9)
MONOCYTES NFR BLD: 0.22 K/UL (ref 0.1–0.95)
MONOCYTES NFR BLD: 3 % (ref 2–12)
NEUTROPHILS NFR BLD: 94 % (ref 43–80)
NEUTS SEG NFR BLD: 7.14 K/UL (ref 1.8–7.3)
PHOSPHATE SERPL-MCNC: 3.9 MG/DL (ref 2.5–4.5)
PLATELET # BLD AUTO: 206 K/UL (ref 130–450)
PMV BLD AUTO: 10.9 FL (ref 7–12)
POTASSIUM SERPL-SCNC: 4.1 MMOL/L (ref 3.5–5)
PROT SERPL-MCNC: 6.6 G/DL (ref 6.4–8.3)
PROTHROMBIN TIME: 32.8 SEC (ref 9.3–12.4)
RBC # BLD AUTO: 3.13 M/UL (ref 3.5–5.5)
RBC # BLD: ABNORMAL 10*6/UL
SODIUM SERPL-SCNC: 140 MMOL/L (ref 132–146)
WBC OTHER # BLD: 7.6 K/UL (ref 4.5–11.5)

## 2025-03-25 PROCEDURE — 6360000002 HC RX W HCPCS: Performed by: INTERNAL MEDICINE

## 2025-03-25 PROCEDURE — 6370000000 HC RX 637 (ALT 250 FOR IP): Performed by: NURSE PRACTITIONER

## 2025-03-25 PROCEDURE — 6370000000 HC RX 637 (ALT 250 FOR IP): Performed by: STUDENT IN AN ORGANIZED HEALTH CARE EDUCATION/TRAINING PROGRAM

## 2025-03-25 PROCEDURE — 80053 COMPREHEN METABOLIC PANEL: CPT

## 2025-03-25 PROCEDURE — 83735 ASSAY OF MAGNESIUM: CPT

## 2025-03-25 PROCEDURE — 6370000000 HC RX 637 (ALT 250 FOR IP): Performed by: INTERNAL MEDICINE

## 2025-03-25 PROCEDURE — 36415 COLL VENOUS BLD VENIPUNCTURE: CPT

## 2025-03-25 PROCEDURE — 2500000003 HC RX 250 WO HCPCS: Performed by: INTERNAL MEDICINE

## 2025-03-25 PROCEDURE — 85025 COMPLETE CBC W/AUTO DIFF WBC: CPT

## 2025-03-25 PROCEDURE — 94640 AIRWAY INHALATION TREATMENT: CPT

## 2025-03-25 PROCEDURE — 6360000002 HC RX W HCPCS: Performed by: NURSE PRACTITIONER

## 2025-03-25 PROCEDURE — 85610 PROTHROMBIN TIME: CPT

## 2025-03-25 PROCEDURE — 94669 MECHANICAL CHEST WALL OSCILL: CPT

## 2025-03-25 PROCEDURE — 84100 ASSAY OF PHOSPHORUS: CPT

## 2025-03-25 PROCEDURE — 2700000000 HC OXYGEN THERAPY PER DAY

## 2025-03-25 RX ORDER — IPRATROPIUM BROMIDE AND ALBUTEROL SULFATE 2.5; .5 MG/3ML; MG/3ML
3 SOLUTION RESPIRATORY (INHALATION) EVERY 6 HOURS
Qty: 360 ML | Refills: 0 | Status: SHIPPED | OUTPATIENT
Start: 2025-03-25

## 2025-03-25 RX ORDER — BUMETANIDE 2 MG/1
2 TABLET ORAL DAILY
Qty: 30 TABLET | Refills: 3 | Status: SHIPPED | OUTPATIENT
Start: 2025-03-26

## 2025-03-25 RX ORDER — WARFARIN SODIUM 1 MG/1
0.5 TABLET ORAL
Status: COMPLETED | OUTPATIENT
Start: 2025-03-25 | End: 2025-03-25

## 2025-03-25 RX ORDER — METOLAZONE 2.5 MG/1
2.5 TABLET ORAL
Qty: 30 TABLET | Refills: 3 | Status: SHIPPED | OUTPATIENT
Start: 2025-03-25

## 2025-03-25 RX ORDER — BUMETANIDE 1 MG/1
2 TABLET ORAL DAILY
Status: DISCONTINUED | OUTPATIENT
Start: 2025-03-25 | End: 2025-03-25 | Stop reason: HOSPADM

## 2025-03-25 RX ORDER — FLUCONAZOLE 100 MG/1
100 TABLET ORAL DAILY
Qty: 12 TABLET | Refills: 0 | Status: SHIPPED | OUTPATIENT
Start: 2025-03-26 | End: 2025-04-07

## 2025-03-25 RX ORDER — BENZONATATE 100 MG/1
100 CAPSULE ORAL 3 TIMES DAILY PRN
Qty: 21 CAPSULE | Refills: 0 | Status: SHIPPED | OUTPATIENT
Start: 2025-03-25 | End: 2025-04-01

## 2025-03-25 RX ORDER — GUAIFENESIN/DEXTROMETHORPHAN 100-10MG/5
5 SYRUP ORAL EVERY 4 HOURS PRN
Qty: 120 ML | Refills: 0 | Status: SHIPPED | OUTPATIENT
Start: 2025-03-25 | End: 2025-04-04

## 2025-03-25 RX ADMIN — IPRATROPIUM BROMIDE AND ALBUTEROL SULFATE 1 DOSE: .5; 2.5 SOLUTION RESPIRATORY (INHALATION) at 13:12

## 2025-03-25 RX ADMIN — MIDODRINE HYDROCHLORIDE 5 MG: 5 TABLET ORAL at 13:22

## 2025-03-25 RX ADMIN — MIDODRINE HYDROCHLORIDE 5 MG: 5 TABLET ORAL at 16:16

## 2025-03-25 RX ADMIN — IPRATROPIUM BROMIDE AND ALBUTEROL SULFATE 1 DOSE: .5; 2.5 SOLUTION RESPIRATORY (INHALATION) at 08:34

## 2025-03-25 RX ADMIN — IPRATROPIUM BROMIDE AND ALBUTEROL SULFATE 1 DOSE: .5; 2.5 SOLUTION RESPIRATORY (INHALATION) at 03:43

## 2025-03-25 RX ADMIN — EPOETIN ALFA-EPBX 10000 UNITS: 10000 INJECTION, SOLUTION INTRAVENOUS; SUBCUTANEOUS at 16:17

## 2025-03-25 RX ADMIN — METHYLPREDNISOLONE SODIUM SUCCINATE 40 MG: 40 INJECTION INTRAMUSCULAR; INTRAVENOUS at 05:18

## 2025-03-25 RX ADMIN — METHYLPREDNISOLONE SODIUM SUCCINATE 40 MG: 40 INJECTION INTRAMUSCULAR; INTRAVENOUS at 16:17

## 2025-03-25 RX ADMIN — FLUCONAZOLE 100 MG: 100 TABLET ORAL at 07:57

## 2025-03-25 RX ADMIN — METOPROLOL SUCCINATE 25 MG: 25 TABLET, EXTENDED RELEASE ORAL at 07:57

## 2025-03-25 RX ADMIN — SODIUM CHLORIDE, PRESERVATIVE FREE 10 ML: 5 INJECTION INTRAVENOUS at 07:57

## 2025-03-25 RX ADMIN — BUMETANIDE 2 MG: 1 TABLET ORAL at 13:22

## 2025-03-25 RX ADMIN — LEVOTHYROXINE SODIUM 125 MCG: 25 TABLET ORAL at 05:18

## 2025-03-25 RX ADMIN — GUAIFENESIN 400 MG: 400 TABLET ORAL at 07:57

## 2025-03-25 RX ADMIN — GUAIFENESIN 400 MG: 400 TABLET ORAL at 16:16

## 2025-03-25 RX ADMIN — WARFARIN SODIUM 0.5 MG: 1 TABLET ORAL at 16:16

## 2025-03-25 RX ADMIN — MIDODRINE HYDROCHLORIDE 5 MG: 5 TABLET ORAL at 07:57

## 2025-03-25 ASSESSMENT — PAIN SCALES - GENERAL: PAINLEVEL_OUTOF10: 0

## 2025-03-25 NOTE — CARE COORDINATION
CASE MANAGEMENT.... Per pulm, patient is stable for discharge today on duonebs q 6 hrs and prednisone taper.  Orders in place. Nursing updated and will check dc with cardio/renal. Janneth Luis notified of probable dc today and will have equipment delivered to patients room.

## 2025-03-25 NOTE — CARE COORDINATION
CASE MANAGEMENT.... Patient requesting [facility] for home o2 and nebulizer. Referral called to Janneth-accepted and aware of prob dc tomorrow pending patient progress.

## 2025-03-25 NOTE — PLAN OF CARE
Problem: Chronic Conditions and Co-morbidities  Goal: Patient's chronic conditions and co-morbidity symptoms are monitored and maintained or improved  3/24/2025 2029 by Pinky Bassett RN  Outcome: Progressing  3/24/2025 1019 by Tabitha Estrada RN  Outcome: Progressing     Problem: Discharge Planning  Goal: Discharge to home or other facility with appropriate resources  3/24/2025 2029 by Pinky Bassett RN  Outcome: Progressing  3/24/2025 1019 by Tabitha Estrada RN  Outcome: Progressing     Problem: ABCDS Injury Assessment  Goal: Absence of physical injury  3/24/2025 2029 by Pinky Basestt RN  Outcome: Progressing  3/24/2025 1019 by Tabitha Estrada RN  Outcome: Progressing     Problem: Safety - Adult  Goal: Free from fall injury  Outcome: Progressing     Problem: Respiratory - Adult  Goal: Achieves optimal ventilation and oxygenation  Outcome: Progressing     Problem: Cardiovascular - Adult  Goal: Maintains optimal cardiac output and hemodynamic stability  Outcome: Progressing     Problem: Skin/Tissue Integrity - Adult  Goal: Skin integrity remains intact  Outcome: Progressing     Problem: Metabolic/Fluid and Electrolytes - Adult  Goal: Electrolytes maintained within normal limits  Outcome: Progressing  Goal: Hemodynamic stability and optimal renal function maintained  Outcome: Progressing     Problem: Pain  Goal: Verbalizes/displays adequate comfort level or baseline comfort level  Outcome: Progressing     Problem: Infection - Adult  Goal: Absence of infection at discharge  Outcome: Progressing

## 2025-03-25 NOTE — PROGRESS NOTES
Pulmonary Progress Note    Admit Date: 3/16/2025  Hospital day                               PCP: Teofilo Dutton Jr., MD    Chief Complaint (s):  Patient Active Problem List   Diagnosis    Acute kidney injury superimposed on CKD    Hypertension    Hypothyroid    Valvular heart disease    CHF exacerbation (HCC)    Atrial fibrillation (HCC)    Herpes zoster    Acute on chronic systolic and diastolic heart failure, NYHA class 3 (HCC)    Red blood cell antibody positive    Abrasion    Coagulopathy    Absolute anemia    Bilateral carotid artery stenosis    Atrial fibrillation with RVR (HCC)    HFrEF (heart failure with reduced ejection fraction) ([facility])    Pulmonary edema    Moderate protein-calorie malnutrition    Thoracic ascending aortic aneurysm    Acute on chronic right-sided congestive heart failure (HCC)    Cellulitis of right hand    CAD (coronary artery disease)    Acute on chronic systolic CHF (congestive heart failure) (HCC)    Acute on chronic congestive heart failure, unspecified heart failure type (HCC)    Supratherapeutic INR    History of mitral valve replacement with mechanical valve    History of mechanical aortic valve replacement    Chronic renal insufficiency, stage III (moderate) (HCC)    Acute on chronic clinical systolic heart failure (HCC)    Anemia    CHF (congestive heart failure), NYHA class I, acute on chronic, combined (HCC)    Acute exacerbation of chronic heart failure (HCC)       Subjective:  Still markedly bronchospastic      Vitals:  VITALS:  /62   Pulse 83   Temp 97.7 °F (36.5 °C) (Oral)   Resp 18   Ht 1.549 m (5' 1\")   Wt 48.5 kg (107 lb)   SpO2 92%   BMI 20.22 kg/m²     24HR INTAKE/OUTPUT:      Intake/Output Summary (Last 24 hours) at 3/24/2025 1635  Last data filed at 3/24/2025 1138  Gross per 24 hour   Intake 280 ml   Output --   Net 280 ml       24HR PULSE OXIMETRY RANGE:    SpO2  Av.3 %  Min: 87 %  Max: 100 %    Medications:  IV:   [Held by 
                 Pulmonary Progress Note    Admit Date: 3/16/2025  Hospital day                               PCP: Teofilo Dutton Jr., MD    Chief Complaint (s):  Patient Active Problem List   Diagnosis    Acute kidney injury superimposed on CKD    Hypertension    Hypothyroid    Valvular heart disease    CHF exacerbation (HCC)    Atrial fibrillation (HCC)    Herpes zoster    Acute on chronic systolic and diastolic heart failure, NYHA class 3 (HCC)    Red blood cell antibody positive    Abrasion    Coagulopathy    Absolute anemia    Bilateral carotid artery stenosis    Atrial fibrillation with RVR (HCC)    HFrEF (heart failure with reduced ejection fraction) (HCC)    Pulmonary edema    Moderate protein-calorie malnutrition    Thoracic ascending aortic aneurysm    Acute on chronic right-sided congestive heart failure (HCC)    Cellulitis of right hand    CAD (coronary artery disease)    Acute on chronic systolic CHF (congestive heart failure) (HCC)    Acute on chronic congestive heart failure, unspecified heart failure type (HCC)    Supratherapeutic INR    History of mitral valve replacement with mechanical valve    History of mechanical aortic valve replacement    Chronic renal insufficiency, stage III (moderate) (HCC)    Acute on chronic clinical systolic heart failure (HCC)    Anemia    CHF (congestive heart failure), NYHA class I, acute on chronic, combined (HCC)    Acute exacerbation of chronic heart failure (HCC)       Subjective:  Patient seen on 2 L nasal cannula. She is sitting edge of bed. Denies any breathing complaints. Cough has improved. Frustrated regarding home oxygen therapy.       Vitals:  VITALS:  BP (!) 102/57   Pulse 84   Temp 97.7 °F (36.5 °C) (Oral)   Resp 15   Ht 1.549 m (5' 1\")   Wt 48.4 kg (106 lb 12.8 oz)   SpO2 100%   BMI 20.18 kg/m²     24HR INTAKE/OUTPUT:      Intake/Output Summary (Last 24 hours) at 3/25/2025 1132  Last data filed at 3/25/2025 0802  Gross per 24 hour   Intake 280 
     LOS Nutrition Note     Reason for Visit:   Length of Stay    Nutrition Assessment:  Pt admit 2/2 acute on chronic CHF, RSV+. PMHx=CKD, CHF, CAD. Fair intake at meals per flowsheets. Will continue to monitor pt POC. No additional nutrition recommendations at this time.      Current Nutrition Therapies:    ADULT DIET; Regular; Low Fat/Low Chol/High Fiber/2 gm Na    Anthropometrics:   Current Height: 154.9 cm (5' 1\")  Current Weight - Scale: 44.5 kg (98 lb 1.6 oz)      Monitoring and Evaluation:  No nutrition diagnosis. Patient will be monitored per nutrition standards of care.     Consult Dietitian if nutrition intervention essential to patient care is needed.     Discharge Planning:  No needs    Shelbi Emmanuel RD, CNSC, LD     
  Pulse oximetry on room air at rest _87__    Pulse oximetry on room air with ambulation __85__    Oxygen applied.    Pulse oximetry was _97___ with __1__ L O2.    Recovery pulse oximetry was __98_ with _1__L O2.  
4 Eyes Skin Assessment     NAME:  Josefina Garcia  YOB: 1945  MEDICAL RECORD NUMBER:  [id number]    The patient is being assessed for  Admission    I agree that at least one RN has performed a thorough Head to Toe Skin Assessment on the patient. ALL assessment sites listed below have been assessed.      Areas assessed by both nurses:    Head, Face, Ears, Shoulders, Back, Chest, Arms, Elbows, Hands, Sacrum. Buttock, Coccyx, Ischium, Legs. Feet and Heels, and Under Medical Devices         Does the Patient have a Wound? No noted wound(s)       Abhi Prevention initiated by RN: Yes  Wound Care Orders initiated by RN: No    Pressure Injury (Stage 3,4, Unstageable, DTI, NWPT, and Complex wounds) if present, place Wound referral order by RN under : No    New Ostomies, if present place, Ostomy referral order under : No     Nurse 1 eSignature: Electronically signed by Olamide Zarate RN on 3/16/25 at 7:19 PM EDT    **SHARE this note so that the co-signing nurse can place an eSignature**    Nurse 2 eSignature: {Esignature:[id number]}  
Admission chart check complete.     
BMP result sent to Dr. Sebastian covering for Dr. Box.  
BMP results sent to Dr Sebastian. New orders received.   
Call placed to Dr. Fajardo regarding patients blood pressure.   
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    
Dr Fajardo paged for new consult.   
Dr Lam notified of BP 86/47  
Dr Lam notified pt positive for RSV  
Dr. Fajardo paged via answering service. Return call received. Notified him of tachycardia rate 120-140's with runs of v-tach with 22 beats in one run. BP was 86/50 and pt denies any shortness of breath, chest pain or lightheadedness. Reviewed meds given. New orders received. .   
GI consult placed to Dr. Jenkins.   
Internal Medicine Progress Note    Patient's name: Josefina Garcia  : 1945  Chief complaints (on day of admission): cough, edema BLE  Admission date: 3/16/2025  Date of service: 3/18/2025   Room: [address] INTERMEDIATE  Primary care physician: Teofilo Dutton Jr., MD  Reason for visit: Follow-up for CHF exacerbation    Subjective  Josefina is seen sitting up on the edge of the bed, awake and alert, seems tired this morning. She reports being frustrated, just doesn't feel well. She reports a cough with chest congestion, doesn't feel any better with the diuresis. She expresses that she really hasn't been able to bring anything up despite the cough. Denies any fever or chills. Denies any nausea or vomiting. She does seem to think her leg swelling is better. No other issues or concerns from nursing.    Review of Systems  Full 10 point review of systems negative unless mentioned above.    Hospital Medications  Current Facility-Administered Medications   Medication Dose Route Frequency Provider Last Rate Last Admin    guaiFENesin-dextromethorphan (ROBITUSSIN DM) 100-10 MG/5ML syrup 5 mL  5 mL Oral Q4H PRN Isaura Guerin APRN - CNP   5 mL at 25 0434    guaiFENesin tablet 400 mg  400 mg Oral TID Franchesca Lentz APRN - MELISSA        ipratropium 0.5 mg-albuterol 2.5 mg (DUONEB) nebulizer solution 1 Dose  1 Dose Inhalation Q4H WA RT Franchesca Lentz APRN - CNP        melatonin tablet 3 mg  3 mg Oral Nightly PRN Nicolás Lam DO        ferric gluconate (FERRLECIT) 125 mg in sodium chloride 0.9 % 100 mL IVPB  125 mg IntraVENous Once per day on  Marli Box MD   Stopped at 25 1355    bumetanide (BUMEX) 12.5 mg in sodium chloride 0.9 % 125 mL infusion  0.2 mg/hr IntraVENous Continuous Marli Box MD 2 mL/hr at 25 1402 0.2 mg/hr at 25 1402    [Held by provider] metOLazone (ZAROXOLYN) tablet 2.5 mg  2.5 mg Oral Once per day on  Tana 
Internal Medicine Progress Note    Patient's name: Josefina Garcia  : 1945  Chief complaints (on day of admission): cough, edema BLE  Admission date: 3/16/2025  Date of service: 3/20/2025   Room: [address] INTERMEDIATE  Primary care physician: Teofilo Dutton Jr., MD  Reason for visit: Follow-up for CHF exacerbation    Subjective  Josefina is seen sitting up in bed awake and alert, seems tired and uncomfortable.  She reports generalized not feeling well.  She reports ongoing chest congestion although not much cough or sputum production.  She still feels short of breath, had to be placed on oxygen overnight.  She denies any fever or chills.  Denies any nausea or vomiting.  Still really is not having any swelling in her legs.  No other issues or concerns from nursing.    Review of Systems  Full 10 point review of systems negative unless mentioned above.    Hospital Medications  Current Facility-Administered Medications   Medication Dose Route Frequency Provider Last Rate Last Admin    methylPREDNISolone sodium succ (SOLU-MEDROL) injection 40 mg  40 mg IntraVENous Q12H Franchesca Lentz APRN - CNP   40 mg at 25 0634    guaiFENesin-dextromethorphan (ROBITUSSIN DM) 100-10 MG/5ML syrup 5 mL  5 mL Oral Q4H PRN Isaura Guerin APRN - CNP   5 mL at 25 0606    guaiFENesin tablet 400 mg  400 mg Oral TID Franchesca Lentz APRN - CNP   400 mg at 25 0740    ipratropium 0.5 mg-albuterol 2.5 mg (DUONEB) nebulizer solution 1 Dose  1 Dose Inhalation Q4H WA RT Franchesca Lentz APRN - CNP   1 Dose at 25    [Held by provider] acetaZOLAMIDE (DIAMOX) tablet 375 mg  375 mg Oral Daily Marli Box MD   375 mg at 25 1039    melatonin tablet 3 mg  3 mg Oral Nightly PRN Nicolás Lam DO   3 mg at 25 2321    ferric gluconate (FERRLECIT) 125 mg in sodium chloride 0.9 % 100 mL IVPB  125 mg IntraVENous Once per day on  Marli Box MD   Stopped at 25 1355 
Internal Medicine Progress Note    Patient's name: Josefina Garcia  : 1945  Chief complaints (on day of admission): cough, edema BLE  Admission date: 3/16/2025  Date of service: 3/21/2025   Room: [address] INTERMEDIATE  Primary care physician: Teofilo Dutton Jr., MD  Reason for visit: Follow-up for CHF exacerbation    Subjective  Josefina is seen sitting up in bed awake and alert, in no distress.  She reports that she does feel little bit better this morning although still very fatigued.  She reports that her breathing is a little bit better although still with some chest congestion, not bringing up much sputum.  She denies any fever or chills.  Denies any nausea or vomiting.  In discussion with nursing her BP does remain low, was given midodrine last night.  In review of her medications administration records she was given her Aldactone as well as her Entresto yesterday morning.  Extensive conversation with patient and her  this morning that at this time would recommend continuing to hold the Entresto and Aldactone as it is felt that the risks outweigh the benefits of continuing at this time.  Both  and patient agree with this.  No other issues or concerns from nursing.    Review of Systems  Full 10 point review of systems negative unless mentioned above.    Hospital Medications  Current Facility-Administered Medications   Medication Dose Route Frequency Provider Last Rate Last Admin    methylPREDNISolone sodium succ (SOLU-MEDROL) injection 40 mg  40 mg IntraVENous Q12H Franchesca Lentz APRN - CNP   40 mg at 25 0610    potassium chloride (KLOR-CON M) extended release tablet 40 mEq  40 mEq Oral PRGutierrez Salgado MD        Or    potassium bicarb-citric acid (EFFER-K) effervescent tablet 40 mEq  40 mEq Oral PRGutierrez Salgado MD        Or    potassium chloride 10 mEq/100 mL IVPB (Peripheral Line)  10 mEq IntraVENous PRGutierrez Salgado MD        magnesium sulfate 2000 mg in 50 mL 
Internal Medicine Progress Note    Patient's name: Josefina Garcia  : 1945  Chief complaints (on day of admission): cough, edema BLE  Admission date: 3/16/2025  Date of service: 3/23/2025   Room: [address] INTERMEDIATE  Primary care physician: Teofilo Dutton Jr., MD  Reason for visit: Follow-up for CHF exacerbation    Subjective  Josefina seen and evaluated sitting up at the edge of the bed eating breakfast.  Her  is at bedside.  She is alert and oriented.  In no acute distress. She reports overall feeling tired.  Still with a cough with mild sputum production. Remains on 1 L NC. Pulmonology consult pending.  She was started on scheduled midodrine yesterday with some improvement in BPs noted but overall still running low. Kidney function is stable. She is on IV fluids. States she is peeing normal.  She has no other reported complaints currently.  No issues reported from nursing overnight otherwise.    Review of Systems  Full 10 point review of systems negative unless mentioned above.    Hospital Medications  Current Facility-Administered Medications   Medication Dose Route Frequency Provider Last Rate Last Admin    midodrine (PROAMATINE) tablet 5 mg  5 mg Oral TID  Gutierrez Smith MD   5 mg at 25 1746    predniSONE (DELTASONE) tablet 40 mg  40 mg Oral Daily Franchesca Lentz APRN - CNP   40 mg at 25 1028    potassium chloride (KLOR-CON M) extended release tablet 40 mEq  40 mEq Oral PRGutierrez Salgado MD        Or    potassium bicarb-citric acid (EFFER-K) effervescent tablet 40 mEq  40 mEq Oral PRGutierrez Salgado MD        Or    potassium chloride 10 mEq/100 mL IVPB (Peripheral Line)  10 mEq IntraVENous PRGutierrez Salgado MD        magnesium sulfate 2000 mg in 50 mL IVPB premix  2,000 mg IntraVENous PRGutierrez Salgado MD        [Held by provider] spironolactone (ALDACTONE) tablet 25 mg  25 mg Oral Daily Rick Fajardo DO        guaiFENesin-dextromethorphan (ROBITUSSIN DM) 100-10 
Internal Medicine Progress Note    Patient's name: Josefina Garcia  : 1945  Chief complaints (on day of admission): cough, edema BLE  Admission date: 3/16/2025  Date of service: 3/24/2025   Room: [address] INTERMEDIATE  Primary care physician: Teofilo Dutton Jr., MD  Reason for visit: Follow-up for CHF exacerbation    Subjective  Josefina is seen lying in bed asleep, in no distress. She does easily awaken to voice. She reports that overall she is feeling better. Less chest congestion and isn't coughing as much. She denies any fever or chills. No leg swelling. She denies feeling dizzy or lightheaded. In discussion with nursing she still needs the 1L NC as she desaturates when on room air. No other issues or concerns from nursing.    Review of Systems  Full 10 point review of systems negative unless mentioned above.    Hospital Medications  Current Facility-Administered Medications   Medication Dose Route Frequency Provider Last Rate Last Admin    ipratropium 0.5 mg-albuterol 2.5 mg (DUONEB) nebulizer solution 1 Dose  1 Dose Inhalation Q4H RT Douglas Davidson MD   1 Dose at 25 0338    methylPREDNISolone sodium succ (SOLU-MEDROL) injection 40 mg  40 mg IntraVENous Q8H Douglas Davidson MD   40 mg at 25 0135    midodrine (PROAMATINE) tablet 5 mg  5 mg Oral TID  Gutierrez Smith MD   5 mg at 25 1611    potassium chloride (KLOR-CON M) extended release tablet 40 mEq  40 mEq Oral PRN Gutierrez Smith MD        Or    potassium bicarb-citric acid (EFFER-K) effervescent tablet 40 mEq  40 mEq Oral PRGutierrez Salgado MD        Or    potassium chloride 10 mEq/100 mL IVPB (Peripheral Line)  10 mEq IntraVENous PRN Gutierrez Smith MD        magnesium sulfate 2000 mg in 50 mL IVPB premix  2,000 mg IntraVENous PRN Gutierrez Smith MD        [Held by provider] spironolactone (ALDACTONE) tablet 25 mg  25 mg Oral Daily Rick Fajardo DO        guaiFENesin-dextromethorphan (ROBITUSSIN DM) 100-10 MG/5ML 
Internal Medicine Progress Note    Patient's name: Josefnia Garcia  : 1945  Chief complaints (on day of admission): cough, edema BLE  Admission date: 3/16/2025  Date of service: 3/22/2025   Room: [address] INTERMEDIATE  Primary care physician: Teofilo Dutton Jr., MD  Reason for visit: Follow-up for CHF exacerbation    Subjective  Josefina seen and evaluated sitting up in bed.  She is awake alert and oriented in no acute distress.  She states that she feels very lousy.  She has an ongoing cough.  States breathing is the same compared to yesterday.  States that she has a dry cough but she cannot get any of the mucus up. She did receive midodrine 3/20, remains with low blood pressures, nephrology following. No other issues or concerns from nursing.    Review of Systems  Full 10 point review of systems negative unless mentioned above.    Hospital Medications  Current Facility-Administered Medications   Medication Dose Route Frequency Provider Last Rate Last Admin    predniSONE (DELTASONE) tablet 40 mg  40 mg Oral Daily Franchesca Lentz APRN - CNP   40 mg at 25 0834    potassium chloride (KLOR-CON M) extended release tablet 40 mEq  40 mEq Oral PRN Gutierrez Smith MD        Or    potassium bicarb-citric acid (EFFER-K) effervescent tablet 40 mEq  40 mEq Oral PRN Gutierrez Smith MD        Or    potassium chloride 10 mEq/100 mL IVPB (Peripheral Line)  10 mEq IntraVENous PRN Gutierrez Smith MD        magnesium sulfate 2000 mg in 50 mL IVPB premix  2,000 mg IntraVENous PRN Gutierrez Smith MD        [Held by provider] spironolactone (ALDACTONE) tablet 25 mg  25 mg Oral Daily Rick Fajardo DO        guaiFENesin-dextromethorphan (ROBITUSSIN DM) 100-10 MG/5ML syrup 5 mL  5 mL Oral Q4H PRN Isaura Guerin APRN - CNP   5 mL at 25 0606    guaiFENesin tablet 400 mg  400 mg Oral TID Franchesca Lentz APRN - CNP   400 mg at 25    ipratropium 0.5 mg-albuterol 2.5 mg (DUONEB) nebulizer solution 1 
John Paul Fajardo on personal paged regarding Blood pressures  
Left message w Dr Bhardwaj answering service regarding pts home medication list is up to date   
Message left at Dr. Swann answering service regarding patient high heart rate. Awaiting call back.   
Message left with Dr. Fajardo's office regarding patient's BP. Awaiting a call back.   
Message sent to Dr. Box in regards to medications ordered along with bumex gtt.   
Message sent to Dr. Box regarding patients low potassium.   
Message sent to Dr. Jenkins regarding duration of diflucan medication for discharge, orders received.   
Message sent to Dr. Lam in regards to morning K level.   
Message sent to Dr. Lam in regards to patient requesting cough medicine.   
Message sent to Dr. Schultz regarding if patient is okay for discharge, states patient can be discharged and to discharge on 2mg of bumex daily. Orders to hold diamox at discharge.   
Message sent to Dr. Sebastian regarding BP after receiving 1800 dose of midodrine and about to receive 2200 dose now.   
Message sent to Dr. Smith regarding patient stating she has not urinated since this morning, and bladder scanner showing 0mL.  
Nephrology Progress Note  Patient's Name: Josefina Garcia  1:44 PM  3/19/2025    Nephrologist:     Reason for Consult:  CKD  Requesting Physician: Dr. ASHLY Lma    Chief Complaint:  L ARM SWELLING, ANEMIA    History Obtained From:  patient and past medical records    History of Present Ilness from the 2/28/25 note:    Josefina Garcia is a 80 y.o. female with prior history CKD 3B with A1 e-GFR=36-42ml/min with a baseline serum cr 1.24-1.42mg/dl presumed sec to microvascular disease due to HTN, ASVD, Valvular heart diease. She states she developed edema around the elbow area on the L arm and her  was concerned as it was on te L it was heart related and had her come to ED. She had no CP or increase SOB. In the ED HgB 7.7 and dropped to 7.1mg/dl. She has a Hx of Chronic Fe++ Def Anemia and has followed with Heme/Onc for SCOTT and IV Fe++. It was noted her cr was 2.0 mg/dl on admission and 1.8mg/dl today. She denies any urinary tract symptoms    Addendum to HPI: She was D/Nam 3/6/25 after being worked up for Acute on Chronic Blood Loss S/p NM Bleeding Scan 3/2 -- no evidence of bleeding  S/p EGD 3/3 -- no evidence of bleeding  Plan is for outpatient capsule endoscopy -- SBFT completed 3/6 and no evidence of obstruction  Her cr was 1.5mg/dl at D/C and HgB 8.2.  She presented back to the ED 3/10/25 for edema. She was treated with IV Lasix and D/Nam home.  She presented back to [facility] ED 3/16/25 for cough and edema of the bilat LE  Her BP in the ED 89/45. hgB 10.6 and cr 1.4mg/dl  XR CHEST PORTABLE 3/16/25  Final Result  1. There are no findings of pneumonia  2. Cardiomegaly.  Mild vascular congestion cannot be excluded  3. Small right pleural effusion and right lung base atelectasis.  She had hypoxia 88-89% on 2L NC        INTERVAL HX:    3/19/25: Pt awake alert and up in bed.  No change in cough and SOB. (+) RSV. BP dropped to 88/55 early this AM and bumetanide drip remains on hold. Appetite poor    Past Medical 
New consult placed to Dr Box.  
New orders received from Dr. Sebastian.   
Notified Isaura BAINS of patient's desire for Robitussin. Verbal order received.  
PROGRESS NOTE       PATIENT PROBLEM LIST:  Patient Active Problem List   Diagnosis Code    Acute kidney injury superimposed on CKD N17.9, N18.9    Hypertension I10    Hypothyroid E03.9    Valvular heart disease I38    CHF exacerbation ([facility]) I50.9    Atrial fibrillation ([facility]) I48.91    Herpes zoster B02.9    Acute on chronic systolic and diastolic heart failure, NYHA class 3 ([facility]) I50.43    Red blood cell antibody positive R76.8    Abrasion T14.8XXA    Coagulopathy D68.9    Absolute anemia D64.9    Bilateral carotid artery stenosis I65.23    Atrial fibrillation with RVR ([facility]) I48.91    HFrEF (heart failure with reduced ejection fraction) ([facility]) I50.20    Pulmonary edema J81.1    Moderate protein-calorie malnutrition E44.0    Thoracic ascending aortic aneurysm I71.21    Acute on chronic right-sided congestive heart failure ([facility]) I50.813    Cellulitis of right hand L03.113    CAD (coronary artery disease) I25.10    Acute on chronic systolic CHF (congestive heart failure) ([facility]) I50.23    Acute on chronic congestive heart failure, unspecified heart failure type ([facility]) I50.9    Supratherapeutic INR R79.1    History of mitral valve replacement with mechanical valve Z95.2    History of mechanical aortic valve replacement Z95.2    Chronic renal insufficiency, stage III (moderate) ([facility]) N18.30    Acute on chronic clinical systolic heart failure ([facility]) I50.23    Anemia D64.9    CHF (congestive heart failure), NYHA class I, acute on chronic, combined ([facility]) I50.43    Acute exacerbation of chronic heart failure ([facility]) I50.9       SUBJECTIVE:  Josefina Garcia states she is breathing somewhat easier and notes that her peripheral edema has significantly decreased.  She denies any chest discomfort, palpitations nor lightheadedness.    REVIEW OF SYSTEMS:  General ROS: negative for - fatigue, malaise,  weight gain or weight loss  Psychological ROS: positive for - anxiety , depression  Ophthalmic ROS: negative for - decreased vision or 
PROGRESS NOTE       PATIENT PROBLEM LIST:  Patient Active Problem List   Diagnosis Code    Acute kidney injury superimposed on CKD N17.9, N18.9    Hypertension I10    Hypothyroid E03.9    Valvular heart disease I38    CHF exacerbation ([facility]) I50.9    Atrial fibrillation ([facility]) I48.91    Herpes zoster B02.9    Acute on chronic systolic and diastolic heart failure, NYHA class 3 ([facility]) I50.43    Red blood cell antibody positive R76.8    Abrasion T14.8XXA    Coagulopathy D68.9    Absolute anemia D64.9    Bilateral carotid artery stenosis I65.23    Atrial fibrillation with RVR ([facility]) I48.91    HFrEF (heart failure with reduced ejection fraction) ([facility]) I50.20    Pulmonary edema J81.1    Moderate protein-calorie malnutrition E44.0    Thoracic ascending aortic aneurysm I71.21    Acute on chronic right-sided congestive heart failure ([facility]) I50.813    Cellulitis of right hand L03.113    CAD (coronary artery disease) I25.10    Acute on chronic systolic CHF (congestive heart failure) ([facility]) I50.23    Acute on chronic congestive heart failure, unspecified heart failure type ([facility]) I50.9    Supratherapeutic INR R79.1    History of mitral valve replacement with mechanical valve Z95.2    History of mechanical aortic valve replacement Z95.2    Chronic renal insufficiency, stage III (moderate) ([facility]) N18.30    Acute on chronic clinical systolic heart failure ([facility]) I50.23    Anemia D64.9    CHF (congestive heart failure), NYHA class I, acute on chronic, combined ([facility]) I50.43    Acute exacerbation of chronic heart failure ([facility]) I50.9       SUBJECTIVE:  Josefina JENNINGS Jose notes that she feels \"lousy\" still with significant cough and shortness of breath and feels generally weak and notes that her heart rate has remained elevated at 130/min.  REVIEW OF SYSTEMS:  General ROS: positive for - fatigue, malaise, and weight loss  Psychological ROS: positive for - anxiety , depression  Ophthalmic ROS: negative for - decreased vision or visual 
PROGRESS NOTE       PATIENT PROBLEM LIST:  Patient Active Problem List   Diagnosis Code    Acute kidney injury superimposed on CKD N17.9, N18.9    Hypertension I10    Hypothyroid E03.9    Valvular heart disease I38    CHF exacerbation ([facility]) I50.9    Atrial fibrillation ([facility]) I48.91    Herpes zoster B02.9    Acute on chronic systolic and diastolic heart failure, NYHA class 3 ([facility]) I50.43    Red blood cell antibody positive R76.8    Abrasion T14.8XXA    Coagulopathy D68.9    Absolute anemia D64.9    Bilateral carotid artery stenosis I65.23    Atrial fibrillation with RVR ([facility]) I48.91    HFrEF (heart failure with reduced ejection fraction) ([facility]) I50.20    Pulmonary edema J81.1    Moderate protein-calorie malnutrition E44.0    Thoracic ascending aortic aneurysm I71.21    Acute on chronic right-sided congestive heart failure ([facility]) I50.813    Cellulitis of right hand L03.113    CAD (coronary artery disease) I25.10    Acute on chronic systolic CHF (congestive heart failure) ([facility]) I50.23    Acute on chronic congestive heart failure, unspecified heart failure type ([facility]) I50.9    Supratherapeutic INR R79.1    History of mitral valve replacement with mechanical valve Z95.2    History of mechanical aortic valve replacement Z95.2    Chronic renal insufficiency, stage III (moderate) ([facility]) N18.30    Acute on chronic clinical systolic heart failure ([facility]) I50.23    Anemia D64.9    CHF (congestive heart failure), NYHA class I, acute on chronic, combined ([facility]) I50.43    Acute exacerbation of chronic heart failure ([facility]) I50.9       SUBJECTIVE:  oJsefina Garcia is still having difficulty with recurrent mucinous cough for which she has had difficulty with expectoration.  From a cardiac standpoint her heart rate is better controlled on her present medical management.  REVIEW OF SYSTEMS:  General ROS: positive for - fatigue, malaise, and weight loss  Psychological ROS: positive for - anxiety , depression  Ophthalmic ROS: negative for - 
PROGRESS NOTE       PATIENT PROBLEM LIST:  Patient Active Problem List   Diagnosis Code    Acute kidney injury superimposed on CKD N17.9, N18.9    Hypertension I10    Hypothyroid E03.9    Valvular heart disease I38    CHF exacerbation ([facility]) I50.9    Atrial fibrillation ([facility]) I48.91    Herpes zoster B02.9    Acute on chronic systolic and diastolic heart failure, NYHA class 3 ([facility]) I50.43    Red blood cell antibody positive R76.8    Abrasion T14.8XXA    Coagulopathy D68.9    Absolute anemia D64.9    Bilateral carotid artery stenosis I65.23    Atrial fibrillation with RVR ([facility]) I48.91    HFrEF (heart failure with reduced ejection fraction) ([facility]) I50.20    Pulmonary edema J81.1    Moderate protein-calorie malnutrition E44.0    Thoracic ascending aortic aneurysm I71.21    Acute on chronic right-sided congestive heart failure ([facility]) I50.813    Cellulitis of right hand L03.113    CAD (coronary artery disease) I25.10    Acute on chronic systolic CHF (congestive heart failure) ([facility]) I50.23    Acute on chronic congestive heart failure, unspecified heart failure type ([facility]) I50.9    Supratherapeutic INR R79.1    History of mitral valve replacement with mechanical valve Z95.2    History of mechanical aortic valve replacement Z95.2    Chronic renal insufficiency, stage III (moderate) ([facility]) N18.30    Acute on chronic clinical systolic heart failure ([facility]) I50.23    Anemia D64.9    CHF (congestive heart failure), NYHA class I, acute on chronic, combined ([facility]) I50.43    Acute exacerbation of chronic heart failure ([facility]) I50.9       SUBJECTIVE:  Josefina Garcia states that they think she has RSV pneumonia now.  Nonetheless her cough has increased and is much more mucinous in nature.  Renal status presently remains stable.  REVIEW OF SYSTEMS:  General ROS: positive for - fatigue, malaise, and weight loss  Psychological ROS: positive for - anxiety , depression  Ophthalmic ROS: negative for - decreased vision or visual distortion.  ENT 
PROGRESS NOTE       PATIENT PROBLEM LIST:  Patient Active Problem List   Diagnosis Code    Acute kidney injury superimposed on CKD N17.9, N18.9    Hypertension I10    Hypothyroid E03.9    Valvular heart disease I38    CHF exacerbation ([facility]) I50.9    Atrial fibrillation ([facility]) I48.91    Herpes zoster B02.9    Acute on chronic systolic and diastolic heart failure, NYHA class 3 ([facility]) I50.43    Red blood cell antibody positive R76.8    Abrasion T14.8XXA    Coagulopathy D68.9    Absolute anemia D64.9    Bilateral carotid artery stenosis I65.23    Atrial fibrillation with RVR ([facility]) I48.91    HFrEF (heart failure with reduced ejection fraction) ([facility]) I50.20    Pulmonary edema J81.1    Moderate protein-calorie malnutrition E44.0    Thoracic ascending aortic aneurysm I71.21    Acute on chronic right-sided congestive heart failure ([facility]) I50.813    Cellulitis of right hand L03.113    CAD (coronary artery disease) I25.10    Acute on chronic systolic CHF (congestive heart failure) ([facility]) I50.23    Acute on chronic congestive heart failure, unspecified heart failure type ([facility]) I50.9    Supratherapeutic INR R79.1    History of mitral valve replacement with mechanical valve Z95.2    History of mechanical aortic valve replacement Z95.2    Chronic renal insufficiency, stage III (moderate) ([facility]) N18.30    Acute on chronic clinical systolic heart failure ([facility]) I50.23    Anemia D64.9    CHF (congestive heart failure), NYHA class I, acute on chronic, combined ([facility]) I50.43    Acute exacerbation of chronic heart failure ([facility]) I50.9       SUBJECTIVE:  Josefina Garcia is still having difficulty with recurrent mucinous cough for which she has had difficulty with expectoration.  From a cardiac standpoint her heart rate is better controlled on her present medical management.  Nonetheless she does state that her breathing has improved overall.  REVIEW OF SYSTEMS:  General ROS: positive for - fatigue, malaise, and weight loss  Psychological 
Paged Dr. Fajardo again regarding checking for discharge.   
Paged Dr. Fajardo to check for discharge.   
Pharmacy Consultation Note  (Warfarin Dosing and Monitoring)    Initial consult date: 3/16/25  Consulting Provider: Dr. Lashae Garcia is a 80 y.o. female for whom pharmacy has been asked to manage warfarin therapy.     Weight:   Wt Readings from Last 1 Encounters:   03/16/25 44.9 kg (99 lb)       TSH:    Lab Results   Component Value Date/Time    TSH 0.31 12/31/2024 05:13 AM       Hepatic Function Panel:                          Lab Results   Component Value Date/Time    ALKPHOS 70 03/16/2025 10:58 AM    ALT 10 03/16/2025 10:58 AM    AST 40 03/16/2025 10:58 AM    BILITOT 1.0 03/16/2025 10:58 AM    BILIDIR <0.2 09/25/2016 10:07 AM    IBILI 0.7 09/25/2016 10:07 AM       Current significant warfarin drug-drug interactions include:   -Levothyroxine: may enhance anticoagulatory effect of warfarin    Recent Labs     03/16/25  1058   HGB 10.6*        Date Warfarin Dose INR Heparin or LMWH Comment   3/16 2 mg 2.7 X                           Assessment:  Patient is a 80 y.o. female on warfarin for Atrial Fibrillation, Mechanical Heart Valve (mitral), and Mechanical Heart Valve (atrial).  Patient's home warfarin dosing regimen is documented as warfarin 1 mg on Tuesdays and Fridays and warfarin 2 mg all other days.   Goal INR 2.5 - 3.5  INR 2.7 today    Plan:  Will give warfarin 2 mg tonight  Daily PT/INR until the INR is stable within the therapeutic range  Pharmacist will follow and monitor/adjust dosing as necessary    Neil Kc PharmD  03/16/25 6:54 PM  
Pharmacy Consultation Note  (Warfarin Dosing and Monitoring)    Initial consult date: 3/17/2025  Consulting Provider: Dr. Nicolás Garcia is a 80 y.o. female for whom pharmacy has been asked to manage warfarin therapy.     Weight:   Wt Readings from Last 1 Encounters:   03/18/25 45.3 kg (99 lb 12.8 oz)       TSH:    Lab Results   Component Value Date/Time    TSH 0.31 12/31/2024 05:13 AM       Hepatic Function Panel:                          Lab Results   Component Value Date/Time    ALKPHOS 76 03/18/2025 07:50 AM    ALT 11 03/18/2025 07:50 AM    AST 31 03/18/2025 07:50 AM    BILITOT 1.6 03/18/2025 07:50 AM    BILIDIR <0.2 09/25/2016 10:07 AM    IBILI 0.7 09/25/2016 10:07 AM       Current significant warfarin drug-drug interactions include:   -Levothyroxine: may enhance anticoagulatory effect of warfarin    Recent Labs     03/16/25  1058 03/17/25  0409 03/18/25  0750   HGB 10.6* 9.1* 10.1*    167 178     Date Warfarin Dose INR Heparin or LMWH Comment   3/16 2 mg 2.7 X    3/17 0.5 mg 3.5 X    3/18 1 mg 3.6 X             Assessment:  Patient is a 80 y.o. female on warfarin for Atrial Fibrillation, Mechanical Heart Valve (mitral), and Mechanical Heart Valve (atrial).  Patient's home warfarin dosing regimen is documented as warfarin 1 mg on Tuesdays and Fridays and warfarin 2 mg all other days.   Goal INR 2.5 - 3.5  INR 3.6 today    Plan:  Will give warfarin 1 mg tonight  Daily PT/INR until the INR is stable within the therapeutic range  Pharmacist will follow and monitor/adjust dosing as necessary    Douglas Romero, PharmD, BCCCP  3/18/2025  9:46 AM  
Pharmacy Consultation Note  (Warfarin Dosing and Monitoring)    Initial consult date: 3/17/2025  Consulting Provider: Dr. Nicolás Garcia is a 80 y.o. female for whom pharmacy has been asked to manage warfarin therapy.     Weight:   Wt Readings from Last 1 Encounters:   03/19/25 46 kg (101 lb 6.6 oz)       TSH:    Lab Results   Component Value Date/Time    TSH 0.31 12/31/2024 05:13 AM       Hepatic Function Panel:                          Lab Results   Component Value Date/Time    ALKPHOS 71 03/19/2025 08:47 AM    ALT 11 03/19/2025 08:47 AM    AST 32 03/19/2025 08:47 AM    BILITOT 1.1 03/19/2025 08:47 AM    BILIDIR <0.2 09/25/2016 10:07 AM    IBILI 0.7 09/25/2016 10:07 AM       Current significant warfarin drug-drug interactions include:   -Levothyroxine: may enhance anticoagulatory effect of warfarin    Recent Labs     03/17/25  0409 03/18/25  0750 03/19/25  0847   HGB 9.1* 10.1* 9.6*    178 171     Date Warfarin Dose INR Heparin or LMWH Comment   3/16 2 mg 2.7 X    3/17 0.5 mg 3.5 X    3/18 1 mg 3.6 X    3/19 HOLD 4.3 X                    Assessment:  Patient is a 80 y.o. female on warfarin for Atrial Fibrillation, Mechanical Heart Valve (mitral), and Mechanical Heart Valve (atrial).  Patient's home warfarin dosing regimen is documented as warfarin 1 mg on Tuesdays and Fridays and warfarin 2 mg all other days.   Goal INR 2.5 - 3.5  INR 4.3 today    Plan:  Will hold warfarin tonight  Daily PT/INR until the INR is stable within the therapeutic range  Pharmacist will follow and monitor/adjust dosing as necessary    Douglas Romero, PharmD, BCCCP  3/19/2025  10:47 AM  
Pharmacy Consultation Note  (Warfarin Dosing and Monitoring)    Initial consult date: 3/17/2025  Consulting Provider: Dr. Nicolás Garcia is a 80 y.o. female for whom pharmacy has been asked to manage warfarin therapy.     Weight:   Wt Readings from Last 1 Encounters:   03/21/25 44.5 kg (98 lb 1.6 oz)       TSH:    Lab Results   Component Value Date/Time    TSH 0.33 03/22/2025 10:30 AM       Hepatic Function Panel:                          Lab Results   Component Value Date/Time    ALKPHOS 59 03/23/2025 03:12 AM    ALT 13 03/23/2025 03:12 AM    AST 36 03/23/2025 03:12 AM    BILITOT 0.6 03/23/2025 03:12 AM    BILIDIR <0.2 09/25/2016 10:07 AM    IBILI 0.7 09/25/2016 10:07 AM       Current significant warfarin drug-drug interactions include:   -Levothyroxine: may enhance anticoagulatory effect of warfarin    Recent Labs     03/21/25  0230 03/22/25  0350 03/23/25  0312   HGB 9.0* 8.9* 8.5*    195 225     Date Warfarin Dose INR Heparin or LMWH Comment   3/16 2 mg 2.7 X    3/17 0.5 mg 3.5 X    3/18 1 mg 3.6 X    3/19 HOLD 4.3 X    3/20 HOLD 3.9 X    3/21 HOLD 4.6 X    3/22 HOLD 3.5 X INR did not result   3/23 1 mg 2.9 x             Assessment:  Patient is a 80 y.o. female on warfarin for Atrial Fibrillation, Mechanical Heart Valve (mitral), and Mechanical Heart Valve (atrial).  Patient's home warfarin dosing regimen is documented as warfarin 1 mg on Tuesdays and Fridays and warfarin 2 mg all other days.   Goal INR 2.5 - 3.5  INR 2.9    Plan:  Warfarin 1 mg tonight  Daily PT/INR until the INR is stable within the therapeutic range  Pharmacist will follow and monitor/adjust dosing as necessary    Electronically signed by Maria Del Carmen Helms RPH, Pharm.D. 3/23/2025 9:46 AM   Ext: [phone]    
Pharmacy Consultation Note  (Warfarin Dosing and Monitoring)    Initial consult date: 3/17/2025  Consulting Provider: Dr. Nicolás JENNINGS Garcia is a 80 y.o. female for whom pharmacy has been asked to manage warfarin therapy.     Weight:   Wt Readings from Last 1 Encounters:   03/17/25 46.3 kg (102 lb 1.6 oz)       TSH:    Lab Results   Component Value Date/Time    TSH 0.31 12/31/2024 05:13 AM       Hepatic Function Panel:                          Lab Results   Component Value Date/Time    ALKPHOS 64 03/17/2025 04:09 AM    ALT 10 03/17/2025 04:09 AM    AST 29 03/17/2025 04:09 AM    BILITOT 0.9 03/17/2025 04:09 AM    BILIDIR <0.2 09/25/2016 10:07 AM    IBILI 0.7 09/25/2016 10:07 AM       Current significant warfarin drug-drug interactions include:   -Levothyroxine: may enhance anticoagulatory effect of warfarin    Recent Labs     03/16/25  1058 03/17/25  0409   HGB 10.6* 9.1*    167     Date Warfarin Dose INR Heparin or LMWH Comment   3/16 2 mg 2.7 X    3/17 0.5 mg 3.5 X                    Assessment:  Patient is a 80 y.o. female on warfarin for Atrial Fibrillation, Mechanical Heart Valve (mitral), and Mechanical Heart Valve (atrial).  Patient's home warfarin dosing regimen is documented as warfarin 1 mg on Tuesdays and Fridays and warfarin 2 mg all other days.   Goal INR 2.5 - 3.5  INR 3.5 today    Plan:  Will give decreased dose of warfarin 0.5 mg tonight  Daily PT/INR until the INR is stable within the therapeutic range  Pharmacist will follow and monitor/adjust dosing as necessary    Douglas Romero, PharmD, BCCCP  3/17/2025  1:25 PM  
Pharmacy Consultation Note  (Warfarin Dosing and Monitoring)    Initial consult date: 3/17/2025  Consulting Provider: Dr. Nicolás JENNINGS Jose is a 80 y.o. female for whom pharmacy has been asked to manage warfarin therapy.     Weight:   Wt Readings from Last 1 Encounters:   03/20/25 46.3 kg (102 lb)       TSH:    Lab Results   Component Value Date/Time    TSH 0.31 12/31/2024 05:13 AM       Hepatic Function Panel:                          Lab Results   Component Value Date/Time    ALKPHOS 65 03/20/2025 05:50 AM    ALT 10 03/20/2025 05:50 AM    AST 29 03/20/2025 05:50 AM    BILITOT 0.8 03/20/2025 05:50 AM    BILIDIR <0.2 09/25/2016 10:07 AM    IBILI 0.7 09/25/2016 10:07 AM       Current significant warfarin drug-drug interactions include:   -Levothyroxine: may enhance anticoagulatory effect of warfarin    Recent Labs     03/18/25  0750 03/19/25  0847 03/20/25  0550   HGB 10.1* 9.6* 9.6*    171 154     Date Warfarin Dose INR Heparin or LMWH Comment   3/16 2 mg 2.7 X    3/17 0.5 mg 3.5 X    3/18 1 mg 3.6 X    3/19 HOLD 4.3 X    3/20 HOLD 3.9 X             Assessment:  Patient is a 80 y.o. female on warfarin for Atrial Fibrillation, Mechanical Heart Valve (mitral), and Mechanical Heart Valve (atrial).  Patient's home warfarin dosing regimen is documented as warfarin 1 mg on Tuesdays and Fridays and warfarin 2 mg all other days.   Goal INR 2.5 - 3.5  INR 3.9 today    Plan:  Will hold warfarin tonight  Daily PT/INR until the INR is stable within the therapeutic range  Pharmacist will follow and monitor/adjust dosing as necessary    Douglas Romero, PharmD, BCCCP  3/20/2025  9:15 AM  
Pharmacy Consultation Note  (Warfarin Dosing and Monitoring)    Initial consult date: 3/17/2025  Consulting Provider: Dr. Nicolás JENNINGS Jose is a 80 y.o. female for whom pharmacy has been asked to manage warfarin therapy.     Weight:   Wt Readings from Last 1 Encounters:   03/21/25 44.5 kg (98 lb 1.6 oz)       TSH:    Lab Results   Component Value Date/Time    TSH 0.31 12/31/2024 05:13 AM       Hepatic Function Panel:                          Lab Results   Component Value Date/Time    ALKPHOS 64 03/21/2025 02:30 AM    ALT 11 03/21/2025 02:30 AM    AST 29 03/21/2025 02:30 AM    BILITOT 0.6 03/21/2025 02:30 AM    BILIDIR <0.2 09/25/2016 10:07 AM    IBILI 0.7 09/25/2016 10:07 AM       Current significant warfarin drug-drug interactions include:   -Levothyroxine: may enhance anticoagulatory effect of warfarin    Recent Labs     03/19/25  0847 03/20/25  0550 03/21/25  0230   HGB 9.6* 9.6* 9.0*    154 170     Date Warfarin Dose INR Heparin or LMWH Comment   3/16 2 mg 2.7 X    3/17 0.5 mg 3.5 X    3/18 1 mg 3.6 X    3/19 HOLD 4.3 X    3/20 HOLD 3.9 X    3/21 HOLD 4.6 X                           Assessment:  Patient is a 80 y.o. female on warfarin for Atrial Fibrillation, Mechanical Heart Valve (mitral), and Mechanical Heart Valve (atrial).  Patient's home warfarin dosing regimen is documented as warfarin 1 mg on Tuesdays and Fridays and warfarin 2 mg all other days.   Goal INR 2.5 - 3.5  INR 4.6 today    Plan:  Will hold warfarin tonight  Daily PT/INR until the INR is stable within the therapeutic range  Pharmacist will follow and monitor/adjust dosing as necessary    Douglas Romero, Rachael, BCCCP  3/21/2025  9:39 AM  
Pharmacy Consultation Note  (Warfarin Dosing and Monitoring)    Initial consult date: 3/17/2025  Consulting Provider: Dr. Nicolás JENNINGS Jose is a 80 y.o. female for whom pharmacy has been asked to manage warfarin therapy.     Weight:   Wt Readings from Last 1 Encounters:   03/21/25 44.5 kg (98 lb 1.6 oz)       TSH:    Lab Results   Component Value Date/Time    TSH 0.33 03/22/2025 10:30 AM       Hepatic Function Panel:                          Lab Results   Component Value Date/Time    ALKPHOS 70 03/22/2025 06:08 PM    ALT 13 03/22/2025 06:08 PM    AST 26 03/22/2025 06:08 PM    BILITOT 0.7 03/22/2025 06:08 PM    BILIDIR <0.2 09/25/2016 10:07 AM    IBILI 0.7 09/25/2016 10:07 AM       Current significant warfarin drug-drug interactions include:   -Levothyroxine: may enhance anticoagulatory effect of warfarin    Recent Labs     03/20/25  0550 03/21/25  0230 03/22/25  0350   HGB 9.6* 9.0* 8.9*    170 195     Date Warfarin Dose INR Heparin or LMWH Comment   3/16 2 mg 2.7 X    3/17 0.5 mg 3.5 X    3/18 1 mg 3.6 X    3/19 HOLD 4.3 X    3/20 HOLD 3.9 X    3/21 HOLD 4.6 X    3/22 HOLD -- X INR did not result                   Assessment:  Patient is a 80 y.o. female on warfarin for Atrial Fibrillation, Mechanical Heart Valve (mitral), and Mechanical Heart Valve (atrial).  Patient's home warfarin dosing regimen is documented as warfarin 1 mg on Tuesdays and Fridays and warfarin 2 mg all other days.   Goal INR 2.5 - 3.5  No INR for today was drawn    Plan:  With INR having been high throughout the week, will HOLD warfarin tonight in the setting of assumed supratherapeutic INR.   Daily PT/INR until the INR is stable within the therapeutic range  Pharmacist will follow and monitor/adjust dosing as necessary    Electronically signed by Maria Del Carmen Helms RPH, Pharm.D. 3/22/2025 8:02 PM   Ext: [id number]    
Pharmacy Consultation Note  (Warfarin Dosing and Monitoring)    Initial consult date: 3/17/2025  Consulting Provider: Dr. Nicolás JENNINGS Jose is a 80 y.o. female for whom pharmacy has been asked to manage warfarin therapy.     Weight:   Wt Readings from Last 1 Encounters:   03/24/25 48.5 kg (107 lb)       TSH:    Lab Results   Component Value Date/Time    TSH 0.33 03/22/2025 10:30 AM       Hepatic Function Panel:                          Lab Results   Component Value Date/Time    ALKPHOS 64 03/24/2025 06:00 AM    ALT 14 03/24/2025 06:00 AM    AST 22 03/24/2025 06:00 AM    BILITOT 0.7 03/24/2025 06:00 AM    BILIDIR <0.2 09/25/2016 10:07 AM    IBILI 0.7 09/25/2016 10:07 AM       Current significant warfarin drug-drug interactions include:   -Levothyroxine: may enhance anticoagulatory effect of warfarin    Recent Labs     03/22/25  0350 03/23/25  0312 03/24/25  0600   HGB 8.9* 8.5* 9.1*    225 217     Date Warfarin Dose INR Heparin or LMWH Comment   3/16 2 mg 2.7 X    3/17 0.5 mg 3.5 X    3/18 1 mg 3.6 X    3/19 HOLD 4.3 X    3/20 HOLD 3.9 X    3/21 HOLD 4.6 X    3/22 HOLD 3.5 X INR did not result   3/23 1 mg 2.9 X    3/24 1 mg 2.7 X                           Assessment:  Patient is a 80 y.o. female on warfarin for Atrial Fibrillation, Mechanical Heart Valve (mitral), and Mechanical Heart Valve (atrial).  Patient's home warfarin dosing regimen is documented as warfarin 1 mg on Tuesdays and Fridays and warfarin 2 mg all other days.   Goal INR 2.5 - 3.5  INR 2.7 today    Plan:  Will give warfarin 1 mg tonight  Daily PT/INR until the INR is stable within the therapeutic range  Pharmacist will follow and monitor/adjust dosing as necessary    Douglas Romero, PharmD, BCCCP  3/24/2025  11:50 AM  
Pharmacy Consultation Note  (Warfarin Dosing and Monitoring)    Initial consult date: 3/17/2025  Consulting Provider: Dr. Nicolás JENNINGS Jose is a 80 y.o. female for whom pharmacy has been asked to manage warfarin therapy.     Weight:   Wt Readings from Last 1 Encounters:   03/25/25 48.4 kg (106 lb 12.8 oz)       TSH:    Lab Results   Component Value Date/Time    TSH 0.33 03/22/2025 10:30 AM       Hepatic Function Panel:                          Lab Results   Component Value Date/Time    ALKPHOS 61 03/25/2025 06:42 AM    ALT 15 03/25/2025 06:42 AM    AST 21 03/25/2025 06:42 AM    BILITOT 0.7 03/25/2025 06:42 AM    BILIDIR <0.2 09/25/2016 10:07 AM    IBILI 0.7 09/25/2016 10:07 AM       Current significant warfarin drug-drug interactions include:   -Levothyroxine: may enhance anticoagulatory effect of warfarin  -Fluconazole: may enhance anticoagulatory effect of warfarin    Recent Labs     03/23/25  0312 03/24/25  0600 03/25/25  0642   HGB 8.5* 9.1* 9.0*    217 206     Date Warfarin Dose INR Heparin or LMWH Comment   3/16 2 mg 2.7 X    3/17 0.5 mg 3.5 X    3/18 1 mg 3.6 X    3/19 HOLD 4.3 X    3/20 HOLD 3.9 X    3/21 HOLD 4.6 X    3/22 HOLD 3.5 X INR did not result   3/23 1 mg 2.9 X    3/24 1 mg 2.7 X    3/25 0.5 mg 3.1 X                    Assessment:  Patient is a 80 y.o. female on warfarin for Atrial Fibrillation, Mechanical Heart Valve (mitral), and Mechanical Heart Valve (atrial).  Patient's home warfarin dosing regimen is documented as warfarin 1 mg on Tuesdays and Fridays and warfarin 2 mg all other days.   Goal INR 2.5 - 3.5  INR 3.1 today  Fluconazole was started on 3/24; this may interact with warfarin and increase INR    Plan:  Will give warfarin 0.5 mg tonight  Daily PT/INR until the INR is stable within the therapeutic range  Pharmacist will follow and monitor/adjust dosing as necessary    Douglas Romero, PharmD, BCCCP  3/25/2025  1:40 PM  
Pulm consult placed per family request.   
Pulse ox was 91% on room air at rest.   Ambulated patient on room air.   Oxygen saturation was 87% on room air while ambulating.   Oxygen applied, titrated to 2L   Recovery pulse ox was 98% of 2L of oxygen while ambulating   
Pulse ox was 97% on room air at rest.   Ambulated patient on room air.   Oxygen saturation was 96% on room air while ambulating.     
Received call back from montse Chan for discharge and to restart entresto and aldactone at discharge.   
Received report from Michaela SANTOS RN  
Spoke with Dr. Fajardo, updated him on patients BP and previous orders from Dr. Sebastian, new orders received.   
[facility]  Nephrology Progress Note  Patient's Name: Josefina Garcia  1:33 PM  3/23/2025    Nephrologist:     Reason for Consult:  CKD  Requesting Physician: Dr. ASHLY Lam    Chief Complaint:  L ARM SWELLING, ANEMIA    History Obtained From:  patient and past medical records    History of Present Ilness from the 2/28/25 note:    Josefina Garcia is a 80 y.o. female with prior history CKD 3B with A1 e-GFR=36-42ml/min with a baseline serum cr 1.24-1.42mg/dl presumed sec to microvascular disease due to HTN, ASVD, Valvular heart diease. She states she developed edema around the elbow area on the L arm and her  was concerned as it was on te L it was heart related and had her come to ED. She had no CP or increase SOB. In the ED HgB 7.7 and dropped to 7.1mg/dl. She has a Hx of Chronic Fe++ Def Anemia and has followed with Heme/Onc for SCOTT and IV Fe++. It was noted her cr was 2.0 mg/dl on admission and 1.8mg/dl today. She denies any urinary tract symptoms    Addendum to HPI: She was D/Nam 3/6/25 after being worked up for Acute on Chronic Blood Loss S/p NM Bleeding Scan 3/2 -- no evidence of bleeding  S/p EGD 3/3 -- no evidence of bleeding  Plan is for outpatient capsule endoscopy -- SBFT completed 3/6 and no evidence of obstruction  Her cr was 1.5mg/dl at D/C and HgB 8.2.  She presented back to the ED 3/10/25 for edema. She was treated with IV Lasix and D/Nam home.  She presented back to [facility] ED 3/16/25 for cough and edema of the bilat LE  Her BP in the ED 89/45. hgB 10.6 and cr 1.4mg/dl  XR CHEST PORTABLE 3/16/25  Final Result  1. There are no findings of pneumonia  2. Cardiomegaly.  Mild vascular congestion cannot be excluded  3. Small right pleural effusion and right lung base atelectasis.  She had hypoxia 88-89% on 2L NC        INTERVAL HX:    3/19/25: Pt awake alert and up in bed.  No change in cough and SOB. (+) RSV. BP dropped to 88/55 early this AM and bumetanide drip remains on hold. Appetite 
[facility]  Nephrology Progress Note  Patient's Name: Josefina Garcia  1:57 PM  3/22/2025    Nephrologist:     Reason for Consult:  CKD  Requesting Physician: Dr. ASHLY Lam    Chief Complaint:  L ARM SWELLING, ANEMIA    History Obtained From:  patient and past medical records    History of Present Ilness from the 2/28/25 note:    Josefina Garcia is a 80 y.o. female with prior history CKD 3B with A1 e-GFR=36-42ml/min with a baseline serum cr 1.24-1.42mg/dl presumed sec to microvascular disease due to HTN, ASVD, Valvular heart diease. She states she developed edema around the elbow area on the L arm and her  was concerned as it was on te L it was heart related and had her come to ED. She had no CP or increase SOB. In the ED HgB 7.7 and dropped to 7.1mg/dl. She has a Hx of Chronic Fe++ Def Anemia and has followed with Heme/Onc for SCOTT and IV Fe++. It was noted her cr was 2.0 mg/dl on admission and 1.8mg/dl today. She denies any urinary tract symptoms    Addendum to HPI: She was D/Nam 3/6/25 after being worked up for Acute on Chronic Blood Loss S/p NM Bleeding Scan 3/2 -- no evidence of bleeding  S/p EGD 3/3 -- no evidence of bleeding  Plan is for outpatient capsule endoscopy -- SBFT completed 3/6 and no evidence of obstruction  Her cr was 1.5mg/dl at D/C and HgB 8.2.  She presented back to the ED 3/10/25 for edema. She was treated with IV Lasix and D/Nam home.  She presented back to [facility] ED 3/16/25 for cough and edema of the bilat LE  Her BP in the ED 89/45. hgB 10.6 and cr 1.4mg/dl  XR CHEST PORTABLE 3/16/25  Final Result  1. There are no findings of pneumonia  2. Cardiomegaly.  Mild vascular congestion cannot be excluded  3. Small right pleural effusion and right lung base atelectasis.  She had hypoxia 88-89% on 2L NC        INTERVAL HX:    3/19/25: Pt awake alert and up in bed.  No change in cough and SOB. (+) RSV. BP dropped to 88/55 early this AM and bumetanide drip remains on hold. Appetite 
[facility]  Nephrology Progress Note  Patient's Name: Josefina Garcia  2:38 PM  3/20/2025    Nephrologist:     Reason for Consult:  CKD  Requesting Physician: Dr. ASHLY Lam    Chief Complaint:  L ARM SWELLING, ANEMIA    History Obtained From:  patient and past medical records    History of Present Ilness from the 2/28/25 note:    Josefina Garcia is a 80 y.o. female with prior history CKD 3B with A1 e-GFR=36-42ml/min with a baseline serum cr 1.24-1.42mg/dl presumed sec to microvascular disease due to HTN, ASVD, Valvular heart diease. She states she developed edema around the elbow area on the L arm and her  was concerned as it was on te L it was heart related and had her come to ED. She had no CP or increase SOB. In the ED HgB 7.7 and dropped to 7.1mg/dl. She has a Hx of Chronic Fe++ Def Anemia and has followed with Heme/Onc for SCOTT and IV Fe++. It was noted her cr was 2.0 mg/dl on admission and 1.8mg/dl today. She denies any urinary tract symptoms    Addendum to HPI: She was D/Nam 3/6/25 after being worked up for Acute on Chronic Blood Loss S/p NM Bleeding Scan 3/2 -- no evidence of bleeding  S/p EGD 3/3 -- no evidence of bleeding  Plan is for outpatient capsule endoscopy -- SBFT completed 3/6 and no evidence of obstruction  Her cr was 1.5mg/dl at D/C and HgB 8.2.  She presented back to the ED 3/10/25 for edema. She was treated with IV Lasix and D/Nam home.  She presented back to [facility] ED 3/16/25 for cough and edema of the bilat LE  Her BP in the ED 89/45. hgB 10.6 and cr 1.4mg/dl  XR CHEST PORTABLE 3/16/25  Final Result  1. There are no findings of pneumonia  2. Cardiomegaly.  Mild vascular congestion cannot be excluded  3. Small right pleural effusion and right lung base atelectasis.  She had hypoxia 88-89% on 2L NC        INTERVAL HX:    3/19/25: Pt awake alert and up in bed.  No change in cough and SOB. (+) RSV. BP dropped to 88/55 early this AM and bumetanide drip remains on hold. Appetite 
[facility]  Nephrology Progress Note  Patient's Name: Josefina Garcia  3:25 PM  3/21/2025    Nephrologist:     Reason for Consult:  CKD  Requesting Physician: Dr. ASHLY Lam    Chief Complaint:  L ARM SWELLING, ANEMIA    History Obtained From:  patient and past medical records    History of Present Ilness from the 2/28/25 note:    Josefina Garcia is a 80 y.o. female with prior history CKD 3B with A1 e-GFR=36-42ml/min with a baseline serum cr 1.24-1.42mg/dl presumed sec to microvascular disease due to HTN, ASVD, Valvular heart diease. She states she developed edema around the elbow area on the L arm and her  was concerned as it was on te L it was heart related and had her come to ED. She had no CP or increase SOB. In the ED HgB 7.7 and dropped to 7.1mg/dl. She has a Hx of Chronic Fe++ Def Anemia and has followed with Heme/Onc for SCOTT and IV Fe++. It was noted her cr was 2.0 mg/dl on admission and 1.8mg/dl today. She denies any urinary tract symptoms    Addendum to HPI: She was D/Nam 3/6/25 after being worked up for Acute on Chronic Blood Loss S/p NM Bleeding Scan 3/2 -- no evidence of bleeding  S/p EGD 3/3 -- no evidence of bleeding  Plan is for outpatient capsule endoscopy -- SBFT completed 3/6 and no evidence of obstruction  Her cr was 1.5mg/dl at D/C and HgB 8.2.  She presented back to the ED 3/10/25 for edema. She was treated with IV Lasix and D/Nam home.  She presented back to [facility] 3/16/25 for cough and edema of the bilat LE  Her BP in the ED 89/45. hgB 10.6 and cr 1.4mg/dl  XR CHEST PORTABLE 3/16/25  Final Result  1. There are no findings of pneumonia  2. Cardiomegaly.  Mild vascular congestion cannot be excluded  3. Small right pleural effusion and right lung base atelectasis.  She had hypoxia 88-89% on 2L NC        INTERVAL HX:    3/19/25: Pt awake alert and up in bed.  No change in cough and SOB. (+) RSV. BP dropped to 88/55 early this AM and bumetanide drip remains on hold. Appetite 
[facility]  Nephrology Progress Note  Patient's Name: Josefina Garcia  4:19 PM  3/24/2025    Nephrologist:     Reason for Consult:  CKD  Requesting Physician: Dr. ASHLY Lam    Chief Complaint:  L ARM SWELLING, ANEMIA    History Obtained From:  patient and past medical records    History of Present Ilness from the 2/28/25 note:    Josefina Garcia is a 80 y.o. female with prior history CKD 3B with A1 e-GFR=36-42ml/min with a baseline serum cr 1.24-1.42mg/dl presumed sec to microvascular disease due to HTN, ASVD, Valvular heart diease. She states she developed edema around the elbow area on the L arm and her  was concerned as it was on te L it was heart related and had her come to ED. She had no CP or increase SOB. In the ED HgB 7.7 and dropped to 7.1mg/dl. She has a Hx of Chronic Fe++ Def Anemia and has followed with Heme/Onc for SCOTT and IV Fe++. It was noted her cr was 2.0 mg/dl on admission and 1.8mg/dl today. She denies any urinary tract symptoms    Addendum to HPI: She was D/Nam 3/6/25 after being worked up for Acute on Chronic Blood Loss S/p NM Bleeding Scan 3/2 -- no evidence of bleeding  S/p EGD 3/3 -- no evidence of bleeding  Plan is for outpatient capsule endoscopy -- SBFT completed 3/6 and no evidence of obstruction  Her cr was 1.5mg/dl at D/C and HgB 8.2.  She presented back to the ED 3/10/25 for edema. She was treated with IV Lasix and D/Nam home.  She presented back to [facility] ED 3/16/25 for cough and edema of the bilat LE  Her BP in the ED 89/45. hgB 10.6 and cr 1.4mg/dl  XR CHEST PORTABLE 3/16/25  Final Result  1. There are no findings of pneumonia  2. Cardiomegaly.  Mild vascular congestion cannot be excluded  3. Small right pleural effusion and right lung base atelectasis.  She had hypoxia 88-89% on 2L NC        INTERVAL HX:    3/19/25: Pt awake alert and up in bed.  No change in cough and SOB. (+) RSV. BP dropped to 88/55 early this AM and bumetanide drip remains on hold. Appetite 
poor    3/20: Patient seen and examined at bedside.  Blood pressures have been on the lower side.  Continues to have mild cough.  She denies chest pain.    3/21: Patient seen and examined.  She was on multiple blood pressure medications last evening including Entresto and spironolactone.  Blood pressure was 80s over 40s hence those medications were held.  She is a little weak and fatigued this morning but otherwise in no distress.  Discussed with  at bedside.  Labs reviewed with patient.    3/22: Patient has had worsening hypotension.  I was paged last night about the patient's lack of urine output on bladder scan.  I started IV fluids.  Patient began making some more urine output overnight and this morning.  Blood pressure still 80s to 90s over 50s to 60s.  However no distress.  She still looks dry.   at bedside.    3/23: Patient seen and examined.   present.  Oxygen has been weaned to 1 L.  She is making a little bit more urine output.  Still weak and fatigued otherwise no distress.    3/24: No changes over the past 24 hours.  No new issues.  Continues to feel fatigued    3/25: Slight worsening of edema today.  Renal function stable.  Possible discharge.    Past Medical History:   Diagnosis Date    A-fib ([facility])     Acute exacerbation of CHF (congestive heart failure) ([facility]) 07/23/2015    alpha one neg PiM >34uM    Acute renal failure 10/27/2011    Aneurysm     thoracic aortic    Aneurysm of ascending aorta without rupture     stable ,small . follows with Dr Limon last seen 1/2024    CAD (coronary artery disease)     follows with Dr Fajardo    CRF (chronic renal failure)     Epistaxis 06/26/2014    Gastrointestinal bleeding, lower 09/23/2012    H/O anemia of chronic disorder 09/23/2012    Hypertension     Thyroid disease        Past Surgical History:   Procedure Laterality Date    AORTIC VALVULOPLASTY      1992    CARDIAC DEFIBRILLATOR PLACEMENT Left 05/09/2022    [employer] CRT-D  (Dr. Vega) 
03/18/25  0750 03/19/25  0847 03/20/25  0550   INR 3.6 4.3 3.9     PRO-BNP:   Lab Results   Component Value Date    PROBNP 32,619 (H) 03/16/2025    PROBNP 29,926 (H) 03/12/2025      TSH:   Lab Results   Component Value Date    TSH 0.31 12/31/2024      Cardiac Injury Profile:   No results for input(s): \"TROPHS\" in the last 72 hours.     Lipid Profile:   Lab Results   Component Value Date/Time    TRIG 58 03/20/2024 10:56 AM    HDL 45 03/20/2024 10:56 AM    LDL 63 03/20/2024 10:56 AM    LDL 69 11/28/2021 12:04 PM    CHOL 120 03/20/2024 10:56 AM    CHOL 132 11/28/2021 12:04 PM      Hemoglobin A1C: No components found for: \"HGBA1C\"      RAD:   XR CHEST PORTABLE  Result Date: 3/16/2025  1. There are no findings of pneumonia 2. Cardiomegaly.  Mild vascular congestion cannot be excluded 3. Small right pleural effusion and right lung base atelectasis. .         EKG: See Report  Echo: See Report      IMPRESSIONS:  Patient Active Problem List   Diagnosis Code    Acute kidney injury superimposed on CKD N17.9, N18.9    Hypertension I10    Hypothyroid E03.9    Valvular heart disease I38    CHF exacerbation ([facility]) I50.9    Atrial fibrillation ([facility]) I48.91    Herpes zoster B02.9    Acute on chronic systolic and diastolic heart failure, NYHA class 3 ([facility]) I50.43    Red blood cell antibody positive R76.8    Abrasion T14.8XXA    Coagulopathy D68.9    Absolute anemia D64.9    Bilateral carotid artery stenosis I65.23    Atrial fibrillation with RVR ([facility]) I48.91    HFrEF (heart failure with reduced ejection fraction) ([facility]) I50.20    Pulmonary edema J81.1    Moderate protein-calorie malnutrition E44.0    Thoracic ascending aortic aneurysm I71.21    Acute on chronic right-sided congestive heart failure ([facility]) I50.813    Cellulitis of right hand L03.113    CAD (coronary artery disease) I25.10    Acute on chronic systolic CHF (congestive heart failure) ([facility]) I50.23    Acute on chronic congestive heart failure, unspecified heart failure type 
that is well-seated. AV mean gradient is 27 mmHg.    Mitral Valve: Bileaflet mechanical valve that is well-seated with normal leaflet motion.    Tricuspid Valve: Repaired by annular ring. Mild regurgitation. RVSP is 64 mmHg. Moderately elevated RVSP, consistent with moderate pulmonary hypertension.    Left Atrium: Left atrium is moderately dilated. Left atrial volume index is severely increased (>48 mL/m2).    Right Atrium: Right atrium is moderately dilated.    Image quality is adequate.      Assessment   Active Hospital Problems    Diagnosis     Acute on chronic systolic CHF (congestive heart failure) ([facility]) [I50.23]      Priority: High    Chronic renal insufficiency, stage III (moderate) ([facility]) [N18.30]     History of mitral valve replacement with mechanical valve [Z95.2]     Supratherapeutic INR [R79.1]     CAD (coronary artery disease) [I25.10]     Coagulopathy [D68.9]     Atrial fibrillation ([facility]) [I48.91]     Hypothyroid [E03.9]     Valvular heart disease [I38]     Hypertension [I10]          Plan  Acute on Chronic Systolic Congestive Heart Failure with Resolved Hypoxia  Most recent echocardiogram noted  CXR noted  Pro-BNP 32,619  Continue BB with hold parameters  Entresto held 3/18 due to ongoing hypotension  Continue holding Zaroxolyn and Aldactone, Bumex drip stopped 3/18 due to hypotension  Monitor edema and I&O  Cardiology and Nephrology consult appreciated     Atrial fibrillation with Secondary Hypercoagulable State/History of mechanical mitral and aortic valves  Continue Digoxin and Toprol XL  Continue Coumadin -- pharmacy dosing  Follow INR  Monitor telemetry   Cardiology consult appreciated     Essential Hypertension with Relative Hypotension  Holding diuretics and Entresto for now due to ongoing hypotension  Monitor BP trends and adjust as indicated     Hypothyroidism  Continue Synthroid     CKD stage III  Baseline serum creatinine around 1.2-1.5  Monitor BMP -- AM labs pending  Nephrology consult 
insufficiency, stage III (moderate) ([facility]) N18.30    Acute on chronic clinical systolic heart failure ([facility]) I50.23    Anemia D64.9    CHF (congestive heart failure), NYHA class I, acute on chronic, combined ([facility]) I50.43    Acute exacerbation of chronic heart failure ([facility]) I50.9       RECOMMENDATIONS:  Continue present medical management and supportive care for pulmonary status.  Attempt to make sure that she receives her neprilysin inhibitor as her systolic pressure remains borderline.  Continue to closely monitor renal function while on present medical regimen.    I have spent more than 25 minutes face to face with Josefina Garcia and reviewing notes and laboratory data, with greater than 50% of this time instructing and counseling the patient and her  face to face regarding my findings and recommendations and I have answered all questions as posed to me by Ms. Garcia.    Rick Fajardo, DO FACP,FACC,[facility]      NOTE:  This report was transcribed using voice recognition software.  Every effort was made to ensure accuracy; however, inadvertent computerized transcription errors may be present        
  [APTT}  Troponin:    Lab Results   Component Value Date/Time    TROPONINI <0.01 2016 10:07 AM     U/A:    Lab Results   Component Value Date/Time    COLORU Yellow 03/10/2025 01:45 PM    PROTEINU NEGATIVE 03/10/2025 01:45 PM    PHUR 6.0 03/10/2025 01:45 PM    PHUR 6.0 2024 11:31 AM    WBCUA 0 TO 5 03/10/2025 01:45 PM    WBCUA 1-3 2012 10:15 AM    RBCUA 0 TO 2 03/10/2025 01:45 PM    RBCUA NONE 2013 12:20 PM    MUCUS PRESENT 2023 06:00 PM    BACTERIA RARE 2022 03:23 PM    CLARITYU Clear 2022 03:23 PM    LEUKOCYTESUR TRACE 03/10/2025 01:45 PM    UROBILINOGEN 0.2 03/10/2025 01:45 PM    BILIRUBINUR NEGATIVE 03/10/2025 01:45 PM    BLOODU Negative 2022 03:23 PM    GLUCOSEU NEGATIVE 03/10/2025 01:45 PM    GLUCOSEU NEGATIVE 2012 10:15 AM     ABG:  No results found for: \"PH\", \"PCO2\", \"PO2\", \"HCO3\", \"BE\", \"THGB\", \"TCO2\", \"O2SAT\"  HgBA1c:    Lab Results   Component Value Date/Time    LABA1C 5.6 2024 10:56 AM     Microalbumen/Creatinine ratio:  No components found for: \"RUCREAT\"  FLP:    Lab Results   Component Value Date/Time    TRIG 58 2024 10:56 AM    HDL 45 2024 10:56 AM     TSH:    Lab Results   Component Value Date/Time    TSH 0.31 2024 05:13 AM     VITAMIN B12: No components found for: \"B12\"  FOLATE:    Lab Results   Component Value Date/Time    FOLATE >20.0 2025 04:00 AM     Iron Saturation:  No components found for: \"PERCENTFE\"  FERRITIN:    Lab Results   Component Value Date/Time    FERRITIN 71 2025 06:27 AM     LIPASE:    Lab Results   Component Value Date/Time    LIPASE 19 2023 06:00 PM     Fibrinogen Level:  No components found for: \"FIB\"  24 Hour Urine for Protein:  No components found for: \"RAWUPRO\", \"UHRS3\", \"LTHM31TG\", \"UTV3\"  24 Hour Urine for Creatinine Clearance:  No components found for: \"CREAT4\", \"UHRS10\", \"UTV10\"     Imagin24 2 D ECHO:  Left Ventricle Severely reduced left ventricular systolic 
more consistent with URI rather than CHF  Viral respiratory panel positive for RSV  Continue bronchodilators  Mucinex TID  IV steroids -- taper as per Pulmonary  Encourage IS/flutter valve and increased activity   Continue supplemental oxygen and wean to maintain SpO2 >90% -- currently on 3L NC; baseline is room air  Pulmonology consult appreciated    ?Dysphagia/Odynophagia  Reported 3/24  S/p Esophagram 3/24 -- narrowing of the mid to distal thoracic esophagus felt to be extrinsic mass effect from enlarged left atrium   Had EGD <3 weeks ago which was unremarkable -- no indication for repeat endoscopic evaluation  Fluconazole added 3/24 per GI  GI consult appreciated    Follow labs   DVT prophylaxis  Please see orders for further management and care.  Discharge plan: home when ok with consultants    The pertinent details of this case were discussed with Dr. Lam.    Electronically signed by SOCORRO Anderson CNP on 3/25/2025 at 6:33 AM    Addendum: I have personally participated in a face-to-face history and physical exam on the date of service with the patient. I have discussed the case with the nurse practitioner. I also participated in medical decision making on the date of service and I agree with all of the pertinent clinical information unless indicated in my editing of the note. I have reviewed and edited the note above based on my findings during my history, exam, and decision making on the same day of service.     My additional thoughts:   Seen and examined in room seated up in bed  She told me that she did not feel great but to \"just send her home\"  GI's input is appreciated-Diflucan initiated-defer to GI  Recent EGD noted  Esophagram noted  Heart rate controlled  Steroids per pulmonology  Cardiology's input is appreciated  She will require oxygen on discharge  Discharge home in the near future-possibly later today  Case discussed with  at bedside    Electronically signed by Nicolás Lam,

## 2025-03-25 NOTE — CARE COORDINATION
CASE MANAGEMENT.... Per pulse ox testing, patient will require home o2. List of DME options provided. Will f/u with choice. O2 order obtained. Cont aerosols - new - will need order if required at dc, iv solumedrol q 12hrs. Bps stable on midodrine tid. GI placed on consult for dysphagia. Started on diflucan for thrush.Renal also on board. Await input from consultants. Anticipate dc soon. Will follow.

## 2025-03-25 NOTE — PLAN OF CARE
Problem: Chronic Conditions and Co-morbidities  Goal: Patient's chronic conditions and co-morbidity symptoms are monitored and maintained or improved  3/25/2025 1023 by Tabitha Estrada RN  Outcome: Progressing     Problem: Discharge Planning  Goal: Discharge to home or other facility with appropriate resources  3/25/2025 1023 by Tabitha Estrada RN  Outcome: Progressing     Problem: ABCDS Injury Assessment  Goal: Absence of physical injury  3/25/2025 1023 by Tabitha Estrada RN  Outcome: Progressing

## 2025-03-25 NOTE — DISCHARGE SUMMARY
Internal Medicine Discharge Summary    NAME: Josefina Garcia :  1945  MRN:  [id number] PCP:Teofilo Dutton Jr., MD    ADMITTED: 3/16/2025   DISCHARGED: 3/25/2025  5:35 PM    ADMITTING PHYSICIAN: Nicolás Lam DO    PCP: Teofilo Dutton Jr., MD    CONSULTANT(S):   IP CONSULT TO CARDIOLOGY  IP CONSULT TO NEPHROLOGY  IP CONSULT TO SOCIAL WORK  IP CONSULT TO HEART FAILURE NURSE/COORDINATOR  IP CONSULT TO PHARMACY  IP CONSULT TO VASCULAR ACCESS TEAM  IP CONSULT TO PULMONOLOGY  IP CONSULT TO VASCULAR ACCESS TEAM  IP CONSULT TO GI     ADMITTING DIAGNOSIS:   CHF (congestive heart failure) (HCC) [I50.9]  CHF (congestive heart failure), NYHA class I, acute on chronic, combined (HCC) [I50.43]  Acute exacerbation of chronic heart failure (HCC) [I50.9]     Please see H&P for further details    DISCHARGE DIAGNOSES:   Active Hospital Problems    Diagnosis     Acute on chronic systolic CHF (congestive heart failure) (HCC) [I50.23]      Priority: High    Chronic renal insufficiency, stage III (moderate) (HCC) [N18.30]     History of mitral valve replacement with mechanical valve [Z95.2]     Supratherapeutic INR [R79.1]     CAD (coronary artery disease) [I25.10]     Coagulopathy [D68.9]     Atrial fibrillation (HCC) [I48.91]     Hypothyroid [E03.9]     Valvular heart disease [I38]     Hypertension [I10]        BRIEF HISTORY OF PRESENT ILLNESS: Josefina Garcia is a 80 y.o. female patient of Teofilo Dutton Jr., MD who  has a past medical history of A-fib (HCC), Acute exacerbation of CHF (congestive heart failure) (HCC), Acute renal failure, Aneurysm, Aneurysm of ascending aorta without rupture, CAD (coronary artery disease), CRF (chronic renal failure), Epistaxis, Gastrointestinal bleeding, lower, H/O anemia of chronic disorder, Hypertension, and Thyroid disease. who originally had no chief complaint listed for this encounter. at presentation on 3/16/2025, and was found to have CHF (congestive heart failure) (HCC)

## 2025-03-27 ENCOUNTER — HOSPITAL ENCOUNTER (EMERGENCY)
Age: 80
Discharge: HOME | End: 2025-03-27
Attending: EMERGENCY MEDICINE
Payer: MEDICARE

## 2025-03-27 VITALS
WEIGHT: 106 LBS | HEIGHT: 61 IN | HEART RATE: 82 BPM | TEMPERATURE: 97.3 F | DIASTOLIC BLOOD PRESSURE: 50 MMHG | RESPIRATION RATE: 12 BRPM | OXYGEN SATURATION: 96 % | BODY MASS INDEX: 20.01 KG/M2 | SYSTOLIC BLOOD PRESSURE: 100 MMHG

## 2025-03-27 DIAGNOSIS — K56.41 FECAL IMPACTION IN RECTUM (HCC): Primary | ICD-10-CM

## 2025-03-27 PROCEDURE — 99282 EMERGENCY DEPT VISIT SF MDM: CPT

## 2025-03-27 ASSESSMENT — PAIN - FUNCTIONAL ASSESSMENT: PAIN_FUNCTIONAL_ASSESSMENT: NONE - DENIES PAIN

## 2025-03-27 NOTE — ED PROVIDER NOTES
HPI:  3/27/25,   Time: 7:23 PM EDT         Josefina Garcia is a 80 y.o. female presenting to the ED for evaluation of what she states is constipation.  Patient was admitted to the hospital and discharged this past Tuesday.  She states she was in there for cardiac reasons.  Prior to discharge however, she had a barium enema since that time she has had very hard stool stuck in her rectum and has been unable to move her bowels as a result.  She states her appetite has been fine.  She consumed 8 ounces of magnesium citrate and feels her bowels gurgling but cannot pass stool through and stool in the rectum.  Denies nausea or vomiting.  Denies fevers or chills.  Denies urinary symptoms.    ROS:   Pertinent positives and negatives are stated within HPI, all other systems reviewed and are negative.  --------------------------------------------- PAST HISTORY ---------------------------------------------  Past Medical History:  has a past medical history of A-fib (HCC), Acute exacerbation of CHF (congestive heart failure) (HCC), Acute renal failure, Aneurysm, Aneurysm of ascending aorta without rupture, CAD (coronary artery disease), CRF (chronic renal failure), Epistaxis, Gastrointestinal bleeding, lower, H/O anemia of chronic disorder, Hypertension, and Thyroid disease.    Past Surgical History:  has a past surgical history that includes Cardiac surgery; Aortic valvuloplasty; Mitral valvuloplasty; Tricuspid valvuloplasty; Colonoscopy; Upper gastrointestinal endoscopy; Cardiac defibrillator placement (Left, 05/09/2022); hernia repair (Right, 01/05/2023); Hernia mesh removal; Colonoscopy (04/23/2024); Upper gastrointestinal endoscopy (04/23/2024); and Upper gastrointestinal endoscopy (N/A, 03/03/2025).    Social History:  reports that she quit smoking about 24 years ago. Her smoking use included cigarettes. She started smoking about 49 years ago. She has a 12.5 pack-year smoking history. She has never used smokeless

## 2025-04-01 ENCOUNTER — TELEPHONE (OUTPATIENT)
Dept: OTHER | Age: 80
End: 2025-04-01

## 2025-04-01 NOTE — TELEPHONE ENCOUNTER
Patient called to cancel appointment due to being run down and not feeling up to it. I offered to rescheduled but the patient would prefer to call back.

## 2025-04-29 DIAGNOSIS — I65.23 BILATERAL CAROTID ARTERY STENOSIS: Primary | ICD-10-CM

## 2025-04-30 ENCOUNTER — HOSPITAL ENCOUNTER (OUTPATIENT)
Age: 80
Discharge: HOME | End: 2025-04-30
Payer: MEDICARE

## 2025-04-30 LAB
ALBUMIN SERPL-MCNC: 3.5 G/DL (ref 3.5–5.2)
ALP SERPL-CCNC: 83 U/L (ref 35–104)
ALT SERPL-CCNC: 18 U/L (ref 0–32)
ANION GAP SERPL CALCULATED.3IONS-SCNC: 9 MMOL/L (ref 7–16)
AST SERPL-CCNC: 30 U/L (ref 0–31)
BASOPHILS # BLD: 0.04 K/UL (ref 0–0.2)
BASOPHILS NFR BLD: 1 % (ref 0–2)
BILIRUB SERPL-MCNC: 0.5 MG/DL (ref 0–1.2)
BNP SERPL-MCNC: ABNORMAL PG/ML (ref 0–450)
BUN SERPL-MCNC: 22 MG/DL (ref 6–23)
CALCIUM SERPL-MCNC: 9.1 MG/DL (ref 8.6–10.2)
CHLORIDE SERPL-SCNC: 97 MMOL/L (ref 98–107)
CO2 SERPL-SCNC: 32 MMOL/L (ref 22–29)
CREAT SERPL-MCNC: 1.2 MG/DL (ref 0.5–1)
EOSINOPHIL # BLD: 0.03 K/UL (ref 0.05–0.5)
EOSINOPHILS RELATIVE PERCENT: 1 % (ref 0–6)
ERYTHROCYTE [DISTWIDTH] IN BLOOD BY AUTOMATED COUNT: 17.7 % (ref 11.5–15)
GFR, ESTIMATED: 45 ML/MIN/1.73M2
GLUCOSE SERPL-MCNC: 116 MG/DL (ref 74–99)
HCT VFR BLD AUTO: 29.1 % (ref 34–48)
HGB BLD-MCNC: 8.9 G/DL (ref 11.5–15.5)
IMM GRANULOCYTES # BLD AUTO: 0.08 K/UL (ref 0–0.58)
IMM GRANULOCYTES NFR BLD: 2 % (ref 0–5)
LYMPHOCYTES NFR BLD: 1.21 K/UL (ref 1.5–4)
LYMPHOCYTES RELATIVE PERCENT: 23 % (ref 20–42)
MAGNESIUM SERPL-MCNC: 1.7 MG/DL (ref 1.6–2.6)
MCH RBC QN AUTO: 28.7 PG (ref 26–35)
MCHC RBC AUTO-ENTMCNC: 30.6 G/DL (ref 32–34.5)
MCV RBC AUTO: 93.9 FL (ref 80–99.9)
MONOCYTES NFR BLD: 0.47 K/UL (ref 0.1–0.95)
MONOCYTES NFR BLD: 9 % (ref 2–12)
NEUTROPHILS NFR BLD: 65 % (ref 43–80)
NEUTS SEG NFR BLD: 3.47 K/UL (ref 1.8–7.3)
PHOSPHATE SERPL-MCNC: 2.4 MG/DL (ref 2.5–4.5)
PLATELET # BLD AUTO: 201 K/UL (ref 130–450)
PMV BLD AUTO: 11.3 FL (ref 7–12)
POTASSIUM SERPL-SCNC: 4.3 MMOL/L (ref 3.5–5)
PROT SERPL-MCNC: 6.6 G/DL (ref 6.4–8.3)
RBC # BLD AUTO: 3.1 M/UL (ref 3.5–5.5)
SODIUM SERPL-SCNC: 138 MMOL/L (ref 132–146)
WBC OTHER # BLD: 5.3 K/UL (ref 4.5–11.5)

## 2025-04-30 PROCEDURE — 83735 ASSAY OF MAGNESIUM: CPT

## 2025-04-30 PROCEDURE — 85025 COMPLETE CBC W/AUTO DIFF WBC: CPT

## 2025-04-30 PROCEDURE — 80053 COMPREHEN METABOLIC PANEL: CPT

## 2025-04-30 PROCEDURE — 83880 ASSAY OF NATRIURETIC PEPTIDE: CPT

## 2025-04-30 PROCEDURE — 84100 ASSAY OF PHOSPHORUS: CPT

## 2025-04-30 PROCEDURE — 36415 COLL VENOUS BLD VENIPUNCTURE: CPT

## 2025-05-06 ENCOUNTER — APPOINTMENT (OUTPATIENT)
Dept: GENERAL RADIOLOGY | Age: 80
DRG: 293 | End: 2025-05-06
Payer: MEDICARE

## 2025-05-06 ENCOUNTER — HOSPITAL ENCOUNTER (OUTPATIENT)
Dept: CARDIOLOGY | Age: 80
Discharge: HOME | End: 2025-05-08
Payer: MEDICARE

## 2025-05-06 ENCOUNTER — HOSPITAL ENCOUNTER (INPATIENT)
Age: 80
LOS: 1 days | Discharge: HOME | DRG: 293 | End: 2025-05-07
Attending: EMERGENCY MEDICINE | Admitting: INTERNAL MEDICINE
Payer: MEDICARE

## 2025-05-06 ENCOUNTER — OFFICE VISIT (OUTPATIENT)
Dept: VASCULAR SURGERY | Age: 80
End: 2025-05-06
Payer: MEDICARE

## 2025-05-06 ENCOUNTER — HOSPITAL ENCOUNTER (OUTPATIENT)
Age: 80
Discharge: HOME | End: 2025-05-06
Payer: MEDICARE

## 2025-05-06 DIAGNOSIS — I50.9 ACUTE ON CHRONIC CONGESTIVE HEART FAILURE, UNSPECIFIED HEART FAILURE TYPE (HCC): Primary | ICD-10-CM

## 2025-05-06 DIAGNOSIS — I65.23 BILATERAL CAROTID ARTERY STENOSIS: Primary | ICD-10-CM

## 2025-05-06 DIAGNOSIS — I65.23 BILATERAL CAROTID ARTERY STENOSIS: ICD-10-CM

## 2025-05-06 DIAGNOSIS — I95.9 HYPOTENSION, UNSPECIFIED HYPOTENSION TYPE: ICD-10-CM

## 2025-05-06 LAB
ALBUMIN SERPL-MCNC: 3.6 G/DL (ref 3.5–5.2)
ALP SERPL-CCNC: 87 U/L (ref 35–104)
ALT SERPL-CCNC: 19 U/L (ref 0–32)
ANION GAP SERPL CALCULATED.3IONS-SCNC: 10 MMOL/L (ref 7–16)
ANION GAP SERPL CALCULATED.3IONS-SCNC: 10 MMOL/L (ref 7–16)
AST SERPL-CCNC: 51 U/L (ref 0–31)
BASOPHILS # BLD: 0 K/UL (ref 0–0.2)
BASOPHILS NFR BLD: 0 % (ref 0–2)
BILIRUB SERPL-MCNC: 0.9 MG/DL (ref 0–1.2)
BILIRUB UR QL STRIP: NEGATIVE
BNP SERPL-MCNC: ABNORMAL PG/ML (ref 0–450)
BNP SERPL-MCNC: ABNORMAL PG/ML (ref 0–450)
BUN SERPL-MCNC: 24 MG/DL (ref 6–23)
BUN SERPL-MCNC: 25 MG/DL (ref 6–23)
CALCIUM SERPL-MCNC: 9.1 MG/DL (ref 8.6–10.2)
CALCIUM SERPL-MCNC: 9.5 MG/DL (ref 8.6–10.2)
CHLORIDE SERPL-SCNC: 97 MMOL/L (ref 98–107)
CHLORIDE SERPL-SCNC: 99 MMOL/L (ref 98–107)
CLARITY UR: CLEAR
CO2 SERPL-SCNC: 31 MMOL/L (ref 22–29)
CO2 SERPL-SCNC: 33 MMOL/L (ref 22–29)
COLOR UR: YELLOW
CREAT SERPL-MCNC: 1.2 MG/DL (ref 0.5–1)
CREAT SERPL-MCNC: 1.4 MG/DL (ref 0.5–1)
EOSINOPHIL # BLD: 0.05 K/UL (ref 0.05–0.5)
EOSINOPHILS RELATIVE PERCENT: 1 % (ref 0–6)
EPI CELLS #/AREA URNS HPF: NORMAL /HPF
ERYTHROCYTE [DISTWIDTH] IN BLOOD BY AUTOMATED COUNT: 19.1 % (ref 11.5–15)
GFR, ESTIMATED: 38 ML/MIN/1.73M2
GFR, ESTIMATED: 44 ML/MIN/1.73M2
GLUCOSE SERPL-MCNC: 90 MG/DL (ref 74–99)
GLUCOSE SERPL-MCNC: 99 MG/DL (ref 74–99)
GLUCOSE UR STRIP-MCNC: NEGATIVE MG/DL
HCT VFR BLD AUTO: 30.7 % (ref 34–48)
HGB BLD-MCNC: 8.8 G/DL (ref 11.5–15.5)
HGB UR QL STRIP.AUTO: NEGATIVE
INR PPP: 4.1
KETONES UR STRIP-MCNC: NEGATIVE MG/DL
LEUKOCYTE ESTERASE UR QL STRIP: NEGATIVE
LYMPHOCYTES NFR BLD: 0.85 K/UL (ref 1.5–4)
LYMPHOCYTES RELATIVE PERCENT: 14 % (ref 20–42)
MAGNESIUM SERPL-MCNC: 1.8 MG/DL (ref 1.6–2.6)
MCH RBC QN AUTO: 28.5 PG (ref 26–35)
MCHC RBC AUTO-ENTMCNC: 28.7 G/DL (ref 32–34.5)
MCV RBC AUTO: 99.4 FL (ref 80–99.9)
MONOCYTES NFR BLD: 0.53 K/UL (ref 0.1–0.95)
MONOCYTES NFR BLD: 9 % (ref 2–12)
NEUTROPHILS NFR BLD: 77 % (ref 43–80)
NEUTS SEG NFR BLD: 4.77 K/UL (ref 1.8–7.3)
NITRITE UR QL STRIP: NEGATIVE
PH UR STRIP: 5.5 [PH] (ref 5–8)
PLATELET # BLD AUTO: 197 K/UL (ref 130–450)
PMV BLD AUTO: 11.4 FL (ref 7–12)
POTASSIUM SERPL-SCNC: 3.4 MMOL/L (ref 3.5–5)
POTASSIUM SERPL-SCNC: 4.6 MMOL/L (ref 3.5–5)
PROT SERPL-MCNC: 6.6 G/DL (ref 6.4–8.3)
PROT UR STRIP-MCNC: NEGATIVE MG/DL
PROTHROMBIN TIME: 44.9 SEC (ref 9.3–12.4)
RBC # BLD AUTO: 3.09 M/UL (ref 3.5–5.5)
RBC # BLD: ABNORMAL 10*6/UL
RBC #/AREA URNS HPF: NORMAL /HPF
SODIUM SERPL-SCNC: 138 MMOL/L (ref 132–146)
SODIUM SERPL-SCNC: 142 MMOL/L (ref 132–146)
SP GR UR STRIP: 1.02 (ref 1–1.03)
TROPONIN I SERPL HS-MCNC: 41 NG/L (ref 0–14)
UROBILINOGEN UR STRIP-ACNC: 0.2 EU/DL (ref 0–1)
VAS LEFT CCA DIST EDV: 16.2 CM/S
VAS LEFT CCA DIST PSV: 67.6 CM/S
VAS LEFT CCA PROX EDV: 18.6 CM/S
VAS LEFT CCA PROX PSV: 98.9 CM/S
VAS LEFT ECA EDV: 0 CM/S
VAS LEFT ECA PSV: 68.5 CM/S
VAS LEFT ICA DIST EDV: 34.3 CM/S
VAS LEFT ICA DIST PSV: 99.6 CM/S
VAS LEFT ICA MID EDV: 21.7 CM/S
VAS LEFT ICA MID PSV: 89.3 CM/S
VAS LEFT ICA PROX EDV: 18.4 CM/S
VAS LEFT ICA PROX PSV: 70.5 CM/S
VAS LEFT ICA/CCA PSV: 1 NO UNITS
VAS LEFT VERTEBRAL EDV: 16.4 CM/S
VAS LEFT VERTEBRAL PSV: 61.4 CM/S
VAS RIGHT CCA DIST EDV: 17.9 CM/S
VAS RIGHT CCA DIST PSV: 77.2 CM/S
VAS RIGHT CCA PROX EDV: 15.4 CM/S
VAS RIGHT CCA PROX PSV: 86.5 CM/S
VAS RIGHT ECA EDV: 0 CM/S
VAS RIGHT ECA PSV: 69.7 CM/S
VAS RIGHT ICA DIST EDV: 29 CM/S
VAS RIGHT ICA DIST PSV: 129.4 CM/S
VAS RIGHT ICA MID EDV: 30.8 CM/S
VAS RIGHT ICA MID PSV: 122.1 CM/S
VAS RIGHT ICA PROX EDV: 19.6 CM/S
VAS RIGHT ICA PROX PSV: 73.1 CM/S
VAS RIGHT ICA/CCA PSV: 1.5 NO UNITS
VAS RIGHT VERTEBRAL EDV: 8.8 CM/S
VAS RIGHT VERTEBRAL PSV: 43.1 CM/S
WBC #/AREA URNS HPF: NORMAL /HPF
WBC OTHER # BLD: 6.2 K/UL (ref 4.5–11.5)

## 2025-05-06 PROCEDURE — 87086 URINE CULTURE/COLONY COUNT: CPT

## 2025-05-06 PROCEDURE — 83735 ASSAY OF MAGNESIUM: CPT

## 2025-05-06 PROCEDURE — 81001 URINALYSIS AUTO W/SCOPE: CPT

## 2025-05-06 PROCEDURE — 84484 ASSAY OF TROPONIN QUANT: CPT

## 2025-05-06 PROCEDURE — 85025 COMPLETE CBC W/AUTO DIFF WBC: CPT

## 2025-05-06 PROCEDURE — 83880 ASSAY OF NATRIURETIC PEPTIDE: CPT

## 2025-05-06 PROCEDURE — 99213 OFFICE O/P EST LOW 20 MIN: CPT

## 2025-05-06 PROCEDURE — 1090F PRES/ABSN URINE INCON ASSESS: CPT

## 2025-05-06 PROCEDURE — 80048 BASIC METABOLIC PNL TOTAL CA: CPT

## 2025-05-06 PROCEDURE — 2060000000 HC ICU INTERMEDIATE R&B

## 2025-05-06 PROCEDURE — 1036F TOBACCO NON-USER: CPT

## 2025-05-06 PROCEDURE — 6370000000 HC RX 637 (ALT 250 FOR IP): Performed by: NURSE PRACTITIONER

## 2025-05-06 PROCEDURE — 93880 EXTRACRANIAL BILAT STUDY: CPT

## 2025-05-06 PROCEDURE — 93005 ELECTROCARDIOGRAM TRACING: CPT

## 2025-05-06 PROCEDURE — G8419 CALC BMI OUT NRM PARAM NOF/U: HCPCS

## 2025-05-06 PROCEDURE — 71046 X-RAY EXAM CHEST 2 VIEWS: CPT

## 2025-05-06 PROCEDURE — 99285 EMERGENCY DEPT VISIT HI MDM: CPT

## 2025-05-06 PROCEDURE — 93880 EXTRACRANIAL BILAT STUDY: CPT | Performed by: SURGERY

## 2025-05-06 PROCEDURE — 36415 COLL VENOUS BLD VENIPUNCTURE: CPT

## 2025-05-06 PROCEDURE — 1159F MED LIST DOCD IN RCRD: CPT

## 2025-05-06 PROCEDURE — 2580000003 HC RX 258: Performed by: EMERGENCY MEDICINE

## 2025-05-06 PROCEDURE — G8427 DOCREV CUR MEDS BY ELIG CLIN: HCPCS

## 2025-05-06 PROCEDURE — 80053 COMPREHEN METABOLIC PANEL: CPT

## 2025-05-06 PROCEDURE — 85610 PROTHROMBIN TIME: CPT

## 2025-05-06 PROCEDURE — G8400 PT W/DXA NO RESULTS DOC: HCPCS

## 2025-05-06 PROCEDURE — 1123F ACP DISCUSS/DSCN MKR DOCD: CPT

## 2025-05-06 PROCEDURE — 6360000002 HC RX W HCPCS

## 2025-05-06 PROCEDURE — 6370000000 HC RX 637 (ALT 250 FOR IP): Performed by: EMERGENCY MEDICINE

## 2025-05-06 RX ORDER — SENNOSIDES A AND B 8.6 MG/1
1 TABLET, FILM COATED ORAL DAILY PRN
Status: DISCONTINUED | OUTPATIENT
Start: 2025-05-06 | End: 2025-05-07 | Stop reason: HOSPADM

## 2025-05-06 RX ORDER — PROCHLORPERAZINE MALEATE 10 MG
10 TABLET ORAL EVERY 8 HOURS PRN
Status: DISCONTINUED | OUTPATIENT
Start: 2025-05-06 | End: 2025-05-07 | Stop reason: HOSPADM

## 2025-05-06 RX ORDER — FERROUS SULFATE 325(65) MG
325 TABLET ORAL 2 TIMES DAILY WITH MEALS
Status: DISCONTINUED | OUTPATIENT
Start: 2025-05-07 | End: 2025-05-07 | Stop reason: HOSPADM

## 2025-05-06 RX ORDER — ACETAMINOPHEN 650 MG/1
650 SUPPOSITORY RECTAL EVERY 6 HOURS PRN
Status: DISCONTINUED | OUTPATIENT
Start: 2025-05-06 | End: 2025-05-07 | Stop reason: HOSPADM

## 2025-05-06 RX ORDER — SODIUM CHLORIDE 0.9 % (FLUSH) 0.9 %
10 SYRINGE (ML) INJECTION EVERY 12 HOURS SCHEDULED
Status: DISCONTINUED | OUTPATIENT
Start: 2025-05-06 | End: 2025-05-07 | Stop reason: HOSPADM

## 2025-05-06 RX ORDER — BUMETANIDE 1 MG/1
2 TABLET ORAL DAILY
Status: DISCONTINUED | OUTPATIENT
Start: 2025-05-07 | End: 2025-05-07 | Stop reason: HOSPADM

## 2025-05-06 RX ORDER — ALLOPURINOL 100 MG/1
100 TABLET ORAL DAILY
Status: DISCONTINUED | OUTPATIENT
Start: 2025-05-07 | End: 2025-05-07 | Stop reason: HOSPADM

## 2025-05-06 RX ORDER — IPRATROPIUM BROMIDE AND ALBUTEROL SULFATE 2.5; .5 MG/3ML; MG/3ML
1 SOLUTION RESPIRATORY (INHALATION) EVERY 6 HOURS
Status: DISCONTINUED | OUTPATIENT
Start: 2025-05-06 | End: 2025-05-06

## 2025-05-06 RX ORDER — SODIUM CHLORIDE 0.9 % (FLUSH) 0.9 %
10 SYRINGE (ML) INJECTION PRN
Status: DISCONTINUED | OUTPATIENT
Start: 2025-05-06 | End: 2025-05-07 | Stop reason: HOSPADM

## 2025-05-06 RX ORDER — ACETAMINOPHEN 325 MG/1
650 TABLET ORAL EVERY 6 HOURS PRN
Status: DISCONTINUED | OUTPATIENT
Start: 2025-05-06 | End: 2025-05-07 | Stop reason: HOSPADM

## 2025-05-06 RX ORDER — DIGOXIN 125 MCG
62.5 TABLET ORAL
Status: DISCONTINUED | OUTPATIENT
Start: 2025-05-07 | End: 2025-05-07 | Stop reason: HOSPADM

## 2025-05-06 RX ORDER — PROCHLORPERAZINE EDISYLATE 5 MG/ML
10 INJECTION INTRAMUSCULAR; INTRAVENOUS EVERY 6 HOURS PRN
Status: DISCONTINUED | OUTPATIENT
Start: 2025-05-06 | End: 2025-05-07 | Stop reason: HOSPADM

## 2025-05-06 RX ORDER — 0.9 % SODIUM CHLORIDE 0.9 %
500 INTRAVENOUS SOLUTION INTRAVENOUS ONCE
Status: COMPLETED | OUTPATIENT
Start: 2025-05-06 | End: 2025-05-06

## 2025-05-06 RX ORDER — METOLAZONE 2.5 MG/1
2.5 TABLET ORAL
Status: DISCONTINUED | OUTPATIENT
Start: 2025-05-07 | End: 2025-05-07 | Stop reason: HOSPADM

## 2025-05-06 RX ORDER — POTASSIUM CHLORIDE 750 MG/1
10 TABLET, EXTENDED RELEASE ORAL DAILY
Status: DISCONTINUED | OUTPATIENT
Start: 2025-05-07 | End: 2025-05-07 | Stop reason: HOSPADM

## 2025-05-06 RX ORDER — ONDANSETRON 4 MG/1
4 TABLET, ORALLY DISINTEGRATING ORAL EVERY 8 HOURS PRN
Status: DISCONTINUED | OUTPATIENT
Start: 2025-05-06 | End: 2025-05-06 | Stop reason: CLARIF

## 2025-05-06 RX ORDER — MIDODRINE HYDROCHLORIDE 5 MG/1
5 TABLET ORAL ONCE
Status: COMPLETED | OUTPATIENT
Start: 2025-05-06 | End: 2025-05-06

## 2025-05-06 RX ORDER — BUMETANIDE 0.25 MG/ML
1 INJECTION, SOLUTION INTRAMUSCULAR; INTRAVENOUS ONCE
Status: DISCONTINUED | OUTPATIENT
Start: 2025-05-06 | End: 2025-05-07 | Stop reason: HOSPADM

## 2025-05-06 RX ORDER — METOPROLOL SUCCINATE 25 MG/1
25 TABLET, EXTENDED RELEASE ORAL DAILY
Status: DISCONTINUED | OUTPATIENT
Start: 2025-05-07 | End: 2025-05-07 | Stop reason: HOSPADM

## 2025-05-06 RX ORDER — FOLIC ACID 1 MG/1
1 TABLET ORAL DAILY
Status: DISCONTINUED | OUTPATIENT
Start: 2025-05-07 | End: 2025-05-07 | Stop reason: HOSPADM

## 2025-05-06 RX ORDER — MIDODRINE HYDROCHLORIDE 5 MG/1
5 TABLET ORAL
Status: DISCONTINUED | OUTPATIENT
Start: 2025-05-07 | End: 2025-05-07 | Stop reason: HOSPADM

## 2025-05-06 RX ORDER — SODIUM CHLORIDE 9 MG/ML
INJECTION, SOLUTION INTRAVENOUS PRN
Status: DISCONTINUED | OUTPATIENT
Start: 2025-05-06 | End: 2025-05-07 | Stop reason: HOSPADM

## 2025-05-06 RX ORDER — ONDANSETRON 2 MG/ML
4 INJECTION INTRAMUSCULAR; INTRAVENOUS EVERY 6 HOURS PRN
Status: DISCONTINUED | OUTPATIENT
Start: 2025-05-06 | End: 2025-05-06 | Stop reason: CLARIF

## 2025-05-06 RX ORDER — CALCITRIOL 0.25 UG/1
0.25 CAPSULE, LIQUID FILLED ORAL DAILY
Status: DISCONTINUED | OUTPATIENT
Start: 2025-05-07 | End: 2025-05-07 | Stop reason: HOSPADM

## 2025-05-06 RX ORDER — ACETAZOLAMIDE 500 MG/5ML
500 INJECTION, POWDER, LYOPHILIZED, FOR SOLUTION INTRAVENOUS ONCE
Status: COMPLETED | OUTPATIENT
Start: 2025-05-06 | End: 2025-05-06

## 2025-05-06 RX ORDER — IPRATROPIUM BROMIDE AND ALBUTEROL SULFATE 2.5; .5 MG/3ML; MG/3ML
1 SOLUTION RESPIRATORY (INHALATION) EVERY 4 HOURS PRN
Status: DISCONTINUED | OUTPATIENT
Start: 2025-05-06 | End: 2025-05-07 | Stop reason: HOSPADM

## 2025-05-06 RX ADMIN — ACETAZOLAMIDE SODIUM 500 MG: 500 INJECTION, POWDER, LYOPHILIZED, FOR SOLUTION INTRAVENOUS at 21:03

## 2025-05-06 RX ADMIN — MIDODRINE HYDROCHLORIDE 5 MG: 5 TABLET ORAL at 21:02

## 2025-05-06 RX ADMIN — SODIUM CHLORIDE 500 ML: 0.9 INJECTION, SOLUTION INTRAVENOUS at 21:01

## 2025-05-06 NOTE — PROGRESS NOTES
vitals filed for this visit.  General Appearance: alert and oriented to person, place and time, in no acute distress, well developed and well- nourished  Neurologic: no cranial nerve deficit, speech normal  Head: normocephalic and atraumatic  Eyes: extraocular eye movements intact, conjunctivae normal  ENT: external ear and ear canal normal bilaterally, nose without deformity, no carotid bruits  Pulmonary/Chest: normal air movement, no respiratory distress  Cardiovascular: normal rate, regular rhythm, no murmur  Abdomen: non-distended, no masses  Musculoskeletal: no joint deformity or tenderness  Extremities: no leg edema bilaterally  Skin: warm and dry, no rash or erythema    PULSE EXAM      Right      Left   Brachial     Radial     Femoral     Popliteal     Dorsalis Pedis     Posterior Tibial     (3=normal, 2=diminished, 1=barely palpable, 4=widened)    RADIOLOGY: [facility] today      Problem List Items Addressed This Visit          Circulatory    Bilateral carotid artery stenosis - Primary    Relevant Orders    Vascular duplex carotid bilateral         I reviewed her ultrasound from today which shows <50% stenosis in the bilateral ICAs.  I reviewed with the patient from my standpoint she can continue with all her normal activities.  I will plan to see her again in 2 years with a repeat carotid ultrasound.  I asked her to call sooner with any new problems.    Pt seen and plan reviewed with Dr. Kristi Rm, APRN - CNP

## 2025-05-06 NOTE — ED PROVIDER NOTES
[facility] EMERGENCY DEPARTMENT  EMERGENCY DEPARTMENT ENCOUNTER        Pt Name: Josefina Garcia  MRN: [id number]  Birthdate 1945  Date of evaluation: 5/6/2025  Provider: Odalis Mota MD  PCP: Teofilo Dutton Jr., MD  Note Started: 3:14 PM EDT 5/6/25    CHIEF COMPLAINT       Chief Complaint   Patient presents with    Abnormal Lab     Sent in for BNP of 30,000    Leg Swelling     Bilateral leg swelling       HISTORY OF PRESENT ILLNESS: 1 or more Elements   History From: Patient    Limitations to history : None    Josefina Garcia is a 80 y.o. female with history of CHF, CAD, A Fib with pacemaker defibrillator placement, heart valve replacement who presents to the ED for elevated BNP.  Patient reports that she had blood work done by PCP and was found to have a BNP of 30,000.  Patient complains of having ongoing leg swelling.  Per patient, it has been ongoing for 3 months. Patient denies any chest pain or shortness of breath.  Patient follows with Dr. Fajardo for cardiology.  She notes that she does take Bumex 2 mg twice in the morning and 1 mg at night.  Patient otherwise has been feeling well.  She has no other complaints at this time. Pt is on coumadin.     Patient denies fever, chills, cough, congestion, headache, dyspnea on exertion, orthopnea, abdominal pain, nausea, vomiting, diarrhea, lightheadedness, dysuria, hematuria, hematochezia, and melena.    Nursing Notes were all reviewed and agreed with or any disagreements were addressed in the HPI.        REVIEW OF EXTERNAL NOTES :         12/27/2024: Echo done showing severely reduced left ventricular systolic function.  EF of approximately 28%.  Moderate pulmonary hypertension.      REVIEW OF SYSTEMS :       Positives and Pertinent negatives as per HPI.     SURGICAL HISTORY     Past Surgical History:   Procedure Laterality Date    AORTIC VALVULOPLASTY      1992    CARDIAC DEFIBRILLATOR PLACEMENT Left 05/09/2022       Height:           Patient was given the following medications:  Medications   bumetanide (BUMEX) injection 1 mg (1 mg IntraVENous Not Given 5/6/25 2012)   acetaZOLAMIDE (DIAMOX) injection 500 mg (has no administration in time range)   sodium chloride 0.9 % bolus 500 mL (has no administration in time range)   midodrine (PROAMATINE) tablet 5 mg (has no administration in time range)         Medical Decision Making/Differential Diagnosis:    CC/HPI Summary, Social Determinants of health, Records Reviewed, DDx, testing done/not done, ED Course, Reassessment, disposition considerations/shared decision making with patient, consults, disposition:      ED Course as of 05/06/25 2048   Tue May 06, 2025   1659 ECG  1654  Ecg read by me  V paced  Hr 87  Nonspecific interventricular delay  Pvcs, no acute ischemia [ROSEMARY]   1700 Hemoglobin Quant(!): 8.8 [ROSEMARY]   1700 WBC: 6.2 [ROSEMARY]   1700 INR: 4.1 [ROSEMARY]   1700 Creatinine(!): 1.2 [ROSEMARY]   1700 BUN,BUNPL(!): 24 [ROSEMARY]   1700 NT Pro-BNP(!): 28,150 [ROSEMARY]   1700 Magnesium: 1.8 [ORSEMARY]   1729 NT Pro-BNP(!): 28,150 [ROSEMARY]   1729 Hemoglobin Quant(!): 8.8 [ROSEMARY]   1729 Prothrombin Time(!): 44.9 [ROSEMARY]   1729 INR: 4.1 [ROSEMARY]   1729 Chloride(!): 97 [ROSEMARY]   1729 CARBON DIOXIDE(!): 31 [ROSEMARY]   1729 Creatinine(!): 1.2 [ROSEMARY]   1729 BUN,BUNPL(!): 24 [ROSEMARY]   1729 Magnesium: 1.8 [ROSEMARY]   1759 Spoke with Dr. Fajardo.  Discussed case.  He recommends giving patient 500 mg of acetazolamide and 1 mg of Bumex IV.  He believes patient will need admission and will see patient in consult. [CM]   2037 Spoke with HERSON Delarosa for Dr. Lam. Discussed case. She will admit pt.  [CM]      ED Course User Index  [CM] Odalis Mota MD  [ROSEMARY] Jp Garcia, DO        This is an 80-year-old female with history of HTN, CAD, kidney disease, CHF (EF 28%), A Fib with pacemaker defibrillator placement who presents to the ED for elevated BNP.  Patient reports leg swelling otherwise no other complaints.  Differential diagnosis includes CHF

## 2025-05-07 VITALS
HEIGHT: 61 IN | RESPIRATION RATE: 18 BRPM | DIASTOLIC BLOOD PRESSURE: 50 MMHG | TEMPERATURE: 98.1 F | OXYGEN SATURATION: 95 % | WEIGHT: 95 LBS | SYSTOLIC BLOOD PRESSURE: 91 MMHG | BODY MASS INDEX: 17.94 KG/M2 | HEART RATE: 60 BPM

## 2025-05-07 LAB
ALBUMIN SERPL-MCNC: 3.2 G/DL (ref 3.5–5.2)
ALP SERPL-CCNC: 84 U/L (ref 35–104)
ALT SERPL-CCNC: 12 U/L (ref 0–32)
ANION GAP SERPL CALCULATED.3IONS-SCNC: 11 MMOL/L (ref 7–16)
AST SERPL-CCNC: 23 U/L (ref 0–31)
BASOPHILS # BLD: 0.04 K/UL (ref 0–0.2)
BASOPHILS NFR BLD: 1 % (ref 0–2)
BILIRUB SERPL-MCNC: 0.8 MG/DL (ref 0–1.2)
BUN SERPL-MCNC: 22 MG/DL (ref 6–23)
CALCIUM SERPL-MCNC: 8.8 MG/DL (ref 8.6–10.2)
CHLORIDE SERPL-SCNC: 100 MMOL/L (ref 98–107)
CO2 SERPL-SCNC: 29 MMOL/L (ref 22–29)
CREAT SERPL-MCNC: 1.3 MG/DL (ref 0.5–1)
EKG ATRIAL RATE: 94 BPM
EKG Q-T INTERVAL: 462 MS
EKG QRS DURATION: 174 MS
EKG QTC CALCULATION (BAZETT): 555 MS
EKG R AXIS: 192 DEGREES
EKG T AXIS: -30 DEGREES
EKG VENTRICULAR RATE: 87 BPM
EOSINOPHIL # BLD: 0.14 K/UL (ref 0.05–0.5)
EOSINOPHILS RELATIVE PERCENT: 3 % (ref 0–6)
ERYTHROCYTE [DISTWIDTH] IN BLOOD BY AUTOMATED COUNT: 19.1 % (ref 11.5–15)
GFR, ESTIMATED: 42 ML/MIN/1.73M2
GLUCOSE SERPL-MCNC: 77 MG/DL (ref 74–99)
HCT VFR BLD AUTO: 30.5 % (ref 34–48)
HGB BLD-MCNC: 9 G/DL (ref 11.5–15.5)
IMM GRANULOCYTES # BLD AUTO: <0.03 K/UL (ref 0–0.58)
IMM GRANULOCYTES NFR BLD: 0 % (ref 0–5)
INR PPP: 4.1
LYMPHOCYTES NFR BLD: 1.18 K/UL (ref 1.5–4)
LYMPHOCYTES RELATIVE PERCENT: 23 % (ref 20–42)
MCH RBC QN AUTO: 28.4 PG (ref 26–35)
MCHC RBC AUTO-ENTMCNC: 29.5 G/DL (ref 32–34.5)
MCV RBC AUTO: 96.2 FL (ref 80–99.9)
MONOCYTES NFR BLD: 0.55 K/UL (ref 0.1–0.95)
MONOCYTES NFR BLD: 11 % (ref 2–12)
NEUTROPHILS NFR BLD: 63 % (ref 43–80)
NEUTS SEG NFR BLD: 3.25 K/UL (ref 1.8–7.3)
PLATELET # BLD AUTO: 166 K/UL (ref 130–450)
PMV BLD AUTO: 11.6 FL (ref 7–12)
POTASSIUM SERPL-SCNC: 3.7 MMOL/L (ref 3.5–5)
PROT SERPL-MCNC: 6.1 G/DL (ref 6.4–8.3)
PROTHROMBIN TIME: 45.1 SEC (ref 9.3–12.4)
RBC # BLD AUTO: 3.17 M/UL (ref 3.5–5.5)
SODIUM SERPL-SCNC: 140 MMOL/L (ref 132–146)
WBC OTHER # BLD: 5.2 K/UL (ref 4.5–11.5)

## 2025-05-07 PROCEDURE — 85610 PROTHROMBIN TIME: CPT

## 2025-05-07 PROCEDURE — 6370000000 HC RX 637 (ALT 250 FOR IP): Performed by: NURSE PRACTITIONER

## 2025-05-07 PROCEDURE — 36415 COLL VENOUS BLD VENIPUNCTURE: CPT

## 2025-05-07 PROCEDURE — 80053 COMPREHEN METABOLIC PANEL: CPT

## 2025-05-07 PROCEDURE — 93010 ELECTROCARDIOGRAM REPORT: CPT | Performed by: INTERNAL MEDICINE

## 2025-05-07 PROCEDURE — 2500000003 HC RX 250 WO HCPCS: Performed by: NURSE PRACTITIONER

## 2025-05-07 PROCEDURE — 85025 COMPLETE CBC W/AUTO DIFF WBC: CPT

## 2025-05-07 RX ADMIN — FOLIC ACID 1 MG: 1 TABLET ORAL at 07:43

## 2025-05-07 RX ADMIN — FERROUS SULFATE TAB 325 MG (65 MG ELEMENTAL FE) 325 MG: 325 (65 FE) TAB at 07:43

## 2025-05-07 RX ADMIN — MIDODRINE HYDROCHLORIDE 5 MG: 5 TABLET ORAL at 12:48

## 2025-05-07 RX ADMIN — POTASSIUM CHLORIDE 10 MEQ: 750 TABLET, EXTENDED RELEASE ORAL at 07:43

## 2025-05-07 RX ADMIN — CALCITRIOL 0.25 MCG: 0.25 CAPSULE ORAL at 07:44

## 2025-05-07 RX ADMIN — MIDODRINE HYDROCHLORIDE 5 MG: 5 TABLET ORAL at 07:43

## 2025-05-07 RX ADMIN — LEVOTHYROXINE SODIUM 125 MCG: 0.1 TABLET ORAL at 05:52

## 2025-05-07 RX ADMIN — SODIUM CHLORIDE, PRESERVATIVE FREE 10 ML: 5 INJECTION INTRAVENOUS at 07:44

## 2025-05-07 RX ADMIN — SACUBITRIL AND VALSARTAN 0.5 TABLET: 24; 26 TABLET, FILM COATED ORAL at 07:43

## 2025-05-07 RX ADMIN — ALLOPURINOL 100 MG: 100 TABLET ORAL at 07:43

## 2025-05-07 NOTE — RT PROTOCOL NOTE
RT Nebulizer Bronchodilator Protocol Note    There is a bronchodilator order in the chart from a provider indicating to follow the RT Bronchodilator Protocol and there is an “Initiate RT Bronchodilator Protocol” order as well (see protocol at bottom of note).    CXR Findings:  No results found.    The findings from the last RT Protocol Assessment were as follows:  Smoking: None or smoker <15 pack years  Respiratory Pattern: Regular pattern and RR 12-20 bpm  Breath Sounds: Clear breath sounds  Cough: Strong, spontaneous, non-productive  Indication for Bronchodilator Therapy: Wheezing associated with pulm disorder  Bronchodilator Assessment Score: 0    Aerosolized bronchodilator medication orders have been revised according to the RT Nebulizer Bronchodilator Protocol below.    Respiratory Therapist to perform RT Therapy Protocol Assessment initially then follow the protocol.  Repeat RT Therapy Protocol Assessment PRN for score 0-3 or on second treatment, BID, and PRN for scores above 3.    No Indications - adjust the frequency to every 6 hours PRN wheezing or bronchospasm, if no treatments needed after 48 hours then discontinue using Per Protocol order mode.     If indication present, adjust the RT bronchodilator orders based on the Bronchodilator Assessment Score as indicated below.  If a patient is on this medication at home then do not decrease Frequency below that used at home.    0-3 - enter or revise RT bronchodilator order(s) to equivalent RT Bronchodilator order with Frequency of every 4 hours PRN for wheezing or increased work of breathing using Per Protocol order mode.       4-6 - enter or revise RT Bronchodilator order(s) to two equivalent RT bronchodilator orders with one order with BID Frequency and one order with Frequency of every 4 hours PRN wheezing or increased work of breathing using Per Protocol order mode.         7-10 - enter or revise RT Bronchodilator order(s) to two equivalent RT

## 2025-05-07 NOTE — PROGRESS NOTES
Discharge instructions explained to pt and  at bedside and copy given.  Both verbalize understanding and are in agreement with dc plan to home.  Telemetry sanitized and returned to storage.  Pt to be transported home with  via private car.  Transport notified of need for W/C assist.

## 2025-05-07 NOTE — CONSULTS
[facility]   Inpatient CHF Nurse Navigator Consult        Cardiologist: Dr Fajardo  Primary Care Physician: Teofilo Dutton Jr., MD Geraldmathieu Garcia is a 80 y.o. (1945) female with a history of HFrEF, most recent EF:  Lab Results   Component Value Date    LVEF 32 05/10/2022       Patient was awake and alert, laying in bed during the consultation and is agreeable to heart failure education. She was engaged and asked appropriate questions throughout the education session.     Barriers identified during consult contributing to HF Hospitalization: none.     [] Limited medication adherence   [] Poor health literacy, education regarding HF medications provided   [] Pill box provided to patient  [] Difficulty affording medications  [] Difficulty obtaining/ managing medications  [] Prescription assistance information given     [] Not weighing themselves daily  [x] Weight log provided for easy monitoring  [] Scale provided     [] Not following low sodium diet  [] Food insecurity   [x] 2 gram sodium diet education provided   [] Low sodium recipes provided  [] Sodium free seasoning provided   [] Low sodium meal delivery options given to patient  [] Dietician consulted     [] Lack of transportation to appointments     [] Depression, given chronic illness  [] Primary team notified     [] Goals of care need addressed  [] Palliative care consulted     [] CHF community health worker Pinky Meredith consulted [phone], to assist with       Chart Reviewed:  Diet: ADULT DIET; Regular; Low Fat/Low Chol/High Fiber/DAVIDSON   Daily Weights: Patient Vitals for the past 96 hrs (Last 3 readings):   Weight   05/06/25 1427 43.1 kg (95 lb)     I/O: No intake or output data in the 24 hours ending 05/07/25 1506    [] Nursing staff/manager notified of inaccurate tompkins weights or I/O      Discharge Plan:  Above identified barriers reviewed and needs addressed    Patient/family educated on daily

## 2025-05-07 NOTE — PROGRESS NOTES
4 Eyes Skin Assessment     NAME:  Josefina Garcia  YOB: 1945  MEDICAL RECORD NUMBER:  [id number]    The patient is being assessed for  Admission    I agree that at least one RN has performed a thorough Head to Toe Skin Assessment on the patient. ALL assessment sites listed below have been assessed.      Areas assessed by both nurses:    Head, Face, Ears, Shoulders, Back, Chest, Arms, Elbows, Hands, Sacrum. Buttock, Coccyx, Ischium, Legs. Feet and Heels, and Under Medical Devices         Does the Patient have a Wound? No noted wound(s)       Abhi Prevention initiated by RN: No  Wound Care Orders initiated by RN: No    Pressure Injury (Stage 3,4, Unstageable, DTI, NWPT, and Complex wounds) if present, place Wound referral order by RN under : No    New Ostomies, if present place, Ostomy referral order under : No     Nurse 1 eSignature: Electronically signed by Faye Toledo RN on 5/6/25 at 10:30 PM EDT    **SHARE this note so that the co-signing nurse can place an eSignature**    Nurse 2 eSignature: Electronically signed by Brie Merino RN on 5/6/25 at 10:31 PM EDT

## 2025-05-07 NOTE — PROGRESS NOTES
Spiritual Health History and Assessment/Progress Note  [facility]    Initial Encounter,  ,  ,      Name: Josefina Garcia MRN: [id number]    Age: 80 y.o.     Sex: female   Language: English   Druze: Yarsani   Acute decompensated heart failure (HCC)     Date: 5/7/2025                           Spiritual Assessment began in [address] MED SURG        Referral/Consult From: Rounding   Encounter Overview/Reason: Initial Encounter  Service Provided For: Patient    Imani, Belief, Meaning:   Patient identifies as spiritual, is connected with a imani tradition or spiritual practice, and has beliefs or practices that help with coping during difficult times  Family/Friends No family/friends present      Importance and Influence:  Patient has no beliefs influential to healthcare decision-making identified during this visit  Family/Friends No family/friends present    Community:  Patient feels well-supported. Support system includes: Spouse/Partner and Children  Family/Friends No family/friends present    Assessment and Plan of Care:     Patient Interventions include: Facilitated expression of thoughts and feelings, Affirmed coping skills/support systems, and Provided sacramental/Scientology ritual  Family/Friends Interventions include: No family/friends present    Patient Plan of Care: Spiritual Care available upon further referral  Family/Friends Plan of Care: No family/friends present    Electronically signed by Chaplain Colleen on 5/7/2025 at 1:51 PM

## 2025-05-07 NOTE — CARE COORDINATION
Internal Medicine On-call Care Coordination Note    I was called by the ED physician because they recommended admission for this patient and we cover their PCP.  The history as I understand it after discussion with the ED physician is as follows:    Sent from PCP for elevated BNP of 30k  Hx CHF, EF 28% on bumex  BLE swelling  Imaging showing small bilat pleural effusion  Unable to give diuretics in ED-BP soft,   Also hypoxic, not normally on O2  Follows with Dr. Fajardo    I placed admission orders.  Including:    General admission orders  Reconciled home meds  Cardiology consulted in ED  Pharmacy to dose coumadin    Dr. Lam, or our coverage will see the patient tomorrow for H&P.    Electronically signed by SOCORRO Hinds CNP on 5/6/2025 at 8:32 PM

## 2025-05-07 NOTE — PROGRESS NOTES
Pharmacy Consultation Note  (Warfarin Dosing and Monitoring)    Initial consult date: 5/7/2025  Consulting Provider: SOCORRO Hinds-MELISSA    Josefina Garcia is a 80 y.o. female for whom pharmacy has been asked to manage warfarin therapy.     Weight:   Wt Readings from Last 1 Encounters:   05/06/25 43.1 kg (95 lb)       TSH:    Lab Results   Component Value Date/Time    TSH 0.33 03/22/2025 10:30 AM       Hepatic Function Panel:                          Lab Results   Component Value Date/Time    ALKPHOS 84 05/07/2025 06:03 AM    ALT 12 05/07/2025 06:03 AM    AST 23 05/07/2025 06:03 AM    BILITOT 0.8 05/07/2025 06:03 AM    BILIDIR <0.2 09/25/2016 10:07 AM    IBILI 0.7 09/25/2016 10:07 AM       Current significant warfarin drug-drug interactions include: No significant interactions    Recent Labs     05/06/25  1530 05/07/25  0603   HGB 8.8* 9.0*    166     Date Warfarin Dose INR Heparin or LMWH Comment   5/7 HOLD 4.1 --                                  Assessment:  Patient is a 80 y.o. female on warfarin for Atrial Fibrillation, Mechanical Heart Valve (mitral), and Mechanical Heart Valve (atrial).  Patient's home warfarin dosing regimen is documented as warfarin 2 mg daily.   Goal INR 2.5 - 3.5  INR 4.1 today    Plan:  Will hold warfarin tonight  Daily PT/INR until the INR is stable within the therapeutic range  Pharmacist will follow and monitor/adjust dosing as necessary    Douglas Romero, AndreiD, [facility] the Way HospitalCP  5/7/2025  2:44 PM    JUAN: [phone]  SEY: [phone]  SJW: [phone]

## 2025-05-07 NOTE — ED NOTES
ED to Inpatient Handoff Report    Notified [address] that electronic handoff available and patient ready for transport to room 606.    Safety Risks: Risk of falls    Patient in Restraints: no    Constant Observer or Patient : no    Telemetry Monitoring Ordered :Yes           Order to transfer to unit without monitor:N/A    Last MEWS: 1 Time completed: 2115    Deterioration Index Score:   Predictive Model Details          27 (Normal)  Factor Value    Calculated 5/6/2025 21:14 49% Age 80 years old    Deterioration Index Model 21% Respiratory rate 20     12% Potassium 4.6 mmol/L     10% Systolic 101     4% BUN abnormal (24 mg/dL)     2% Hematocrit abnormal (30.7 %)     1% Pulse oximetry 94 %     1% Pulse 86     1% Sodium 138 mmol/L     0% Temperature 97.5 °F (36.4 °C)     0% WBC count 6.2 k/uL        Vitals:    05/06/25 1901 05/06/25 1924 05/06/25 2003 05/06/25 2100   BP: (!) 90/55 (!) 92/54 (!) 87/58 (!) 101/52   Pulse: 85 82 77 86   Resp: 18 19 19 20   Temp:       TempSrc:       SpO2: 95% 94% 92% 94%   Weight:       Height:             Opportunity for questions and clarification was provided.     
V1-V2 KIAH ~1mm; global inverted T wave/depression

## 2025-05-07 NOTE — DISCHARGE SUMMARY
Patient discharged within 24 hours    Patient is stable and doing well and wishes to discharge home.  Patient's INR was elevated and will hold her Coumadin dose for tonight.  Recheck with PCP as scheduled.    Patient is doing well.  Medications were adjusted after discussion with cardiology.  Patient ready for discharge today.  Patient wishing for discharge today as well.   at bedside.  All questions answered.  Medications were adjusted [facility] at [facility].  Resume medications as ordered.       Medication List        START taking these medications      metOLazone 2.5 MG tablet  Commonly known as: ZAROXOLYN  Take 1 tablet by mouth three times a week     vericiguat 2.5 MG tablet  Commonly known as: VERQUVO  Take 1 tablet by mouth daily            CONTINUE taking these medications      allopurinol 100 MG tablet  Commonly known as: ZYLOPRIM     bumetanide 2 MG tablet  Commonly known as: BUMEX  Take 1 tablet by mouth daily     calcitRIOL 0.25 MCG capsule  Commonly known as: ROCALTROL     Digoxin 62.5 MCG Tabs  Take 62.5 mcg by mouth Every Monday, Wednesday, and Friday     ferrous sulfate 325 (65 Fe) MG tablet  Commonly known as: IRON 325     folic acid 1 MG tablet  Commonly known as: FOLVITE     ipratropium 0.5 mg-albuterol 2.5 mg 0.5-2.5 (3) MG/3ML Soln nebulizer solution  Commonly known as: DUONEB  Inhale 3 mLs into the lungs every 6 hours     levothyroxine 125 MCG tablet  Commonly known as: SYNTHROID     metoprolol succinate 25 MG extended release tablet  Commonly known as: TOPROL XL  Take 1 tablet by mouth daily     midodrine 5 MG tablet  Commonly known as: PROAMATINE  Take 1 tablet by mouth 3 times daily (with meals)     vitamin B-12 500 MCG tablet  Commonly known as: CYANOCOBALAMIN     vitamin D 50 MCG (2000 UT) Caps capsule  Commonly known as: CHOLECALCIFEROL     warfarin 2 MG tablet  Commonly known as: COUMADIN            STOP taking these medications      b complex vitamins capsule     potassium chloride

## 2025-05-07 NOTE — H&P
Internal Medicine History & Physical     Name: Josefian Garcia  : 1945  Chief Complaint: Abnormal Lab (Sent in for BNP of 30,000) and Leg Swelling (Bilateral leg swelling)  Primary Care Physician: Teofilo Dutton Jr., MD  Admission date: 2025  Date of service: 2025     History of Present Illness  Josefina is a 80 y.o. year old female.  Patient states she was sent in for abnormal labs. Had some swelling of her legs. Was not taking after dc from F. No fever, chills, cp, sob, n/v, ha, dizziness, dysuria, hematuria. Patient states BNP was elevated, so she was sent in. Per Dr. Fajardo, meds have been adjusted several times.  At this point, he is adjusting to add a new medication.  Patient states her legs are down today.  Breathing better and not having any shortness of breath.  She is off oxygen with.  No other complaints at this time.  Baseline BNP is elevated.    Cardiology was consulted and agreed that patient is stable and ready for DC.      ED course:   Initial blood work and imaging studies performed. Admission recommended by ED physician. Case was discussed with ED provider. Meds in ED consisted of the following:  Medications   bumetanide (BUMEX) injection 1 mg (1 mg IntraVENous Not Given 25)   midodrine (PROAMATINE) tablet 5 mg (5 mg Oral Given 25)   allopurinol (ZYLOPRIM) tablet 100 mg (100 mg Oral Given 2543)   bumetanide (BUMEX) tablet 2 mg (2 mg Oral Not Given 25)   digoxin (LANOXIN) tablet 62.5 mcg (has no administration in time range)   calcitRIOL (ROCALTROL) capsule 0.25 mcg (0.25 mcg Oral Given 2544)   ferrous sulfate (IRON 325) tablet 325 mg (325 mg Oral Given 2543)   folic acid (FOLVITE) tablet 1 mg (1 mg Oral Given 2543)   levothyroxine (SYNTHROID) tablet 125 mcg (125 mcg Oral Given 25 0552)   metOLazone (ZAROXOLYN) tablet 2.5 mg (2.5 mg Oral Not Given 25 0745)   metoprolol succinate (TOPROL XL) extended release  1 capsule by mouth daily  Patient not taking: Reported on 5/6/2025    Provider, Historical, MD       Allergies  Allergies   Allergen Reactions    Iodine Anaphylaxis     Heart cath contrast    Quinidine Hives       Review of Systems:   Please see HPI above. All bolded are positive. All un-bolded are negative.  Constitutional Symptoms: fever, chills, fatigue, generalized weakness, diaphoresis, increase in thirst, loss of appetite  Eyes: vision change   Ears, Nose, Mouth, Throat: hearing loss, nasal congestion, sores in the mouth  Cardiovascular: chest pain, chest heaviness, palpitations  Respiratory: shortness of breath, wheezing, coughing  Gastrointestinal: abdominal pain, nausea, vomiting, diarrhea, constipation, melena, hematochezia, hematemesis  Genitourinary: dysuria, hematuria, increased frequency  Musculoskeletal: lower extremity edema, myalgias, arthralgias, back pain  Integumentary: rashes, itching   Neurological: headache, lightheadedness, dizziness, confusion, syncope, numbness, tingling, focal weakness  Psychiatric: depression, suicidal ideation, anxiety  Endocrine: unintentional weight change  Hematologic/Lymphatic: lymphadenopathy, easy bruising, easy bleeding   Allergic/Immunologic: recurrent infections      Objective  VITALS:  BP (!) 91/50   Pulse 60   Temp 98.1 °F (36.7 °C) (Oral)   Resp 18   Ht 1.549 m (5' 1\")   Wt 43.1 kg (95 lb)   SpO2 95%   BMI 17.95 kg/m²     Physical Exam:   General: awake, alert, oriented to person, place, time, and purpose, appears stated age, cooperative, no acute distress, pleasant, appropriate mood  Eyes: conjunctivae/corneas clear, sclera non icteric, EOMI  Ears: no obvious scars, no lesions, no masses, hearing intact  Mouth: mucous membranes moist, no obvious oral sores  Head: normocephalic, atraumatic  Neck: no JVD, no adenopathy, no thyromegaly, neck is supple, trachea is midline  Back: ROM normal, no CVA tenderness.  Chest: no pain on palpation  Lungs: clear to

## 2025-05-07 NOTE — CARE COORDINATION
Introduced my self and provided explanation of CM role to patient and , Gagandeep at bedside. Admitted for acute decompensated heart failure,hypotension. Patient was recently discharged from [facility] 4/29/25 for Acute on Chronic Systolic Heart Failure with RSV Infection.  Patient has a hx of A-fib with mechanical mitral and aortic valves, CKD, failure to thrive. CHF Nurse is following, Cardiology consult pending, await plan. She voices she resides in a tri-level home with 5 steps down and 7 up with her spouse. She completes her adl's with independence. Patient has a nebulizer and had O2 in the past w/[employer]. Patient is established with Selma Dutton. She denies any home needs at this time, will continue to follow.  Elizabeth CARRIONN, RN  Case Management

## 2025-05-07 NOTE — CONSULTS
CARDIOLOGY CONSULTATION    Patient Name:  Josefina Garcia    :  1945    Reason for Consultation:   Acute on chronic congestive heart failure; ischemic cardiomyopathy    History of Present Illness:   Josefina Garcia presents to [facility] following history of having just been released from the [facility] following a 9-day hospital stay for recurrent heart failure and second opinion.  While there a few of her heart failure guideline medications have been discontinued secondary to hypotension.  She was then seen in follow-up at her primary internist's office who noted that her NT proBNP was excessively elevated and disproportionate to her underlying renal function and sent her back to the hospital for further evaluation.  Upon further questioning however Mrs. Garcia states that she feels no different than when she left the clinic and is still short of breath with any significant effort but at least not short of breath at rest presently.  She denies any palpitations nor lightheadedness but does note that she was constipated and based upon that problem missed her appointment with her [facility] heart failure specialist Dr. Aleyda Espinosa.  She is now readmitted for further evaluation and adjustment of her medical regimen.  Specifically she was taken off of sacubitril/valsartan..  In addition to her cardiomyopathy she also has underlying valvular disease which she underwent aortic and mitral valve replacements in  as well as a tricuspid valve repair in .  Tricuspid valve interventions as well as having an ICD device implanted.    Past Medical History:   has a past medical history of A-fib (HCC), Acute exacerbation of CHF (congestive heart failure) ([facility]), Acute renal failure, Aneurysm, Aneurysm of ascending aorta without rupture, CAD (coronary artery disease), CRF (chronic renal failure), Epistaxis, Gastrointestinal bleeding, lower, H/O anemia of chronic disorder,  pleural effusions. Is stable cardiomegaly.     Assessment:    Patient Active Problem List   Diagnosis Code    Acute kidney injury superimposed on CKD N17.9, N18.9    Hypertension I10    Hypothyroid E03.9    Valvular heart disease I38    CHF exacerbation ([facility]) I50.9    Atrial fibrillation ([facility]) I48.91    Herpes zoster B02.9    Acute on chronic systolic and diastolic heart failure, NYHA class 3 ([facility]) I50.43    Red blood cell antibody positive R76.8    Abrasion T14.8XXA    Coagulopathy D68.9    Absolute anemia D64.9    Bilateral carotid artery stenosis I65.23    Atrial fibrillation with RVR ([facility]) I48.91    HFrEF (heart failure with reduced ejection fraction) ([facility]) I50.20    Pulmonary edema J81.1    Moderate protein-calorie malnutrition E44.0    Thoracic ascending aortic aneurysm I71.21    Acute on chronic right-sided congestive heart failure ([facility]) I50.813    Cellulitis of right hand L03.113    CAD (coronary artery disease) I25.10    Acute on chronic systolic CHF (congestive heart failure) ([facility]) I50.23    Acute on chronic congestive heart failure, unspecified heart failure type ([facility]) I50.9    Supratherapeutic INR R79.1    History of mitral valve replacement with mechanical valve Z95.2    History of mechanical aortic valve replacement Z95.2    Chronic renal insufficiency, stage III (moderate) ([facility]) N18.30    Acute on chronic clinical systolic heart failure ([facility]) I50.23    Anemia D64.9    CHF (congestive heart failure), NYHA class I, acute on chronic, combined ([facility]) I50.43    Acute exacerbation of chronic heart failure ([facility]) I50.9    Acute decompensated heart failure ([facility]) I50.9       Plan:  At this point multiple heart failure drug regimens have been attempted with no significant improvement noted.  Will attempt vericiguat at this point and carefully monitor her renal function and blood pressure and I have urged her to follow-up with Dr. Espinosa as well.  Certainly she should be on chronic diuretic therapy but her

## 2025-05-07 NOTE — ACP (ADVANCE CARE PLANNING)
Advance Care Planning   Healthcare Decision Maker:    Primary Decision Maker: Gagandeep Garcia - [facility] - [phone]    Click here to complete Healthcare Decision Makers including selection of the Healthcare Decision Maker Relationship (ie \"Primary\").  Today we documented Decision Maker(s) consistent with Legal Next of Kin hierarchy.

## 2025-05-08 LAB
MICROORGANISM SPEC CULT: ABNORMAL
MICROORGANISM SPEC CULT: ABNORMAL
SERVICE CMNT-IMP: ABNORMAL
SPECIMEN DESCRIPTION: ABNORMAL

## 2025-05-08 NOTE — PROGRESS NOTES
Outpatient pharmacy spoke with Gretel at Dr Fajardo's Office about verquvo 2.5 that patient was sent home on,on 05/07/2025 the medication was not covered on patients insurance, Gretel is following up with [facility].

## 2025-05-14 NOTE — PROGRESS NOTES
Physician Progress Note      PATIENT:               RULA GUZMAN  CSN #:                  [id number]  :                       1945  ADMIT DATE:       2025 2:29 PM  DISCH DATE:        2025 3:58 PM  RESPONDING  PROVIDER #:        Richy Olea MD          QUERY TEXT:    Based on your medical judgment, please clarify these findings and document if   any of the following are being evaluated and/or treated:    The clinical indicators include:  h and p report 2025 Patient was admitted with heart failure. history of   CHF, CAD, A Fib with pacemaker defibrillator placement, heart valve   replacement who presents to the ED for elevated BNP. Patient was noted to have   troponin level 41    troponin 41    EKG Interpretation:  Wide QRS with occasional ventricular paced complexes.    Rate 87 bpm.  Right axis deviation.  QTc 555.  No STEMI criteria.    Thank you    Bay Dorantes [facility]  ,  CDS  Options provided:  -- Myocardial injury without infarction due to heart failure  -- Clinically insignificant troponin elevation  -- Other - I will add my own diagnosis  -- Disagree - Not applicable / Not valid  -- Disagree - Clinically unable to determine / Unknown  -- Refer to Clinical Documentation Reviewer    PROVIDER RESPONSE TEXT:    Clinically insignificant troponin elevation    Query created by: Bay Dorantes on 2025 8:33 AM      Electronically signed by:  Richy Olea MD 2025 1:54 PM

## 2025-05-20 ENCOUNTER — OFFICE VISIT (OUTPATIENT)
Dept: NON INVASIVE DIAGNOSTICS | Age: 80
End: 2025-05-20
Payer: MEDICARE

## 2025-05-20 ENCOUNTER — CLINICAL SUPPORT (OUTPATIENT)
Dept: NON INVASIVE DIAGNOSTICS | Age: 80
End: 2025-05-20

## 2025-05-20 VITALS
RESPIRATION RATE: 17 BRPM | HEIGHT: 61 IN | SYSTOLIC BLOOD PRESSURE: 94 MMHG | WEIGHT: 95 LBS | DIASTOLIC BLOOD PRESSURE: 54 MMHG | HEART RATE: 98 BPM | BODY MASS INDEX: 17.94 KG/M2

## 2025-05-20 DIAGNOSIS — I48.20 CHRONIC ATRIAL FIBRILLATION (HCC): ICD-10-CM

## 2025-05-20 DIAGNOSIS — I42.0 NONISCHEMIC DILATED CARDIOMYOPATHY (HCC): Primary | ICD-10-CM

## 2025-05-20 DIAGNOSIS — Z95.810 PRESENCE OF AUTOMATIC CARDIOVERTER/DEFIBRILLATOR (AICD): ICD-10-CM

## 2025-05-20 PROCEDURE — G8419 CALC BMI OUT NRM PARAM NOF/U: HCPCS | Performed by: INTERNAL MEDICINE

## 2025-05-20 PROCEDURE — 3074F SYST BP LT 130 MM HG: CPT | Performed by: INTERNAL MEDICINE

## 2025-05-20 PROCEDURE — 1090F PRES/ABSN URINE INCON ASSESS: CPT | Performed by: INTERNAL MEDICINE

## 2025-05-20 PROCEDURE — 93000 ELECTROCARDIOGRAM COMPLETE: CPT | Performed by: INTERNAL MEDICINE

## 2025-05-20 PROCEDURE — 1036F TOBACCO NON-USER: CPT | Performed by: INTERNAL MEDICINE

## 2025-05-20 PROCEDURE — 99213 OFFICE O/P EST LOW 20 MIN: CPT | Performed by: INTERNAL MEDICINE

## 2025-05-20 PROCEDURE — 1160F RVW MEDS BY RX/DR IN RCRD: CPT | Performed by: INTERNAL MEDICINE

## 2025-05-20 PROCEDURE — 1159F MED LIST DOCD IN RCRD: CPT | Performed by: INTERNAL MEDICINE

## 2025-05-20 PROCEDURE — 1123F ACP DISCUSS/DSCN MKR DOCD: CPT | Performed by: INTERNAL MEDICINE

## 2025-05-20 PROCEDURE — 1111F DSCHRG MED/CURRENT MED MERGE: CPT | Performed by: INTERNAL MEDICINE

## 2025-05-20 PROCEDURE — G8400 PT W/DXA NO RESULTS DOC: HCPCS | Performed by: INTERNAL MEDICINE

## 2025-05-20 PROCEDURE — 3078F DIAST BP <80 MM HG: CPT | Performed by: INTERNAL MEDICINE

## 2025-05-20 PROCEDURE — G8427 DOCREV CUR MEDS BY ELIG CLIN: HCPCS | Performed by: INTERNAL MEDICINE

## 2025-05-20 NOTE — PROGRESS NOTES
Cardiac Electrophysiology   Outpatient Progress Note    Josefina Garcia  1945  Date of Service: 5/20/2025  Referring Provider/PCP: Teofilo Dutton Jr., MD    Chief Complaint   Patient presents with    Cardiomyopathy     6 month office visit with MDT. Pt has no c/c         Patient Active Problem List    Diagnosis Date Noted    Acute on chronic right-sided congestive heart failure (HCC) 05/03/2022     Priority: Medium    Acute decompensated heart failure (HCC) 05/06/2025    CHF (congestive heart failure), NYHA class I, acute on chronic, combined ([facility]) 03/16/2025    Acute exacerbation of chronic heart failure ([facility]) 03/16/2025    Anemia 02/28/2025    Acute on chronic clinical systolic heart failure ([facility]) 12/27/2024    Supratherapeutic INR 12/20/2023    History of mitral valve replacement with mechanical valve 12/20/2023    History of mechanical aortic valve replacement 12/20/2023    Chronic renal insufficiency, stage III (moderate) ([facility]) 12/20/2023    Acute on chronic congestive heart failure, unspecified heart failure type ([facility]) 12/15/2023    Acute on chronic systolic CHF (congestive heart failure) ([facility]) 12/06/2023    CAD (coronary artery disease) 09/02/2023    Cellulitis of right hand 09/01/2023    Thoracic ascending aortic aneurysm 02/01/2022    Moderate protein-calorie malnutrition 12/03/2021    Atrial fibrillation with RVR ([facility]) 11/28/2021    HFrEF (heart failure with reduced ejection fraction) ([facility]) 11/28/2021    Pulmonary edema 11/28/2021    Bilateral carotid artery stenosis 05/11/2021    Abrasion 02/22/2021    Coagulopathy 02/22/2021    Absolute anemia 02/22/2021    Red blood cell antibody positive 06/25/2019     Overview Note:     \"The patient demonstrates red blood cell antibody (Antibody ID: Anti-[place]) in pre-transfusion testing.   Antigen-negative red blood cells are required for transfusion.  Slight/minimal delay in finding compatible red blood cell units for transfusion.        Herpes zoster  Fall with Harm Risk

## 2025-05-29 ENCOUNTER — HOSPITAL ENCOUNTER (OUTPATIENT)
Dept: OTHER | Age: 80
Setting detail: THERAPIES SERIES
Discharge: HOME | End: 2025-05-29
Payer: MEDICARE

## 2025-05-29 VITALS
OXYGEN SATURATION: 95 % | RESPIRATION RATE: 16 BRPM | WEIGHT: 97 LBS | BODY MASS INDEX: 18.33 KG/M2 | DIASTOLIC BLOOD PRESSURE: 50 MMHG | HEART RATE: 80 BPM | SYSTOLIC BLOOD PRESSURE: 90 MMHG

## 2025-05-29 LAB
ANION GAP SERPL CALCULATED.3IONS-SCNC: 12 MMOL/L (ref 7–16)
BNP SERPL-MCNC: ABNORMAL PG/ML (ref 0–450)
BUN SERPL-MCNC: 35 MG/DL (ref 6–23)
CALCIUM SERPL-MCNC: 10.2 MG/DL (ref 8.6–10.2)
CHLORIDE SERPL-SCNC: 94 MMOL/L (ref 98–107)
CO2 SERPL-SCNC: 33 MMOL/L (ref 22–29)
CREAT SERPL-MCNC: 1.4 MG/DL (ref 0.5–1)
GFR, ESTIMATED: 39 ML/MIN/1.73M2
GLUCOSE SERPL-MCNC: 87 MG/DL (ref 74–99)
POTASSIUM SERPL-SCNC: 3.2 MMOL/L (ref 3.5–5)
SODIUM SERPL-SCNC: 139 MMOL/L (ref 132–146)

## 2025-05-29 PROCEDURE — 36415 COLL VENOUS BLD VENIPUNCTURE: CPT

## 2025-05-29 PROCEDURE — 6360000002 HC RX W HCPCS

## 2025-05-29 PROCEDURE — 96374 THER/PROPH/DIAG INJ IV PUSH: CPT

## 2025-05-29 PROCEDURE — 99214 OFFICE O/P EST MOD 30 MIN: CPT

## 2025-05-29 PROCEDURE — 80048 BASIC METABOLIC PNL TOTAL CA: CPT

## 2025-05-29 PROCEDURE — 83880 ASSAY OF NATRIURETIC PEPTIDE: CPT

## 2025-05-29 PROCEDURE — 2500000003 HC RX 250 WO HCPCS: Performed by: INTERNAL MEDICINE

## 2025-05-29 RX ORDER — BUMETANIDE 0.25 MG/ML
2 INJECTION, SOLUTION INTRAMUSCULAR; INTRAVENOUS ONCE
Status: DISCONTINUED | OUTPATIENT
Start: 2025-05-29 | End: 2025-05-30 | Stop reason: HOSPADM

## 2025-05-29 RX ORDER — BUMETANIDE 0.25 MG/ML
INJECTION, SOLUTION INTRAMUSCULAR; INTRAVENOUS
Status: COMPLETED
Start: 2025-05-29 | End: 2025-05-29

## 2025-05-29 RX ORDER — SODIUM CHLORIDE 0.9 % (FLUSH) 0.9 %
10 SYRINGE (ML) INJECTION ONCE
Status: COMPLETED | OUTPATIENT
Start: 2025-05-29 | End: 2025-05-29

## 2025-05-29 RX ADMIN — BUMETANIDE 2 MG: 0.25 INJECTION INTRAMUSCULAR; INTRAVENOUS at 09:20

## 2025-05-29 RX ADMIN — SODIUM CHLORIDE, PRESERVATIVE FREE 10 ML: 5 INJECTION INTRAVENOUS at 09:18

## 2025-05-29 NOTE — PROGRESS NOTES
SOB. Patient continues to weigh daily and weights are stable Patient continues with 2000mg low sodium diet and hydrates with approx 48 fld ounces enc to hydrate with 64 fld ounces and educated on importance and verbalizes understanding. Patient enc to elevate legs as much as possible and states she does, compression hose are on.Dr Fajardo started patient Verquvo 2.5mg daily.      I spoke to Deb jody Swann office at 1153am and notified of Potassium 3.2 and patient is not on supplement ,labs faxed with confirmation and she will notify Dr Fajardo. Donna from Dr Swann office notified CHF clinic that Dr Fajardo ordered patient Aldactone 12.5mg daily.                [x]Lab work obtained    [x] Patient/Family Educated On:  [x] HF zones (Green, Yellow, Red) and aware of when to take action   [x] Daily weights  [] Scale provided   [x] Low sodium diet (2000 mg)  Barriers to compliance  [] Refuses to monitor diet  [] Socioeconomic difficulties  [] Unable to cook for self (use of frozen meals, can goods, etc)  [] CHF CHW consulted  [] Low sodium meal delivery options given to patient  [] Dietician consulted   [] Low sodium recipes provided  [] Sodium free seasoning provided   [x] Fluid intake 6-8 cups (around 64 oz)  [x] Reviewed currently prescribed medications with patient, educated on importance of compliance and answered any questions regarding their medication  [] Pill box provided to patient  [] Patient using pill packing pharmacy   [] CPAP/BiPAP use  [] Low impact exercise / cardiac rehab   [] LifeVest use  [x] Patient aware of signs and symptoms of worsening HF, CHF clinic phone number provided and made aware to call clinic for sooner if evaluation if needed     [] Difficulty affording medications  [] CHF CHW consulted  [] Prescription assistance information given   [] [facility] medication assistance program information given   [] Sample medications provided to patient to help bridge gap until

## 2025-06-09 ENCOUNTER — TELEPHONE (OUTPATIENT)
Age: 80
End: 2025-06-09

## 2025-06-09 NOTE — TELEPHONE ENCOUNTER
Patient spoke with Nurse Rosario, instructed to call patient's cardiologist Dr. Fajardo's office. Patient reported this office is closed today. Nurse instructed patient's  that patient must go the Emergency with that high of a heart rate.

## 2025-06-09 NOTE — TELEPHONE ENCOUNTER
Patient reports that pulse on the pulse monitor has been running from 100-141 at the highest over the last 3 days.

## 2025-06-10 ENCOUNTER — HOSPITAL ENCOUNTER (EMERGENCY)
Age: 80
Discharge: ANOTHER HOSPITAL | End: 2025-06-11
Attending: EMERGENCY MEDICINE
Payer: MEDICARE

## 2025-06-10 DIAGNOSIS — R79.89 ELEVATED TROPONIN: ICD-10-CM

## 2025-06-10 DIAGNOSIS — R00.2 PALPITATIONS: Primary | ICD-10-CM

## 2025-06-10 DIAGNOSIS — I50.9 CONGESTIVE HEART FAILURE, UNSPECIFIED HF CHRONICITY, UNSPECIFIED HEART FAILURE TYPE (HCC): ICD-10-CM

## 2025-06-10 DIAGNOSIS — R09.02 HYPOXEMIA: ICD-10-CM

## 2025-06-10 PROCEDURE — 85027 COMPLETE CBC AUTOMATED: CPT

## 2025-06-10 PROCEDURE — 82150 ASSAY OF AMYLASE: CPT

## 2025-06-10 PROCEDURE — 93005 ELECTROCARDIOGRAM TRACING: CPT | Performed by: EMERGENCY MEDICINE

## 2025-06-10 PROCEDURE — 83735 ASSAY OF MAGNESIUM: CPT

## 2025-06-10 PROCEDURE — 83880 ASSAY OF NATRIURETIC PEPTIDE: CPT

## 2025-06-10 PROCEDURE — 99285 EMERGENCY DEPT VISIT HI MDM: CPT

## 2025-06-10 PROCEDURE — 84484 ASSAY OF TROPONIN QUANT: CPT

## 2025-06-10 PROCEDURE — 87636 SARSCOV2 & INF A&B AMP PRB: CPT

## 2025-06-10 PROCEDURE — 83605 ASSAY OF LACTIC ACID: CPT

## 2025-06-10 PROCEDURE — 82550 ASSAY OF CK (CPK): CPT

## 2025-06-10 PROCEDURE — 80053 COMPREHEN METABOLIC PANEL: CPT

## 2025-06-10 PROCEDURE — 96374 THER/PROPH/DIAG INJ IV PUSH: CPT

## 2025-06-10 PROCEDURE — 84443 ASSAY THYROID STIM HORMONE: CPT

## 2025-06-10 PROCEDURE — 84439 ASSAY OF FREE THYROXINE: CPT

## 2025-06-10 PROCEDURE — 85379 FIBRIN DEGRADATION QUANT: CPT

## 2025-06-10 PROCEDURE — 83690 ASSAY OF LIPASE: CPT

## 2025-06-10 PROCEDURE — 85610 PROTHROMBIN TIME: CPT

## 2025-06-10 PROCEDURE — 82248 BILIRUBIN DIRECT: CPT

## 2025-06-10 RX ORDER — 0.9 % SODIUM CHLORIDE 0.9 %
500 INTRAVENOUS SOLUTION INTRAVENOUS ONCE
Status: COMPLETED | OUTPATIENT
Start: 2025-06-10 | End: 2025-06-11

## 2025-06-10 ASSESSMENT — PAIN - FUNCTIONAL ASSESSMENT: PAIN_FUNCTIONAL_ASSESSMENT: 0-10

## 2025-06-11 ENCOUNTER — APPOINTMENT (OUTPATIENT)
Dept: GENERAL RADIOLOGY | Age: 80
End: 2025-06-11
Payer: MEDICARE

## 2025-06-11 VITALS
DIASTOLIC BLOOD PRESSURE: 63 MMHG | HEART RATE: 99 BPM | SYSTOLIC BLOOD PRESSURE: 101 MMHG | OXYGEN SATURATION: 96 % | TEMPERATURE: 98 F | RESPIRATION RATE: 20 BRPM

## 2025-06-11 PROBLEM — I50.9 CONGESTIVE HEART FAILURE OF UNKNOWN ETIOLOGY (HCC): Status: ACTIVE | Noted: 2025-01-01

## 2025-06-11 PROBLEM — R09.02 HYPOXEMIA: Status: ACTIVE | Noted: 2025-01-01

## 2025-06-11 PROBLEM — R00.2 PALPITATIONS: Status: ACTIVE | Noted: 2025-01-01

## 2025-06-11 LAB
ALBUMIN SERPL-MCNC: 3.8 G/DL (ref 3.5–5.2)
ALP SERPL-CCNC: 70 U/L (ref 35–104)
ALT SERPL-CCNC: <5 U/L (ref 0–32)
AMYLASE SERPL-CCNC: 44 U/L (ref 20–100)
ANION GAP SERPL CALCULATED.3IONS-SCNC: 13 MMOL/L (ref 7–16)
AST SERPL-CCNC: 37 U/L (ref 0–31)
BILIRUB DIRECT SERPL-MCNC: 0.4 MG/DL (ref 0–0.3)
BILIRUB INDIRECT SERPL-MCNC: 0.8 MG/DL (ref 0–1)
BILIRUB SERPL-MCNC: 1.2 MG/DL (ref 0–1.2)
BNP SERPL-MCNC: ABNORMAL PG/ML (ref 0–450)
BUN SERPL-MCNC: 36 MG/DL (ref 6–23)
CALCIUM SERPL-MCNC: 8.9 MG/DL (ref 8.6–10.2)
CHLORIDE SERPL-SCNC: 89 MMOL/L (ref 98–107)
CK SERPL-CCNC: 77 U/L (ref 20–180)
CO2 SERPL-SCNC: 32 MMOL/L (ref 22–29)
CREAT SERPL-MCNC: 1.5 MG/DL (ref 0.5–1)
D-DIMER QUANTITATIVE: <200 NG/ML DDU (ref 0–230)
EKG ATRIAL RATE: 70 BPM
EKG P AXIS: 252 DEGREES
EKG Q-T INTERVAL: 390 MS
EKG QRS DURATION: 158 MS
EKG QTC CALCULATION (BAZETT): 544 MS
EKG R AXIS: 268 DEGREES
EKG T AXIS: 118 DEGREES
EKG VENTRICULAR RATE: 117 BPM
ERYTHROCYTE [DISTWIDTH] IN BLOOD BY AUTOMATED COUNT: 16 % (ref 11.5–15)
FLUAV RNA RESP QL NAA+PROBE: NOT DETECTED
FLUBV RNA RESP QL NAA+PROBE: NOT DETECTED
GFR, ESTIMATED: 34 ML/MIN/1.73M2
GLUCOSE SERPL-MCNC: 98 MG/DL (ref 74–99)
HCT VFR BLD AUTO: 30.9 % (ref 34–48)
HGB BLD-MCNC: 9.7 G/DL (ref 11.5–15.5)
INR PPP: 2.4
LACTATE BLDV-SCNC: 1.2 MMOL/L (ref 0.5–2.2)
LIPASE SERPL-CCNC: 20 U/L (ref 13–60)
MAGNESIUM SERPL-MCNC: 1.6 MG/DL (ref 1.6–2.6)
MCH RBC QN AUTO: 27.4 PG (ref 26–35)
MCHC RBC AUTO-ENTMCNC: 31.4 G/DL (ref 32–34.5)
MCV RBC AUTO: 87.3 FL (ref 80–99.9)
PLATELET # BLD AUTO: 166 K/UL (ref 130–450)
PMV BLD AUTO: 12.3 FL (ref 7–12)
POTASSIUM SERPL-SCNC: 3.5 MMOL/L (ref 3.5–5)
PROT SERPL-MCNC: 7.4 G/DL (ref 6.4–8.3)
PROTHROMBIN TIME: 26 SEC (ref 9.3–12.4)
RBC # BLD AUTO: 3.54 M/UL (ref 3.5–5.5)
SARS-COV-2 RNA RESP QL NAA+PROBE: NOT DETECTED
SODIUM SERPL-SCNC: 134 MMOL/L (ref 132–146)
SOURCE: NORMAL
SPECIMEN DESCRIPTION: NORMAL
T4 FREE SERPL-MCNC: 1.3 NG/DL (ref 0.9–1.7)
TROPONIN I SERPL HS-MCNC: 70 NG/L (ref 0–14)
TROPONIN I SERPL HS-MCNC: 76 NG/L (ref 0–14)
TSH SERPL DL<=0.05 MIU/L-ACNC: 22.7 UIU/ML (ref 0.27–4.2)
WBC OTHER # BLD: 4.4 K/UL (ref 4.5–11.5)

## 2025-06-11 PROCEDURE — 93010 ELECTROCARDIOGRAM REPORT: CPT | Performed by: INTERNAL MEDICINE

## 2025-06-11 PROCEDURE — 96374 THER/PROPH/DIAG INJ IV PUSH: CPT

## 2025-06-11 PROCEDURE — 84484 ASSAY OF TROPONIN QUANT: CPT

## 2025-06-11 PROCEDURE — 2580000003 HC RX 258: Performed by: EMERGENCY MEDICINE

## 2025-06-11 PROCEDURE — 6360000002 HC RX W HCPCS: Performed by: EMERGENCY MEDICINE

## 2025-06-11 PROCEDURE — 71045 X-RAY EXAM CHEST 1 VIEW: CPT

## 2025-06-11 RX ORDER — FUROSEMIDE 10 MG/ML
10 INJECTION INTRAMUSCULAR; INTRAVENOUS ONCE
Status: COMPLETED | OUTPATIENT
Start: 2025-06-11 | End: 2025-06-11

## 2025-06-11 RX ORDER — DILTIAZEM HYDROCHLORIDE 5 MG/ML
5 INJECTION INTRAVENOUS ONCE
Status: DISCONTINUED | OUTPATIENT
Start: 2025-06-11 | End: 2025-06-11 | Stop reason: HOSPADM

## 2025-06-11 RX ADMIN — FUROSEMIDE 10 MG: 10 INJECTION, SOLUTION INTRAMUSCULAR; INTRAVENOUS at 01:50

## 2025-06-11 RX ADMIN — SODIUM CHLORIDE 500 ML: 0.9 INJECTION, SOLUTION INTRAVENOUS at 00:47

## 2025-06-11 NOTE — PROGRESS NOTES
Pharmacy Consultation Note  (Warfarin Dosing and Monitoring)    Initial consult date: 6/11/2025  Consulting Provider: SOCORRO Hinds-MELISSA    Josefina Garcia is a 80 y.o. female for whom pharmacy has been asked to manage warfarin therapy.     Weight:   Wt Readings from Last 1 Encounters:   06/11/25 45.4 kg (100 lb)       TSH:    Lab Results   Component Value Date/Time    TSH 22.70 06/10/2025 11:15 PM       Hepatic Function Panel:                        Lab Results   Component Value Date/Time    ALKPHOS 75 06/11/2025 06:42 AM    ALT 11 06/11/2025 06:42 AM    AST 30 06/11/2025 06:42 AM    BILITOT 1.1 06/11/2025 06:42 AM    BILIDIR 0.4 06/10/2025 11:15 PM    IBILI 0.8 06/10/2025 11:15 PM       Current significant warfarin drug-drug interactions include: No significant interactions    Recent Labs     06/10/25  2315   HGB 9.7*        Date Warfarin Dose INR Heparin or LMWH Comment   6/11 2 mg 2.4 --                                  Assessment:  Patient is a 80 y.o. female on warfarin for Atrial Fibrillation, Mechanical Heart Valve (mitral), and Mechanical Heart Valve (atrial).  Patient's home warfarin dosing regimen is documented as warfarin 2 mg every evening.   Goal INR 2.5 - 3.5  INR 2.4 today    Plan:  Will give warfarin 2 mg tonight  Daily PT/INR until the INR is stable within the therapeutic range  Pharmacist will follow and monitor/adjust dosing as necessary    Douglas Romero, PharmD, BCCCP  6/11/2025  1:42 PM    JUAN: [phone]  SEY: [phone]  SJW: [phone]

## 2025-06-11 NOTE — CONSULTS
[facility]   Inpatient CHF Nurse Navigator Consult    Cardiologist: Dr Fajardo   Primary Care Physician: Teofilo Dutton Jr., MD Geraldmathieu Garcia is a 80 y.o. (1945) female with a history of HFrEF, most recent EF:  Lab Results   Component Value Date    LVEF 32 05/10/2022       Patient was awake and alert, laying in bed during the consultation and is agreeable to heart failure education. She was engaged and asked appropriate questions throughout the education session. Her  asked some questions about when to call the CHF clinic and those questions were answered.     Barriers identified during consult contributing to HF Hospitalization: none.     [] Limited medication adherence   [] Poor health literacy, education regarding HF medications provided   [] Pill box provided to patient  [] Difficulty affording medications  [] Difficulty obtaining/ managing medications  [] Prescription assistance information given     [] Not weighing themselves daily  [x] Weight log provided for easy monitoring  [] Scale provided     [] Not following low sodium diet  [] Food insecurity   [x] 2 gram sodium diet education provided   [] Low sodium recipes provided  [] Sodium free seasoning provided   [] Low sodium meal delivery options given to patient  [] Dietician consulted     [] Lack of transportation to appointments     [] Depression, given chronic illness  [] Primary team notified     [] Goals of care need addressed  [] Palliative care consulted     [] CHF community health worker Pinky Meredith consulted [phone], to assist with         Chart Reviewed:  Diet: ADULT DIET; Regular   Daily Weights: Patient Vitals for the past 96 hrs (Last 3 readings):   Weight   06/11/25 0401 45.4 kg (100 lb)     I/O: No intake or output data in the 24 hours ending 06/11/25 1020    [] Nursing staff/manager notified of inaccurate tompkins weights or I/O    Patient Education:  Self

## 2025-06-11 NOTE — CARE COORDINATION
Internal Medicine On-call Care Coordination Note    I was called by the ED physician because they recommended admission for this patient and we cover their PCP.  The history as I understand it after discussion with the ED physician is as follows:    Presents with shortness of breath, palpitations  Hx afib on coumadin, pacemaker  Hypoxic requiring 2LNC    I placed admission orders.  Including:    General admission orders  Reconciled home meds  Cardiology consult    Dr. Lam or our coverage will see the patient tomorrow for H&P.    Electronically signed by SOCORRO Hinds CNP on 6/11/2025 at 4:16 AM

## 2025-06-11 NOTE — RT PROTOCOL NOTE
RT Nebulizer Bronchodilator Protocol Note    There is a bronchodilator order in the chart from a provider indicating to follow the RT Bronchodilator Protocol and there is an “Initiate RT Bronchodilator Protocol” order as well (see protocol at bottom of note).    CXR Findings:  XR CHEST PORTABLE  Result Date: 6/11/2025  1. No acute cardiopulmonary process. 2. Unchanged cardiomegaly.       The findings from the last RT Protocol Assessment were as follows:  Smoking: Smoker 15 pack years or more  Respiratory Pattern: Regular pattern and RR 12-20 bpm  Breath Sounds: Clear breath sounds  Cough: Strong, spontaneous, non-productive  Indication for Bronchodilator Therapy:    Bronchodilator Assessment Score: 1    Aerosolized bronchodilator medication orders have been revised according to the RT Nebulizer Bronchodilator Protocol below.    Respiratory Therapist to perform RT Therapy Protocol Assessment initially then follow the protocol.  Repeat RT Therapy Protocol Assessment PRN for score 0-3 or on second treatment, BID, and PRN for scores above 3.    No Indications - adjust the frequency to every 6 hours PRN wheezing or bronchospasm, if no treatments needed after 48 hours then discontinue using Per Protocol order mode.     If indication present, adjust the RT bronchodilator orders based on the Bronchodilator Assessment Score as indicated below.  If a patient is on this medication at home then do not decrease Frequency below that used at home.    0-3 - enter or revise RT bronchodilator order(s) to equivalent RT Bronchodilator order with Frequency of every 4 hours PRN for wheezing or increased work of breathing using Per Protocol order mode.       4-6 - enter or revise RT Bronchodilator order(s) to two equivalent RT bronchodilator orders with one order with BID Frequency and one order with Frequency of every 4 hours PRN wheezing or increased work of breathing using Per Protocol order mode.         7-10 - enter or revise RT

## 2025-06-11 NOTE — ED PROVIDER NOTES
HPI:  6/11/25, Time: 1:40 AM EDT         Josefina Garcia is a 80 y.o. female presenting to the ED for patient came in for for for some shortness of breath palpitations she has a pacemaker which she was had some runs of what looks like atrial fibs which she has a history of or some kind of wide-complex tachycardia she did not get have any cardioversion her blood pressures is a little on the lower and 95/59 initially I gave her fluids because she was hypotensive and tachycardic then I see that she is in congestive heart failure I did give her 10 the Lasix she was actually stable she does have JVD she has bilateral crackles she has never had a heart attack before but she has been in atrial fibs and she is on Coumadin she is also been in congestive heart failure before, beginning days ago.  The complaint has been persistent, moderate in severity, and worsened by nothing.  She is not normally on oxygen but she was in her 80s and we put her on 2 L of oxygen which got her into the 90s 96%    ROS:   Pertinent positives and negatives are stated within HPI, all other systems reviewed and are negative.  --------------------------------------------- PAST HISTORY ---------------------------------------------  Past Medical History:  has a past medical history of A-fib (HCC), Acute exacerbation of CHF (congestive heart failure) (HCC), Acute renal failure, Aneurysm, Aneurysm of ascending aorta without rupture, CAD (coronary artery disease), CRF (chronic renal failure), Epistaxis, Gastrointestinal bleeding, lower, H/O anemia of chronic disorder, Hypertension, and Thyroid disease.    Past Surgical History:  has a past surgical history that includes Cardiac surgery; Aortic valvuloplasty; Mitral valvuloplasty; Tricuspid valvuloplasty; Colonoscopy; Upper gastrointestinal endoscopy; Cardiac defibrillator placement (Left, 05/09/2022); hernia repair (Right, 01/05/2023); Hernia mesh removal; Colonoscopy (04/23/2024); Upper  gastrointestinal endoscopy (04/23/2024); and Upper gastrointestinal endoscopy (N/A, 03/03/2025).    Social History:  reports that she quit smoking about 24 years ago. Her smoking use included cigarettes. She started smoking about 49 years ago. She has a 12.5 pack-year smoking history. She has never used smokeless tobacco. She reports that she does not drink alcohol and does not use drugs.    Family History: family history includes Cancer in her brother, father, and sister.     The patient’s home medications have been reviewed.    Allergies: Iodine and Quinidine    ---------------------------------------------------PHYSICAL EXAM--------------------------------------    Constitutional/General: Alert and oriented x3, well appearing, non toxic in NAD  Head: Normocephalic and atraumatic  Eyes: PERRL, EOMI  Mouth: Oropharynx clear, handling secretions, no trismus  Neck: Supple, full ROM, non tender to palpation in the midline, no stridor, no crepitus, no meningeal signs positive JVD  Pulmonary: Lungs  positive  cracklesclear to auscultation bilaterally, no wheezes, rales, or rhonchi. Not in respiratory distress  Cardiovascular:  Regular rate. Regular rhythm. No murmurs, gallops, or rubs. 2+ distal pulses  Chest: no chest wall tenderness  Abdomen: Soft.  Non tender. Non distended.  +BS.  No rebound, guarding, or rigidity. No pulsatile masses appreciated.  Musculoskeletal: Moves all extremities x 4. Warm and well perfused, no clubbing, cyanosis, positive edema. Capillary refill <3 seconds  Skin: warm and dry. No rashes.   Neurologic: GCS 15, CN 2-12 grossly intact, no focal deficits, symmetric strength 5/5 in the upper and lower extremities bilaterally  Psych: Normal Affect    -------------------------------------------------- RESULTS -------------------------------------------------  I have personally reviewed all laboratory and imaging results for this patient. Results are listed below.     LABS:  Results for orders

## 2025-06-11 NOTE — H&P
Internal Medicine History & Physical     Name: Josefina Garcia  : 1945  Chief Complaint: No chief complaint on file.  Primary Care Physician: Teofilo Dutton Jr., MD  Admission date: 2025  Date of service: 2025     History of Present Illness  Josefina is a 80 y.o. year old female.  Patient states that she came in because she has been having increasing shortness of breath with ambulation and cough for the last 4 to 5 days.  States things been getting worse.  No changes in medication.  No changes in activity.  States that she is not needing oxygen since she got here.  She states that she has had no sick contacts.  No recent travel.  She states that her heart rate has been going up, especially at night into the 140s.  Currently, she states that she is just feeling down and miserable.  Denies any fevers, chills, headache.  Admits to this cough that has been nonproductive at times and at times productive.  Appetite has been down over the last 4 or 5 days.  She denies any falls, injuries, trauma.  She also states her lower extremities are mildly swollen compared to usual.  No dysuria, hematuria, chest pain, nausea, vomiting, headache, vision or hearing changes, constipation, diarrhea.    Patient states nothing is making her feel better at home.  Was taking her medications as ordered.  She last saw her electrophysiologist 1 month ago and was told everything was going well at that time.      ED course:   Initial blood work and imaging studies performed. Admission recommended by ED physician. Case was discussed with ED provider. Meds in ED consisted of the following:  Medications   allopurinol (ZYLOPRIM) tablet 100 mg (100 mg Oral Given 25)   bumetanide (BUMEX) tablet 2 mg (2 mg Oral Given 25)   ferrous sulfate (IRON 325) tablet 325 mg (325 mg Oral Given 25)   folic acid (FOLVITE) tablet 1 mg (1 mg Oral Given 25)   levothyroxine (SYNTHROID) tablet 100 mcg (100  chills, fatigue, generalized weakness, diaphoresis, increase in thirst, loss of appetite  Eyes: vision change   Ears, Nose, Mouth, Throat: hearing loss, nasal congestion, sores in the mouth  Cardiovascular: chest pain, chest heaviness, palpitations  Respiratory: shortness of breath, wheezing, coughing  Gastrointestinal: abdominal pain, nausea, vomiting, diarrhea, constipation, melena, hematochezia, hematemesis  Genitourinary: dysuria, hematuria, increased frequency  Musculoskeletal: lower extremity edema, myalgias, arthralgias, back pain  Integumentary: rashes, itching   Neurological: headache, lightheadedness, dizziness, confusion, syncope, numbness, tingling, focal weakness  Psychiatric: depression, suicidal ideation, anxiety  Endocrine: unintentional weight change  Hematologic/Lymphatic: lymphadenopathy, easy bruising, easy bleeding   Allergic/Immunologic: recurrent infections      Objective  VITALS:  /78   Pulse (!) 117   Temp 97.2 °F (36.2 °C) (Oral)   Resp 18   Ht 1.549 m (5' 1\")   Wt 45.4 kg (100 lb)   SpO2 100%   BMI 18.89 kg/m²     Physical Exam:   General: awake, alert, oriented to person, place, time, and purpose, appears stated age, cooperative, no acute distress, pleasant, appropriate mood  Eyes: conjunctivae/corneas clear, sclera non icteric, EOMI  Ears: no obvious scars, no lesions, no masses, hearing intact  Mouth: mucous membranes moist, no obvious oral sores  Head: normocephalic, atraumatic  Neck: no JVD, no adenopathy, no thyromegaly, neck is supple, trachea is midline  Back: ROM normal, no CVA tenderness.  Chest: no pain on palpation  Lungs: Rhonchorous breath sounds throughout anterior posterior lung fields, mild increased work of breathing at rest, on 2 L/min of O2, occasional wheeze  Heart: Irregular/irregular, no murmur, normal S1, S2  Abdomen: soft, non-tender; bowel sounds normal; no masses, no organomegaly  : Deferred   Extremities: 1+ lower extremity edema, extremities

## 2025-06-11 NOTE — ACP (ADVANCE CARE PLANNING)
Advance Care Planning   Healthcare Decision Maker:    Primary Decision Maker: GarciaGagandeep - [facility] - [phone]    Click here to complete Healthcare Decision Makers including selection of the Healthcare Decision Maker Relationship (ie \"Primary\").

## 2025-06-11 NOTE — PROGRESS NOTES
Spiritual Health History and Assessment/Progress Note  [facility]    Initial Encounter, Attempted Encounter,  ,  ,      Name: Josefina Garcia MRN: [id number]    Age: 80 y.o.     Sex: female   Language: English   Confucianist: Mu-ism   Congestive heart failure of unknown etiology (HCC)     Date: 6/11/2025                           Spiritual Assessment began in [address] MED SURG        Referral/Consult From: Rounding   Encounter Overview/Reason: Initial Encounter, Attempted Encounter  Service Provided For: Patient    Imani, Belief, Meaning:   Patient is connected with a imani tradition or spiritual practice  Family/Friends No family/friends present      Importance and Influence:  Patient unable to assess at this time  Family/Friends No family/friends present    Community:  Patient feels well-supported. Support system includes: Spouse/Partner and Children  Family/Friends No family/friends present    Assessment and Plan of Care:     Patient Interventions include: Other: Patient at rest and appears peaceful, did not disturb. Prayer offered for the patient.  Family/Friends Interventions include: No family/friends present    Patient Plan of Care: Spiritual Care available upon further referral  Family/Friends Plan of Care: No family/friends present    Electronically signed by Chaplain Colleen on 6/11/2025 at 11:40 AM

## 2025-06-11 NOTE — PLAN OF CARE
Problem: Chronic Conditions and Co-morbidities  Goal: Patient's chronic conditions and co-morbidity symptoms are monitored and maintained or improved  6/11/2025 0449 by Gautam Yuen RN  Outcome: Progressing  6/11/2025 0449 by Gautam Yuen RN  Outcome: Progressing     Problem: Discharge Planning  Goal: Discharge to home or other facility with appropriate resources  6/11/2025 0449 by Gautam Yuen RN  Outcome: Progressing  6/11/2025 0449 by Gautam Yuen RN  Outcome: Progressing

## 2025-06-11 NOTE — CARE COORDINATION
Social work / Discharge planning:           Patient is independent from home with spouse.     Patient is requiring 2 liters of oxygen which she does not have at home.    Patient has a home nebulizer.    Cardiology consulted.   Electronically signed by LISSETH Schwartz on 6/11/2025 at 9:43 AM

## 2025-06-11 NOTE — PROGRESS NOTES
4 Eyes Skin Assessment     NAME:  Josefina Garcia  YOB: 1945  MEDICAL RECORD NUMBER:  [id number]    The patient is being assessed for  Admission    I agree that at least one RN has performed a thorough Head to Toe Skin Assessment on the patient. ALL assessment sites listed below have been assessed.      Areas assessed by both nurses:    Head, Face, Ears, Shoulders, Back, Chest, Arms, Elbows, Hands, Sacrum. Buttock, Coccyx, Ischium, Legs. Feet and Heels, and Under Medical Devices         Does the Patient have a Wound? No noted wound(s)       Abhi Prevention initiated by RN: No  Wound Care Orders initiated by RN: No    Pressure Injury (Stage 3,4, Unstageable, DTI, NWPT, and Complex wounds) if present, place Wound referral order by RN under : No    New Ostomies, if present place, Ostomy referral order under : No     Nurse 1 eSignature: Electronically signed by Gautam Yuen RN on 6/11/25 at 5:51 AM EDT    **SHARE this note so that the co-signing nurse can place an eSignature**    Nurse 2 eSignature: Electronically signed by Nimisha Campbell RN on 6/11/25 at 5:52 AM EDT

## 2025-06-12 PROBLEM — B34.8 PARAINFLUENZA: Status: ACTIVE | Noted: 2025-01-01

## 2025-06-12 NOTE — CONSULTS
CARDIOLOGY CONSULTATION    Patient Name:  Josefina Garcia    :  1945    Reason for Consultation:   Acute on chronic congestive heart failure; ischemic cardiomyopathy; tachycardia and increased shortness of breath and cough.    History of Present Illness:   Josefina Garcia returns to [facility] following history of having just been released from the hospital only a few weeks ago with exacerbation of heart failure.  She now notes over the past 1 week that she is coughing and wheezing much more so than she did in the past.  She denies any major change in medications.  She has been on guideline therapy for her heart failure but her left ventricular ejection fraction is merely 28%.  She denies any fever but is recently obtained a recurrent cough and while at home noted that her heart rate was increased and she just did not feel well and came back to the emergency room.  She is now admitted for further diagnostic testing and adjustment of her medical regimen.  Of note is that she follows at the [facility] for longstanding heart failure.  Past Medical History:   has a past medical history of A-fib ([facility]), Acute exacerbation of CHF (congestive heart failure) ([facility]), Acute renal failure, Aneurysm, Aneurysm of ascending aorta without rupture, CAD (coronary artery disease), CRF (chronic renal failure), Epistaxis, Gastrointestinal bleeding, lower, H/O anemia of chronic disorder, Hypertension, and Thyroid disease.    Surgical History:   has a past surgical history that includes Cardiac surgery; Aortic valvuloplasty; Mitral valvuloplasty; Tricuspid valvuloplasty; Colonoscopy; Upper gastrointestinal endoscopy; Cardiac defibrillator placement (Left, 2022); hernia repair (Right, 2023); Hernia mesh removal; Colonoscopy (2024); Upper gastrointestinal endoscopy (2024); and Upper gastrointestinal endoscopy (N/A, 2025).     Social History:   reports that she quit

## 2025-06-12 NOTE — PROGRESS NOTES
Internal Medicine Progress Note    Patient's name: Josefina Garcia  : 1945  Chief complaints (on day of admission): No chief complaint on file.  Admission date: 2025  Date of service: 2025   Room: [address] SURG  Primary care physician: Teofilo Dutton Jr., MD  Reason for visit: Follow-up for shortness of breath    Subjective  Josefina was seen and examined.  Starting to breathe a little better in her room.  Appetite is fair.  No bowel movement since arrival.  No chest pains.  Satting at 99% on 3 L at this time.    Review of Systems  There are no new complaints of chest pain, shortness of breath, abdominal pain, nausea, vomiting, diarrhea, constipation.    Hospital Medications  Current Facility-Administered Medications   Medication Dose Route Frequency Provider Last Rate Last Admin    allopurinol (ZYLOPRIM) tablet 100 mg  100 mg Oral Daily Isaura Guerin APRN - CNP   100 mg at 25 0829    bumetanide (BUMEX) tablet 2 mg  2 mg Oral Daily LeslyivIsaura pinzon APRN - CNP   2 mg at 25 1006    ferrous sulfate (IRON 325) tablet 325 mg  325 mg Oral BID Isaura Guerin APRN - CNP   325 mg at 25 0829    folic acid (FOLVITE) tablet 1 mg  1 mg Oral Daily LeslyivIsaura pinzon, APRN - CNP   1 mg at 25 0829    levothyroxine (SYNTHROID) tablet 100 mcg  100 mcg Oral Daily Isaura Guerin APRN - CNP   100 mcg at 25 0611    metOLazone (ZAROXOLYN) tablet 2.5 mg  2.5 mg Oral Once per day on  Isaura Guerin APRN - CNP   2.5 mg at 25 0929    metoprolol succinate (TOPROL XL) extended release tablet 25 mg  25 mg Oral Daily Isaura Guerin APRN - CNP   25 mg at 25 1006    midodrine (PROAMATINE) tablet 5 mg  5 mg Oral TID WC Isaura Guerin APRN - CNP   5 mg at 25 1158    vericiguat (VERQUVO) tablet 2.5 mg (Patient Supplied)  2.5 mg Oral Daily with breakfast Richy Olea MD   2.5 mg at 25 1004    sodium chloride flush 0.9 % injection  (5' 1\")   Wt 45.4 kg (100 lb)   SpO2 99%   BMI 18.89 kg/m²   I/O last 3 completed shifts:  In: 120 [P.O.:120]  Out: 800 [Urine:800]  I/O this shift:  In: 240 [P.O.:240]  Out: 1100 [Urine:1100]    Physical Exam:  General: AAO to person/place/time/purpose, NAD, no labored breathing  Eyes: conjunctivae/corneas clear, sclera non icteric  Skin: color/texture/turgor normal, no rashes or lesions  Lungs: Coarse rhonchi anteriorly and posteriorly with mild expiratory wheeze anteriorly, no retractions/use of accessory muscles, no vocal fremitus, no rhonchi, no crackle, no rales, currently on 3 L/min  Heart: Irregular/irregular, + murmur  Abdomen: soft, NT, bowel sounds normal  Extremities: atraumatic, no edema  Neurologic: cranial nerves 2-12 grossly intact, no slurred speech    Most Recent Labs  Lab Results   Component Value Date    WBC 4.4 (L) 06/12/2025    HGB 10.4 (L) 06/12/2025    HCT 33.7 (L) 06/12/2025     06/12/2025     06/12/2025    K 4.0 06/12/2025    CL 90 (L) 06/12/2025    CREATININE 1.5 (H) 06/12/2025    BUN 41 (H) 06/12/2025    CO2 29 06/12/2025    GLUCOSE 138 (H) 06/12/2025    ALT 20 06/12/2025    AST 53 (H) 06/12/2025    INR 3.8 06/12/2025    APTT 37.4 (H) 03/16/2025    TSH 22.70 (H) 06/10/2025    LABA1C 5.6 03/20/2024       XR CHEST PORTABLE   Final Result   1. No significant interval change.   2. Stable cardiomegaly cardiomegaly with mild pulmonary vascular congestion   and mild bibasilar pleural and parenchymal disease.               Assessment   Active Hospital Problems    Diagnosis     Hypoxemia [R09.02]     Chronic renal insufficiency, stage III (moderate) (HCC) [N18.30]     Moderate protein-calorie malnutrition [E44.0]     Atrial fibrillation with RVR ([facility]) [I48.91]     Acute on chronic systolic and diastolic heart failure, NYHA class 3 ([facility]) [I50.43]     Atrial fibrillation ([facility]) [I48.91]     Hypertension [I10]     Hypothyroid [E03.9]          Plan  Atrial fibrillation with rapid

## 2025-06-12 NOTE — PROGRESS NOTES
Pharmacy Consultation Note  (Warfarin Dosing and Monitoring)    Initial consult date: 6/11/2025  Consulting Provider: SOCORRO Hinds-MELISSA    Josefina Garcia is a 80 y.o. female for whom pharmacy has been asked to manage warfarin therapy.     Weight:   Wt Readings from Last 1 Encounters:   06/11/25 45.4 kg (100 lb)       TSH:    Lab Results   Component Value Date/Time    TSH 22.70 06/10/2025 11:15 PM       Hepatic Function Panel:                        Lab Results   Component Value Date/Time    ALKPHOS 78 06/12/2025 07:35 AM    ALT 20 06/12/2025 07:35 AM    AST 53 06/12/2025 07:35 AM    BILITOT 1.0 06/12/2025 07:35 AM    BILIDIR 0.4 06/10/2025 11:15 PM    IBILI 0.8 06/10/2025 11:15 PM       Current significant warfarin drug-drug interactions include:   -Allopurinol and amiodarone: may SIGNIFICANTLY increase anticoagulatory effects of warfarin  -Levothyroxine and prednisone: may increas anticoagulatory effects of warfarin    Recent Labs     06/10/25  2315 06/12/25  0735   HGB 9.7* 10.4*    176     Date Warfarin Dose INR Heparin or LMWH Comment   6/11 2 mg 2.4 --    6/12 NO DOSE 3.8 x                           Assessment:  Patient is a 80 y.o. female on warfarin for Atrial Fibrillation, Mechanical Heart Valve (mitral), and Mechanical Heart Valve (atrial).  Patient's home warfarin dosing regimen is documented as warfarin 2 mg every evening.   Goal INR 2.5 - 3.5  6/12  INR 3.8    Plan:  Given increase in INR from below therapeutic range yesterday, to now above therapeutic range today, will not order any warfarin tonight  No warfarin tonight.  Daily PT/INR until the INR is stable within the therapeutic range  Pharmacist will follow and monitor/adjust dosing as necessary    Dorian Mayo RPh  6/12/2025  12:37 PM    SEB: [phone]  SEY: [phone]  SJW: [phone]

## 2025-06-12 NOTE — PROGRESS NOTES
PROGRESS NOTE       PATIENT PROBLEM LIST:  Patient Active Problem List   Diagnosis Code    Acute kidney injury superimposed on CKD N17.9, N18.9    Hypertension I10    Hypothyroid E03.9    Valvular heart disease I38    CHF exacerbation ([facility]) I50.9    Atrial fibrillation ([facility]) I48.91    Herpes zoster B02.9    Acute on chronic systolic and diastolic heart failure, NYHA class 3 ([facility]) I50.43    Red blood cell antibody positive R76.8    Abrasion T14.8XXA    Coagulopathy D68.9    Absolute anemia D64.9    Bilateral carotid artery stenosis I65.23    Atrial fibrillation with RVR ([facility]) I48.91    HFrEF (heart failure with reduced ejection fraction) ([facility]) I50.20    Pulmonary edema J81.1    Moderate protein-calorie malnutrition E44.0    Thoracic ascending aortic aneurysm I71.21    Acute on chronic right-sided congestive heart failure ([facility]) I50.813    Cellulitis of right hand L03.113    CAD (coronary artery disease) I25.10    Acute on chronic systolic CHF (congestive heart failure) ([facility]) I50.23    Acute on chronic congestive heart failure, unspecified heart failure type ([facility]) I50.9    Supratherapeutic INR R79.1    History of mitral valve replacement with mechanical valve Z95.2    History of mechanical aortic valve replacement Z95.2    Chronic renal insufficiency, stage III (moderate) ([facility]) N18.30    Acute on chronic clinical systolic heart failure ([facility]) I50.23    Anemia D64.9    CHF (congestive heart failure), NYHA class I, acute on chronic, combined ([facility]) I50.43    Acute exacerbation of chronic heart failure ([facility]) I50.9    Congestive heart failure ([facility]) I50.9    Hypoxemia R09.02    Palpitations R00.2    Congestive heart failure of unknown etiology ([facility]) I50.9    Parainfluenza B34.8       SUBJECTIVE:  Josefina Garcia has just returned to her bed from going to the bathroom and has mild shortness of breath but mainly wheezing loudly.  She denies any lightheadedness but heart rate remains above 100/min.    REVIEW OF

## 2025-06-12 NOTE — PLAN OF CARE
Problem: Chronic Conditions and Co-morbidities  Goal: Patient's chronic conditions and co-morbidity symptoms are monitored and maintained or improved  6/12/2025 1031 by Dennise Painting RN  Outcome: Progressing  6/11/2025 2240 by Sumanth Faustin RN  Outcome: Progressing     Problem: Discharge Planning  Goal: Discharge to home or other facility with appropriate resources  6/12/2025 1031 by Dennise Painting RN  Outcome: Progressing  6/11/2025 2240 by Sumanth Faustin RN  Outcome: Progressing     Problem: Safety - Adult  Goal: Free from fall injury  6/12/2025 1031 by Dennise Painting RN  Outcome: Progressing  6/11/2025 2240 by Sumanth Faustin RN  Outcome: Progressing

## 2025-06-13 NOTE — PROGRESS NOTES
PROGRESS NOTE       PATIENT PROBLEM LIST:  Patient Active Problem List   Diagnosis Code    Acute kidney injury superimposed on CKD N17.9, N18.9    Hypertension I10    Hypothyroid E03.9    Valvular heart disease I38    CHF exacerbation ([facility]) I50.9    Atrial fibrillation ([facility]) I48.91    Herpes zoster B02.9    Acute on chronic systolic and diastolic heart failure, NYHA class 3 ([facility]) I50.43    Red blood cell antibody positive R76.8    Abrasion T14.8XXA    Coagulopathy D68.9    Absolute anemia D64.9    Bilateral carotid artery stenosis I65.23    Atrial fibrillation with RVR ([facility]) I48.91    HFrEF (heart failure with reduced ejection fraction) ([facility]) I50.20    Pulmonary edema J81.1    Moderate protein-calorie malnutrition E44.0    Thoracic ascending aortic aneurysm I71.21    Acute on chronic right-sided congestive heart failure ([facility]) I50.813    Cellulitis of right hand L03.113    CAD (coronary artery disease) I25.10    Acute on chronic systolic CHF (congestive heart failure) ([facility]) I50.23    Acute on chronic congestive heart failure, unspecified heart failure type ([facility]) I50.9    Supratherapeutic INR R79.1    History of mitral valve replacement with mechanical valve Z95.2    History of mechanical aortic valve replacement Z95.2    Chronic renal insufficiency, stage III (moderate) ([facility]) N18.30    Acute on chronic clinical systolic heart failure ([facility]) I50.23    Anemia D64.9    CHF (congestive heart failure), NYHA class I, acute on chronic, combined ([facility]) I50.43    Acute exacerbation of chronic heart failure ([facility]) I50.9    Congestive heart failure ([facility]) I50.9    Hypoxemia R09.02    Palpitations R00.2    Congestive heart failure of unknown etiology ([facility]) I50.9    Parainfluenza B34.8       SUBJECTIVE:  Josefina Garcia is sitting up in her bedside chair with her  present and notes that her breathing has improved although she is unable to expectorate despite her significant mucinous cough.  She denies any lightheadedness

## 2025-06-13 NOTE — CARE COORDINATION
Social work / Discharge planning:           Discharge plan is home when medically stable.    Discussed the need for pulse ox testing during IDR.   Electronically signed by LISSETH Schwartz on 6/13/2025 at 9:27 AM    68

## 2025-06-13 NOTE — PROGRESS NOTES
Pulse ox was 94 % on room air at rest.  Ambulated patient on room air.     Oxygen saturation was 90% on room air while ambulating.

## 2025-06-13 NOTE — PROGRESS NOTES
Internal Medicine Progress Note    Patient's name: Josefina Garcia  : 1945  Chief complaints (on day of admission): No chief complaint on file.  Admission date: 2025  Date of service: 2025   Room: [address] SURG  Primary care physician: Teofilo Dutton Jr., MD  Reason for visit: Follow-up for shortness of breath    Subjective  Josefina was seen and examined.  Patient is feeling stronger and better.  No fevers or chills.  Utilizing breathing treatment currently.  No nausea or vomiting.  Was taken off oxygen but desaturated to 86%.  Continue on 1 L/min for now.  Continue to wean off as tolerated.    Review of Systems  There are no new complaints of chest pain, shortness of breath, abdominal pain, nausea, vomiting, diarrhea, constipation.    Hospital Medications  Current Facility-Administered Medications   Medication Dose Route Frequency Provider Last Rate Last Admin    allopurinol (ZYLOPRIM) tablet 100 mg  100 mg Oral Daily Isaura Guerin APRN - CNP   100 mg at 25 0936    bumetanide (BUMEX) tablet 2 mg  2 mg Oral Daily LacivIsaura pinzon, APRN - CNP   2 mg at 25 1033    ferrous sulfate (IRON 325) tablet 325 mg  325 mg Oral BID Isaura Guerin APRN - CNP   325 mg at 25 0936    folic acid (FOLVITE) tablet 1 mg  1 mg Oral Daily LeslyivIsaura pinzon APRN - CNP   1 mg at 25 0936    levothyroxine (SYNTHROID) tablet 100 mcg  100 mcg Oral Daily Isaura Guerin APRN - CNP   100 mcg at 25 0623    metOLazone (ZAROXOLYN) tablet 2.5 mg  2.5 mg Oral Once per day on  Isaura Guerin APRN - CNP   2.5 mg at 25 1033    metoprolol succinate (TOPROL XL) extended release tablet 25 mg  25 mg Oral Daily Isaura Guerin APRN - CNP   25 mg at 25 1033    midodrine (PROAMATINE) tablet 5 mg  5 mg Oral TID WC Isaura Guerin APRN - CNP   5 mg at 25 1152    vericiguat (VERQUVO) tablet 2.5 mg (Patient Supplied)  2.5 mg Oral Daily with breakfast  Richy Olea MD   2.5 mg at 06/13/25 0804    sodium chloride flush 0.9 % injection 10 mL  10 mL IntraVENous 2 times per day Isaura Guerin APRN - CNP   10 mL at 06/13/25 0935    sodium chloride flush 0.9 % injection 10 mL  10 mL IntraVENous PRN Isaura Guerin APRN - CNP        0.9 % sodium chloride infusion   IntraVENous PRN Isaura Guerin APRN - CNP        senna (SENOKOT) tablet 8.6 mg  1 tablet Oral Daily PRN Isaura Guerin APRN - CNP        acetaminophen (TYLENOL) tablet 650 mg  650 mg Oral Q6H PRN Isaura Guerin APRN - CNP        Or    acetaminophen (TYLENOL) suppository 650 mg  650 mg Rectal Q6H PRN Isaura Guerin APRN - CNP        melatonin tablet 3 mg  3 mg Oral Nightly PRN Isaura Guerin APRN - CNP        prochlorperazine (COMPAZINE) tablet 10 mg  10 mg Oral Q8H PRN Isaura Guerin APRN - CNP        Or    prochlorperazine (COMPAZINE) injection 10 mg  10 mg IntraVENous Q6H PRN Isaura Guerin APRN - CNP   10 mg at 06/11/25 0715    ipratropium 0.5 mg-albuterol 2.5 mg (DUONEB) nebulizer solution 1 Dose  1 Dose Inhalation Q4H PRN Isaura Guerin APRN - CNP   1 Dose at 06/11/25 0956    warfarin placeholder: dosing by pharmacy   Oral RX Placeholder Isaura Guerin APRN - CNP        albuterol (PROVENTIL) (2.5 MG/3ML) 0.083% nebulizer solution 2.5 mg  2.5 mg Nebulization 4x Daily RT Richy Olea MD   2.5 mg at 06/13/25 1225    predniSONE (DELTASONE) tablet 20 mg  20 mg Oral Daily Richy Olea MD   20 mg at 06/13/25 0935    guaiFENesin (ROBITUSSIN) 100 MG/5ML liquid 200 mg  200 mg Oral Q4H PRN Rcihy Olea MD   200 mg at 06/13/25 1204    amiodarone (CORDARONE) tablet 200 mg  200 mg Oral Daily Rick Fajardo DO   200 mg at 06/13/25 1033       PRN Medications  sodium chloride flush, sodium chloride, senna, acetaminophen **OR** acetaminophen, melatonin, prochlorperazine **OR** prochlorperazine, ipratropium 0.5 mg-albuterol 2.5 mg, guaiFENesin    Objective  Most Recent Recorded

## 2025-06-13 NOTE — PROGRESS NOTES
Pharmacy Consultation Note  (Warfarin Dosing and Monitoring)    Initial consult date: 6/11/2025  Consulting Provider: Isaura Guerin APRN-MELISSA    Josefina Garcia is a 80 y.o. female for whom pharmacy has been asked to manage warfarin therapy.     Weight:   Wt Readings from Last 1 Encounters:   06/11/25 45.4 kg (100 lb)       TSH:    Lab Results   Component Value Date/Time    TSH 22.70 06/10/2025 11:15 PM       Hepatic Function Panel:                        Lab Results   Component Value Date/Time    ALKPHOS 76 06/13/2025 12:40 PM    ALT 20 06/13/2025 12:40 PM    AST 47 06/13/2025 12:40 PM    BILITOT 1.1 06/13/2025 12:40 PM    BILIDIR 0.4 06/10/2025 11:15 PM    IBILI 0.8 06/10/2025 11:15 PM       Current significant warfarin drug-drug interactions include:   -Allopurinol and amiodarone: may SIGNIFICANTLY increase anticoagulatory effects of warfarin  -Levothyroxine and prednisone: may increas anticoagulatory effects of warfarin    Recent Labs     06/10/25  2315 06/12/25  0735 06/13/25  1240   HGB 9.7* 10.4* 9.8*    176 202     Date Warfarin Dose INR Heparin or LMWH Comment   6/11 2 mg 2.4 --    6/12 NO DOSE 3.8 --    6/13 NO DOSE 4.4 --                    Assessment:  Patient is a 80 y.o. female on warfarin for Atrial Fibrillation, Mechanical Heart Valve (mitral), and Mechanical Heart Valve (atrial).  Patient's home warfarin dosing regimen is documented as warfarin 2 mg every evening.   Goal INR 2.5 - 3.5  INR 4.4 today    Plan:  Will hold warfarin tonight  Daily PT/INR until the INR is stable within the therapeutic range  Pharmacist will follow and monitor/adjust dosing as necessary    Douglas Romero, PharmD, [facility]  6/13/2025  1:43 PM    JUAN: [phone]  SEY: [phone]  SJW: [phone]

## 2025-06-13 NOTE — CARE COORDINATION
**ATTENTION BEDSIDE RN**    Please copy below template, fill in appropriate fields, and place in a progress note.    Testing MUST be completed within but no later than 48 hours of discharge.     Please ambulate patient for a MINIMUM of 6 minutes to ensure accuracy of test.      Pulse ox was _______ % on room air at rest.  Ambulated patient on room air.    Oxygen saturation was _______ % on room air while ambulating. (lowest saturation reached)  Oxygen applied.    Recovery pulse ox was _______% on ____ liters of oxygen while ambulating.

## 2025-06-13 NOTE — PLAN OF CARE
Problem: Chronic Conditions and Co-morbidities  Goal: Patient's chronic conditions and co-morbidity symptoms are monitored and maintained or improved  6/12/2025 2116 by Sumanth Faustin RN  Outcome: Progressing  6/12/2025 1031 by Dennise Painting RN  Outcome: Progressing     Problem: Discharge Planning  Goal: Discharge to home or other facility with appropriate resources  6/12/2025 2116 by Sumanth Faustin RN  Outcome: Progressing  6/12/2025 1031 by Dennise Painting RN  Outcome: Progressing     Problem: Safety - Adult  Goal: Free from fall injury  6/12/2025 2116 by Sumanth Faustin RN  Outcome: Progressing  6/12/2025 1031 by Dennise Painting RN  Outcome: Progressing

## 2025-06-14 NOTE — CONSULTS
Nephrology Consult  [facility]      CC:   Acute on chronic heart failure    Reason for Consult: JAIR    HPI:   The patient is an 80 year old female with a past medical history that includes CKD 3B, A-fib, CHF, CAD, hypertension, thyroid disease. She presented to the ED on 6/10 with c/o shortness of breath, cough and rapid heart rate as high as 140s.. SOB worsening over the past 4-5 days. Denies fever.  He labs on 6/10 were significant for BUN 36, Cr 1.5, GFR 34, hgb 9.7. CXR showed stable cardiomegaly with mild pulmonary vascular congestion and mild bibasilar pleural and parenchymal disease. VS were /63  Pulse 99  Temp 98 °F Resp 20  SpO2 96%. She was admitted for evaluation ans treatment of Atrial fibrillation with rapid ventricular response, acute on chronic systolic and diastolic heart failure and possible acute bronchitis.    We were consulted on 6/14 to see the patient for JAIR. The patient is known to our practice and follows with Dr. Box. Her baseline cr is 1.24 to 1.42. Her cr increased to 1.7 this morning. Upon assessment the patient reports she is feeling better, she is less short of breath. She has been eating and drinking normally. She denies nausea, vomiting, diarrhea. She denies dysuria or hematuria. Her  is present.     PMH:          Past Medical History:   Diagnosis Date    A-fib (HCC)     Acute exacerbation of CHF (congestive heart failure) ([facility]) 07/23/2015    alpha one neg PiM >34uM    Acute renal failure 10/27/2011    Aneurysm     thoracic aortic    Aneurysm of ascending aorta without rupture     stable ,small . follows with Dr Limon last seen 1/2024    CAD (coronary artery disease)     follows with Dr Fajardo    CRF (chronic renal failure)     Epistaxis 06/26/2014    Gastrointestinal bleeding, lower 09/23/2012    H/O anemia of chronic disorder 09/23/2012    Hypertension     Thyroid disease        Patient Active Problem List   Diagnosis    Acute kidney injury superimposed

## 2025-06-14 NOTE — CONSULTS
Pulmonary Consultation    Admit Date: 6/11/2025  Requesting Physician: Teofilo Dutton Jr., MD    Reason for consultation:  Bronchospasm  HPI:  The patient is a 80-year-old female who presents to the hospital with a several day history of increasing shortness of breath.  The patient was recently discharged from the hospital after prolonged hospitalization secondary to RSV.  She presents now with parainfluenza 3.  She notes no sick contacts but has been out and about in the community especially at doctors offices.  There has been no fever, chills or night sweats there is been no hemoptysis.  She has been markedly bronchospastic since admission.    She has followed up once in our office and pulmonary functions were to be obtained, the last that is posted from 10 years ago.  At that time, she had GOLD class 4 COPD.    PMH:    Past Medical History:   Diagnosis Date    A-fib (HCC)     Acute exacerbation of CHF (congestive heart failure) ([facility]) 07/23/2015    alpha one neg PiM >34uM    Acute renal failure 10/27/2011    Aneurysm     thoracic aortic    Aneurysm of ascending aorta without rupture     stable ,small . follows with Dr Limon last seen 1/2024    CAD (coronary artery disease)     follows with Dr Fajardo    CRF (chronic renal failure)     Epistaxis 06/26/2014    Gastrointestinal bleeding, lower 09/23/2012    H/O anemia of chronic disorder 09/23/2012    Hypertension     Thyroid disease      PSH:   Past Surgical History:   Procedure Laterality Date    AORTIC VALVULOPLASTY      1992    CARDIAC DEFIBRILLATOR PLACEMENT Left 05/09/2022    [employer] CRT-D  (Dr. Vega)    CARDIAC SURGERY      COLONOSCOPY      COLONOSCOPY  04/23/2024    COLONOSCOPY CONTROL HEMORRHAGE WITH CLIPPING performed by Wolf Jenkins MD at [facility] ENDOSCOPY    HERNIA MESH REMOVAL      HERNIA REPAIR Right 01/05/2023    RIGHT FEMORAL HERNIA  REPAIR WITH MESH performed by Cindi Garcia MD at [facility] [place]    MITRAL VALVULOPLASTY

## 2025-06-14 NOTE — PROGRESS NOTES
Pharmacy Consultation Note  (Warfarin Dosing and Monitoring)    Initial consult date: 6/11/2025  Consulting Provider: SOCORRO Hinds-MELISSA    Josefina Garcia is a 80 y.o. female for whom pharmacy has been asked to manage warfarin therapy.     Weight:   Wt Readings from Last 1 Encounters:   06/11/25 45.4 kg (100 lb)       TSH:    Lab Results   Component Value Date/Time    TSH 22.70 06/10/2025 11:15 PM       Hepatic Function Panel:                        Lab Results   Component Value Date/Time    ALKPHOS 67 06/14/2025 04:52 AM    ALT 20 06/14/2025 04:52 AM    AST 46 06/14/2025 04:52 AM    BILITOT 0.7 06/14/2025 04:52 AM    BILIDIR 0.4 06/10/2025 11:15 PM    IBILI 0.8 06/10/2025 11:15 PM       Current significant warfarin drug-drug interactions include:   -Allopurinol and amiodarone: may SIGNIFICANTLY increase anticoagulatory effects of warfarin  -Levothyroxine and prednisone: may increas anticoagulatory effects of warfarin    Recent Labs     06/12/25  0735 06/13/25  1240 06/14/25  0452   HGB 10.4* 9.8* 8.7*    202 156     Date Warfarin Dose INR Heparin or LMWH Comment   6/11 2 mg 2.4 --    6/12 NO DOSE 3.8 --    6/13 NO DOSE 4.4 --    6/14 NO DOSE 3.7 --             Assessment:  Patient is a 80 y.o. female on warfarin for Atrial Fibrillation, Mechanical Heart Valve (mitral), and Mechanical Heart Valve (atrial).  Patient's home warfarin dosing regimen is documented as warfarin 2 mg every evening.   Goal INR 2.5 - 3.5  INR 3.7 today    Plan:  Will hold warfarin tonight. Likely able to resume tomorrow if INR falls back or below therapeutic range.   Daily PT/INR until the INR is stable within the therapeutic range  Pharmacist will follow and monitor/adjust dosing as necessary    Electronically signed by Maria Del Carmen Helms RPH, PharmD, BCPS 6/14/2025 12:19 PM         JUAN: [phone]  SEY: [phone]  SJW: [phone]

## 2025-06-15 NOTE — PROGRESS NOTES
Pharmacy Consultation Note  (Warfarin Dosing and Monitoring)    Initial consult date: 6/11/2025  Consulting Provider: SOCORRO Hinds-MELISSA    Josefina Garcia is a 80 y.o. female for whom pharmacy has been asked to manage warfarin therapy.     Weight:   Wt Readings from Last 1 Encounters:   06/15/25 44.5 kg (98 lb)       TSH:    Lab Results   Component Value Date/Time    TSH 22.70 06/10/2025 11:15 PM       Hepatic Function Panel:                        Lab Results   Component Value Date/Time    ALKPHOS 70 06/15/2025 06:21 AM    ALT 20 06/15/2025 06:21 AM    AST 34 06/15/2025 06:21 AM    BILITOT 0.8 06/15/2025 06:21 AM    BILIDIR 0.4 06/10/2025 11:15 PM    IBILI 0.8 06/10/2025 11:15 PM       Current significant warfarin drug-drug interactions include:   -Allopurinol and amiodarone: may SIGNIFICANTLY increase anticoagulatory effects of warfarin  -Levothyroxine and prednisone: may increas anticoagulatory effects of warfarin    Recent Labs     06/13/25  1240 06/14/25  0452 06/15/25  0621   HGB 9.8* 8.7* 8.9*    156 157     Date Warfarin Dose INR Heparin or LMWH Comment   6/11 2 mg 2.4 --    6/12 NO DOSE 3.8 --    6/13 NO DOSE 4.4 --    6/14 NO DOSE 3.7 --    6/15 1 mg 3.4 --      Assessment:  Patient is a 80 y.o. female on warfarin for Atrial Fibrillation, Mechanical Heart Valve (mitral), and Mechanical Heart Valve (atrial).  Patient's home warfarin dosing regimen is documented as warfarin 2 mg every evening.   Goal INR 2.5 - 3.5  INR 3.4 today    Plan:  Warfarin 1 mg tonight now that INR is within goal range  Daily PT/INR until the INR is stable within the therapeutic range  Pharmacist will follow and monitor/adjust dosing as necessary    Electronically signed by Maria Del Carmen Helms RPH, PharmD, BCPS 6/15/2025 8:45 AM         JUAN: [phone]  SEY: [phone]  SJW: [phone]

## 2025-06-15 NOTE — PROGRESS NOTES
Internal Medicine Progress Note    Patient's name: Josefina Garcia  : 1945  Chief complaints (on day of admission): No chief complaint on file.  Admission date: 2025  Date of service: 6/15/2025   Room: [address] SURG  Primary care physician: Teofilo Dutton Jr., MD  Reason for visit: Follow-up for shortness of breath    Subjective  Josefina seen and evaluated sitting up in bed, receiving breathing treatment.  She states that she feels like her breathing has improved with the IV steroids and breathing treatments.  She was seen by pulmonology and nephrology yesterday, appreciate recommendations.  Her potassium this a.m. is 3.2, replacements ordered.  Sr Cr remains elevated at 1.6, baseline is ~ 1.2. She is still on 1 L nasal cannula.  Nursing is going to attempt to wean her down today and also complete ambulatory pulse ox. Her blood pressures remain low, on midodrine 5mg TID.no new complaints per nursing.  Awake, alert, oriented in no acute distress upon initial evaluation.    Review of Systems  Full 10 point ROS reviewed and negative less documented above    Hospital Medications  Current Facility-Administered Medications   Medication Dose Route Frequency Provider Last Rate Last Admin    warfarin (COUMADIN) tablet 1 mg  1 mg Oral Once Isaura Guerin APRN - CNP        methylPREDNISolone sodium succ (SOLU-MEDROL) injection 40 mg  40 mg IntraVENous Q8H Douglas Davidson MD   40 mg at 06/15/25 0839    ipratropium 0.5 mg-albuterol 2.5 mg (DUONEB) nebulizer solution 1 Dose  1 Dose Inhalation Q4H RT Douglas Davidson MD   1 Dose at 06/15/25 0900    allopurinol (ZYLOPRIM) tablet 100 mg  100 mg Oral Daily Isaura Guerin APRN - CNP   100 mg at 06/15/25 0838    bumetanide (BUMEX) tablet 2 mg  2 mg Oral Daily Isaura Guerin APRN - CNP   2 mg at 06/15/25 0838    ferrous sulfate (IRON 325) tablet 325 mg  325 mg Oral BID Isaura Guerin APRN - CNP   325 mg at 06/15/25 0838    folic acid (FOLVITE)  improvement /weaning from oxygen again/electrolytes improve  Pertinent details of this case discussed with Dr. Olea  Electronically signed by SOCORRO Youngblood CNP on 6/15/2025 at 9:02 AM

## 2025-06-15 NOTE — PROGRESS NOTES
Internal Medicine Progress Note    Patient's name: Josefina Garcia  : 1945  Chief complaints (on day of admission): No chief complaint on file.  Admission date: 2025  Date of service: 6/15/2025   Room: [address] SURG  Primary care physician: Teofilo Dutton Jr., MD  Reason for visit: Follow-up for shortness of breath    Subjective  Josefina seen evaluated sitting up in bed.  Complains of ongoing shortness of breath.  She is on 1 L nasal cannula.  She was ambulated on room air and her pulse ox was 90% patient has several questions and is upset that pulmonology and nephrology were not consulted during her hospital stay.  She and her  would like to speak with pulmonology and nephrology.  Will place consults.  Noted to have hypokalemia of 3.1 this a.m. with replacements ordered.  Awake, alert, oriented in no acute distress upon initial evaluation.    Review of Systems  Full 10 point ROS reviewed and negative less documented above    Hospital Medications  Current Facility-Administered Medications   Medication Dose Route Frequency Provider Last Rate Last Admin    warfarin (COUMADIN) tablet 1 mg  1 mg Oral Once Isaura Guerin APRN - CNP        methylPREDNISolone sodium succ (SOLU-MEDROL) injection 40 mg  40 mg IntraVENous Q8H Douglas Davidson MD   40 mg at 06/15/25 0839    ipratropium 0.5 mg-albuterol 2.5 mg (DUONEB) nebulizer solution 1 Dose  1 Dose Inhalation Q4H RT Douglas Davidson MD   1 Dose at 06/15/25 0501    allopurinol (ZYLOPRIM) tablet 100 mg  100 mg Oral Daily Isaura Guerin APRN - CNP   100 mg at 06/15/25 0838    bumetanide (BUMEX) tablet 2 mg  2 mg Oral Daily Isaura Guerin APRN - CNP   2 mg at 06/15/25 0838    ferrous sulfate (IRON 325) tablet 325 mg  325 mg Oral BID Isaura Guerin APRN - CNP   325 mg at 06/15/25 0838    folic acid (FOLVITE) tablet 1 mg  1 mg Oral Daily Isaura Guerin APRN - CNP   1 mg at 06/15/25 0838    levothyroxine (SYNTHROID) tablet 100 mcg   acetaminophen **OR** acetaminophen, melatonin, prochlorperazine **OR** prochlorperazine, guaiFENesin    Objective  Most Recent Recorded Vitals  BP (!) 93/57   Pulse 75   Temp 97.6 °F (36.4 °C) (Oral)   Resp 20   Ht 1.549 m (5' 1\")   Wt 44.5 kg (98 lb)   SpO2 98%   BMI 18.52 kg/m²   I/O last 3 completed shifts:  In: 120 [P.O.:120]  Out: 2300 [Urine:2300]  No intake/output data recorded.    Physical Exam:  General: AAO to person/place/time/purpose, NAD, no labored breathing  Eyes: conjunctivae/corneas clear, sclera non icteric  Skin: color/texture/turgor normal, no rashes or lesions  Lungs: Coarse rhonchi anteriorly and posteriorly with mild expiratory wheeze anteriorly, no retractions/use of accessory muscles, no vocal fremitus, no rhonchi, no crackle, no rales, currently on 1 L/min  Heart: Irregular/irregular, + murmur  Abdomen: soft, NT, bowel sounds normal  Extremities: atraumatic, no edema  Neurologic: cranial nerves 2-12 grossly intact, no slurred speech    Most Recent Labs  Lab Results   Component Value Date    WBC 2.8 (L) 06/15/2025    HGB 8.9 (L) 06/15/2025    HCT 28.2 (L) 06/15/2025     06/15/2025     06/15/2025    K 3.2 (L) 06/15/2025    CL 88 (L) 06/15/2025    CREATININE 1.6 (H) 06/15/2025    BUN 62 (H) 06/15/2025    CO2 31 (H) 06/15/2025    GLUCOSE 139 (H) 06/15/2025    ALT 20 06/15/2025    AST 34 (H) 06/15/2025    INR 3.4 06/15/2025    APTT 37.4 (H) 03/16/2025    TSH 22.70 (H) 06/10/2025    LABA1C 5.6 03/20/2024       US RETROPERITONEAL COMPLETE   Final Result   Bilateral kidneys are slightly small in size.  Otherwise unremarkable   ultrasound of the kidneys and urinary bladder.  No hydronephrosis.         XR CHEST PORTABLE   Final Result   1. No significant interval change.   2. Stable cardiomegaly cardiomegaly with mild pulmonary vascular congestion   and mild bibasilar pleural and parenchymal disease.               Assessment   Active Hospital Problems    Diagnosis

## 2025-06-15 NOTE — PROGRESS NOTES
Pulmonary Progress Note    Admit Date: 6/11/2025  Hospital day                               PCP: Teofilo Dutton Jr., MD    Chief Complaint (s):  Patient Active Problem List   Diagnosis    Acute kidney injury superimposed on CKD    Hypertension    Hypothyroid    Valvular heart disease    CHF exacerbation (HCC)    Atrial fibrillation (HCC)    Herpes zoster    Acute on chronic systolic and diastolic heart failure, NYHA class 3 (HCC)    Red blood cell antibody positive    Abrasion    Coagulopathy    Absolute anemia    Bilateral carotid artery stenosis    Atrial fibrillation with RVR (HCC)    HFrEF (heart failure with reduced ejection fraction) (HCC)    Pulmonary edema    Moderate protein-calorie malnutrition    Thoracic ascending aortic aneurysm    Acute on chronic right-sided congestive heart failure (HCC)    Cellulitis of right hand    CAD (coronary artery disease)    Acute on chronic systolic CHF (congestive heart failure) (HCC)    Acute on chronic congestive heart failure, unspecified heart failure type (HCC)    Supratherapeutic INR    History of mitral valve replacement with mechanical valve    History of mechanical aortic valve replacement    Chronic renal insufficiency, stage III (moderate) (HCC)    Acute on chronic clinical systolic heart failure (HCC)    Anemia    CHF (congestive heart failure), NYHA class I, acute on chronic, combined (HCC)    Acute exacerbation of chronic heart failure (HCC)    Congestive heart failure (HCC)    Hypoxemia    Palpitations    Congestive heart failure of unknown etiology (HCC)    Parainfluenza       Subjective:  Somewhat better but still wheezing diffusely.   at the bedside.      Vitals:  VITALS:  BP (!) 82/54   Pulse (!) 101   Temp 98 °F (36.7 °C) (Oral)   Resp 18   Ht 1.549 m (5' 1\")   Wt 44.5 kg (98 lb)   SpO2 94%   BMI 18.52 kg/m²     24HR INTAKE/OUTPUT:      Intake/Output Summary (Last 24 hours) at 6/15/2025 1531  Last data filed at 6/15/2025

## 2025-06-15 NOTE — PLAN OF CARE
Problem: Chronic Conditions and Co-morbidities  Goal: Patient's chronic conditions and co-morbidity symptoms are monitored and maintained or improved  6/14/2025 2113 by Sumanth Faustin RN  Outcome: Progressing  6/14/2025 0957 by Sulema Duncan RN  Outcome: Progressing     Problem: Discharge Planning  Goal: Discharge to home or other facility with appropriate resources  6/14/2025 2113 by Sumanth Faustin RN  Outcome: Progressing  6/14/2025 0957 by Sulema Duncan RN  Outcome: Progressing     Problem: Safety - Adult  Goal: Free from fall injury  6/14/2025 2113 by Sumanth Faustin RN  Outcome: Progressing  6/14/2025 0957 by Sulema Duncan RN  Outcome: Progressing     Problem: ABCDS Injury Assessment  Goal: Absence of physical injury  6/14/2025 2113 by Sumanth Faustin RN  Outcome: Progressing  6/14/2025 0957 by Sulema Duncan RN  Outcome: Progressing     Problem: Respiratory - Adult  Goal: Achieves optimal ventilation and oxygenation  6/14/2025 2113 by Sumanth Faustin RN  Outcome: Progressing  6/14/2025 0957 by Sulema Duncan RN  Outcome: Progressing     Problem: Cardiovascular - Adult  Goal: Maintains optimal cardiac output and hemodynamic stability  6/14/2025 2113 by Sumanth Faustin RN  Outcome: Progressing  6/14/2025 0957 by Sulema Duncan RN  Outcome: Progressing     Problem: Infection - Adult  Goal: Absence of infection at discharge  6/14/2025 2113 by Sumanth Faustin RN  Outcome: Progressing  6/14/2025 0957 by Sulema Duncan RN  Outcome: Progressing  Goal: Absence of infection during hospitalization  6/14/2025 2113 by Sumanth Faustin RN  Outcome: Progressing  6/14/2025 0957 by Sulema Duncan RN  Outcome: Progressing  Goal: Absence of fever/infection during anticipated neutropenic period  6/14/2025 2113 by Sumanth Faustin RN  Outcome: Progressing  6/14/2025 0957 by Sulema Duncan RN  Outcome: Progressing     Problem: Metabolic/Fluid and Electrolytes - Adult  Goal: Electrolytes maintained within

## 2025-06-15 NOTE — PROGRESS NOTES
Nephrology Progress Note  [facility]      HPI from 6/14/2025:   \"The patient is an 80 year old female with a past medical history that includes CKD 3B, A-fib, CHF, CAD, hypertension, thyroid disease. She presented to the ED on 6/10 with c/o shortness of breath, cough and rapid heart rate as high as 140s.. SOB worsening over the past 4-5 days. Denies fever.  He labs on 6/10 were significant for BUN 36, Cr 1.5, GFR 34, hgb 9.7. CXR showed stable cardiomegaly with mild pulmonary vascular congestion and mild bibasilar pleural and parenchymal disease. VS were /63  Pulse 99  Temp 98 °F Resp 20  SpO2 96%. She was admitted for evaluation ans treatment of Atrial fibrillation with rapid ventricular response, acute on chronic systolic and diastolic heart failure and possible acute bronchitis.    We were consulted on 6/14 to see the patient for JAIR. The patient is known to our practice and follows with Dr. Box. Her baseline cr is 1.24 to 1.42. Her cr increased to 1.7 this morning. Upon assessment the patient reports she is feeling better, she is less short of breath. She has been eating and drinking normally. She denies nausea, vomiting, diarrhea. She denies dysuria or hematuria. Her  is present.\"    Subjective: Sitting on the edge of the bed. States she is feeling much better. Less short of breath. No complaints.  is visiting.      PMH:          Past Medical History:   Diagnosis Date    A-fib (Formerly [facility])     Acute exacerbation of CHF (congestive heart failure) (Formerly [facility]) 07/23/2015    alpha one neg PiM >34uM    Acute renal failure 10/27/2011    Aneurysm     thoracic aortic    Aneurysm of ascending aorta without rupture     stable ,small . follows with Dr Limon last seen 1/2024    CAD (coronary artery disease)     follows with Dr Fajardo    CRF (chronic renal failure)     Epistaxis 06/26/2014    Gastrointestinal bleeding, lower 09/23/2012    H/O anemia of chronic disorder 09/23/2012    Hypertension

## 2025-06-15 NOTE — PROGRESS NOTES
Pulse ox was ____92__% on room air at rest.  Ambulated patient on room air.  Oxygen saturation was ____90-91__% on room air while ambulating.  Oxygen not applied.  Recovery pulse ox was __92____% on ___0____ liters of oxygen while ambulating.

## 2025-06-16 ENCOUNTER — HOSPITAL ENCOUNTER (OUTPATIENT)
Dept: OTHER | Age: 80
Discharge: HOME | End: 2025-06-16

## 2025-06-16 NOTE — CARE COORDINATION
Social work / Discharge planning:           Discharge plan is home with .     Pulse ox testing completed yesterday and patient does not qualify for home oxygen.    Electronically signed by LISSETH Schwartz on 6/16/2025 at 9:40 AM

## 2025-06-16 NOTE — PROGRESS NOTES
Patient informed RN that she takes digoxin MWF 0.125 mcg; RN passed this on to Cardiology and is awaiting response.    1400 Cardiology ordered correct dose of digoxin.

## 2025-06-16 NOTE — PROGRESS NOTES
Pulmonary Progress Note    Admit Date: 6/11/2025  Hospital day                               PCP: Teofilo Dutton Jr., MD    Chief Complaint (s):  Patient Active Problem List   Diagnosis    Acute kidney injury superimposed on CKD    Hypertension    Hypothyroid    Valvular heart disease    CHF exacerbation (HCC)    Atrial fibrillation (HCC)    Herpes zoster    Acute on chronic systolic and diastolic heart failure, NYHA class 3 (HCC)    Red blood cell antibody positive    Abrasion    Coagulopathy    Absolute anemia    Bilateral carotid artery stenosis    Atrial fibrillation with RVR (HCC)    HFrEF (heart failure with reduced ejection fraction) (HCC)    Pulmonary edema    Moderate protein-calorie malnutrition    Thoracic ascending aortic aneurysm    Acute on chronic right-sided congestive heart failure (HCC)    Cellulitis of right hand    CAD (coronary artery disease)    Acute on chronic systolic CHF (congestive heart failure) (HCC)    Acute on chronic congestive heart failure, unspecified heart failure type (HCC)    Supratherapeutic INR    History of mitral valve replacement with mechanical valve    History of mechanical aortic valve replacement    Chronic renal insufficiency, stage III (moderate) (HCC)    Acute on chronic clinical systolic heart failure (HCC)    Anemia    CHF (congestive heart failure), NYHA class I, acute on chronic, combined (HCC)    Acute exacerbation of chronic heart failure (HCC)    Congestive heart failure (HCC)    Hypoxemia    Palpitations    Congestive heart failure of unknown etiology (HCC)    Parainfluenza       Subjective:  Getting ready to leave.  Discussed with  regarding dietary requirements in a patient with advanced lung disease and health and his wife is i.e. pulmonary cachexia..      Vitals:  VITALS:  BP 97/60   Pulse (!) 105   Temp 97.5 °F (36.4 °C) (Oral)   Resp 18   Ht 1.549 m (5' 1\")   Wt 44.5 kg (98 lb)   SpO2 90%   BMI 18.52 kg/m²     24HR    Recent Labs     06/14/25  0452 06/15/25  0621 06/16/25  0638    133 135   K 3.1* 3.2* 3.4*   CL 88* 88* 91*   CO2 31* 31* 31*   PHOS  --  3.3  --    BUN 59* 62* 69*   CREATININE 1.7* 1.6* 1.8*     LIVER PROFILE:   Recent Labs     06/14/25 0452 06/15/25  0621 06/16/25  0638   AST 46* 34* 28   ALT 20 20 20   BILITOT 0.7 0.8 0.8   ALKPHOS 67 70 70     PT/INR:   Recent Labs     06/14/25  0452 06/15/25  0621 06/16/25  0638   PROTIME 39.8* 36.2* 38.7*   INR 3.7 3.4 3.5     APTT: No results for input(s): \"APTT\" in the last 72 hours.      Pathology:  N/A      Microbiology:  None    Recent ABG:   No results for input(s): \"PH\", \"PO2\", \"PCO2\", \"HCO3\", \"BE\", \"O2SAT\", \"METHB\", \"O2HB\", \"COHB\", \"O2CON\", \"HHB\", \"THB\" in the last 72 hours.          Recent Films:  US RETROPERITONEAL COMPLETE   Final Result   Bilateral kidneys are slightly small in size.  Otherwise unremarkable   ultrasound of the kidneys and urinary bladder.  No hydronephrosis.         XR CHEST PORTABLE   Final Result   1. No significant interval change.   2. Stable cardiomegaly cardiomegaly with mild pulmonary vascular congestion   and mild bibasilar pleural and parenchymal disease.           .           Assessment:  Parainfluenza 3 bronchitis with bronchospasm  Significant underlying COPD  History of CHF, followed at [facility] for EP as well.        Plan:  Wean steroids  Okay to discharge with office follow-up      Time at the bedside, reviewing labs and radiographs, reviewing updated notes and consultations, discussing with staff and family was more than 50 minutes.    Please note that voice recognition technology was used in the preparation of this note and make therefore it may contain inadvertent transcription errors.  If the patient is a COVID 19 isolation patient, the above physical exam reflects that of the examining physician for the day.        Douglas Davidson MD,  M.D., F.C.C.P.    [facility]    [facility]

## 2025-06-16 NOTE — PROGRESS NOTES
Pharmacy Consultation Note  (Warfarin Dosing and Monitoring)    Initial consult date: 6/11/2025  Consulting Provider: SOCORRO Hinds-MELISSA    Josefina Garcia is a 80 y.o. female for whom pharmacy has been asked to manage warfarin therapy.     Weight:   Wt Readings from Last 1 Encounters:   06/15/25 44.5 kg (98 lb)       TSH:    Lab Results   Component Value Date/Time    TSH 22.70 06/10/2025 11:15 PM       Hepatic Function Panel:                        Lab Results   Component Value Date/Time    ALKPHOS 70 06/16/2025 06:38 AM    ALT 20 06/16/2025 06:38 AM    AST 28 06/16/2025 06:38 AM    BILITOT 0.8 06/16/2025 06:38 AM    BILIDIR 0.4 06/10/2025 11:15 PM    IBILI 0.8 06/10/2025 11:15 PM       Current significant warfarin drug-drug interactions include:   -Allopurinol and amiodarone: may SIGNIFICANTLY increase anticoagulatory effects of warfarin  -Levothyroxine and prednisone: may increas anticoagulatory effects of warfarin    Recent Labs     06/14/25  0452 06/15/25  0621 06/16/25  0638   HGB 8.7* 8.9* 9.1*    157 179     Date Warfarin Dose INR Heparin or LMWH Comment   6/11 2 mg 2.4 --    6/12 NO DOSE 3.8 --    6/13 NO DOSE 4.4 --    6/14 NO DOSE 3.7 --    6/15 1 mg 3.4 --    6/16 NO DOSE 3.5 --                           Assessment:  Patient is a 80 y.o. female on warfarin for Atrial Fibrillation, Mechanical Heart Valve (mitral), and Mechanical Heart Valve (atrial).  Patient's home warfarin dosing regimen is documented as warfarin 2 mg every evening.   Goal INR 2.5 - 3.5  INR 3.5 today    Plan:  Will hold warfarin tonight as INR increased slightly after only 1 dose of warfarin 1 mg  Daily PT/INR until the INR is stable within the therapeutic range  Pharmacist will follow and monitor/adjust dosing as necessary    Douglas Romero, PharmD, [facility]  6/16/2025  10:17 AM   SEB: [phone]  SEY: [phone]  SJW: [phone]

## 2025-06-16 NOTE — PROGRESS NOTES
Nephrology Progress Note  [facility]      HPI from 6/14/2025 note:   \"The patient is an 80 year old female with a past medical history that includes CKD 3B, A-fib, CHF, CAD, hypertension, thyroid disease. She presented to the ED on 6/10 with c/o shortness of breath, cough and rapid heart rate as high as 140s.. SOB worsening over the past 4-5 days. Denies fever.  He labs on 6/10 were significant for BUN 36, Cr 1.5, GFR 34, hgb 9.7. CXR showed stable cardiomegaly with mild pulmonary vascular congestion and mild bibasilar pleural and parenchymal disease. VS were /63  Pulse 99  Temp 98 °F Resp 20  SpO2 96%. She was admitted for evaluation ans treatment of Atrial fibrillation with rapid ventricular response, acute on chronic systolic and diastolic heart failure and possible acute bronchitis.    We were consulted on 6/14 to see the patient for JAIR. The patient is known to our practice and follows with Dr. Box. Her baseline cr is 1.24 to 1.42. Her cr increased to 1.7 this morning. Upon assessment the patient reports she is feeling better, she is less short of breath. She has been eating and drinking normally. She denies nausea, vomiting, diarrhea. She denies dysuria or hematuria. Her  is present.\"    Subjective: Sitting on the edge of the bed. States she is feeling much better. Less short of breath. No complaints.  is visiting.      INTERVAL HX:    6/16/25: Pt awake alert and states she is eager to go home, but still has wheezing    PMH:          Past Medical History:   Diagnosis Date    A-fib (HCC)     Acute exacerbation of CHF (congestive heart failure) (Formerly [facility]) 07/23/2015    alpha one neg PiM >34uM    Acute renal failure 10/27/2011    Aneurysm     thoracic aortic    Aneurysm of ascending aorta without rupture     stable ,small . follows with Dr Limon last seen 1/2024    CAD (coronary artery disease)     follows with Dr Fajardo    CRF (chronic renal failure)     Epistaxis 06/26/2014     allopurinol (ZYLOPRIM) 100 MG tablet Take 1 tablet by mouth daily      ferrous sulfate 325 (65 FE) MG tablet Take 1 tablet by mouth 2 times daily         Allergies:    Iodine and Quinidine    Social History:     reports that she quit smoking about 24 years ago. Her smoking use included cigarettes. She started smoking about 49 years ago. She has a 12.5 pack-year smoking history. She has never used smokeless tobacco. She reports that she does not drink alcohol and does not use drugs.    Family History:         Problem Relation Age of Onset    Cancer Father         Colon    Cancer Sister         pancreatic, thyroid    Cancer Brother         bone, kidney, bladder       ROS:     Pertinent positives as stated in HPI. All other systems were reviewed and were negative.      Physical Exam:      Patient Vitals for the past 24 hrs:   BP Temp Temp src Pulse Resp SpO2   06/16/25 1131 (!) 100/59 97.8 °F (36.6 °C) Oral (!) 18 18 93 %   06/16/25 0738 96/60 98.1 °F (36.7 °C) Oral (!) 103 18 92 %   06/16/25 0350 (!) 86/50 97.5 °F (36.4 °C) Oral 84 16 95 %   06/16/25 0013 (!) 92/55 97.6 °F (36.4 °C) Oral 76 18 100 %   06/15/25 1941 (!) 93/58 97.7 °F (36.5 °C) Oral 96 18 93 %   06/15/25 1515 (!) 82/54 98 °F (36.7 °C) Oral (!) 101 18 94 %       I/O last 3 completed shifts:  In: 170 [P.O.:170]  Out: 950 [Urine:950]  I/O this shift:  In: 180 [P.O.:180]  Out: -        Patient Vitals for the past 96 hrs (Last 3 readings):   Weight   06/15/25 0437 44.5 kg (98 lb)       General appearance: In no acute distress  Skin: No rashes or lesions on exposed skin  Neck: No JVD  Lungs: Wheezes present throughout  Heart: RRR, no rub  Abdomen: Soft, non-tender, + bowel sounds  Extremities: Trace BLE edema  Neurologic: Mental status: Alert, oriented    Data:    Recent Labs     06/14/25  0452 06/15/25  0621 06/16/25  0638   WBC 4.1* 2.8* 4.3*   HGB 8.7* 8.9* 9.1*   HCT 29.0* 28.2* 29.9*   MCV 90.1 86.8 89.5    157 179       Recent Labs      06/14/25 0452 06/15/25  0621 06/16/25  0638    133 135   K 3.1* 3.2* 3.4*   CL 88* 88* 91*   CO2 31* 31* 31*   CREATININE 1.7* 1.6* 1.8*   BUN 59* 62* 69*   LABGLOM 30* 31* 29*   GLUCOSE 106* 139* 140*   CALCIUM 8.4* 8.7 9.3   PHOS  --  3.3  --    MG 1.9 2.0 2.2       Vit D, 25-Hydroxy   Date Value Ref Range Status   03/01/2025 60.4 30.0 - 100.0 ng/mL Final       No results found for: \"PTH\"    Recent Labs     06/14/25  0452 06/15/25  0621 06/16/25  0638   ALT 20 20 20   AST 46* 34* 28   ALKPHOS 67 70 70   BILITOT 0.7 0.8 0.8       No results for input(s): \"LABALBU\" in the last 72 hours.    Ferritin   Date Value Ref Range Status   02/28/2025 71 ng/mL Final     Comment:           FERRITIN Reference Ranges:  Adult Males   20 - 60 years:    30 - 400 ng/mL  Adult females 17 - 60 years:    13 - 150 ng/mL  Adults greater than 60 years:   no established reference range  Pediatrics:  no established reference range       Iron   Date Value Ref Range Status   02/28/2025 45 37 - 145 ug/dL Final     TIBC   Date Value Ref Range Status   02/28/2025 351 250 - 450 ug/dL Final       Vitamin B-12   Date Value Ref Range Status   03/01/2025 690 211 - 946 pg/mL Final       Folate   Date Value Ref Range Status   03/01/2025 >20.0 4.8 - 24.2 ng/mL Final         Lab Results   Component Value Date/Time    COLORU Yellow 06/14/2025 03:45 PM    NITRU NEGATIVE 06/14/2025 03:45 PM    GLUCOSEU NEGATIVE 06/14/2025 03:45 PM    GLUCOSEU NEGATIVE 04/05/2012 10:15 AM    KETUA NEGATIVE 06/14/2025 03:45 PM    UROBILINOGEN 0.2 06/14/2025 03:45 PM    BILIRUBINUR NEGATIVE 06/14/2025 03:45 PM       Lab Results   Component Value Date/Time    PROTEIN 7.1 06/16/2025 06:38 AM       No components found for: \"URIC\"    No results found for: \"LIPIDPAN\"      Assessment and Plan:    1. Acute kidney injury on CKD3B  From decreased effective renal perfusion secondary to CHF and diuretics, hypotension  Baseline Cr is 1.24 to 1.42, peaked at 1.7 on 6/14, is at 1.6

## 2025-06-16 NOTE — PLAN OF CARE
Problem: Chronic Conditions and Co-morbidities  Goal: Patient's chronic conditions and co-morbidity symptoms are monitored and maintained or improved  Outcome: Progressing     Problem: Discharge Planning  Goal: Discharge to home or other facility with appropriate resources  Outcome: Progressing     Problem: Safety - Adult  Goal: Free from fall injury  Outcome: Progressing  Flowsheets (Taken 6/15/2025 2149 by Lyla Barr, RN)  Free From Fall Injury: Instruct family/caregiver on patient safety     Problem: ABCDS Injury Assessment  Goal: Absence of physical injury  Outcome: Progressing  Flowsheets (Taken 6/15/2025 2149 by Lyla Barr, RN)  Absence of Physical Injury: Implement safety measures based on patient assessment     Problem: Respiratory - Adult  Goal: Achieves optimal ventilation and oxygenation  Outcome: Progressing     Problem: Cardiovascular - Adult  Goal: Maintains optimal cardiac output and hemodynamic stability  Outcome: Progressing  Goal: Absence of cardiac dysrhythmias or at baseline  Outcome: Progressing     Problem: Infection - Adult  Goal: Absence of infection at discharge  Outcome: Progressing  Goal: Absence of infection during hospitalization  Outcome: Progressing  Goal: Absence of fever/infection during anticipated neutropenic period  Outcome: Progressing     Problem: Metabolic/Fluid and Electrolytes - Adult  Goal: Electrolytes maintained within normal limits  Outcome: Progressing  Goal: Hemodynamic stability and optimal renal function maintained  Outcome: Progressing  Goal: Glucose maintained within prescribed range  Outcome: Progressing

## 2025-06-16 NOTE — DISCHARGE SUMMARY
Internal Medicine Discharge Summary    NAME: Josefina Garcia :  1945  MRN:  [id number] PCP:Teofilo Dutton Jr., MD    ADMITTED: 2025   DISCHARGED: No discharge date for patient encounter.    ADMITTING PHYSICIAN: Richy Olea MD    PCP: Teofilo Dutton Jr., MD    CONSULTANT(S):   IP CONSULT TO CARDIOLOGY  IP CONSULT TO HEART FAILURE NURSE/COORDINATOR  IP CONSULT TO PHARMACY  IP CONSULT TO VASCULAR ACCESS TEAM  IP CONSULT TO VASCULAR ACCESS TEAM  IP CONSULT TO PULMONOLOGY  IP CONSULT TO NEPHROLOGY     ADMITTING DIAGNOSIS:   CHF (congestive heart failure) (HCC) [I50.9]  Congestive heart failure of unknown etiology (HCC) [I50.9]     Please see H&P for further details    DISCHARGE DIAGNOSES:   Active Hospital Problems    Diagnosis     Parainfluenza [B34.8]     Hypoxemia [R09.02]     Chronic renal insufficiency, stage III (moderate) (HCC) [N18.30]     Moderate protein-calorie malnutrition [E44.0]     Atrial fibrillation with RVR (HCC) [I48.91]     Acute on chronic systolic and diastolic heart failure, NYHA class 3 (HCC) [I50.43]     Atrial fibrillation (HCC) [I48.91]     Hypertension [I10]     Hypothyroid [E03.9]        BRIEF HISTORY OF PRESENT ILLNESS: Josefina Garcia is a 80 y.o. female patient of Teofilo Dutton Jr., MD who  has a past medical history of A-fib (HCC), Acute exacerbation of CHF (congestive heart failure) (HCC), Acute renal failure, Aneurysm, Aneurysm of ascending aorta without rupture, CAD (coronary artery disease), CRF (chronic renal failure), Epistaxis, Gastrointestinal bleeding, lower, H/O anemia of chronic disorder, Hypertension, and Thyroid disease. who originally had no chief complaint listed for this encounter. at presentation on 2025, and was found to have CHF (congestive heart failure) (HCC) [I50.9]  Congestive heart failure of unknown etiology (HCC) [I50.9] after workup.    Please see H&P for further details.    HOSPITAL COURSE:   The patient presented to the hospital

## 2025-06-16 NOTE — PROGRESS NOTES
PROGRESS NOTE       PATIENT PROBLEM LIST:  Patient Active Problem List   Diagnosis Code    Acute kidney injury superimposed on CKD N17.9, N18.9    Hypertension I10    Hypothyroid E03.9    Valvular heart disease I38    CHF exacerbation ([facility]) I50.9    Atrial fibrillation ([facility]) I48.91    Herpes zoster B02.9    Acute on chronic systolic and diastolic heart failure, NYHA class 3 ([facility]) I50.43    Red blood cell antibody positive R76.8    Abrasion T14.8XXA    Coagulopathy D68.9    Absolute anemia D64.9    Bilateral carotid artery stenosis I65.23    Atrial fibrillation with RVR ([facility]) I48.91    HFrEF (heart failure with reduced ejection fraction) ([facility]) I50.20    Pulmonary edema J81.1    Moderate protein-calorie malnutrition E44.0    Thoracic ascending aortic aneurysm I71.21    Acute on chronic right-sided congestive heart failure ([facility]) I50.813    Cellulitis of right hand L03.113    CAD (coronary artery disease) I25.10    Acute on chronic systolic CHF (congestive heart failure) ([facility]) I50.23    Acute on chronic congestive heart failure, unspecified heart failure type ([facility]) I50.9    Supratherapeutic INR R79.1    History of mitral valve replacement with mechanical valve Z95.2    History of mechanical aortic valve replacement Z95.2    Chronic renal insufficiency, stage III (moderate) ([facility]) N18.30    Acute on chronic clinical systolic heart failure ([facility]) I50.23    Anemia D64.9    CHF (congestive heart failure), NYHA class I, acute on chronic, combined ([facility]) I50.43    Acute exacerbation of chronic heart failure ([facility]) I50.9    Congestive heart failure ([facility]) I50.9    Hypoxemia R09.02    Palpitations R00.2    Congestive heart failure of unknown etiology ([facility]) I50.9    Parainfluenza B34.8       SUBJECTIVE:  Josefina Garcia is sitting up in bed and in very good spirits noting that her secretions have decreased significantly and her breathing has overall improved.  Her heart rate still remains mildly elevated.    REVIEW OF  SYSTEMS:  General ROS: positive for - fatigue, malaise, weight loss  Psychological ROS: positive for - anxiety , depression  Ophthalmic ROS: negative for - decreased vision or visual distortion.  ENT ROS: negative  Allergy and Immunology ROS: negative  Hematological and Lymphatic ROS: negative  Endocrine: no heat or cold intolerance and no polyphagia, polydipsia, or polyuria  Respiratory ROS: positive for - cough, shortness of breath, sputum changes, and wheezing  Cardiovascular ROS: positive for - dyspnea on exertion, murmur, rapid heart rate, and shortness of breath.  Gastrointestinal ROS: no abdominal pain, change in bowel habits, or black or bloody stools  Genito-Urinary ROS: no nocturia, dysuria, trouble voiding, frequency or hematuria  Musculoskeletal ROS: negative for- myalgias, arthralgias, or claudication  Neurological ROS: no TIA or stroke symptoms otherwise no significant change in symptoms or problems since yesterday as documented in previous progress notes.    SCHEDULED MEDICATIONS:   methylPREDNISolone sodium succ  40 mg IntraVENous Q12H    ipratropium 0.5 mg-albuterol 2.5 mg  1 Dose Inhalation Q4H RT    allopurinol  100 mg Oral Daily    bumetanide  2 mg Oral Daily    ferrous sulfate  325 mg Oral BID    folic acid  1 mg Oral Daily    levothyroxine  100 mcg Oral Daily    metOLazone  2.5 mg Oral Once per day on Monday Wednesday Friday    metoprolol succinate  25 mg Oral Daily    midodrine  5 mg Oral TID WC    vericiguat  2.5 mg Oral Daily with breakfast    sodium chloride flush  10 mL IntraVENous 2 times per day    warfarin placeholder: dosing by pharmacy   Oral RX Placeholder    amiodarone  200 mg Oral Daily       VITAL SIGNS:                                                                                                                          BP (!) 100/59   Pulse (!) 18   Temp 97.8 °F (36.6 °C) (Oral)   Resp 18   Ht 1.549 m (5' 1\")   Wt 44.5 kg (98 lb)   SpO2 93%   BMI 18.52 kg/m²   Patient

## 2025-06-22 NOTE — ED PROVIDER NOTES
Ventricular Rate 99 BPM    Atrial Rate 88 BPM    QRS Duration 188 ms    Q-T Interval 458 ms    QTc Calculation (Bazett) 587 ms    R Axis 210 degrees    T Axis 9 degrees       RADIOLOGY:  XR CHEST PORTABLE   Final Result   1. Significant multifocal bilateral airspace disease more prominent within   the right lung.  These findings could represent pneumonia, CHF or combination   of both.   2. Small right pleural effusion and trace left pleural effusion   3. Cardiomegaly             ------------------------- NURSING NOTES AND VITALS REVIEWED ---------------------------  Date / Time Roomed:  6/22/2025  9:42 AM  ED Bed Assignment:  13/13    The nursing notes within the ED encounter and vital signs as below have been reviewed.     No data found.    Oxygen Saturation Interpretation: Abnormal and Improved after treatment    ------------------------------------------ PROGRESS NOTES ------------------------------------------  Counseling:  I have spoken with the patient and  and discussed today’s results, in addition to providing specific details for the plan of care and counseling regarding the diagnosis and prognosis.  Their questions are answered at this time and they are agreeable with the plan of admission.    --------------------------------- ADDITIONAL PROVIDER NOTES ---------------------------------  Consultations:  Spoke with Dr. Bhardwaj.  Discussed case.  They will admit the patient.  This patient's ED course included: a personal history and physicial examination, re-evaluation prior to disposition, multiple bedside re-evaluations, IV medications, cardiac monitoring, continuous pulse oximetry, and complex medical decision making and emergency management    This patient has remained hemodynamically stable during their ED course.    Diagnosis:  1. Acute on chronic congestive heart failure, unspecified heart failure type (HCC)    2. Pneumonia of both lower lobes due to infectious organism    3. Bilateral pleural

## 2025-06-22 NOTE — ED NOTES
Karrie Mckoy from access line called with bed assignment and PAS ETA. Patient going to [facility] room 648-A, report number [phone]. PAS ETA 60 minutes ([id number]).

## 2025-06-22 NOTE — PLAN OF CARE
Problem: Chronic Conditions and Co-morbidities  Goal: Patient's chronic conditions and co-morbidity symptoms are monitored and maintained or improved  Outcome: Progressing     Problem: ABCDS Injury Assessment  Goal: Absence of physical injury  Outcome: Progressing  Flowsheets (Taken 6/22/2025 8235)  Absence of Physical Injury: Implement safety measures based on patient assessment

## 2025-06-22 NOTE — PROGRESS NOTES
Josefina Garcia was ordered vericiguat (Verquvo) which is a nonformulary medication. Nurse is going to check with patient to see if home supply of this medication will be brought in to the hospital for inpatient use.  A pharmacist will follow-up with the nurse of the patient to assess the capability of the patient to bring in the medication.  If it is determined that the patient cannot supply the medication and it is not available to be dispensed from the pharmacy, a call will be placed to the ordering provider to discuss alternative options.     Neil Kc, PharmD  06/22/25 2:52 PM

## 2025-06-22 NOTE — PROGRESS NOTES
4 Eyes Skin Assessment     NAME:  Josefina Garcia  YOB: 1945  MEDICAL RECORD NUMBER:  [id number]    The patient is being assessed for  Admission    I agree that at least one RN has performed a thorough Head to Toe Skin Assessment on the patient. ALL assessment sites listed below have been assessed.      Areas assessed by both nurses:    Head, Face, Ears, Shoulders, Back, Chest, Arms, Elbows, Hands, Sacrum. Buttock, Coccyx, Ischium, Legs. Feet and Heels, and Under Medical Devices         Does the Patient have a Wound? No noted wound(s)       Abhi Prevention initiated by RN: No  Wound Care Orders initiated by RN: No    Pressure Injury (Stage 3,4, Unstageable, DTI, NWPT, and Complex wounds) if present, place Wound referral order by RN under : No    New Ostomies, if present place, Ostomy referral order under : No     Nurse 1 eSignature: Electronically signed by Sima Mireles RN on 6/22/25 at 3:09 PM EDT    **SHARE this note so that the co-signing nurse can place an eSignature**    Nurse 2 eSignature: Electronically signed by Kwadwo Verdugo RN on 6/22/25 at 3:14 PM EDT

## 2025-06-22 NOTE — CARE COORDINATION
Called by Transfer center due to patient needing admitted from [place]. Patient hypoxic and had signs of CHF. Due to leukocytosis concerned about infection. Transferred to [place]. Admission orders placed. Coverage to see in am.     Michelle Bhardwaj, DO

## 2025-06-23 PROBLEM — R65.21 SEPTIC SHOCK (HCC): Status: ACTIVE | Noted: 2025-01-01

## 2025-06-23 PROBLEM — A41.9 SEPTIC SHOCK (HCC): Status: ACTIVE | Noted: 2025-01-01

## 2025-06-23 PROBLEM — E44.0 MODERATE PROTEIN-CALORIE MALNUTRITION: Chronic | Status: ACTIVE | Noted: 2025-01-01

## 2025-06-23 NOTE — PROGRESS NOTES
All belongings sent home with , Gagandeep, except for dentures. Dentures labeled and sent with body to [facility].

## 2025-06-23 NOTE — PROCEDURES
PROCEDURE NOTE  Date: 6/23/2025   Name: Josefina Garcia  YOB: 1945    Central Line Placement Procedure Note    Indication: centrally administered medications    Consent: The spouse was counseled regarding the procedure, its indications, risks, potential complications and alternatives, and any questions were answered. Consent was obtained to proceed.    Procedure: The patient was positioned appropriately and the skin over the right femoral vein was prepped with betadine and draped in a sterile fashion. Local anesthesia was obtained by infiltration using 1% Lidocaine without epinephrine.  A large bore needle was used to identify the vein.  A guide wire was then inserted into the vein through the needle. A triple lumen catheter was then inserted into the vessel over the guide wire using the Seldinger technique.  All ports showed good, free flowing blood return and were flushed with saline solution.   The catheter was then securely fastened to the skin with suture at 20 cm.  Two sutures were placed into the kit included tube clamp, proximal eyelets, and a suture end from each of the securing sutures was extended around the catheter and tied to the proximal eyelets as an added measure to prevent dislodgement. An antibiotic disk was placed and the site was then covered with a sterile dressing.  A post procedure X-ray was not indicated.    The patient tolerated the procedure well.    Complications: None      Richy Lancaster DO

## 2025-06-23 NOTE — SIGNIFICANT EVENT
RRT was called for hypotension.  Patient here and still positive with parainfluenza, had worsening blood pressures and was being given midodrine 10 mg 3 times daily but remained hypotensive with blood pressure 73/44 and heart rate in the 140s, also noted to be febrile around 102.  Patient still awake although somnolent but oriented x 3.  Heart rate was tachycardic but regular and lungs CTAB.  Upon discussion with patient and  at bedside decision was made to transfer to ICU for vasopressor administration.  Would recommend the patient to also get broad-spectrum antibiotics at this point as this is a picture of septic shock.  Noted potassium was 2.7 today and she did not get appropriate replacement for this and has not been eating as well, she will need aggressive electrolyte replacement.  Blood glucose was 57 during the RRT and patient has not been eating, will be started D10 infusion when she arrives in the ICU.  Will defer further management to ICU team.    Please note that the withdrawal or failure to initiate urgent interventions for this patient would likely result in a life threatening deterioration or permanent disability.      Accordingly this patient received 42 minutes of critical care time, excluding separately billable procedures.    Electronically signed by Colby Hayes MD on 6/23/2025 at 3:32 PM

## 2025-06-23 NOTE — PROGRESS NOTES
Notified Dr. Lam via perfect serve re: manual bp 74/42, heart rate elevated and having to increase oxygen to 5L. Also called Franchesca Lentz NP re: this as well and were told they were no longer here.     Dr. Fajardo aware of K 2.7 today and ordered 20 meq of K. Dr. Lam and Dr. Chaidez both made aware of K levels this am as well with no new orders given.

## 2025-06-23 NOTE — CONSULTS
intact.    Pertinent Labs:  CBC:   Recent Labs     06/22/25  1003 06/23/25  1005   WBC 20.7* 9.3   HGB 10.8* 10.2*    130     BMP:  Recent Labs     06/22/25  1003 06/22/25  1142 06/22/25  1720 06/23/25  1005     --  137 137   K 2.8* 2.3* 3.9 2.7*   CL 86*  --  85* 86*   CO2 38*  --  39* 38*   BUN 71*  --  73* 75*   CREATININE 1.9*  --  1.7* 1.8*   GLUCOSE 125*  --  110* 60*   LABGLOM 26*  --  30* 28*     ABGs: No results found for: \"PH\", \"PO2\", \"PCO2\"  INR:   Recent Labs     06/22/25  1003   INR 3.9     PRO-BNP:   Lab Results   Component Value Date    PROBNP >70,000 (H) 06/22/2025    PROBNP 42,171 (H) 06/10/2025      Cardiac Injury Profile:   Recent Labs     06/22/25  1003 06/22/25  1142   TROPHS 89* 89*      Lipid Profile:   Lab Results   Component Value Date/Time    TRIG 58 03/20/2024 10:56 AM    HDL 45 03/20/2024 10:56 AM    LDL 63 03/20/2024 10:56 AM    LDL 69 11/28/2021 12:04 PM    CHOL 120 03/20/2024 10:56 AM    CHOL 132 11/28/2021 12:04 PM      Thyroid:   Lab Results   Component Value Date    TSH 22.70 (H) 06/10/2025      Hemoglobin A1C: No components found for: \"HGBA1C\"   ECG:  See Report    Radiology:  XR CHEST (2 VW)  Result Date: 5/6/2025  Small bilateral pleural effusions. Is stable cardiomegaly.     Assessment:    Patient Active Problem List   Diagnosis Code    Acute kidney injury superimposed on CKD N17.9, N18.9    Hypertension I10    Hypothyroid E03.9    Valvular heart disease I38    CHF exacerbation (HCC) I50.9    Atrial fibrillation (HCC) I48.91    Herpes zoster B02.9    Acute on chronic systolic and diastolic heart failure, NYHA class 3 (HCC) I50.43    Red blood cell antibody positive R76.8    Abrasion T14.8XXA    Coagulopathy D68.9    Absolute anemia D64.9    Bilateral carotid artery stenosis I65.23    Atrial fibrillation with RVR ([facility]) I48.91    HFrEF (heart failure with reduced ejection fraction) ([facility]) I50.20    Pulmonary edema J81.1    Moderate protein-calorie malnutrition E44.0     consult in the care of this patient.    Rick Fajardo DO , FACP, FACC, [facility]    NOTE:  This report was transcribed using voice recognition software.  Every effort was made to ensure accuracy; however, inadvertent computerized transcription errors may be present.

## 2025-06-23 NOTE — PROGRESS NOTES
Occupational Therapy      Occupational Therapy referral received.  Attempt made - RN in room - hold low BP

## 2025-06-23 NOTE — CONSULTS
Critical Care Admit/Consult Note         Patient - Josefina Garcia   MRN -  [id number]   [id number]   - 1945      Date of Admission -  2025  2:27 PM  Date of evaluation -  2025  [id number]   Hospital Day - 1            ADMIT/CONSULT DETAILS     Reason for Admit/Consult   Septic shock    Consulting Service/Physician   Consulting - Nicolás Lam DO  Primary Care Physician - Teofilo Dutton Jr., MD         HPI   The patient is a 80 y.o. female with significant past medical history of acute on chronic congestive heart failure, ischemic cardiomyopathy, hypertension, hyperlipidemia, CAD, CKD, atrial fibrillation and anticoagulated on Coumadin.     Patient was admitted to the ER on  for generalized weakness and fatigue.  She was recently admitted to the hospital from  to  after being treated for RSV.  Since she was discharged last week, she has been having a persistent cough, some shortness of breath, decreased p.o. intake, and fatigue.  In the ED, she was found to have a BNP greater than 70,000, troponins 89 with delta unchanged, positive for parainfluenza, JAIR with creatinine 1.7.  Chest x-ray showed significant multifocal bilateral airspace disease more prominent within the right lung.  These findings could represent pneumonia, CHF or combination of both.  She has been persistently hypotensive since admission yesterday despite increasing midodrine.  An RRT was called due to this with increased hypoxia.  She was then  brought to the ICU.  Fluids were held since admission due to concern of worsening CHF.  Her lactic acid is 2.7 and CMP was significant most notably for potassium of 2.7 as well as persistent JAIR with creatinine of 1.8.  She was mildly hypoglycemic with glucose of 56.  She was given D10.    She remaining short of breath with SpO2 in the upper 80s and lower 90s on 5-6 L NC initially.  She began to develop a fever as well as had persistent MAP in the 50s.  A  daily (Patient taking differently: Take 1 capsule by mouth once a week Takes on )  calcitRIOL (ROCALTROL) 0.25 MCG capsule, Take 1 capsule by mouth daily  allopurinol (ZYLOPRIM) 100 MG tablet, Take 1 tablet by mouth daily  ferrous sulfate 325 (65 FE) MG tablet, Take 1 tablet by mouth 2 times daily  amiodarone (CORDARONE) 200 MG tablet, Take 1 tablet by mouth daily    Diet/Nutrition   Diet NPO    Allergies   Iodine and Quinidine    Social History   Tobacco   reports that she quit smoking about 24 years ago. Her smoking use included cigarettes. She started smoking about 49 years ago. She has a 12.5 pack-year smoking history. She has never used smokeless tobacco.    Alcohol     reports no history of alcohol use.    Occupational history :    Family History         Problem Relation Age of Onset    Cancer Father         Colon    Cancer Sister         pancreatic, thyroid    Cancer Brother         bone, kidney, bladder         ROS     REVIEW OF SYSTEMS:  Negative except for as stated above    Lines and Devices   Peripheral IV, central line, arterial line, Thompson catheter    Mechanical Ventilation Data   VENT SETTINGS (Comprehensive)     Additional Respiratory Assessments  Pulse: (!) 123  Respirations: 26  SpO2: 92 %    ABG  No results found for: \"PH\", \"PCO2\", \"PO2\", \"HCO3\", \"O2SAT\"  No results found for: \"IFIO2\", \"MODE\", \"SETTIDVOL\", \"SETPEEP\"        Vitals    height is 1.549 m (5' 0.98\") and weight is 40.7 kg (89 lb 11.6 oz). Her oral temperature is 97.2 °F (36.2 °C). Her blood pressure is 73/43 (abnormal) and her pulse is 123 (abnormal). Her respiration is 26 and oxygen saturation is 92%.       Temperature Range: Temp: 97.2 °F (36.2 °C) Temp  Av.9 °F (36.6 °C)  Min: 97.2 °F (36.2 °C)  Max: 98.4 °F (36.9 °C)  BP Range:  Systolic (24hrs), Av , Min:73 , Max:102     Diastolic (24hrs), Av, Min:34, Max:57    Pulse Range: Pulse  Av.3  Min: 81  Max: 143  Respiration Range: Resp  Av.5  Min: 20

## 2025-06-23 NOTE — CONSULTS
Comprehensive Nutrition Assessment    Type and Reason for Visit:  Initial, Positive nutrition screen    Nutrition Recommendations/Plan:   Monitor NPO, Diet Advancement  Continue Inpatient Monitoring     Malnutrition Assessment:  Malnutrition Status:  Moderate malnutrition (06/23/25 1425)    Context:  Chronic Illness     Findings of the 6 clinical characteristics of malnutrition:  Energy Intake:  75% or less estimated energy requirements for 1 month or longer  Weight Loss:  Greater than 10% over 6 months (14.1% x 6mo)     Body Fat Loss:  Mild body fat loss (moderate) Orbital, Triceps, Buccal region   Muscle Mass Loss:  Mild muscle mass loss (moderate) Temples (temporalis), Clavicles (pectoralis & deltoids), Hand (interosseous), Scapula (trapezius)  Fluid Accumulation:  Unable to assess (Multifactorial)     Strength:  Not Performed    Nutrition Assessment:    Consult for Poor appetite/intake 5 or more days + Trigger. Pt adm Acute Exac Chr Heart Failure PMH CHF, CKD, CAD. Recent adm for Exac CHF/URI. Pt reports poor unable to eat past several weeks especially in the past week very tired, fatigued, difficulty breathing. Rapid Response today-now NPO, Meets Criteria for Moderate Malnutrition, will monitor for diet adv/progression-pt would like ONS High Berto/High Pro BID + Frozen QD    Nutrition Related Findings:    I/O -700ml, A/O x4, +BS +BM 6/22, +1 BLE-pitting, BL Heels/Buttocks-redness. K+ 2.7, CO2 38, BUN 75, Cr 1.8, glu 60, POC glu 57, KCL Wound Type: None       Current Nutrition Intake & Therapies:    Average Meal Intake: NPO  Average Supplements Intake: NPO  Diet NPO    Anthropometric Measures:  Height: 154.9 cm (5' 0.98\")  Ideal Body Weight (IBW): 105 lbs (48 kg)    Admission Body Weight: 38.5 kg (84 lb 14 oz) (6/22 scale)  Current Body Weight: 40.7 kg (89 lb 11.6 oz), 85.5 % IBW. Weight Source: Bed scale (6/23)  Current BMI (kg/m2): 17  Usual Body Weight: 47.4 kg (104 lb 8 oz) (12/26/24 Standing Scale per

## 2025-06-23 NOTE — PROGRESS NOTES
Spiritual Health History and Assessment/Progress Note  [facility]    Crisis, Rapid Response,  ,      Name: Josefina Garcia MRN: [id number]    Age: 80 y.o.     Sex: female   Language: English   Orthodoxy: Mandaeism   Acute on chronic systolic CHF (congestive heart failure) ([facility])     Date: 6/23/2025                           Spiritual Assessment began in [facility] 6S INTERMEDIATE            Encounter Overview/Reason: Crisis  Service Provided For: Patient and family together    Imani, Belief, Meaning:   Patient unable to assess at this time  Family/Friends Other: did not discuss      Importance and Influence:  Patient unable to assess at this time  Family/Friends Other: did not discuss    Community:  Patient feels well-supported. Support system includes: Spouse/Partner, Children, and Extended family  Family/Friends feel well-supported. Support system includes: Spouse/Partner, Children, and Extended family    Assessment and Plan of Care:     Patient Interventions include: Facilitated expression of thoughts and feelings and Other: Offered prayer silently and remained with spouse for a time who is coping  Family/Friends Interventions include: Facilitated expression of thoughts and feelings and Other: Remained a supportive presence    Patient Plan of Care: Spiritual Care available upon further referral  Family/Friends Plan of Care: Spiritual Care available upon further referral    Electronically signed by Chaplain Colleen on 6/23/2025 at 2:14 PM

## 2025-06-23 NOTE — CONSULTS
Pulmonary Consultation    Admit Date: 6/22/2025  Requesting Physician: Teofilo Dutton Jr., MD    Reason for consultation:  Respiratory failure  HPI:  The patient is a 80-year-old female recently admitted and discharged twice from this hospital.  The first time was for RSV, the second time was for parainfluenza 3.  The patient was home for several days when she began experiencing increasing shortness of breath.  She returned to the hospital with that same complaint.    This time, there is no cough, fever or chills.  The patient was just dyspneic.  Seen this p.m., she is gasping for air.  A chest radiograph showed no pulmonary edema as seen below.  Seen by cardiology, it is noted the patient follows at the [facility] for CHF and has an ejection fraction of less than 30%.    PMH:    Past Medical History:   Diagnosis Date    A-fib (HCC)     Acute exacerbation of CHF (congestive heart failure) ([facility]) 07/23/2015    alpha one neg PiM >34uM    Acute renal failure 10/27/2011    Aneurysm     thoracic aortic    Aneurysm of ascending aorta without rupture     stable ,small . follows with Dr Limon last seen 1/2024    CAD (coronary artery disease)     follows with Dr Fajardo    CRF (chronic renal failure)     Epistaxis 06/26/2014    Gastrointestinal bleeding, lower 09/23/2012    H/O anemia of chronic disorder 09/23/2012    Hypertension     Thyroid disease      PSH:   Past Surgical History:   Procedure Laterality Date    AORTIC VALVULOPLASTY      1992    CARDIAC DEFIBRILLATOR PLACEMENT Left 05/09/2022    [employer]tr[employer] CRT-D  (Dr. Vega)    CARDIAC SURGERY      COLONOSCOPY      COLONOSCOPY  04/23/2024    COLONOSCOPY CONTROL HEMORRHAGE WITH CLIPPING performed by Wolf Jenkins MD at [facility] ENDOSCOPY    HERNIA MESH REMOVAL      HERNIA REPAIR Right 01/05/2023    RIGHT FEMORAL HERNIA  REPAIR WITH MESH performed by Cindi Garcia MD at [facility]    MITRAL VALVULOPLASTY      1992    TRICUSPID VALVULOPLASTY       --  85* 86*   CO2 38*  --  39* 38*   BUN 71*  --  73* 75*   CREATININE 1.9*  --  1.7* 1.8*    ALB:3,BILIDIR:3,BILITOT:3,ALKPHOS:3)@    PT/INR:   Recent Labs     06/22/25  1003 06/23/25  1005   PROTIME 43.6* 51.9*   INR 3.9 4.9       Cultures:  Sputum: not available  Blood: not available    ABG:   No results for input(s): \"PH\", \"PO2\", \"PCO2\", \"HCO3\", \"BE\", \"O2SAT\", \"METHB\", \"O2HB\", \"COHB\", \"O2CON\", \"HHB\", \"THB\" in the last 72 hours.          Films:      .        Assessment:  New onset CHF  Underlying severe COPD  Recent RSV and parainfluenza 3 infections  Acute respiratory failure secondary to #1      Plan:  Immediate trial of noninvasive ventilation using BiPAP to decrease the work of breathing and to reduce preload  Agree with aerosols, no indication for steroids  Diuresis as tolerated though difficult in the presence of hypotension  Overall prognosis is poor.      Thanks for letting us see this patient in consultation.  Total time in reviewing the previous admissions and records, reviewing the current x-rays, labs, and discussing with clinical staff including nursing and physicians, exceeded 50 minutes.      Please contact us with any questions. Office [phone] or after hours through Siine, x [phone].    Please note that voice recognition technology was used (while wearing a Covid mandated mask) in the preparation of this note and make therefore it may contain inadvertent transcription errors.  If the patient is a COVID 19 isolation patient, the above physical exam reflects that of the examining physician for the day.    Douglas Davidson MD,  M.D., F.C.C.P.    [facility]    [facility], [address] [place] [address], [address], [place] [address]  Office Visits:  [address] [place] [address]

## 2025-06-23 NOTE — CONSULTS
Palliative Care Department  [phone]  Palliative Care Initial Consult  Provider Olive Thomson, APRN - CNP     Josefina Garcia  [id number]  Hospital Day: 2  Date of Initial Consult: 6/23/25  Referring Provider: Dr. Lam  Palliative Medicine was consulted for assistance with:     goals of care, multiple advanced comorbidities and recurrent hospitalizations       HPI:   Josefina Garcia is a 80 y.o. with a medical history hypertension, hyperlipidemia, CAD, CKD and atrial fibrillation/valvular heart disease anticoagulated with Coumadin  of who was admitted on 6/22/2025 from home with a CHIEF COMPLAINT of weakness and fatigue.  Patient was recently discharged last Monday after admission for RSV.  Presenting back to ER,  reports she has been extremely weak, having trouble getting around in bed very tired.  ER workup reveals BNP >70,000, respiratory panel positive for parainfluenza, CXR with significant multifocal bilateral airspace disease suggestive of pneumonia or CHF, small right pleural effusion and trace left pleural effusion, cardiomegaly.  Cardiology and pulmonology consulted.  Palliative medicine consulted for further assistance with goals of care.    ASSESSMENT/PLAN:     Pertinent Hospital Diagnoses     Acute on chronic CHF  Parainfluenza      Palliative Care Encounter / Counseling Regarding Goals of Care  Please see detailed goals of care discussion as below  At this time, Josefina Garcia, Does Not have capacity for medical decision-making.  Capacity is time limited and situation/question specific  During encounter Gagandeep was surrogate medical decision-maker  Outcome of goals of care meeting:   Continue full code  Continue current medical management, likely needs RRT she is unstable, hypotensive, tachycardic and in respiratory distress  Code status Full Code  Advanced Directives: no POA or living will in [facility]  Surrogate/Legal NOK:  Gagandeep Garcia (spouse) [phone]        Spiritual

## 2025-06-23 NOTE — ACP (ADVANCE CARE PLANNING)
Advance Care Planning   Healthcare Decision Maker:    Primary Decision Maker: Gagandeep Garcia - [facility] - [phone]    Click here to complete Healthcare Decision Makers including selection of the Healthcare Decision Maker Relationship (ie \"Primary\").  Today we documented Decision Maker(s) consistent with Legal Next of Kin hierarchy.

## 2025-06-23 NOTE — PROGRESS NOTES
Nephrology Progress Note  [facility]      HPI from 6/14/2025 note:   \"The patient is an 80 year old female with a past medical history that includes CKD 3B, A-fib, CHF, CAD, hypertension, thyroid disease. She presented to the ED on 6/10 with c/o shortness of breath, cough and rapid heart rate as high as 140s.. SOB worsening over the past 4-5 days. Denies fever.  He labs on 6/10 were significant for BUN 36, Cr 1.5, GFR 34, hgb 9.7. CXR showed stable cardiomegaly with mild pulmonary vascular congestion and mild bibasilar pleural and parenchymal disease. VS were /63  Pulse 99  Temp 98 °F Resp 20  SpO2 96%. She was admitted for evaluation ans treatment of Atrial fibrillation with rapid ventricular response, acute on chronic systolic and diastolic heart failure and possible acute bronchitis.    We were consulted on 6/14 to see the patient for JAIR. The patient is known to our practice and follows with Dr. Box. Her baseline cr is 1.24 to 1.42. Her cr increased to 1.7 this morning. Upon assessment the patient reports she is feeling better, she is less short of breath. She has been eating and drinking normally. She denies nausea, vomiting, diarrhea. She denies dysuria or hematuria. Her  is present.\"    Subjective: Sitting on the edge of the bed. States she is feeling much better. Less short of breath. No complaints.  is visiting.    Interval History:  6/23/25: Seen and examined. No new issues overnight.       PMH:          Past Medical History:   Diagnosis Date    A-fib ([facility])     Acute exacerbation of CHF (congestive heart failure) ([facility]) 07/23/2015    alpha one neg PiM >34uM    Acute renal failure 10/27/2011    Aneurysm     thoracic aortic    Aneurysm of ascending aorta without rupture     stable ,small . follows with Dr Limon last seen 1/2024    CAD (coronary artery disease)     follows with Dr Fajardo    CRF (chronic renal failure)     Epistaxis 06/26/2014    Gastrointestinal bleeding,

## 2025-06-23 NOTE — CONSULTS
[facility]   Inpatient CHF Nurse Navigator Consult    Cardiologist: Dr Fajardo   Primary Care Physician: Teofilo Dutton Jr., MD     Josefina Garcia is a 80 y.o. (1945) female with a history of HFrEF, most recent EF:  Lab Results   Component Value Date    LVEF 32 05/10/2022       Patient was awake and alert, laying in bed during the consultation and is agreeable to heart failure education. She was engaged and asked appropriate questions throughout the education session. She is not feeling well at all today. She has not felt any better since leaving the hospital 6 days ago.     Barriers identified during consult contributing to HF Hospitalization: none.     [] Limited medication adherence   [] Poor health literacy, education regarding HF medications provided   [] Pill box provided to patient  [] Difficulty affording medications  [] Difficulty obtaining/ managing medications  [] Prescription assistance information given     [] Not weighing themselves daily  [x] Weight log provided for easy monitoring  [] Scale provided     [] Not following low sodium diet  [] Food insecurity   [x] 2 gram sodium diet education provided   [] Low sodium recipes provided  [] Sodium free seasoning provided   [] Low sodium meal delivery options given to patient  [] Dietician consulted     [] Lack of transportation to appointments     [] Depression, given chronic illness  [] Primary team notified     [] Goals of care need addressed  [] Palliative care consulted     [] CHF community health worker Pinky Meredith consulted [phone], to assist with     Chart Reviewed:  Diet: ADULT DIET; Regular; Low Fat/Low Chol/High Fiber/DAVIDSON   Daily Weights: Patient Vitals for the past 96 hrs (Last 3 readings):   Weight   06/22/25 1445 38.5 kg (84 lb 14 oz)     I/O:   Intake/Output Summary (Last 24 hours) at 6/23/2025 1059  Last data filed at 6/23/2025 1054  Gross per 24 hour   Intake --   Output 700 ml   Net  medications as prescribed, meals /diet planning (sodium and fluid restriction), how to read food labels, keeping physician follow ups, smoking cessation, follow the “Heart Failure Zones”      Instructed to call 911 if you have any of the following symptoms:  Struggling to breathe unrelieved with rest  Having chest pain  Confusion or can’t think clearly    Discharge Plan:  Above identified barriers reviewed and needs addressed    Patient/family educated on daily monitoring tools for CHF, made aware of signs and symptoms of worsening HF and to notify provider immediately of change in symptoms.     Heart Failure Home Instructions placed in patient's discharge instructions    Per AHA guidelines patient to be closely monitored following discharge with 7 day follow up appointment    Scheduling with the CHF clinic Already follows, post hospital appointment made    Future Appointments   Date Time Provider Department Center   6/27/2025 12:45 PM SEB CHF ROOM 3 [facility]   11/12/2025  9:00 AM SCHEDULE, MHYX YTOWN DEVICE YTOWN ELECTR [facility]   5/18/2027 10:00 AM CATA FUENTES VAS US 1 SEYZ CARDIO SE Rad/Car   5/18/2027 10:30 AM Angel Berry MD [facility]/MED [facility]     Patient verbalizes understanding of above.     Angelica Singer RN   Heart Failure Navigator

## 2025-06-23 NOTE — PROGRESS NOTES
4 Eyes Skin Assessment     NAME:  Josefina Garcia  YOB: 1945  MEDICAL RECORD NUMBER:  [id number]    The patient is being assessed for  Transfer to New Unit    I agree that at least one RN has performed a thorough Head to Toe Skin Assessment on the patient. ALL assessment sites listed below have been assessed.      Areas assessed by both nurses:    Head, Face, Ears, Shoulders, Back, Chest, Arms, Elbows, Hands, Sacrum. Buttock, Coccyx, Ischium, and Legs. Feet and Heels        Does the Patient have a Wound? No noted wound(s)       Abhi Prevention initiated by RN: Yes  Wound Care Orders initiated by RN: No    Pressure Injury (Stage 3,4, Unstageable, DTI, NWPT, and Complex wounds) if present, place Wound referral order by RN under : No    New Ostomies, if present place, Ostomy referral order under : No     Nurse 1 eSignature: Electronically signed by Tasneem Hodge RN on 6/23/25 at 7:12 PM EDT    **SHARE this note so that the co-signing nurse can place an eSignature**    Nurse 2 eSignature: {Esignature:[id number]}

## 2025-06-23 NOTE — PROGRESS NOTES
Pharmacy Consultation Note  (Antibiotic Dosing and Monitoring)    Initial consult date: 6/23  Consulting physician/provider: Anisha  Drug: Vancomycin  Indication: Nosocomial pneumonia x 7 days    Age/  Gender Height Weight IBW  Allergy Information   80 y.o./female 154.9 cm (5' 1\") 38.5 kg (84 lb 14 oz)     Ideal body weight: 52.1 kg (114 lb 14 oz)   Iodine and Quinidine      Renal Function:  Recent Labs     06/22/25  1003 06/22/25  1720 06/23/25  1005   BUN 71* 73* 75*   CREATININE 1.9* 1.7* 1.8*       Intake/Output Summary (Last 24 hours) at 6/23/2025 1518  Last data filed at 6/23/2025 1054  Gross per 24 hour   Intake --   Output 700 ml   Net -700 ml       Vancomycin Monitoring:  Trough:  No results for input(s): \"VANCOTROUGH\" in the last 72 hours.  Random:  No results for input(s): \"VANCORANDOM\" in the last 72 hours.        Assessment:  Patient is a 80 y.o. female who has been initiated on vancomycin  Estimated Creatinine Clearance: 16 mL/min (A) (based on SCr of 1.8 mg/dL (H)).  No history of vancomycin levels in EPIC  CKD stage III with baseline serum creatinine 1.2-1.5 mg/dL per H&P.    Serum creatinine is 1.8 mg/dL today.    Plan:  Will dose vancomycin by level.  Vancomycin 1000 mg IV x 1 now.  Vancomycin level with morning labs on 6/24   Will assess vancomycin level and morning labs on 6/24 to determine future vancomycin dosing plans.   Will continue to monitor renal function   Pharmacy to follow      Dorian Mayo PharmD 6/23/2025 3:18 PM   Ext: [phone]    JUAN: [phone]  SEY: [phone]  SJW: [phone]

## 2025-06-23 NOTE — CARE COORDINATION
Social Work / Discharge Planning : RE-ADMISSION. Patient admitted with Parainfluenza from HOME with spouse. Patient had an RRT and transferred to ICU. Currently on 5 liters of oxygen. RA baseline.Therapy is ordered but was not able to see patient due to current medical status. Await treatment plan and recommendations as well as therapy input. SW to follow. Electronically signed by LISSETH Crowder on 6/23/25 at 2:23 PM EDT

## 2025-06-23 NOTE — PROCEDURES
PROCEDURE NOTE  Date: 6/23/2025   Name: Josefina Garcia  YOB: 1945    Procedure: Right femoral arterial line placement.     Indications: Continuous monitoring of blood pressure in a patient with hypotension +/- shock, on Levophed.     Anesthesia: Local infiltration of 1% lidocaine.     Consent:  Informed verbal obtained from spouse.     Technique:  Procedure was done using strict aseptic technique. Right femoral site was cleaned with chloraprep and draped.  Femoral artery was identified, then Lidocaine 1% was infiltrated locally.  Femoral arterial line was inserted, a good blood flow was obtained, after which guidewire was inserted all the way with no resistance. Then the canula was inserted and needle with guidewire was withdrawn. Pulsatile bright red blood flow was observed. The canula was connected to BP monitoring apparatus and a good waveform was noted. Then the canula was secured with 2 stay sutures of 3-0 silk after Lidocaine infiltration, following which dressing was applied.     Number of sticks: 1.     Number of Kits used: 1.     Complications: No immediate complication.     Estimated blood loss: About 1 ml.     Comment: Patient tolerated the procedure well.     DO Richy Ramsay DO

## 2025-06-23 NOTE — PROGRESS NOTES
Pharmacy Consultation Note  (Warfarin Dosing and Monitoring)    Initial consult date: 6/23  Consulting Provider: Genaro JENNINGS Jose is a 80 y.o. female for whom pharmacy has been asked to manage warfarin therapy.     Weight:   Wt Readings from Last 1 Encounters:   06/22/25 38.5 kg (84 lb 14 oz)       TSH:    Lab Results   Component Value Date/Time    TSH 22.70 06/10/2025 11:15 PM       Hepatic Function Panel:                            Lab Results   Component Value Date/Time    ALKPHOS 92 06/23/2025 10:05 AM    ALT 17 06/23/2025 10:05 AM    AST 19 06/23/2025 10:05 AM    BILITOT 2.7 06/23/2025 10:05 AM    BILIDIR 0.4 06/10/2025 11:15 PM    IBILI 0.8 06/10/2025 11:15 PM       Current significant warfarin drug-drug interactions include:   -Allopurinol and amiodarone: may SIGNIFICANTLY increase anticoagulatory effects of warfarin  -Levothyroxine: may increas anticoagulatory effects of warfarin    Recent Labs     06/22/25  1003 06/23/25  1005   HGB 10.8* 10.2*    130     Date Warfarin Dose INR Heparin or LMWH Comment   6/23 NO DOSE 4.9 x                                  Assessment:  Patient is a 80 y.o. female on warfarin for Atrial Fibrillation and mechanical mitral and aortic valves  Patient's home warfarin dosing regimen is 2 mg every evening per med rec.   Goal INR 2.5 - 3.5  INR 4.9 today, up from 3.9 yesterday    Plan:  No warfarin tonight given supratherapeutic INR  NO WARFARIN TONIGHT.  Daily PT/INR until the INR is stable within the therapeutic range  Pharmacist will follow and monitor/adjust dosing as necessary      Dorian Mayo PharmD 6/23/2025 11:53 AM   Ext: [phone]    JUAN: [phone]  SEY: [phone]  SJW: [phone]

## 2025-06-23 NOTE — H&P
Internal Medicine History & Physical     Name: Josefina Garcia  : 1945  Chief Complaint: Generalized weakness, fatigue  Primary Care Physician: Teofilo Dutton Jr., MD  Admission date: 2025  Date of service: 2025   Unit: [address] INTERMEDIATE     History of Present Illness  Josefina is a 80 y.o. year old female with a past medical history of hypertension, hyperlipidemia, CAD, CKD and atrial fibrillation/valvular heart disease anticoagulated with Coumadin. She presented to the ER on  with a chief complaint of generalized weakness and fatigue.  She was recently discharged from the hospital last Monday after being treated for RSV.  Her  was present at bedside and he helped provide the history as she was extremely weak.  She states that she cannot eat, is having trouble moving around, she has been very tired.  She denies any significant shortness of breath.  She is on room air at baseline but is currently requiring oxygen.  She denies any other new complaints or issues at this time.    Past Medical History:   Diagnosis Date    A-fib (HCC)     Acute exacerbation of CHF (congestive heart failure) (HCC) 2015    alpha one neg PiM >34uM    Acute renal failure 10/27/2011    Aneurysm     thoracic aortic    Aneurysm of ascending aorta without rupture     stable ,small . follows with Dr Limon last seen 2024    CAD (coronary artery disease)     follows with Dr Fajardo    CRF (chronic renal failure)     Epistaxis 2014    Gastrointestinal bleeding, lower 2012    H/O anemia of chronic disorder 2012    Hypertension     Thyroid disease        Past Surgical History:   Procedure Laterality Date    AORTIC VALVULOPLASTY      1992    CARDIAC DEFIBRILLATOR PLACEMENT Left 2022    Medtronic CRT-D  (Dr. Vega)    CARDIAC SURGERY      COLONOSCOPY      COLONOSCOPY  2024    COLONOSCOPY CONTROL HEMORRHAGE WITH CLIPPING performed by Wolf Jenkins MD at [facility] ENDOSCOPY     mL/m2).    Right Atrium: Right atrium is moderately dilated.    Image quality is adequate.    Radiology:    XR CHEST PORTABLE  Result Date: 6/22/2025  EXAMINATION: ONE XRAY VIEW OF THE CHEST 6/22/2025 10:21 am COMPARISON: 06/11/2025 HISTORY: ORDERING SYSTEM PROVIDED HISTORY: cough, shortness of breath TECHNOLOGIST PROVIDED HISTORY: Reason for exam:->cough, shortness of breath FINDINGS: The heart is enlarged.  Postop sternotomy changes are noted.  There is a dual lead cardiac pacer on the left Significant multifocal bilateral airspace disease is noted more prominent within the right lung.  There is a small right pleural effusion trace left pleural effusion.     1. Significant multifocal bilateral airspace disease more prominent within the right lung.  These findings could represent pneumonia, CHF or combination of both. 2. Small right pleural effusion and trace left pleural effusion 3. Cardiomegaly     US RETROPERITONEAL COMPLETE  Result Date: 6/14/2025  EXAMINATION: RETROPERITONEAL ULTRASOUND OF THE KIDNEYS AND URINARY BLADDER 6/14/2025 COMPARISON: CT abdomen 03/10/2025 HISTORY: ORDERING SYSTEM PROVIDED HISTORY: JAIR TECHNOLOGIST PROVIDED HISTORY: Reason for exam:->JAIR What reading provider will be dictating this exam?->CRC FINDINGS: Kidneys: The right kidney measures 9.8 cm in length and the left kidney measures 9.3 cm in length. Kidneys demonstrate normal cortical echogenicity.  No evidence of hydronephrosis or intrarenal stones.  There are few subcentimeter left renal cysts. Bladder: Unremarkable appearance of the bladder.  Bilateral ureteral jets were identified.  No significant post void residual.     Bilateral kidneys are slightly small in size.  Otherwise unremarkable ultrasound of the kidneys and urinary bladder.  No hydronephrosis.     XR CHEST PORTABLE  Result Date: 6/11/2025  EXAMINATION: ONE XRAY VIEW OF THE CHEST 6/11/2025 3:14 pm COMPARISON: 06/11/2025 at 12:22 hours HISTORY: ORDERING SYSTEM PROVIDED

## 2025-06-23 NOTE — PROGRESS NOTES
[facility] Quality Flow/Interdisciplinary Rounds Progress Note        Quality Flow Rounds held on June 23, 2025    Disciplines Attending:  Bedside Nurse, , , and Nursing Unit Leadership    Josefina Garcia was admitted on 6/22/2025  2:27 PM    Anticipated Discharge Date:       Disposition:    Abhi Score:  Abhi Scale Score: 20    [facility] RISK OF UNPLANNED READMISSION 2.0             24.1 Total Score        Discussed patient goal for the day, patient clinical progression, and barriers to discharge.  The following Goal(s) of the Day/Commitment(s) have been identified:  discharge planning, cardio, nephro, pulm, palliative, PO dig      Tereso Mar RN  June 23, 2025

## 2025-06-24 LAB
EKG ATRIAL RATE: 13 BPM
EKG Q-T INTERVAL: 392 MS
EKG QRS DURATION: 146 MS
EKG QTC CALCULATION (BAZETT): 613 MS
EKG R AXIS: -64 DEGREES
EKG T AXIS: 163 DEGREES
EKG VENTRICULAR RATE: 147 BPM

## 2025-06-24 PROCEDURE — 93010 ELECTROCARDIOGRAM REPORT: CPT | Performed by: INTERNAL MEDICINE

## 2025-06-25 LAB
MICROORGANISM SPEC CULT: NORMAL
SPECIMEN DESCRIPTION: NORMAL

## 2025-06-26 NOTE — PROGRESS NOTES
Physician Progress Note      PATIENT:               RULA GUZMAN  [facility] #:                  [id number]  :                       1945  ADMIT DATE:       2025 3:47 AM  DISCH DATE:        2025 4:10 PM  RESPONDING  PROVIDER #:        Richy Olea MD          QUERY TEXT:    Acute respiratory failure is documented in the medical record Discharge   Summary by Richy Olea MD at 2025. Please provide additional clinical   indicators supportive of your documentation. Or please document if the   diagnosis of acute respiratory failure has been ruled out after study.    The clinical indicators include:  80 yr old, Acute bronchitis, parainfluenza with COPD exacerbation.    \" Acute on chronic respiratory failure with Hypoxia - resolved before dc,   Acute on chronic respiratory failure with Hypoxia - resolved before dc, Wean   as tolerated to room air at baseline -remains on 1 L nasal cannula  Lungs: Improving respiratory status, occasional rhonchi on inspiration and   expiration, no retractions/use of accessory muscles, no vocal fremitus, no   rhonchi, no crackle, no rales, currently on RA \" Discharge Summary by Richy Olea MD at 2025    \" Hypoxia Patient currently on oxygen and having some shortness of breath   still, Wean as tolerated to room air at baseline, Possible acute bronchitis  mild increased work of breathing at rest, on 2 L/min of O2, occasional wheeze\"   H&P by Richy Olea MD at 2025    \"No cough or wheezing, No retractions or use of accessory muscles Cardiology   Consults by Rick Fajardo DO at 2025    SpO2  81%,    94%,  86%,  93%, 06/15 93%,  90%    RR  20,  18,  18, RR  18, 06/15 20, RR 18    1-3 L NC, Chest x-ray, Pulmonology consult,    Thank you,  Dana Ma S CDS  Options provided:  -- Acute on chronic Respiratory Failure as evidenced by, Please document   evidence.  -- Acute Respiratory Failure ruled out  after study and Chronic Respiratory   Failure confirmed  -- Other - I will add my own diagnosis  -- Disagree - Not applicable / Not valid  -- Disagree - Clinically unable to determine / Unknown  -- Refer to Clinical Documentation Reviewer    PROVIDER RESPONSE TEXT:    This patient is in acute on chronic respiratory failure as evidenced by    Query created by: Dana Ma on 6/20/2025 3:10 AM      QUERY TEXT:    Based on your medical judgment, please clarify these findings and document if   any of the following are being evaluated and/or treated:    The clinical indicators include:  80 yr old female, Hypertension, Acute on chronic systolic and diastolic heart   failure,      \" Atrial fibrillation with rapid ventricular response HR fairly well   controlled now Appreciate cardio recommendations\" Internal Medicine Progress   Notes by Richy Olea MD at 6/12/2025    Continue warfarin    Thank you,  Dana Ma, [facility] CDS  Options provided:  -- Secondary hypercoagulable state in a patient with atrial fibrillation  -- Other - I will add my own diagnosis  -- Disagree - Not applicable / Not valid  -- Disagree - Clinically unable to determine / Unknown  -- Refer to Clinical Documentation Reviewer    PROVIDER RESPONSE TEXT:    This patient has secondary hypercoagulable state in a patient with atrial   fibrillation.    Query created by: Dana Ma on 6/13/2025 5:48 AM      QUERY TEXT:    Atrial fibrillation is documented in the medical record Internal Medicine   Progress Notes by Richy Olea MD at 6/12/2025. Please clarify the type:    The clinical indicators include:  80 yr old, Acute on chronic systolic and diastolic heart failure, Hypertension    \" Atrial fibrillation with rapid ventricular response, HR fairly well   controlled now, Appreciate cardio recommendations  Continue warfarin for dosing\" Internal Medicine Progress Notes by Richy Olea MD at 6/12/2025    \" Atrial fibrillation with rapid ventricular

## 2025-06-26 NOTE — DISCHARGE SUMMARY
Please see my H&P from the same day as the patient's death.  Her status declined and she had a low blood pressure and was sent to the ICU.  She decided to change her CODE STATUS to DNRCC and be made comfortable.  She  shortly after.    Electronically signed by Nicolás Lam DO on 2025 at 1:28 PM

## 2025-06-27 LAB
ACB COMPLEX DNA BLD POS QL NAA+NON-PROBE: NOT DETECTED
B FRAGILIS DNA BLD POS QL NAA+NON-PROBE: NOT DETECTED
BIOFIRE TEST COMMENT: ABNORMAL
C ALBICANS DNA BLD POS QL NAA+NON-PROBE: NOT DETECTED
C AURIS DNA BLD POS QL NAA+NON-PROBE: NOT DETECTED
C GATTII+NEOFOR DNA BLD POS QL NAA+N-PRB: NOT DETECTED
C GLABRATA DNA BLD POS QL NAA+NON-PROBE: NOT DETECTED
C KRUSEI DNA BLD POS QL NAA+NON-PROBE: NOT DETECTED
C PARAP DNA BLD POS QL NAA+NON-PROBE: NOT DETECTED
C TROPICLS DNA BLD POS QL NAA+NON-PROBE: NOT DETECTED
E CLOAC COMP DNA BLD POS NAA+NON-PROBE: NOT DETECTED
E COLI DNA BLD POS QL NAA+NON-PROBE: NOT DETECTED
E FAECALIS DNA BLD POS QL NAA+NON-PROBE: NOT DETECTED
E FAECIUM DNA BLD POS QL NAA+NON-PROBE: NOT DETECTED
ENTEROBACTERALES DNA BLD POS NAA+N-PRB: NOT DETECTED
GP B STREP DNA BLD POS QL NAA+NON-PROBE: NOT DETECTED
HAEM INFLU DNA BLD POS QL NAA+NON-PROBE: NOT DETECTED
K OXYTOCA DNA BLD POS QL NAA+NON-PROBE: NOT DETECTED
KLEBSIELLA SP DNA BLD POS QL NAA+NON-PRB: NOT DETECTED
KLEBSIELLA SP DNA BLD POS QL NAA+NON-PRB: NOT DETECTED
L MONOCYTOG DNA BLD POS QL NAA+NON-PROBE: NOT DETECTED
MICROORGANISM SPEC CULT: ABNORMAL
MICROORGANISM SPEC CULT: ABNORMAL
MICROORGANISM/AGENT SPEC: ABNORMAL
MICROORGANISM/AGENT SPEC: ABNORMAL
N MEN DNA BLD POS QL NAA+NON-PROBE: NOT DETECTED
P AERUGINOSA DNA BLD POS NAA+NON-PROBE: NOT DETECTED
PROTEUS SP DNA BLD POS QL NAA+NON-PROBE: NOT DETECTED
S AUREUS DNA BLD POS QL NAA+NON-PROBE: NOT DETECTED
S AUREUS+CONS DNA BLD POS NAA+NON-PROBE: NOT DETECTED
S EPIDERMIDIS DNA BLD POS QL NAA+NON-PRB: NOT DETECTED
S LUGDUNENSIS DNA BLD POS QL NAA+NON-PRB: NOT DETECTED
S MALTOPHILIA DNA BLD POS QL NAA+NON-PRB: NOT DETECTED
S MARCESCENS DNA BLD POS NAA+NON-PROBE: NOT DETECTED
S PNEUM DNA BLD POS QL NAA+NON-PROBE: NOT DETECTED
S PYO DNA BLD POS QL NAA+NON-PROBE: NOT DETECTED
SALMONELLA DNA BLD POS QL NAA+NON-PROBE: NOT DETECTED
SERVICE CMNT-IMP: ABNORMAL
SERVICE CMNT-IMP: ABNORMAL
SPECIMEN DESCRIPTION: ABNORMAL
SPECIMEN DESCRIPTION: ABNORMAL
STREPTOCOCCUS DNA BLD POS NAA+NON-PROBE: NOT DETECTED

## 2025-06-30 NOTE — PROGRESS NOTES
Physician Progress Note      PATIENT:               RULA GUZMAN  [facility] #:                  [id number]  :                       1945  ADMIT DATE:       2025 2:27 PM  DISCH DATE:        2025 7:53 PM  RESPONDING  PROVIDER #:        Nicolás RUSH DO          QUERY TEXT:    Septic shock is documented in ICU consult  Dr. Lancaster. Based on your   medical judgment, please clarify the POA status at the time of the order to   admit the patient to inpatient status:    The clinical indicators include:  Per ICU consult  Dr. Lancaster:  - She has been persistently hypotensive since admission yesterday despite   increasing midodrine  - short of breath with SpO2 in the upper 80s and lower 90s on 5-6 L NC   initially.  - She began to develop a fever as well as had persistent MAP in the 50s.  - A central line and am arterial line was placed.  She was started on Levophed   as well as cefepime and vancomycin.  - Shock with MSOF  - Right lower lobe pneumonia  - Acute hypoxic respiratory failure    Per RRT  Dr. Hayes:  - RRT was called for hypotension  - had worsening blood pressures and was being given midodrine 10 mg 3 times   daily but remained hypotensive with blood pressure 73/44 and heart rate in the   140s  - also noted to be febrile around 102.  - somnolent but oriented x 3.  -  transfer to ICU for vasopressor administration.  - broad-spectrum antibiotics at this point as this is a picture of septic   shock.    Per H&P Dr. Rush :  - Parainfluenza infection:  - Relative hypotension: Scheduled midodrine increased   Options provided:  -- Sepsis with septic shock present on admission  -- Sepsis with septic Shock not present on admission.  -- Other - I will add my own diagnosis  -- Disagree - Not applicable / Not valid  -- Disagree - Clinically unable to determine / Unknown  -- Refer to Clinical Documentation Reviewer    PROVIDER RESPONSE TEXT:    Sepsis with septic shock was present

## (undated) DEVICE — GRADUATE TRIANG MEASURE 1000ML BLK PRNT

## (undated) DEVICE — TRAP POLYP ETRAP

## (undated) DEVICE — SPONGE GZ W4XL4IN RAYON POLY CVR W/NONWOVEN FAB STRL 2/PK

## (undated) DEVICE — SNARE ENDOSCP L240CM LOOP W13MM SHTH DIA2.4MM SM OVL FLX

## (undated) DEVICE — BLOCK BITE 60FR RUBBER ADLT DENTAL